# Patient Record
Sex: MALE | Race: WHITE | Employment: OTHER | URBAN - METROPOLITAN AREA
[De-identification: names, ages, dates, MRNs, and addresses within clinical notes are randomized per-mention and may not be internally consistent; named-entity substitution may affect disease eponyms.]

---

## 2017-01-02 ENCOUNTER — APPOINTMENT (OUTPATIENT)
Dept: CARDIAC REHAB | Facility: HOSPITAL | Age: 82
End: 2017-01-02
Attending: INTERNAL MEDICINE
Payer: MEDICARE

## 2017-01-02 ENCOUNTER — PRIOR ORIGINAL RECORDS (OUTPATIENT)
Dept: OTHER | Age: 82
End: 2017-01-02

## 2017-01-04 ENCOUNTER — PRIOR ORIGINAL RECORDS (OUTPATIENT)
Dept: OTHER | Age: 82
End: 2017-01-04

## 2017-01-04 ENCOUNTER — APPOINTMENT (OUTPATIENT)
Dept: CARDIAC REHAB | Facility: HOSPITAL | Age: 82
End: 2017-01-04
Attending: INTERNAL MEDICINE
Payer: MEDICARE

## 2017-01-04 LAB
HEMATOCRIT: 34.3 %
HEMOGLOBIN: 10.6 G/DL
PLATELETS: 206 K/UL
RED BLOOD COUNT: 3.49 X 10-6/U
WHITE BLOOD COUNT: 10.87 X 10-3/U

## 2017-01-06 ENCOUNTER — LAB REQUISITION (OUTPATIENT)
Dept: LAB | Facility: HOSPITAL | Age: 82
End: 2017-01-06
Payer: MEDICARE

## 2017-01-06 ENCOUNTER — PRIOR ORIGINAL RECORDS (OUTPATIENT)
Dept: OTHER | Age: 82
End: 2017-01-06

## 2017-01-06 ENCOUNTER — APPOINTMENT (OUTPATIENT)
Dept: CARDIAC REHAB | Facility: HOSPITAL | Age: 82
End: 2017-01-06
Attending: INTERNAL MEDICINE
Payer: MEDICARE

## 2017-01-06 DIAGNOSIS — I50.31 ACUTE DIASTOLIC HEART FAILURE (HCC): ICD-10-CM

## 2017-01-06 LAB
ALT SERPL-CCNC: 19 U/L (ref 17–63)
ANION GAP SERPL CALC-SCNC: 11 MMOL/L (ref 0–18)
AST SERPL-CCNC: 34 U/L (ref 15–41)
BASOPHILS # BLD: 0.1 K/UL (ref 0–0.2)
BASOPHILS NFR BLD: 1 %
BUN SERPL-MCNC: 18 MG/DL (ref 8–20)
BUN/CREAT SERPL: 16.4 (ref 10–20)
CALCIUM SERPL-MCNC: 8.3 MG/DL (ref 8.5–10.5)
CHLORIDE SERPL-SCNC: 98 MMOL/L (ref 95–110)
CO2 SERPL-SCNC: 25 MMOL/L (ref 22–32)
CREAT SERPL-MCNC: 1.1 MG/DL (ref 0.5–1.5)
EOSINOPHIL # BLD: 0.3 K/UL (ref 0–0.7)
EOSINOPHIL NFR BLD: 3 %
ERYTHROCYTE [DISTWIDTH] IN BLOOD BY AUTOMATED COUNT: 17.6 % (ref 11–15)
GLUCOSE SERPL-MCNC: 119 MG/DL (ref 70–99)
HCT VFR BLD AUTO: 29.5 % (ref 41–52)
HGB BLD-MCNC: 10 G/DL (ref 13.5–17.5)
LYMPHOCYTES # BLD: 0.7 K/UL (ref 1–4)
LYMPHOCYTES NFR BLD: 8 %
MCH RBC QN AUTO: 31.5 PG (ref 27–32)
MCHC RBC AUTO-ENTMCNC: 33.8 G/DL (ref 32–37)
MCV RBC AUTO: 93.2 FL (ref 80–100)
MONOCYTES # BLD: 1.5 K/UL (ref 0–1)
MONOCYTES NFR BLD: 16 %
NEUTROPHILS # BLD AUTO: 6.4 K/UL (ref 1.8–7.7)
NEUTROPHILS NFR BLD: 71 %
OSMOLALITY UR CALC.SUM OF ELEC: 281 MOSM/KG (ref 275–295)
PLATELET # BLD AUTO: 179 K/UL (ref 140–400)
PMV BLD AUTO: 7.8 FL (ref 7.4–10.3)
POTASSIUM SERPL-SCNC: 4 MMOL/L (ref 3.3–5.1)
RBC # BLD AUTO: 3.17 M/UL (ref 4.5–5.9)
SODIUM SERPL-SCNC: 134 MMOL/L (ref 136–144)
WBC # BLD AUTO: 9 K/UL (ref 4–11)

## 2017-01-06 PROCEDURE — 84450 TRANSFERASE (AST) (SGOT): CPT | Performed by: INTERNAL MEDICINE

## 2017-01-06 PROCEDURE — 80048 BASIC METABOLIC PNL TOTAL CA: CPT | Performed by: INTERNAL MEDICINE

## 2017-01-06 PROCEDURE — 84460 ALANINE AMINO (ALT) (SGPT): CPT | Performed by: INTERNAL MEDICINE

## 2017-01-06 PROCEDURE — 85025 COMPLETE CBC W/AUTO DIFF WBC: CPT | Performed by: INTERNAL MEDICINE

## 2017-01-09 ENCOUNTER — APPOINTMENT (OUTPATIENT)
Dept: CARDIAC REHAB | Facility: HOSPITAL | Age: 82
End: 2017-01-09
Attending: INTERNAL MEDICINE
Payer: MEDICARE

## 2017-01-10 ENCOUNTER — PRIOR ORIGINAL RECORDS (OUTPATIENT)
Dept: OTHER | Age: 82
End: 2017-01-10

## 2017-01-11 ENCOUNTER — APPOINTMENT (OUTPATIENT)
Dept: CARDIAC REHAB | Facility: HOSPITAL | Age: 82
End: 2017-01-11
Attending: INTERNAL MEDICINE
Payer: MEDICARE

## 2017-01-13 ENCOUNTER — APPOINTMENT (OUTPATIENT)
Dept: CARDIAC REHAB | Facility: HOSPITAL | Age: 82
End: 2017-01-13
Attending: INTERNAL MEDICINE
Payer: MEDICARE

## 2017-01-16 ENCOUNTER — APPOINTMENT (OUTPATIENT)
Dept: CARDIAC REHAB | Facility: HOSPITAL | Age: 82
End: 2017-01-16
Attending: INTERNAL MEDICINE
Payer: MEDICARE

## 2017-01-16 ENCOUNTER — PRIOR ORIGINAL RECORDS (OUTPATIENT)
Dept: OTHER | Age: 82
End: 2017-01-16

## 2017-01-18 ENCOUNTER — APPOINTMENT (OUTPATIENT)
Dept: CARDIAC REHAB | Facility: HOSPITAL | Age: 82
End: 2017-01-18
Attending: INTERNAL MEDICINE
Payer: MEDICARE

## 2017-01-20 ENCOUNTER — APPOINTMENT (OUTPATIENT)
Dept: CARDIAC REHAB | Facility: HOSPITAL | Age: 82
End: 2017-01-20
Attending: INTERNAL MEDICINE
Payer: MEDICARE

## 2017-01-23 ENCOUNTER — APPOINTMENT (OUTPATIENT)
Dept: CARDIAC REHAB | Facility: HOSPITAL | Age: 82
End: 2017-01-23
Attending: INTERNAL MEDICINE
Payer: MEDICARE

## 2017-01-25 ENCOUNTER — HOSPITAL ENCOUNTER (OUTPATIENT)
Dept: CARDIOLOGY CLINIC | Facility: HOSPITAL | Age: 82
Discharge: HOME | End: 2017-01-25
Attending: NURSE PRACTITIONER
Payer: MEDICARE

## 2017-01-25 ENCOUNTER — HOSPITAL ENCOUNTER (OUTPATIENT)
Dept: CV DIAGNOSTICS | Facility: HOSPITAL | Age: 82
Discharge: HOME | End: 2017-01-25
Attending: THORACIC SURGERY (CARDIOTHORACIC VASCULAR SURGERY)
Payer: MEDICARE

## 2017-01-25 ENCOUNTER — APPOINTMENT (OUTPATIENT)
Dept: CARDIAC REHAB | Facility: HOSPITAL | Age: 82
End: 2017-01-25
Attending: INTERNAL MEDICINE
Payer: MEDICARE

## 2017-01-25 VITALS
OXYGEN SATURATION: 97 % | RESPIRATION RATE: 20 BRPM | DIASTOLIC BLOOD PRESSURE: 62 MMHG | SYSTOLIC BLOOD PRESSURE: 118 MMHG | TEMPERATURE: 98 F | HEART RATE: 85 BPM

## 2017-01-25 DIAGNOSIS — I35.0 AORTIC STENOSIS: ICD-10-CM

## 2017-01-25 PROCEDURE — 93306 TTE W/DOPPLER COMPLETE: CPT | Performed by: THORACIC SURGERY (CARDIOTHORACIC VASCULAR SURGERY)

## 2017-01-25 PROCEDURE — 99214 OFFICE O/P EST MOD 30 MIN: CPT

## 2017-01-25 PROCEDURE — 93306 TTE W/DOPPLER COMPLETE: CPT

## 2017-01-25 NOTE — PROGRESS NOTES
Benny Davis Note    Subjective:   Patient presents for routine 6 Month postop TMVR visit, accompanied by his wife and daughter.   He underwent Mitraclip X 2 on 7/29/2016 with reduction in MR from 4+ to 1+.  He also had a watchman on 9/6/201 pressure: 61mm Hg (S). Compared to the previous study these findings represent improvement in mitral  regurgitation, and worsening in pulmonary hypertension.       Labs:           Lab Results  Component Value Date   PT 31.8* 09/26/2016   INR 1.3* 10/11/201 facility-administered medications on file prior to encounter.       Assessment:  · Severe mitral regurgitation , s/p anne clip x 2 on  7/29/16  - no antiplatelet therapy at present d/t severe GI bleeds   · Chronic atrial fibrillation, s/p Watchman procedur

## 2017-01-27 ENCOUNTER — CARDPULM VISIT (OUTPATIENT)
Dept: CARDIAC REHAB | Facility: HOSPITAL | Age: 82
End: 2017-01-27
Attending: INTERNAL MEDICINE
Payer: MEDICARE

## 2017-01-27 PROCEDURE — 93798 PHYS/QHP OP CAR RHAB W/ECG: CPT

## 2017-01-30 ENCOUNTER — CARDPULM VISIT (OUTPATIENT)
Dept: CARDIAC REHAB | Facility: HOSPITAL | Age: 82
End: 2017-01-30
Attending: INTERNAL MEDICINE
Payer: MEDICARE

## 2017-01-30 PROCEDURE — 93798 PHYS/QHP OP CAR RHAB W/ECG: CPT

## 2017-02-01 ENCOUNTER — CARDPULM VISIT (OUTPATIENT)
Dept: CARDIAC REHAB | Facility: HOSPITAL | Age: 82
End: 2017-02-01
Attending: INTERNAL MEDICINE
Payer: MEDICARE

## 2017-02-01 PROCEDURE — 93798 PHYS/QHP OP CAR RHAB W/ECG: CPT

## 2017-02-03 ENCOUNTER — CARDPULM VISIT (OUTPATIENT)
Dept: CARDIAC REHAB | Facility: HOSPITAL | Age: 82
End: 2017-02-03
Attending: INTERNAL MEDICINE
Payer: MEDICARE

## 2017-02-03 PROCEDURE — 93798 PHYS/QHP OP CAR RHAB W/ECG: CPT

## 2017-02-06 ENCOUNTER — CARDPULM VISIT (OUTPATIENT)
Dept: CARDIAC REHAB | Facility: HOSPITAL | Age: 82
End: 2017-02-06
Attending: INTERNAL MEDICINE
Payer: MEDICARE

## 2017-02-06 PROCEDURE — 93798 PHYS/QHP OP CAR RHAB W/ECG: CPT

## 2017-02-08 ENCOUNTER — CARDPULM VISIT (OUTPATIENT)
Dept: CARDIAC REHAB | Facility: HOSPITAL | Age: 82
End: 2017-02-08
Attending: INTERNAL MEDICINE
Payer: MEDICARE

## 2017-02-08 PROCEDURE — 93798 PHYS/QHP OP CAR RHAB W/ECG: CPT

## 2017-02-10 ENCOUNTER — CARDPULM VISIT (OUTPATIENT)
Dept: CARDIAC REHAB | Facility: HOSPITAL | Age: 82
End: 2017-02-10
Attending: INTERNAL MEDICINE
Payer: MEDICARE

## 2017-02-10 PROCEDURE — 93798 PHYS/QHP OP CAR RHAB W/ECG: CPT

## 2017-02-11 ENCOUNTER — PRIOR ORIGINAL RECORDS (OUTPATIENT)
Dept: OTHER | Age: 82
End: 2017-02-11

## 2017-02-13 ENCOUNTER — CARDPULM VISIT (OUTPATIENT)
Dept: CARDIAC REHAB | Facility: HOSPITAL | Age: 82
End: 2017-02-13
Attending: INTERNAL MEDICINE
Payer: MEDICARE

## 2017-02-13 PROCEDURE — 93798 PHYS/QHP OP CAR RHAB W/ECG: CPT

## 2017-02-15 ENCOUNTER — CARDPULM VISIT (OUTPATIENT)
Dept: CARDIAC REHAB | Facility: HOSPITAL | Age: 82
End: 2017-02-15
Attending: INTERNAL MEDICINE
Payer: MEDICARE

## 2017-02-15 PROCEDURE — 93798 PHYS/QHP OP CAR RHAB W/ECG: CPT

## 2017-02-16 LAB
BUN: 18 MG/DL
CALCIUM: 8.3 MG/DL
CHLORIDE: 98 MEQ/L
CREATININE, SERUM: 1.1 MG/DL
GLUCOSE: 119 MG/DL
HEMATOCRIT: 29.5 %
HEMATOCRIT: 33.9 %
HEMOGLOBIN: 10 G/DL
HEMOGLOBIN: 10.9 G/DL
PLATELETS: 162 K/UL
PLATELETS: 179 K/UL
POTASSIUM, SERUM: 4 MEQ/L
RED BLOOD COUNT: 3.17 X 10-6/U
RED BLOOD COUNT: 3.48 X 10-6/U
SGOT (AST): 34 IU/L
SGPT (ALT): 19 IU/L
SODIUM: 134 MEQ/L
WHITE BLOOD COUNT: 8.82 X 10-3/U
WHITE BLOOD COUNT: 9 X 10-3/U

## 2017-02-17 ENCOUNTER — PRIOR ORIGINAL RECORDS (OUTPATIENT)
Dept: OTHER | Age: 82
End: 2017-02-17

## 2017-02-20 ENCOUNTER — CARDPULM VISIT (OUTPATIENT)
Dept: CARDIAC REHAB | Facility: HOSPITAL | Age: 82
End: 2017-02-20
Attending: INTERNAL MEDICINE
Payer: MEDICARE

## 2017-02-20 PROCEDURE — 93798 PHYS/QHP OP CAR RHAB W/ECG: CPT

## 2017-02-22 ENCOUNTER — CARDPULM VISIT (OUTPATIENT)
Dept: CARDIAC REHAB | Facility: HOSPITAL | Age: 82
End: 2017-02-22
Attending: INTERNAL MEDICINE
Payer: MEDICARE

## 2017-02-22 PROCEDURE — 93798 PHYS/QHP OP CAR RHAB W/ECG: CPT

## 2017-02-24 ENCOUNTER — CARDPULM VISIT (OUTPATIENT)
Dept: CARDIAC REHAB | Facility: HOSPITAL | Age: 82
End: 2017-02-24
Attending: INTERNAL MEDICINE
Payer: MEDICARE

## 2017-02-24 PROCEDURE — 93798 PHYS/QHP OP CAR RHAB W/ECG: CPT

## 2017-02-27 ENCOUNTER — CARDPULM VISIT (OUTPATIENT)
Dept: CARDIAC REHAB | Facility: HOSPITAL | Age: 82
End: 2017-02-27
Attending: INTERNAL MEDICINE
Payer: MEDICARE

## 2017-02-27 PROCEDURE — 93798 PHYS/QHP OP CAR RHAB W/ECG: CPT

## 2017-03-01 ENCOUNTER — CARDPULM VISIT (OUTPATIENT)
Dept: CARDIAC REHAB | Facility: HOSPITAL | Age: 82
End: 2017-03-01
Attending: INTERNAL MEDICINE
Payer: MEDICARE

## 2017-03-01 DIAGNOSIS — Z98.890 S/P MITRAL VALVE REPAIR: Primary | ICD-10-CM

## 2017-03-01 PROCEDURE — 93798 PHYS/QHP OP CAR RHAB W/ECG: CPT

## 2017-03-03 ENCOUNTER — CARDPULM VISIT (OUTPATIENT)
Dept: CARDIAC REHAB | Facility: HOSPITAL | Age: 82
End: 2017-03-03
Attending: INTERNAL MEDICINE
Payer: MEDICARE

## 2017-03-03 PROCEDURE — 93798 PHYS/QHP OP CAR RHAB W/ECG: CPT

## 2017-03-06 ENCOUNTER — CARDPULM VISIT (OUTPATIENT)
Dept: CARDIAC REHAB | Facility: HOSPITAL | Age: 82
End: 2017-03-06
Attending: INTERNAL MEDICINE
Payer: MEDICARE

## 2017-03-06 PROCEDURE — 93798 PHYS/QHP OP CAR RHAB W/ECG: CPT

## 2017-03-08 ENCOUNTER — CARDPULM VISIT (OUTPATIENT)
Dept: CARDIAC REHAB | Facility: HOSPITAL | Age: 82
End: 2017-03-08
Attending: INTERNAL MEDICINE
Payer: MEDICARE

## 2017-03-08 ENCOUNTER — APPOINTMENT (OUTPATIENT)
Dept: CARDIAC REHAB | Facility: HOSPITAL | Age: 82
End: 2017-03-08
Payer: MEDICARE

## 2017-03-08 PROCEDURE — 93798 PHYS/QHP OP CAR RHAB W/ECG: CPT

## 2017-03-10 ENCOUNTER — APPOINTMENT (OUTPATIENT)
Dept: CARDIAC REHAB | Facility: HOSPITAL | Age: 82
End: 2017-03-10
Payer: MEDICARE

## 2017-03-10 ENCOUNTER — CARDPULM VISIT (OUTPATIENT)
Dept: CARDIAC REHAB | Facility: HOSPITAL | Age: 82
End: 2017-03-10
Attending: INTERNAL MEDICINE
Payer: MEDICARE

## 2017-03-10 PROCEDURE — 93798 PHYS/QHP OP CAR RHAB W/ECG: CPT

## 2017-03-13 ENCOUNTER — CARDPULM VISIT (OUTPATIENT)
Dept: CARDIAC REHAB | Facility: HOSPITAL | Age: 82
End: 2017-03-13
Attending: INTERNAL MEDICINE
Payer: MEDICARE

## 2017-03-13 ENCOUNTER — APPOINTMENT (OUTPATIENT)
Dept: CARDIAC REHAB | Facility: HOSPITAL | Age: 82
End: 2017-03-13
Payer: MEDICARE

## 2017-03-13 PROCEDURE — 93798 PHYS/QHP OP CAR RHAB W/ECG: CPT

## 2017-03-15 ENCOUNTER — CARDPULM VISIT (OUTPATIENT)
Dept: CARDIAC REHAB | Facility: HOSPITAL | Age: 82
End: 2017-03-15
Attending: INTERNAL MEDICINE
Payer: MEDICARE

## 2017-03-15 ENCOUNTER — APPOINTMENT (OUTPATIENT)
Dept: CARDIAC REHAB | Facility: HOSPITAL | Age: 82
End: 2017-03-15
Payer: MEDICARE

## 2017-03-15 PROCEDURE — 93798 PHYS/QHP OP CAR RHAB W/ECG: CPT

## 2017-03-17 ENCOUNTER — CARDPULM VISIT (OUTPATIENT)
Dept: CARDIAC REHAB | Facility: HOSPITAL | Age: 82
End: 2017-03-17
Attending: INTERNAL MEDICINE
Payer: MEDICARE

## 2017-03-17 ENCOUNTER — APPOINTMENT (OUTPATIENT)
Dept: CARDIAC REHAB | Facility: HOSPITAL | Age: 82
End: 2017-03-17
Payer: MEDICARE

## 2017-03-17 PROCEDURE — 93798 PHYS/QHP OP CAR RHAB W/ECG: CPT

## 2017-03-20 ENCOUNTER — CARDPULM VISIT (OUTPATIENT)
Dept: CARDIAC REHAB | Facility: HOSPITAL | Age: 82
End: 2017-03-20
Attending: INTERNAL MEDICINE
Payer: MEDICARE

## 2017-03-20 ENCOUNTER — APPOINTMENT (OUTPATIENT)
Dept: CARDIAC REHAB | Facility: HOSPITAL | Age: 82
End: 2017-03-20
Payer: MEDICARE

## 2017-03-20 PROCEDURE — 93798 PHYS/QHP OP CAR RHAB W/ECG: CPT

## 2017-03-22 ENCOUNTER — APPOINTMENT (OUTPATIENT)
Dept: CARDIAC REHAB | Facility: HOSPITAL | Age: 82
End: 2017-03-22
Payer: MEDICARE

## 2017-03-22 ENCOUNTER — CARDPULM VISIT (OUTPATIENT)
Dept: CARDIAC REHAB | Facility: HOSPITAL | Age: 82
End: 2017-03-22
Attending: INTERNAL MEDICINE
Payer: MEDICARE

## 2017-03-22 PROCEDURE — 93798 PHYS/QHP OP CAR RHAB W/ECG: CPT

## 2017-03-24 ENCOUNTER — APPOINTMENT (OUTPATIENT)
Dept: CARDIAC REHAB | Facility: HOSPITAL | Age: 82
End: 2017-03-24
Payer: MEDICARE

## 2017-03-27 ENCOUNTER — APPOINTMENT (OUTPATIENT)
Dept: CARDIAC REHAB | Facility: HOSPITAL | Age: 82
End: 2017-03-27
Payer: MEDICARE

## 2017-03-27 ENCOUNTER — CARDPULM VISIT (OUTPATIENT)
Dept: CARDIAC REHAB | Facility: HOSPITAL | Age: 82
End: 2017-03-27
Attending: INTERNAL MEDICINE
Payer: MEDICARE

## 2017-03-27 PROCEDURE — 93798 PHYS/QHP OP CAR RHAB W/ECG: CPT

## 2017-03-28 ENCOUNTER — DOCUMENTATION ONLY (OUTPATIENT)
Dept: INTERVENTIONAL RADIOLOGY/VASCULAR | Facility: HOSPITAL | Age: 82
End: 2017-03-28

## 2017-03-28 NOTE — PROGRESS NOTES
Patient has reached 6 months post-WATCHMAN device implant. Patient remains off all anticoagulation due to severe GI bleeding, per Dr. Kaila Hughes office note from 2/17/17.

## 2017-03-29 ENCOUNTER — APPOINTMENT (OUTPATIENT)
Dept: CARDIAC REHAB | Facility: HOSPITAL | Age: 82
End: 2017-03-29
Attending: INTERNAL MEDICINE
Payer: MEDICARE

## 2017-04-09 ENCOUNTER — HOSPITAL ENCOUNTER (INPATIENT)
Facility: HOSPITAL | Age: 82
LOS: 4 days | Discharge: HOME | DRG: 378 | End: 2017-04-13
Attending: EMERGENCY MEDICINE | Admitting: INTERNAL MEDICINE
Payer: MEDICARE

## 2017-04-09 DIAGNOSIS — D64.9 ANEMIA, UNSPECIFIED TYPE: Primary | ICD-10-CM

## 2017-04-09 DIAGNOSIS — I95.89 OTHER SPECIFIED HYPOTENSION: ICD-10-CM

## 2017-04-09 PROCEDURE — 86900 BLOOD TYPING SEROLOGIC ABO: CPT | Performed by: EMERGENCY MEDICINE

## 2017-04-09 PROCEDURE — C9113 INJ PANTOPRAZOLE SODIUM, VIA: HCPCS | Performed by: EMERGENCY MEDICINE

## 2017-04-09 PROCEDURE — 86850 RBC ANTIBODY SCREEN: CPT | Performed by: EMERGENCY MEDICINE

## 2017-04-09 PROCEDURE — 99285 EMERGENCY DEPT VISIT HI MDM: CPT

## 2017-04-09 PROCEDURE — 96361 HYDRATE IV INFUSION ADD-ON: CPT

## 2017-04-09 PROCEDURE — 85027 COMPLETE CBC AUTOMATED: CPT | Performed by: EMERGENCY MEDICINE

## 2017-04-09 PROCEDURE — 85025 COMPLETE CBC W/AUTO DIFF WBC: CPT | Performed by: EMERGENCY MEDICINE

## 2017-04-09 PROCEDURE — 86901 BLOOD TYPING SEROLOGIC RH(D): CPT | Performed by: EMERGENCY MEDICINE

## 2017-04-09 PROCEDURE — 30233N1 TRANSFUSION OF NONAUTOLOGOUS RED BLOOD CELLS INTO PERIPHERAL VEIN, PERCUTANEOUS APPROACH: ICD-10-PCS | Performed by: INTERNAL MEDICINE

## 2017-04-09 PROCEDURE — 36430 TRANSFUSION BLD/BLD COMPNT: CPT

## 2017-04-09 PROCEDURE — 80048 BASIC METABOLIC PNL TOTAL CA: CPT | Performed by: EMERGENCY MEDICINE

## 2017-04-09 PROCEDURE — 86920 COMPATIBILITY TEST SPIN: CPT

## 2017-04-09 PROCEDURE — 81003 URINALYSIS AUTO W/O SCOPE: CPT | Performed by: EMERGENCY MEDICINE

## 2017-04-09 PROCEDURE — 85007 BL SMEAR W/DIFF WBC COUNT: CPT | Performed by: EMERGENCY MEDICINE

## 2017-04-09 PROCEDURE — 96374 THER/PROPH/DIAG INJ IV PUSH: CPT

## 2017-04-09 PROCEDURE — 87641 MR-STAPH DNA AMP PROBE: CPT | Performed by: EMERGENCY MEDICINE

## 2017-04-09 RX ORDER — SODIUM CHLORIDE 9 MG/ML
INJECTION, SOLUTION INTRAVENOUS CONTINUOUS
Status: DISCONTINUED | OUTPATIENT
Start: 2017-04-09 | End: 2017-04-11

## 2017-04-09 NOTE — ED NOTES
Pt resting on ED cart with family at bedside. Pt states no pain or nausea, just some cramping intermittently. Skin pink warm dry. Blood transfusing to RAC 20 G with no irritation. Pt and family updated on care.

## 2017-04-09 NOTE — ED INITIAL ASSESSMENT (HPI)
Pt noticed blood in the stool today after bowel movement. sts it appeared dark but he also takes iron.   Hx of gi bleed in the past.  Denies pain but does c/o generalized weakness

## 2017-04-09 NOTE — ED PROVIDER NOTES
Patient Seen in: [facility] AND [facility] Emergency Department    History   Patient presents with:  Bleeding (hematologic)    Stated Complaint: poss gi bleed    HPI  Pt is 81 yo M who p/w generalized weakness x 1 day. +diaphoresis.  Denies nausea, vomiting or di QD   allopurinol 100 MG Oral Tab,  TAKE 1TABLETS BY MOUTH BID   torsemide 10 MG Oral Tab,  Take 20 mg by mouth daily. famoTIDine 20 MG Oral Tab,  Take 20 mg by mouth nightly.    omeprazole 20 MG Oral Capsule Delayed Release,  Take 40 mg by mouth 2 (two) equal  Resp: CTAB, no wheezes or retractions  Ab: soft, nontender, no distension.  +reducible umbilical hernia  Rectal: stool black but hemoccult negative  Extremities: FROM of all extremities, no cyanosis/clubbing/edema  Neuro: CN intact, normal speech, 5/ these tests on the individual orders.    BLOOD TYPE, ABO AND RH D   ANTIBODY SCREEN   RAINBOW DRAW BLUE   RAINBOW DRAW GOLD   RAINBOW DRAW DARK GREEN   RAINBOW DRAW LAVENDER TALL (BNP)       MDM   Pulse Ox: 98%, Normal, RA    Cardiac Monitor: Pulse Readings

## 2017-04-09 NOTE — H&P
[facility]    PATIENT'S NAME: Orville Rodney   ATTENDING PHYSICIAN: Paulo Leo MD   PATIENT ACCOUNT#:   [de-identified]    LOCATION:  [place] [address]  MEDICAL RECORD #:   [id number]       YOB: 1932  ADMISSION DATE:       0 minimally decreased. No active oropharyngeal lesions noted. NECK:  Thyroid feels normal.  HEART:  Normal S1, S2. No S3 audible. No murmurs audible. ABDOMEN:  Slightly bloated. Nontender. No guarding or rigidity. No mass.   Reducible umbilical butch

## 2017-04-10 ENCOUNTER — SURGERY (OUTPATIENT)
Age: 82
End: 2017-04-10

## 2017-04-10 PROCEDURE — 82962 GLUCOSE BLOOD TEST: CPT

## 2017-04-10 PROCEDURE — 85007 BL SMEAR W/DIFF WBC COUNT: CPT | Performed by: INTERNAL MEDICINE

## 2017-04-10 PROCEDURE — 85025 COMPLETE CBC W/AUTO DIFF WBC: CPT | Performed by: INTERNAL MEDICINE

## 2017-04-10 PROCEDURE — 94640 AIRWAY INHALATION TREATMENT: CPT

## 2017-04-10 PROCEDURE — 85018 HEMOGLOBIN: CPT | Performed by: INTERNAL MEDICINE

## 2017-04-10 PROCEDURE — 93005 ELECTROCARDIOGRAM TRACING: CPT

## 2017-04-10 PROCEDURE — 80048 BASIC METABOLIC PNL TOTAL CA: CPT | Performed by: INTERNAL MEDICINE

## 2017-04-10 PROCEDURE — 0D568ZZ DESTRUCTION OF STOMACH, VIA NATURAL OR ARTIFICIAL OPENING ENDOSCOPIC: ICD-10-PCS | Performed by: INTERNAL MEDICINE

## 2017-04-10 PROCEDURE — 85027 COMPLETE CBC AUTOMATED: CPT | Performed by: INTERNAL MEDICINE

## 2017-04-10 PROCEDURE — 93010 ELECTROCARDIOGRAM REPORT: CPT | Performed by: INTERNAL MEDICINE

## 2017-04-10 RX ORDER — MIDAZOLAM HYDROCHLORIDE 1 MG/ML
INJECTION INTRAMUSCULAR; INTRAVENOUS
Status: DISCONTINUED | OUTPATIENT
Start: 2017-04-10 | End: 2017-04-10 | Stop reason: HOSPADM

## 2017-04-10 RX ORDER — SUCRALFATE ORAL 1 G/10ML
1 SUSPENSION ORAL
Status: DISCONTINUED | OUTPATIENT
Start: 2017-04-10 | End: 2017-04-13

## 2017-04-10 RX ORDER — METOCLOPRAMIDE HYDROCHLORIDE 5 MG/ML
10 INJECTION INTRAMUSCULAR; INTRAVENOUS ONCE
Status: COMPLETED | OUTPATIENT
Start: 2017-04-10 | End: 2017-04-10

## 2017-04-10 RX ORDER — PANTOPRAZOLE SODIUM 40 MG/1
40 TABLET, DELAYED RELEASE ORAL
Status: DISCONTINUED | OUTPATIENT
Start: 2017-04-10 | End: 2017-04-13

## 2017-04-10 RX ORDER — SUCRALFATE 1 G/1
1 TABLET ORAL
COMMUNITY
End: 2017-04-18

## 2017-04-10 NOTE — PROGRESS NOTES
[place] [facility] - [facility]    Progress Note    Yvon Dejesus Patient Status:  Inpatient    3/8/1932 MRN [id number]   Location [facility] 2W/SW Attending Milton Alvarado MD   Hosp Day # 1 PCP Nancy Dudley MD       SUBJECTIVE:  No CP, SOB Daily   0.9%  NaCl infusion  Intravenous Continuous         Assessment  Patient Active Problem List:     Hypothyroid     Anemia     COPD (chronic obstructive pulmonary disease) (HCC)     Hyperlipemia     CAD (coronary artery disease)     Gout of ankle, uns

## 2017-04-10 NOTE — PLAN OF CARE
Problem: CARDIOVASCULAR - ADULT  Goal: Maintains optimal cardiac output and hemodynamic stability  INTERVENTIONS:  - Monitor vital signs, rhythm, and trends  - Monitor for bleeding, hypotension and signs of decreased cardiac output  - Evaluate effectivenes mobilization of patient  - Hold pressure on venipuncture sites to achieve adequate hemostasis  - Assess for signs and symptoms of internal bleeding  - Monitor lab trends   Outcome: Progressing  Pt is not bleeding    Problem: SAFETY ADULT - FALL  Goal: Free

## 2017-04-10 NOTE — HISTORICAL OFFICE NOTE
Ferdinand Gibson  : 1932  ACCOUNT:  [id number]  [phone]  PCP: Dr. Ruby Yeboah     TODAY'S DATE: 2017  DICTATED BY:  Mehran Castellanos M.D. ]    CHIEF COMPLAINT: [Followup of .  CAD, of native vessels.]    HPI:    [On 2017, Dominica Brunner, an SOCIAL HISTORY: SMOKING: Former tobacco use. Used to smoke but quit. CAFFEINE: none. ALCOHOL: denies drinking. EXERCISE: no regular exercise. DIET: no special diet. MARITAL STATUS:  and lives with significant other. OCCUPATION: retired.  RESIDENCE:  9. Presence Of Other Vascular Implants And Grafts, LANEY closure device, Watchman    PLAN:  1. Continue current medical therapy. 2. Followup here in four months.       Nora Carter M.D.  BEENA/rebecca    D: 02/17/2017 - T: 03/01/2017 - Job ID# [id number]  C: Dr. Zohaib Ny Additional Medications Administered: None          TEST CONDUCTED BY: Andry Zhao RN  Stress EKG Reviewed By: MB; PM     00/00 - DD: 00 - DT: 000 - Job ID: 00 -c :00

## 2017-04-10 NOTE — CONSULTS
[facility]    Cardiology Consultation    Aba Coronado Location: [facility] 2W/SW    3/8/1932 MRN [id number]   Consulting Date 4/10/2017 [facility] [id number]   Consulting Physician Marcus Godinez MD Attending Physician Hien Comfort [Warfarin]     Bleeding    Comment:Needed 12 units of blood at [facility]             10/2016  Eliquis [Apixaban]      Bleeding    Medications:    No current facility-administered medications on file prior to encounter.   Current Outpatient Prescript Pulse 77  Temp(Src) 98.7 °F (37.1 °C) (Temporal)  Resp 17  Ht 5' 3.5\" (1.613 m)  Wt 146 lb 9.6 oz (66.497 kg)  BMI 25.56 kg/m2  SpO2 95%  General: Comfortable, well-nourished, in no acute distress   HEENT: Atraumatic. No scleral icterus.   Mucous membrane

## 2017-04-10 NOTE — PLAN OF CARE
Problem: Patient Centered Care  Goal: Patient preferences are identified and integrated in the patient’s plan of care  Interventions:  - What would you like us to know as we care for you?  Pt has significan other, keeps a log of his history  - Provide timel

## 2017-04-10 NOTE — OPERATIVE REPORT
Egd:  Long segment catalan's esophagus  Hiatal hernia  Abnormal blood vessel in the mid body of the stomach.  apc cautery done  No ulcers  No active bleeding or old blood seen    Plan;  Continue ppi  Full liquid diet  Advance as tolerated if no bleeding lat

## 2017-04-10 NOTE — PROGRESS NOTES
[facility] AND [facility]  Progress Note    [facility] Patient Status:  Inpatient    3/8/1932 MRN [id number]   Location [facility] 2W/SW Attending Alana Walsh MD   Hosp Day # 1 PCP Chris Ya MD     Assessment:    1.   Upper GI bleeding, INR 1.3* 10/11/2016       Lab Results  Component Value Date    04/10/2017   K 3.7 04/10/2017    04/10/2017   CO2 22 04/10/2017   BUN 40 04/10/2017   CREATSERUM 1.22 04/10/2017    04/10/2017   CA 8.3 04/10/2017          Lab Results  Com

## 2017-04-10 NOTE — CONSULTS
Critical Care Consult     Assessment / Plan:  Acute blood loss anemia:  - trend H+H  - goal hemoglobin >7, platelets >78 and INR <1.5  - hold all anticoagulants and antiplatelet therapy  - large bore IV x2  GI bleed:  - IV pantoprazole  - per GI  Chronic h admission:  sucralfate 1 g Oral Tab Take 1 g by mouth 4 (four) times daily before meals and nightly. Disp:  Rfl:     Tiotropium Bromide Monohydrate 18 MCG Inhalation Cap Inhale 1 mcg into the lungs daily.  Disp:  Rfl:     BREO ELLIPTA 100-25 MCG/INH Inhalat 10/2016  Eliquis [Apixaban]      Bleeding     Social History   Marital Status:    Spouse Name: N/A    Years of Education: N/A  Number of Children: N/A     Occupational History  None on file     Social History Main Topics   Smoking status: Former Smok

## 2017-04-10 NOTE — CONSULTS
[facility]    Report of Consultation    Ashu Jacobson Patient Status:  Inpatient    3/8/1932 MRN [id number]   Location [facility] 2W/SW Attending Radha Palm MD   Hosp Day # 1 PCP Kwabena Bravo MD     Date of Admission: Pantoprazole Sodium (PROTONIX) 40 mg in Sodium Chloride 0.9 % 10 mL IV push 40 mg Intravenous Once   Fluticasone Furoate-Vilanterol (BREO ELLIPTA) 100-25 MCG/INH inhaler 1 puff 1 puff Inhalation Daily   levothyroxine Sodium (SYNTHROID) 50 mcg in Sodium C Blood pressure 111/35, pulse 83, temperature 98.7 °F (37.1 °C), temperature source Temporal, resp. rate 19, height 5' 3.5\" (1.613 m), weight 146 lb 9.6 oz (66.497 kg), SpO2 96 %.     Head: Normocephalic, without obvious abnormality, atraumatic  Throat: l NSAIDS  PROTONIX IV  EGD  HOLD BLOOD THINNERS    Thank you for allowing me to participate in the care of your patient.     [facility] [place] SCHAFFER, Keeley Burns MD  4/10/2017

## 2017-04-11 PROCEDURE — 85025 COMPLETE CBC W/AUTO DIFF WBC: CPT | Performed by: INTERNAL MEDICINE

## 2017-04-11 PROCEDURE — 85018 HEMOGLOBIN: CPT | Performed by: INTERNAL MEDICINE

## 2017-04-11 PROCEDURE — 85027 COMPLETE CBC AUTOMATED: CPT | Performed by: INTERNAL MEDICINE

## 2017-04-11 PROCEDURE — 85007 BL SMEAR W/DIFF WBC COUNT: CPT | Performed by: INTERNAL MEDICINE

## 2017-04-11 PROCEDURE — 80048 BASIC METABOLIC PNL TOTAL CA: CPT | Performed by: INTERNAL MEDICINE

## 2017-04-11 PROCEDURE — 36430 TRANSFUSION BLD/BLD COMPNT: CPT

## 2017-04-11 PROCEDURE — 82962 GLUCOSE BLOOD TEST: CPT

## 2017-04-11 RX ORDER — LEVOTHYROXINE SODIUM 0.15 MG/1
150 TABLET ORAL
Status: DISCONTINUED | OUTPATIENT
Start: 2017-04-12 | End: 2017-04-13

## 2017-04-11 RX ORDER — SODIUM CHLORIDE 9 MG/ML
INJECTION, SOLUTION INTRAVENOUS ONCE
Status: COMPLETED | OUTPATIENT
Start: 2017-04-11 | End: 2017-04-11

## 2017-04-11 RX ORDER — ATORVASTATIN CALCIUM 20 MG/1
20 TABLET, FILM COATED ORAL NIGHTLY
Status: DISCONTINUED | OUTPATIENT
Start: 2017-04-11 | End: 2017-04-13

## 2017-04-11 RX ORDER — SODIUM CHLORIDE 0.9 % (FLUSH) 0.9 %
10 SYRINGE (ML) INJECTION AS NEEDED
Status: DISCONTINUED | OUTPATIENT
Start: 2017-04-11 | End: 2017-04-13

## 2017-04-11 RX ORDER — ALLOPURINOL 100 MG/1
100 TABLET ORAL 2 TIMES DAILY
Status: DISCONTINUED | OUTPATIENT
Start: 2017-04-11 | End: 2017-04-13

## 2017-04-11 NOTE — PROGRESS NOTES
[facility]    Progress Note    Angie Kiser Patient Status:  Inpatient    3/8/1932 MRN [id number]   Location [facility] 2W/SW Attending Shay Mathew MD   Hosp Day # 2 PCP Domenic Quinonez MD       Subjective:   NO BLEEDING 07/29/2016   TROP 0.04* 10/11/2016   CK 27* 07/28/2016           Ekg 12-lead    4/10/2017  ECG Report  Interpretation  -------------------------- Atrial fibrillation Low voltage in limb leads.  - Nonspecific T-abnormality.  ABNORMAL When compared with ECG o

## 2017-04-11 NOTE — PROGRESS NOTES
[facility] AND [facility]  Progress Note    [facility] Patient Status:  Inpatient    3/8/1932 MRN [id number]   Location [facility] 2W/SW Attending Alana Walsh MD   Hosp Day # 2 PCP Chris Ya MD     Assessment:    1.  Upper GI bleeding, Lab Results  Component Value Date   PT 31.8* 09/26/2016   INR 1.3* 10/11/2016       Lab Results  Component Value Date    04/11/2017   K 3.6 04/11/2017    04/11/2017   CO2 22 04/11/2017   BUN 28 04/11/2017   CREATSERUM 1.05 04/11/2017   GL

## 2017-04-11 NOTE — PROGRESS NOTES
Tiffanie Navarrete Patient Status:  Inpatient    3/8/1932 MRN [id number]   Location [facility] 2W/SW Attending Alvah Collet, MD   Hosp Day # 2 PCP Hany Hernandez MD     Critical Care Progress Note    Assessment/Plan:      1.  COPD: not in acu non-distended, positive BS. Extremity: No clubbing or cyanosis no edema. Skin: No rashes or lesions.     Recent Labs   Lab  04/11/17   0455   RBC  2.43*   HGB  7.8*  7.8*   HCT  23.1*   MCV  94.9   MCH  32.0   MCHC  33.7   RDW  19.4*   WBC  9.5   PLT  1

## 2017-04-11 NOTE — PLAN OF CARE
Problem: CARDIOVASCULAR - ADULT  Goal: Maintains optimal cardiac output and hemodynamic stability  INTERVENTIONS:  - Monitor vital signs, rhythm, and trends  - Monitor for bleeding, hypotension and signs of decreased cardiac output  - Evaluate effectivenes pt frequently for physical needs  - Identify cognitive and physical deficits and behaviors that affect risk of falls.   - [place] fall precautions as indicated by assessment.  - Educate pt/family on patient safety including physical limitations  - Instruc

## 2017-04-11 NOTE — PROGRESS NOTES
[facility]    Progress Note    Radford Baumgarten Patient Status:  Inpatient    3/8/1932 MRN [id number]   Location [facility] 2W/SW Attending Ramana Shaikh MD   Hosp Day # 2 PCP Jaswinder Pryor MD       Subjective:   Kira Lopez PHOS 3.7 07/29/2016   TROP 0.04* 10/11/2016   CK 27* 07/28/2016           Ekg 12-lead    4/10/2017  ECG Report  Interpretation  -------------------------- Atrial fibrillation Low voltage in limb leads.  - Nonspecific T-abnormality.  ABNORMAL When compared

## 2017-04-11 NOTE — PROGRESS NOTES
Patient slept during the night. Hgb at 0000 resulted at 7.9. Dr. Linda Schaeffer was notified. Requested transfusion of less than 7. No active bleeding was noted, patient asymptomatic. Vitals stable. Will continue to monitor.

## 2017-04-11 NOTE — OPERATIVE REPORT
[facility]    PATIENT'S NAME: [facility]   ATTENDING PHYSICIAN: Leonard Jo MD   OPERATING PHYSICIAN: Guille Davison MD   PATIENT ACCOUNT#:   [id number]    LOCATION:  [address] [place] [address] #:   [id number]       DATE OF BIRTH: appearance of an angiodysplasia, although not bleeding. This was cauterized with argon plasma coagulation. Careful examination of the esophagus, stomach, and duodenum was done. No other possible lesions seen at present.     The patient tolerated the proc

## 2017-04-12 PROCEDURE — 85018 HEMOGLOBIN: CPT | Performed by: INTERNAL MEDICINE

## 2017-04-12 PROCEDURE — 85007 BL SMEAR W/DIFF WBC COUNT: CPT | Performed by: INTERNAL MEDICINE

## 2017-04-12 PROCEDURE — 82962 GLUCOSE BLOOD TEST: CPT

## 2017-04-12 PROCEDURE — 85027 COMPLETE CBC AUTOMATED: CPT | Performed by: INTERNAL MEDICINE

## 2017-04-12 PROCEDURE — 94640 AIRWAY INHALATION TREATMENT: CPT

## 2017-04-12 PROCEDURE — 85025 COMPLETE CBC W/AUTO DIFF WBC: CPT | Performed by: INTERNAL MEDICINE

## 2017-04-12 NOTE — PROGRESS NOTES
[facility] AND [facility]  Progress Note    Priyank Rose Patient Status:  Inpatient    3/8/1932 MRN [id number]   Location [facility] 2W/SW Attending Alva Lamar MD   Hosp Day # 3 PCP Сергей Singh MD     Assessment:      1.  Upper GI bleeding 1.3* 10/11/2016             Lab Results  Component Value Date   TROP 0.04* 10/11/2016   TROP 0.03 10/11/2016   TROP 0.01 10/02/2016        Medications:    • Atorvastatin Calcium  20 mg Oral Nightly   • Umeclidinium Bromide  1 puff Inhalation Daily   • allo

## 2017-04-12 NOTE — PROGRESS NOTES
[place] [facility] - [facility]    Progress Note    Christopher Palomares Patient Status:  Inpatient    3/8/1932 MRN [id number]   Location [facility] 2W/SW Attending Robert Packer MD   Hosp Day # 3 PCP Aaliyah Johnson MD       SUBJECTIVE:  No CP, SOB 100-25 MCG/INH inhaler 1 puff 1 puff Inhalation Daily         Assessment  Patient Active Problem List:     Hypothyroid     Anemia     COPD (chronic obstructive pulmonary disease) (HCC)     Hyperlipemia     CAD (coronary artery disease)     Gout of ankle, u

## 2017-04-12 NOTE — PROGRESS NOTES
Pulmonary Progress Note     Assessment / Plan:  1. COPD: not in acute exacerbation  - continue Breo  - Incruse in lieu of Spiriva  2.  Acute blood loss anemia: due to GIB  - trend H+H  - cardiology preference for Hgb>8  - hold all anticoagulants and antipla

## 2017-04-13 ENCOUNTER — TELEPHONE (OUTPATIENT)
Dept: MEDSURG UNIT | Facility: HOSPITAL | Age: 82
End: 2017-04-13

## 2017-04-13 ENCOUNTER — PRIOR ORIGINAL RECORDS (OUTPATIENT)
Dept: OTHER | Age: 82
End: 2017-04-13

## 2017-04-13 VITALS
BODY MASS INDEX: 26.71 KG/M2 | DIASTOLIC BLOOD PRESSURE: 60 MMHG | OXYGEN SATURATION: 95 % | HEART RATE: 93 BPM | TEMPERATURE: 98 F | HEIGHT: 63.5 IN | SYSTOLIC BLOOD PRESSURE: 123 MMHG | WEIGHT: 152.63 LBS | RESPIRATION RATE: 20 BRPM

## 2017-04-13 DIAGNOSIS — D64.9 ANEMIA, UNSPECIFIED TYPE: Primary | ICD-10-CM

## 2017-04-13 PROCEDURE — 85025 COMPLETE CBC W/AUTO DIFF WBC: CPT | Performed by: INTERNAL MEDICINE

## 2017-04-13 PROCEDURE — 85018 HEMOGLOBIN: CPT | Performed by: INTERNAL MEDICINE

## 2017-04-13 PROCEDURE — 85027 COMPLETE CBC AUTOMATED: CPT | Performed by: INTERNAL MEDICINE

## 2017-04-13 PROCEDURE — 82962 GLUCOSE BLOOD TEST: CPT

## 2017-04-13 PROCEDURE — 85007 BL SMEAR W/DIFF WBC COUNT: CPT | Performed by: INTERNAL MEDICINE

## 2017-04-13 RX ORDER — ACETAMINOPHEN 325 MG/1
650 TABLET ORAL ONCE
Status: COMPLETED | OUTPATIENT
Start: 2017-04-13 | End: 2017-04-13

## 2017-04-13 NOTE — DISCHARGE SUMMARY
[facility]    PATIENT'S NAME: Mayra Khushi   ATTENDING PHYSICIAN: Jennifer Jimenez.  Abner Velásquez MD   PATIENT ACCOUNT#:   [id number]    LOCATION:  [address] RECORD #:   [id number]       YOB: 1932  ADMISSION DATE:       04/09/

## 2017-04-13 NOTE — PROGRESS NOTES
Pulmonary Progress Note      NAME: Yvon Dejesus - ROOM: 424/228-N - MRN: [id number] - Age: 80year old - : 3/8/1932    Assessment/Plan:  1. COPD: not in acute exacerbation  - continue Breo  - Incruse in lieu of Spiriva  2.  Acute blood loss anemia: du Labs   Lab  04/13/17   0559   RBC  2.62*   HGB  8.4*  8.4*   HCT  24.8*   MCV  94.7   MCH  32.1*   MCHC  33.9   RDW  17.7*   WBC  8.3   PLT  125*     Recent Labs   Lab  04/11/17   0455   GLU  114*   BUN  28*   CREATSERUM  1.05   GFRAA  >60   GFRNAA  >60

## 2017-04-13 NOTE — PLAN OF CARE
Problem: Patient Centered Care  Goal: Patient preferences are identified and integrated in the patient’s plan of care  Interventions:  - What would you like us to know as we care for you?  Pt has significant other, keeps a log of his history  - Provide time Administer supportive blood products/factors, fluids and medications as ordered and appropriate  - Administer supportive blood products/factors as ordered and appropriate   Outcome: Progressing  Hgb 8.4 this am,no active bleeding noted.   Goal: Free from bl

## 2017-05-18 ENCOUNTER — PRIOR ORIGINAL RECORDS (OUTPATIENT)
Dept: OTHER | Age: 82
End: 2017-05-18

## 2017-06-15 LAB
HEMATOCRIT: 24.8 %
HEMOGLOBIN A1C: 8.4 %
HEMOGLOBIN: 8.4 G/DL
PLATELETS: 125 K/UL
RED BLOOD COUNT: 2.62 X 10-6/U
WHITE BLOOD COUNT: 8.3 X 10-3/U

## 2017-06-16 ENCOUNTER — PRIOR ORIGINAL RECORDS (OUTPATIENT)
Dept: OTHER | Age: 82
End: 2017-06-16

## 2017-06-28 ENCOUNTER — HOSPITAL ENCOUNTER (OUTPATIENT)
Age: 82
Discharge: HOME | End: 2017-06-28
Attending: EMERGENCY MEDICINE
Payer: MEDICARE

## 2017-06-28 ENCOUNTER — APPOINTMENT (OUTPATIENT)
Dept: GENERAL RADIOLOGY | Age: 82
End: 2017-06-28
Attending: EMERGENCY MEDICINE
Payer: MEDICARE

## 2017-06-28 VITALS
OXYGEN SATURATION: 95 % | DIASTOLIC BLOOD PRESSURE: 45 MMHG | HEIGHT: 64 IN | HEART RATE: 89 BPM | TEMPERATURE: 98 F | RESPIRATION RATE: 16 BRPM | BODY MASS INDEX: 25.61 KG/M2 | SYSTOLIC BLOOD PRESSURE: 100 MMHG | WEIGHT: 150 LBS

## 2017-06-28 DIAGNOSIS — S93.402A SPRAIN OF LEFT ANKLE, UNSPECIFIED LIGAMENT, INITIAL ENCOUNTER: Primary | ICD-10-CM

## 2017-06-28 PROCEDURE — 99213 OFFICE O/P EST LOW 20 MIN: CPT

## 2017-06-28 PROCEDURE — 99214 OFFICE O/P EST MOD 30 MIN: CPT

## 2017-06-28 PROCEDURE — 73610 X-RAY EXAM OF ANKLE: CPT | Performed by: EMERGENCY MEDICINE

## 2017-06-28 NOTE — ED PROVIDER NOTES
Patient Seen in: [address]    History   Patient presents with:   Ankle Injury    Stated Complaint: Left ankle pain    HPI  1 AM this morning patient got up out of bed to urinate and states when he put his feet on the floo total) by mouth daily.    SPIRIVA HANDIHALER 18 MCG Inhalation Cap,  INHALE THE CONTENTS OF 1 CAPSULE(18 MCG) INTO THE LUNGS DAILY   LEVOTHYROXINE SODIUM 150 MCG Oral Tab,  TAKE 1 TABLET(150 MCG) BY MOUTH BEFORE BREAKFAST   BREO ELLIPTA 100-25 MCG/INH Inhal agreed except as otherwise stated in HPI.     Physical Exam   ED Triage Vitals [06/28/17 1651]  BP: 100/45  Pulse: 89  Resp: 16  Temp: 97.7 °F (36.5 °C)  Temp src: Oral  SpO2: 95 %  O2 Device: None (Room air)    Current:/45   Pulse 89   Temp 97.7 °F ( has gone up and down the stairs on his bottom today. He was advised to continue to do this to avoid falls on the stairs. Patient received pain relief with Tylenol was instructed to continue Tylenol if needed.   Follow-up, return and home management were d

## 2017-06-28 NOTE — ED INITIAL ASSESSMENT (HPI)
1am got out of bed to use bathroom and rolled left ankle. No fall. C/o pain to left ankle. + swelling, tender to touch. Painful weight bearing.

## 2017-07-26 ENCOUNTER — HOSPITAL ENCOUNTER (OUTPATIENT)
Dept: CARDIOLOGY CLINIC | Facility: HOSPITAL | Age: 82
Discharge: HOME | End: 2017-07-26
Attending: NURSE PRACTITIONER
Payer: MEDICARE

## 2017-07-26 ENCOUNTER — HOSPITAL ENCOUNTER (OUTPATIENT)
Dept: CV DIAGNOSTICS | Facility: HOSPITAL | Age: 82
Discharge: HOME | End: 2017-07-26
Attending: THORACIC SURGERY (CARDIOTHORACIC VASCULAR SURGERY)
Payer: MEDICARE

## 2017-07-26 VITALS
SYSTOLIC BLOOD PRESSURE: 121 MMHG | DIASTOLIC BLOOD PRESSURE: 49 MMHG | RESPIRATION RATE: 18 BRPM | HEART RATE: 82 BPM | TEMPERATURE: 98 F | OXYGEN SATURATION: 92 %

## 2017-07-26 DIAGNOSIS — I35.0 AORTIC STENOSIS: ICD-10-CM

## 2017-07-26 PROCEDURE — 99214 OFFICE O/P EST MOD 30 MIN: CPT

## 2017-07-26 PROCEDURE — 93306 TTE W/DOPPLER COMPLETE: CPT | Performed by: THORACIC SURGERY (CARDIOTHORACIC VASCULAR SURGERY)

## 2017-07-26 RX ORDER — TORSEMIDE 10 MG/1
10 TABLET ORAL DAILY
COMMUNITY
End: 2018-02-14

## 2017-07-26 NOTE — PROGRESS NOTES
Benny Davis Note    Subjective:   Patient presents for routine 1 year postop Mitraclip visit, accompanied by his wife and daughter.   He underwent Mitraclip X 2 on 7/29/2016 with reduction in MR from 4+ to 1+.  He also had a watchman on 9/6 estimated at     4mm Hg at a heart rate of 80 beats per minute. There was mild to moderate     regurgitation. 3. Left atrium: The left atrium was markedly dilated. The left atrial volume     was markedly increased. 4. Right ventricle:  The cavity size was daily.   Disp:  Rfl:    docusate sodium 100 MG Oral Cap Take 200 mg by mouth 2 (two) times daily. Disp:  Rfl:    Ferrous Sulfate 325 (65 FE) MG Oral Tab Take 325 mg by mouth 2 (two) times daily.    Disp:  Rfl:      No current facility-administered medicat

## 2017-07-27 ENCOUNTER — TELEPHONE (OUTPATIENT)
Dept: CARDIOLOGY CLINIC | Facility: HOSPITAL | Age: 82
End: 2017-07-27

## 2017-07-27 NOTE — PROGRESS NOTES
I called and updated pt on his echo results from yesterday. I advised his EF was normal 55%, The mitraclips were functioning normally, there remains mild-mod regurg (same as last echo), mean grad 4-7mm. Pulm pressures 40s. He verbalized understanding.

## 2017-08-09 ENCOUNTER — HOSPITAL ENCOUNTER (OUTPATIENT)
Age: 82
Discharge: HOME | End: 2017-08-09
Attending: FAMILY MEDICINE
Payer: MEDICARE

## 2017-08-09 VITALS
HEIGHT: 64 IN | SYSTOLIC BLOOD PRESSURE: 114 MMHG | HEART RATE: 76 BPM | DIASTOLIC BLOOD PRESSURE: 55 MMHG | WEIGHT: 150 LBS | TEMPERATURE: 97 F | BODY MASS INDEX: 25.61 KG/M2 | RESPIRATION RATE: 18 BRPM | OXYGEN SATURATION: 93 %

## 2017-08-09 DIAGNOSIS — S51.812A SKIN TEAR OF FOREARM WITHOUT COMPLICATION, LEFT, INITIAL ENCOUNTER: Primary | ICD-10-CM

## 2017-08-09 PROCEDURE — 99212 OFFICE O/P EST SF 10 MIN: CPT

## 2017-08-09 NOTE — ED INITIAL ASSESSMENT (HPI)
Left forearm skin tear sustained when his dog jumped on him last monday,still bleeding despite dressing per wife, cms intact, minimal oozing

## 2017-09-02 ENCOUNTER — LAB ENCOUNTER (OUTPATIENT)
Dept: LAB | Age: 82
End: 2017-09-02
Attending: INTERNAL MEDICINE
Payer: MEDICARE

## 2017-09-02 DIAGNOSIS — E61.1 IRON DEFICIENCY: ICD-10-CM

## 2017-09-02 DIAGNOSIS — D64.9 ANEMIA: Primary | ICD-10-CM

## 2017-09-02 LAB
BASOPHILS # BLD AUTO: 0.07 X10(3) UL (ref 0–0.1)
BASOPHILS NFR BLD AUTO: 1 %
DEPRECATED HBV CORE AB SER IA-ACNC: 132.1 NG/ML (ref 22–322)
EOSINOPHIL # BLD AUTO: 0.49 X10(3) UL (ref 0–0.3)
EOSINOPHIL NFR BLD AUTO: 6.9 %
ERYTHROCYTE [DISTWIDTH] IN BLOOD BY AUTOMATED COUNT: 19.2 % (ref 11.5–16)
HCT VFR BLD AUTO: 32.4 % (ref 37–53)
HGB BLD-MCNC: 10 G/DL (ref 13–17)
IMMATURE GRANULOCYTE COUNT: 0.08 X10(3) UL (ref 0–1)
IMMATURE GRANULOCYTE RATIO %: 1.1 %
IRON SATURATION: 13 % (ref 13–45)
IRON: 40 UG/DL (ref 45–182)
LYMPHOCYTES # BLD AUTO: 1.21 X10(3) UL (ref 0.9–4)
LYMPHOCYTES NFR BLD AUTO: 16.9 %
MCH RBC QN AUTO: 31.4 PG (ref 27–33.2)
MCHC RBC AUTO-ENTMCNC: 30.9 G/DL (ref 31–37)
MCV RBC AUTO: 101.9 FL (ref 80–99)
MONOCYTES # BLD AUTO: 1.45 X10(3) UL (ref 0.1–0.6)
MONOCYTES NFR BLD AUTO: 20.3 %
MORPHOLOGY: NORMAL
NEUTROPHIL ABS PRELIM: 3.84 X10 (3) UL (ref 1.3–6.7)
NEUTROPHILS # BLD AUTO: 3.84 X10(3) UL (ref 1.3–6.7)
NEUTROPHILS NFR BLD AUTO: 53.8 %
PLATELET # BLD AUTO: 147 10(3)UL (ref 150–450)
PLATELET MORPHOLOGY: NORMAL
RBC # BLD AUTO: 3.18 X10(6)UL (ref 3.8–5.8)
RED CELL DISTRIBUTION WIDTH-SD: 71.6 FL (ref 35.1–46.3)
TOTAL IRON BINDING CAPACITY: 314 UG/DL (ref 298–536)
TRANSFERRIN: 211 MG/DL (ref 200–360)
WBC # BLD AUTO: 7.1 X10(3) UL (ref 4–13)

## 2017-09-02 PROCEDURE — 85025 COMPLETE CBC W/AUTO DIFF WBC: CPT

## 2017-09-02 PROCEDURE — 83550 IRON BINDING TEST: CPT

## 2017-09-02 PROCEDURE — 83540 ASSAY OF IRON: CPT

## 2017-09-02 PROCEDURE — 82728 ASSAY OF FERRITIN: CPT

## 2017-09-25 ENCOUNTER — TELEPHONE (OUTPATIENT)
Dept: INTERVENTIONAL RADIOLOGY/VASCULAR | Facility: HOSPITAL | Age: 82
End: 2017-09-25

## 2017-09-25 NOTE — TELEPHONE ENCOUNTER
Called patient for 1 year follow-up post-WATCHMAN implant. Patient states he is doing well, no bleeding events since December 2016, no stroke/TIA. He is not currently on any anticoagulation due to history of severe GI bleeds.   Will follow-up at 2 years po

## 2017-10-09 ENCOUNTER — PRIOR ORIGINAL RECORDS (OUTPATIENT)
Dept: OTHER | Age: 82
End: 2017-10-09

## 2017-10-12 LAB
ALBUMIN: 3.3 G/DL
ALKALINE PHOSPHATATE(ALK PHOS): 106 IU/L
BILIRUBIN TOTAL: 1 MG/DL
BUN: 19 MG/DL
CALCIUM: 8.8 MG/DL
CHLORIDE: 98 MEQ/L
CREATININE, SERUM: 1.15 MG/DL
GLOBULIN: 2.9 G/DL
GLUCOSE: 108 MG/DL
HEMATOCRIT: 34.5 %
HEMOGLOBIN: 11.2 G/DL
PLATELETS: 197 K/UL
POTASSIUM, SERUM: 3.7 MEQ/L
PROTEIN, TOTAL: 6.2 G/DL
RED BLOOD COUNT: 3.58 X 10-6/U
SGOT (AST): 23 IU/L
SGPT (ALT): 30 IU/L
SODIUM: 135 MEQ/L
WHITE BLOOD COUNT: 15.8 X 10-3/U

## 2017-10-13 ENCOUNTER — PRIOR ORIGINAL RECORDS (OUTPATIENT)
Dept: OTHER | Age: 82
End: 2017-10-13

## 2017-10-27 ENCOUNTER — LAB ENCOUNTER (OUTPATIENT)
Dept: LAB | Facility: HOSPITAL | Age: 82
End: 2017-10-27
Attending: INTERNAL MEDICINE
Payer: MEDICARE

## 2017-10-27 ENCOUNTER — PRIOR ORIGINAL RECORDS (OUTPATIENT)
Dept: OTHER | Age: 82
End: 2017-10-27

## 2017-10-27 DIAGNOSIS — I50.31 ACUTE DIASTOLIC HEART FAILURE (HCC): Primary | ICD-10-CM

## 2017-10-27 PROCEDURE — 36415 COLL VENOUS BLD VENIPUNCTURE: CPT

## 2017-10-27 PROCEDURE — 83880 ASSAY OF NATRIURETIC PEPTIDE: CPT

## 2017-10-27 PROCEDURE — 80048 BASIC METABOLIC PNL TOTAL CA: CPT

## 2017-10-30 LAB
BNP: 415 PMOL/L
BUN: 16 MG/DL
CALCIUM: 8.9 MG/DL
CHLORIDE: 99 MEQ/L
CREATININE, SERUM: 1.25 MG/DL
GLUCOSE: 108 MG/DL
POTASSIUM, SERUM: 3.8 MEQ/L
SODIUM: 135 MEQ/L

## 2017-11-03 ENCOUNTER — PRIOR ORIGINAL RECORDS (OUTPATIENT)
Dept: OTHER | Age: 82
End: 2017-11-03

## 2017-11-13 ENCOUNTER — CARDPULM VISIT (OUTPATIENT)
Dept: CARDIAC REHAB | Facility: HOSPITAL | Age: 82
End: 2017-11-13
Attending: INTERNAL MEDICINE
Payer: MEDICARE

## 2017-11-14 ENCOUNTER — CARDPULM VISIT (OUTPATIENT)
Dept: CARDIAC REHAB | Facility: HOSPITAL | Age: 82
End: 2017-11-14
Attending: INTERNAL MEDICINE
Payer: MEDICARE

## 2017-11-16 ENCOUNTER — CARDPULM VISIT (OUTPATIENT)
Dept: CARDIAC REHAB | Facility: HOSPITAL | Age: 82
End: 2017-11-16
Attending: INTERNAL MEDICINE
Payer: MEDICARE

## 2017-11-21 ENCOUNTER — CARDPULM VISIT (OUTPATIENT)
Dept: CARDIAC REHAB | Facility: HOSPITAL | Age: 82
End: 2017-11-21
Attending: INTERNAL MEDICINE
Payer: MEDICARE

## 2017-11-27 ENCOUNTER — HOSPITAL ENCOUNTER (OUTPATIENT)
Facility: HOSPITAL | Age: 82
Setting detail: OBSERVATION
Discharge: HOME | End: 2017-11-28
Attending: EMERGENCY MEDICINE | Admitting: HOSPITALIST
Payer: MEDICARE

## 2017-11-27 DIAGNOSIS — K43.9 VENTRAL HERNIA WITHOUT OBSTRUCTION OR GANGRENE: Primary | ICD-10-CM

## 2017-11-27 PROCEDURE — 80048 BASIC METABOLIC PNL TOTAL CA: CPT | Performed by: EMERGENCY MEDICINE

## 2017-11-27 PROCEDURE — 86901 BLOOD TYPING SEROLOGIC RH(D): CPT | Performed by: EMERGENCY MEDICINE

## 2017-11-27 PROCEDURE — 81003 URINALYSIS AUTO W/O SCOPE: CPT | Performed by: EMERGENCY MEDICINE

## 2017-11-27 PROCEDURE — 86850 RBC ANTIBODY SCREEN: CPT | Performed by: EMERGENCY MEDICINE

## 2017-11-27 PROCEDURE — 85007 BL SMEAR W/DIFF WBC COUNT: CPT

## 2017-11-27 PROCEDURE — 85027 COMPLETE CBC AUTOMATED: CPT

## 2017-11-27 PROCEDURE — 99285 EMERGENCY DEPT VISIT HI MDM: CPT

## 2017-11-27 PROCEDURE — 36415 COLL VENOUS BLD VENIPUNCTURE: CPT

## 2017-11-27 PROCEDURE — 85025 COMPLETE CBC W/AUTO DIFF WBC: CPT

## 2017-11-27 PROCEDURE — 80048 BASIC METABOLIC PNL TOTAL CA: CPT

## 2017-11-27 PROCEDURE — 83605 ASSAY OF LACTIC ACID: CPT | Performed by: EMERGENCY MEDICINE

## 2017-11-27 PROCEDURE — 86900 BLOOD TYPING SEROLOGIC ABO: CPT | Performed by: EMERGENCY MEDICINE

## 2017-11-27 PROCEDURE — 85025 COMPLETE CBC W/AUTO DIFF WBC: CPT | Performed by: EMERGENCY MEDICINE

## 2017-11-27 RX ORDER — HYDROCODONE BITARTRATE AND ACETAMINOPHEN 5; 325 MG/1; MG/1
1 TABLET ORAL EVERY 4 HOURS PRN
Status: DISCONTINUED | OUTPATIENT
Start: 2017-11-27 | End: 2017-11-28

## 2017-11-27 RX ORDER — MORPHINE SULFATE 4 MG/ML
4 INJECTION, SOLUTION INTRAMUSCULAR; INTRAVENOUS EVERY 2 HOUR PRN
Status: DISCONTINUED | OUTPATIENT
Start: 2017-11-27 | End: 2017-11-28

## 2017-11-27 RX ORDER — MORPHINE SULFATE 2 MG/ML
2 INJECTION, SOLUTION INTRAMUSCULAR; INTRAVENOUS ONCE
Status: DISCONTINUED | OUTPATIENT
Start: 2017-11-27 | End: 2017-11-28

## 2017-11-27 RX ORDER — ATORVASTATIN CALCIUM 20 MG/1
20 TABLET, FILM COATED ORAL NIGHTLY
Status: DISCONTINUED | OUTPATIENT
Start: 2017-11-27 | End: 2017-11-28

## 2017-11-27 RX ORDER — LEVOTHYROXINE SODIUM 0.15 MG/1
150 TABLET ORAL
Status: DISCONTINUED | OUTPATIENT
Start: 2017-11-28 | End: 2017-11-28

## 2017-11-27 RX ORDER — ALLOPURINOL 100 MG/1
100 TABLET ORAL 2 TIMES DAILY
Status: DISCONTINUED | OUTPATIENT
Start: 2017-11-27 | End: 2017-11-28

## 2017-11-27 RX ORDER — DILTIAZEM HYDROCHLORIDE 120 MG/1
120 CAPSULE, COATED, EXTENDED RELEASE ORAL DAILY
Status: DISCONTINUED | OUTPATIENT
Start: 2017-11-28 | End: 2017-11-28

## 2017-11-27 RX ORDER — MORPHINE SULFATE 2 MG/ML
2 INJECTION, SOLUTION INTRAMUSCULAR; INTRAVENOUS EVERY 2 HOUR PRN
Status: DISCONTINUED | OUTPATIENT
Start: 2017-11-27 | End: 2017-11-28

## 2017-11-27 RX ORDER — ACETAMINOPHEN 325 MG/1
650 TABLET ORAL EVERY 6 HOURS PRN
Status: DISCONTINUED | OUTPATIENT
Start: 2017-11-27 | End: 2017-11-28

## 2017-11-27 RX ORDER — PANTOPRAZOLE SODIUM 40 MG/1
40 TABLET, DELAYED RELEASE ORAL
Status: DISCONTINUED | OUTPATIENT
Start: 2017-11-28 | End: 2017-11-28

## 2017-11-27 RX ORDER — ONDANSETRON 2 MG/ML
4 INJECTION INTRAMUSCULAR; INTRAVENOUS EVERY 4 HOURS PRN
Status: DISCONTINUED | OUTPATIENT
Start: 2017-11-27 | End: 2017-11-28

## 2017-11-27 RX ORDER — HYDROCODONE BITARTRATE AND ACETAMINOPHEN 5; 325 MG/1; MG/1
2 TABLET ORAL EVERY 4 HOURS PRN
Status: DISCONTINUED | OUTPATIENT
Start: 2017-11-27 | End: 2017-11-28

## 2017-11-27 RX ORDER — ACETAMINOPHEN 325 MG/1
650 TABLET ORAL EVERY 4 HOURS PRN
Status: DISCONTINUED | OUTPATIENT
Start: 2017-11-27 | End: 2017-11-28

## 2017-11-27 RX ORDER — MONTELUKAST SODIUM 10 MG/1
10 TABLET ORAL NIGHTLY
Status: DISCONTINUED | OUTPATIENT
Start: 2017-11-27 | End: 2017-11-28

## 2017-11-27 RX ORDER — SODIUM CHLORIDE 0.9 % (FLUSH) 0.9 %
3 SYRINGE (ML) INJECTION AS NEEDED
Status: DISCONTINUED | OUTPATIENT
Start: 2017-11-27 | End: 2017-11-28

## 2017-11-27 RX ORDER — MORPHINE SULFATE 2 MG/ML
1 INJECTION, SOLUTION INTRAMUSCULAR; INTRAVENOUS EVERY 2 HOUR PRN
Status: DISCONTINUED | OUTPATIENT
Start: 2017-11-27 | End: 2017-11-28

## 2017-11-27 RX ORDER — MELATONIN
325 2 TIMES DAILY
Status: DISCONTINUED | OUTPATIENT
Start: 2017-11-27 | End: 2017-11-28

## 2017-11-27 RX ORDER — ALBUTEROL SULFATE 90 UG/1
2 AEROSOL, METERED RESPIRATORY (INHALATION) EVERY 6 HOURS PRN
Status: DISCONTINUED | OUTPATIENT
Start: 2017-11-27 | End: 2017-11-28

## 2017-11-27 RX ORDER — FAMOTIDINE 20 MG/1
20 TABLET ORAL NIGHTLY
Status: DISCONTINUED | OUTPATIENT
Start: 2017-11-27 | End: 2017-11-28

## 2017-11-27 NOTE — ED NOTES
Pt's right side of abd is distended. Pt denies n/v/d, urinary complaints or fevers. Pt states, \"Sometimes I feel constipated\". Pt resting comfortably in bed. Will continue to closely monitor.

## 2017-11-27 NOTE — ED INITIAL ASSESSMENT (HPI)
Pt sent by PMD after a CT scan revealed incarcerated hernia. Pt reports mid abdominal pain that started this morning.

## 2017-11-27 NOTE — ED PROVIDER NOTES
Patient Seen in: [facility] AND [facility] Emergency Department    History   Patient presents with:  Abnormal Result (metabolic, cardiac)    Stated Complaint:  Abdominal pain    HPI    79 yo M with PMH afib not on AC secondary to GIB and s/p Watchman procedure, CO 10 MG Oral Tab,  TAKE 1 TABLET BY MOUTH EVERY EVENING   Fluticasone Furoate (ARNUITY ELLIPTA) 200 MCG/ACT Inhalation Aerosol Powder, Breath Activated,  Inhale 1 puff into the lungs daily.    Albuterol Sulfate  (90 Base) MCG/ACT Inhalation Aero Soln, above.    PSFH elements reviewed from today and agreed except as otherwise stated in HPI.     Physical Exam   ED Triage Vitals [11/27/17 1550]  BP: 127/78  Pulse: 99  Resp: 18  Temp: 98.1 °F (36.7 °C)  Temp src: n/a  SpO2: 94 %  O2 Device: None (Room air) orders were created for panel order TYPE AND SCREEN.   Procedure                               Abnormality         Status                     ---------                               -----------         ------                     ABORH (BLOOD XVQF)[[id number] which is stable. IMPRESSION: 1. There has been resolution of a right lower lobe infiltrate/consolidation in the right lower lobe when compared to a CT performed on 9/30/2017. 2. Advanced centrilobular emphysematous change.  3. Trace bilateral pleural eff calcifications are present throughout. MESENTERY/RETROPERITONEUM: There is a supraumbilical ventral hernia containing fat. There is soft tissue stranding within the fat, suggestive of fat necrosis. The fascial gap measures 1.9 cm laterally.  There is ill-de

## 2017-11-28 ENCOUNTER — APPOINTMENT (OUTPATIENT)
Dept: CARDIAC REHAB | Facility: HOSPITAL | Age: 82
End: 2017-11-28
Attending: INTERNAL MEDICINE
Payer: MEDICARE

## 2017-11-28 ENCOUNTER — PRIOR ORIGINAL RECORDS (OUTPATIENT)
Dept: OTHER | Age: 82
End: 2017-11-28

## 2017-11-28 VITALS
HEART RATE: 86 BPM | OXYGEN SATURATION: 91 % | BODY MASS INDEX: 26.12 KG/M2 | TEMPERATURE: 98 F | RESPIRATION RATE: 20 BRPM | WEIGHT: 147.44 LBS | HEIGHT: 63 IN | DIASTOLIC BLOOD PRESSURE: 49 MMHG | SYSTOLIC BLOOD PRESSURE: 103 MMHG

## 2017-11-28 PROCEDURE — 85007 BL SMEAR W/DIFF WBC COUNT: CPT | Performed by: HOSPITALIST

## 2017-11-28 PROCEDURE — 94640 AIRWAY INHALATION TREATMENT: CPT

## 2017-11-28 PROCEDURE — 80048 BASIC METABOLIC PNL TOTAL CA: CPT | Performed by: HOSPITALIST

## 2017-11-28 PROCEDURE — 85027 COMPLETE CBC AUTOMATED: CPT | Performed by: HOSPITALIST

## 2017-11-28 PROCEDURE — 85025 COMPLETE CBC W/AUTO DIFF WBC: CPT | Performed by: HOSPITALIST

## 2017-11-28 RX ORDER — TORSEMIDE 20 MG/1
10 TABLET ORAL DAILY
Status: DISCONTINUED | OUTPATIENT
Start: 2017-11-28 | End: 2017-11-28

## 2017-11-28 RX ORDER — POTASSIUM CHLORIDE 20 MEQ/1
20 TABLET, EXTENDED RELEASE ORAL 2 TIMES DAILY
Status: DISCONTINUED | OUTPATIENT
Start: 2017-11-28 | End: 2017-11-28

## 2017-11-28 RX ORDER — POLYETHYLENE GLYCOL 3350 17 G/17G
17 POWDER, FOR SOLUTION ORAL DAILY
Qty: 7 EACH | Refills: 0 | Status: SHIPPED | OUTPATIENT
Start: 2017-11-28 | End: 2017-12-11

## 2017-11-28 NOTE — CONSULTS
[facility] HOSP - [facility]    Report of Consultation    Anthonythaddeus Muñoz Patient Status:  Emergency    3/8/1932 MRN [id number]   Location [address] Attending Bubba Benitez MD   Hosp Day # 0 PCP Milvia Arnold MD     Date Relation Age of Onset   • Other[other] [OTHER] Father      cerebral hemorrhage    • Other[other] [OTHER] Mother      gallbladder       reports that he quit smoking about 26 years ago.  He has never used smokeless tobacco. He reports that he does not drink a grossly intact. Motor strength and sensory examination is grossly normal.  No focal neurologic deficit.     Laboratory Data:    Lab Results  Component Value Date   WBC 8.7 11/27/2017   HGB 10.5 (L) 11/27/2017   HCT 32.2 (L) 11/27/2017    11/27/2017 believe risks are small for significant problems with incarcerated omentum, however, hernia defect not large and surgery would be reasonable approach depending on risks. Plan admit, observe, repeat exam and labs.   Plan pumonary consult, pt sees Eleanor

## 2017-11-28 NOTE — CONSULTS
Pulmonary H&P/Consult     NAME: Tiffanie Navarrete - ROOM: [address] [place] [address] - MRN: [id number] - Age: 80year old - :  3/8/1932    Date of Admission: 2017  4:18 PM  Admission Diagnosis: Ventral hernia without obstruction or gangrene [K43.9]    Assessment/Ayesha Congestive heart disease (HCC)    • COPD (chronic obstructive pulmonary disease) (HCC)     on home oxygen    • Coronary atherosclerosis    • Disorder of thyroid    • Gout    • Heart attack    • High blood pressure    • High cholesterol    • Hypothyroid 11/28/17 0516   Gross per 24 hour   Intake                0 ml   Output              450 ml   Net             -450 ml     /53 (BP Location: Right arm)   Pulse 84   Temp (!) 97.5 °F (36.4 °C) (Oral)   Resp 20   Ht 160 cm (5' 3\")   Wt 147 lb 7 oz (66.

## 2017-11-28 NOTE — HISTORICAL OFFICE NOTE
Donnell Rodas  : 1932  ACCOUNT:  [id number]  [phone]  PCP: Dr. Henri Segura    TODAY'S DATE: 2017  DICTATED BY:  Grey Chaney M.D.]    CHIEF COMPLAINT: [Followup of .  CAD, of native vessels.]    HPI:  [On 2017, Dagoberto Otto, an 80- 1992, cardiac catheterization 2002, dyslipidemia, hypertension, PTCA 1991, PTCA 1992, EUGENIA and Watchman device implant 2016    FAMILY HISTORY: Significant for premature CAD. Negative for AAA. SOCIAL HISTORY: SMOKING: Former tobacco use.  used to smoke but replacement  8. Mitral regurgitation  9. Presence Of Other Vascular Implants And Grafts, LANEY closure device, Watchaman    PLAN:  [   1. Continue current medical therapy. 2. Obtain records from [place] particularly the echocardiogram and lab work.   3. See

## 2017-11-28 NOTE — CONSULTS
Cardiology (consult dictated)    Assessment:  1. Anterior abdominal wall hernia. Surgical opinion is that surgery can safely be postponed for now.     2.  Multiple cardiac issues including chronic atrial fibrillation, status post Watchman device; severe m

## 2017-11-28 NOTE — DISCHARGE SUMMARY
[facility] Hospitalist Discharge Summary   Patient ID:  Diane Hawk  [id number]  80year old  3/8/1932    Admit date: 11/27/2017  Discharge date: 11/28/2017  Risk of Readmission Lace+ Score: 76  59-90 High Risk  29-58 Medium Risk  0-28   Low Risk    Primary C in case future OR needed. Pt D/C to home, see below for details     Ventral Hernia  -plans for medical management at present time   -Pre Op- per cards \" mild to moderate increased risk for cardiac complications\".  Per pulmonary- \"moderately increased ris 17 g by mouth daily. CONTINUE taking these medications    Albuterol Sulfate  (90 Base) MCG/ACT Aers  Inhale 2 puffs into the lungs every 6 (six) hours as needed for Wheezing.      allopurinol 100 MG Tabs  Commonly known as:  ZYLOPRIM  TAKE 1 T Call Pam Delatorre MD.    Specialty:  SURGERY, GENERAL  Why:  For surgical followup and re-evaluation.   Contact information:  [facility]. [address]  [address]  [phone]             Nestor Robbins MD. Schedule an appointment as s

## 2017-11-28 NOTE — PROGRESS NOTES
[facility]    Progress Note    Priyank Rose Patient Status:  Observation    3/8/1932 MRN [id number]   Location [facility] 4W/SW/SE Attending Andre Sosa MD   Hosp Day # 0 PCP Сергей Singh MD            Subjective:     Pt feel Ct Abdomen+pelvis(cpt=74176)    Result Date: 11/27/2017  IMPRESSION: 1. Supraumbilical ventral hernia containing fat. Soft tissue stranding is suggestive of fat necrosis.  2. Small bilateral pleural effusions, greater on the right than on the pr

## 2017-11-28 NOTE — ED NOTES
Gave report to receiving RN. Awaiting room to be cleaned. Pt denies complaints at this time. Will continue to monitor.

## 2017-11-28 NOTE — CONSULTS
[facility]    PATIENT'S NAME: Eldonmelissa Parkeres   ATTENDING PHYSICIAN: Reuben Andrade MD   CONSULTING PHYSICIAN: Rue Closs.  Mj Amado MD   PATIENT ACCOUNT#:   [id number]    LOCATION:  [address] [place] [address] Drive #:   [id number]       DATE OF BIRTH:  03 q.a.m., vitamin B12, allopurinol, diltiazem 120 q.a.m., Colace, ferrous sulfate, levothyroxine, montelukast, multivitamin, and simvastatin 40 daily. ALLERGIES:  He has no known drug allergies. SOCIAL HISTORY:  Former smoker. No caffeine.   No alcoh

## 2017-11-28 NOTE — H&P
[facility] Hospitalist Team  History and Physical     ASSESSMENT / PLAN:   81 yo male with hx COPD, chronic atrial fibrillation S/P Watchman device, sever MR S/P mitral Clip, CAD S/P CABGChronic hypoxic Respiratory Failure-on 2L at home, gout, Gi Bleeds who prese Patient presents with:  Abnormal Result (metabolic, cardiac)       PCP: Fito yT MD    History of Present Illness:    79 yo male with hx COPD, chronic atrial fibrillation S/P Watchman device, sever MR S/P mitral Clip, CAD S/P CABGChronic hypoxic Res atherosclerosis    • Disorder of thyroid    • Gout    • Heart attack    • High blood pressure    • High cholesterol    • Hypothyroid    • MI, old    • Shortness of breath    • Unspecified essential hypertension         PSH  Past Surgical History:  No date: Rfl: 11   umeclidinium-vilanterol (ANORO ELLIPTA) 62.5-25 MCG/INH Inhalation Aerosol Powder, Breath Activated Inhale 1 puff into the lungs daily. Disp: 1 each Rfl: 11   torsemide 10 MG Oral Tab Take 10 mg by mouth daily.  Disp:  Rfl:    Multiple Vitamin (MU input(s): TROP in the last 72 hours. Radiology: Ct Abdomen+pelvis(cpt=74176)    Result Date: 11/27/2017  IMPRESSION: 1. Supraumbilical ventral hernia containing fat. Soft tissue stranding is suggestive of fat necrosis.  2. Small bilateral pleural effus Fibrillation  -tele  -not on AC as S/P Stefano 2016  -cardizem for HR control  -as per Dr Aurea Nielsen    COPD/Chronic Hypoxic Resp Failure  -uses 2 L at night only   -continue home inhalers  -as per Dr Gary Farmer    CAD S/P PTCA/CABG, MR S/P Mitral Clip, HL, C

## 2017-11-30 ENCOUNTER — CARDPULM VISIT (OUTPATIENT)
Dept: CARDIAC REHAB | Facility: HOSPITAL | Age: 82
End: 2017-11-30
Attending: INTERNAL MEDICINE
Payer: MEDICARE

## 2017-12-05 ENCOUNTER — CARDPULM VISIT (OUTPATIENT)
Dept: CARDIAC REHAB | Facility: HOSPITAL | Age: 82
End: 2017-12-05
Attending: INTERNAL MEDICINE
Payer: MEDICARE

## 2017-12-07 ENCOUNTER — CARDPULM VISIT (OUTPATIENT)
Dept: CARDIAC REHAB | Facility: HOSPITAL | Age: 82
End: 2017-12-07
Attending: INTERNAL MEDICINE
Payer: MEDICARE

## 2017-12-11 PROCEDURE — 88305 TISSUE EXAM BY PATHOLOGIST: CPT | Performed by: INTERNAL MEDICINE

## 2017-12-12 ENCOUNTER — CARDPULM VISIT (OUTPATIENT)
Dept: CARDIAC REHAB | Facility: HOSPITAL | Age: 82
End: 2017-12-12
Attending: INTERNAL MEDICINE
Payer: MEDICARE

## 2017-12-14 ENCOUNTER — CARDPULM VISIT (OUTPATIENT)
Dept: CARDIAC REHAB | Facility: HOSPITAL | Age: 82
End: 2017-12-14
Attending: INTERNAL MEDICINE
Payer: MEDICARE

## 2017-12-19 ENCOUNTER — CARDPULM VISIT (OUTPATIENT)
Dept: CARDIAC REHAB | Facility: HOSPITAL | Age: 82
End: 2017-12-19
Attending: INTERNAL MEDICINE
Payer: MEDICARE

## 2017-12-21 ENCOUNTER — CARDPULM VISIT (OUTPATIENT)
Dept: CARDIAC REHAB | Facility: HOSPITAL | Age: 82
End: 2017-12-21
Attending: INTERNAL MEDICINE
Payer: MEDICARE

## 2017-12-26 ENCOUNTER — CARDPULM VISIT (OUTPATIENT)
Dept: CARDIAC REHAB | Facility: HOSPITAL | Age: 82
End: 2017-12-26
Attending: INTERNAL MEDICINE
Payer: MEDICARE

## 2017-12-28 ENCOUNTER — CARDPULM VISIT (OUTPATIENT)
Dept: CARDIAC REHAB | Facility: HOSPITAL | Age: 82
End: 2017-12-28
Attending: INTERNAL MEDICINE
Payer: MEDICARE

## 2018-01-02 ENCOUNTER — CARDPULM VISIT (OUTPATIENT)
Dept: CARDIAC REHAB | Facility: HOSPITAL | Age: 83
End: 2018-01-02
Attending: INTERNAL MEDICINE
Payer: MEDICARE

## 2018-01-02 LAB
BUN: 19 MG/DL
CALCIUM: 9 MG/DL
CHLORIDE: 106 MEQ/L
CREATININE, SERUM: 1.15 MG/DL
GLUCOSE: 103 MG/DL
HEMATOCRIT: 29.6 %
HEMOGLOBIN: 9.8 G/DL
PLATELETS: 155 K/UL
POTASSIUM, SERUM: 3.9 MEQ/L
RED BLOOD COUNT: 3.01 X 10-6/U
SODIUM: 138 MEQ/L
WHITE BLOOD COUNT: 7.9 X 10-3/U

## 2018-01-04 ENCOUNTER — CARDPULM VISIT (OUTPATIENT)
Dept: CARDIAC REHAB | Facility: HOSPITAL | Age: 83
End: 2018-01-04
Attending: INTERNAL MEDICINE
Payer: MEDICARE

## 2018-01-09 ENCOUNTER — CARDPULM VISIT (OUTPATIENT)
Dept: CARDIAC REHAB | Facility: HOSPITAL | Age: 83
End: 2018-01-09
Attending: INTERNAL MEDICINE
Payer: MEDICARE

## 2018-01-09 ENCOUNTER — PRIOR ORIGINAL RECORDS (OUTPATIENT)
Dept: OTHER | Age: 83
End: 2018-01-09

## 2018-01-10 PROBLEM — J96.10 CHRONIC RESPIRATORY FAILURE (HCC): Status: ACTIVE | Noted: 2018-01-10

## 2018-01-10 PROBLEM — Z87.19 HISTORY OF GASTROINTESTINAL BLEEDING: Status: ACTIVE | Noted: 2018-01-10

## 2018-01-10 PROBLEM — E03.9 HYPOTHYROIDISM, UNSPECIFIED: Status: ACTIVE | Noted: 2018-01-10

## 2018-01-11 ENCOUNTER — CARDPULM VISIT (OUTPATIENT)
Dept: CARDIAC REHAB | Facility: HOSPITAL | Age: 83
End: 2018-01-11
Attending: INTERNAL MEDICINE
Payer: MEDICARE

## 2018-01-16 ENCOUNTER — CARDPULM VISIT (OUTPATIENT)
Dept: CARDIAC REHAB | Facility: HOSPITAL | Age: 83
End: 2018-01-16
Attending: INTERNAL MEDICINE
Payer: MEDICARE

## 2018-01-18 ENCOUNTER — CARDPULM VISIT (OUTPATIENT)
Dept: CARDIAC REHAB | Facility: HOSPITAL | Age: 83
End: 2018-01-18
Attending: INTERNAL MEDICINE
Payer: MEDICARE

## 2018-01-23 ENCOUNTER — APPOINTMENT (OUTPATIENT)
Dept: CARDIAC REHAB | Facility: HOSPITAL | Age: 83
End: 2018-01-23
Attending: INTERNAL MEDICINE
Payer: MEDICARE

## 2018-01-25 ENCOUNTER — APPOINTMENT (OUTPATIENT)
Dept: CARDIAC REHAB | Facility: HOSPITAL | Age: 83
End: 2018-01-25
Attending: INTERNAL MEDICINE
Payer: MEDICARE

## 2018-02-01 ENCOUNTER — CARDPULM VISIT (OUTPATIENT)
Dept: CARDIAC REHAB | Facility: HOSPITAL | Age: 83
End: 2018-02-01
Attending: INTERNAL MEDICINE
Payer: MEDICARE

## 2018-02-06 ENCOUNTER — APPOINTMENT (OUTPATIENT)
Dept: CARDIAC REHAB | Facility: HOSPITAL | Age: 83
End: 2018-02-06
Attending: INTERNAL MEDICINE
Payer: MEDICARE

## 2018-02-08 ENCOUNTER — CARDPULM VISIT (OUTPATIENT)
Dept: CARDIAC REHAB | Facility: HOSPITAL | Age: 83
End: 2018-02-08
Attending: INTERNAL MEDICINE
Payer: MEDICARE

## 2018-02-13 ENCOUNTER — CARDPULM VISIT (OUTPATIENT)
Dept: CARDIAC REHAB | Facility: HOSPITAL | Age: 83
End: 2018-02-13
Attending: INTERNAL MEDICINE
Payer: MEDICARE

## 2018-02-14 PROBLEM — I70.0 CALCIFICATION OF ABDOMINAL AORTA (HCC): Status: ACTIVE | Noted: 2018-02-14

## 2018-02-14 PROBLEM — IMO0002 RIGHT TO LEFT CARDIAC SHUNT: Status: ACTIVE | Noted: 2018-02-14

## 2018-02-14 PROBLEM — I77.9 BILATERAL CAROTID ARTERY DISEASE (HCC): Status: ACTIVE | Noted: 2018-02-14

## 2018-02-14 PROBLEM — M47.814 THORACIC ARTHRITIS: Status: ACTIVE | Noted: 2018-02-14

## 2018-02-14 PROBLEM — S22.000A THORACIC COMPRESSION FRACTURE (HCC): Status: ACTIVE | Noted: 2018-02-14

## 2018-02-14 PROBLEM — I28.0 RIGHT TO LEFT CARDIAC SHUNT (HCC): Status: ACTIVE | Noted: 2018-02-14

## 2018-02-14 PROBLEM — Z95.1 HX OF CABG: Status: ACTIVE | Noted: 2018-02-14

## 2018-02-14 PROBLEM — I70.0 ABDOMINAL AORTIC ATHEROSCLEROSIS (HCC): Status: ACTIVE | Noted: 2018-02-14

## 2018-02-14 PROBLEM — M47.816 ARTHRITIS OF LUMBAR SPINE: Status: ACTIVE | Noted: 2018-02-14

## 2018-02-14 PROBLEM — M40.204 KYPHOSIS OF THORACIC REGION: Status: ACTIVE | Noted: 2018-02-14

## 2018-02-14 PROBLEM — I70.0 THORACIC AORTA ATHEROSCLEROSIS (HCC): Status: ACTIVE | Noted: 2018-02-14

## 2018-02-14 PROBLEM — I27.20 PULMONARY HYPERTENSION (HCC): Status: ACTIVE | Noted: 2018-02-14

## 2018-02-15 ENCOUNTER — CARDPULM VISIT (OUTPATIENT)
Dept: CARDIAC REHAB | Facility: HOSPITAL | Age: 83
End: 2018-02-15
Attending: INTERNAL MEDICINE
Payer: MEDICARE

## 2018-02-20 ENCOUNTER — CARDPULM VISIT (OUTPATIENT)
Dept: CARDIAC REHAB | Facility: HOSPITAL | Age: 83
End: 2018-02-20
Attending: INTERNAL MEDICINE
Payer: MEDICARE

## 2018-02-22 ENCOUNTER — CARDPULM VISIT (OUTPATIENT)
Dept: CARDIAC REHAB | Facility: HOSPITAL | Age: 83
End: 2018-02-22
Attending: INTERNAL MEDICINE
Payer: MEDICARE

## 2018-02-27 ENCOUNTER — CARDPULM VISIT (OUTPATIENT)
Dept: CARDIAC REHAB | Facility: HOSPITAL | Age: 83
End: 2018-02-27
Attending: INTERNAL MEDICINE
Payer: MEDICARE

## 2018-03-01 ENCOUNTER — CARDPULM VISIT (OUTPATIENT)
Dept: CARDIAC REHAB | Facility: HOSPITAL | Age: 83
End: 2018-03-01
Attending: INTERNAL MEDICINE
Payer: MEDICARE

## 2018-03-06 ENCOUNTER — APPOINTMENT (OUTPATIENT)
Dept: CARDIAC REHAB | Facility: HOSPITAL | Age: 83
End: 2018-03-06
Attending: INTERNAL MEDICINE
Payer: MEDICARE

## 2018-03-08 ENCOUNTER — CARDPULM VISIT (OUTPATIENT)
Dept: CARDIAC REHAB | Facility: HOSPITAL | Age: 83
End: 2018-03-08
Attending: INTERNAL MEDICINE
Payer: MEDICARE

## 2018-03-13 ENCOUNTER — CARDPULM VISIT (OUTPATIENT)
Dept: CARDIAC REHAB | Facility: HOSPITAL | Age: 83
End: 2018-03-13
Attending: INTERNAL MEDICINE
Payer: MEDICARE

## 2018-03-15 ENCOUNTER — CARDPULM VISIT (OUTPATIENT)
Dept: CARDIAC REHAB | Facility: HOSPITAL | Age: 83
End: 2018-03-15
Attending: INTERNAL MEDICINE
Payer: MEDICARE

## 2018-03-17 ENCOUNTER — PRIOR ORIGINAL RECORDS (OUTPATIENT)
Dept: OTHER | Age: 83
End: 2018-03-17

## 2018-03-17 ENCOUNTER — LAB ENCOUNTER (OUTPATIENT)
Dept: LAB | Facility: HOSPITAL | Age: 83
End: 2018-03-17
Attending: INTERNAL MEDICINE
Payer: MEDICARE

## 2018-03-17 DIAGNOSIS — I25.10 CAD (CORONARY ARTERY DISEASE): Primary | ICD-10-CM

## 2018-03-17 LAB
ALT SERPL-CCNC: 28 U/L (ref 17–63)
AST SERPL-CCNC: 39 U/L (ref 15–41)
CHOLEST SERPL-MCNC: 140 MG/DL (ref 110–200)
CK SERPL-CCNC: 38 U/L (ref 49–397)
HDLC SERPL-MCNC: 56 MG/DL
LDLC SERPL CALC-MCNC: 70 MG/DL (ref 0–99)
NONHDLC SERPL-MCNC: 84 MG/DL
TRIGL SERPL-MCNC: 72 MG/DL (ref 1–149)

## 2018-03-17 PROCEDURE — 36415 COLL VENOUS BLD VENIPUNCTURE: CPT

## 2018-03-17 PROCEDURE — 82550 ASSAY OF CK (CPK): CPT

## 2018-03-17 PROCEDURE — 80061 LIPID PANEL: CPT

## 2018-03-17 PROCEDURE — 84460 ALANINE AMINO (ALT) (SGPT): CPT

## 2018-03-17 PROCEDURE — 84450 TRANSFERASE (AST) (SGOT): CPT

## 2018-03-20 ENCOUNTER — CARDPULM VISIT (OUTPATIENT)
Dept: CARDIAC REHAB | Facility: HOSPITAL | Age: 83
End: 2018-03-20
Attending: INTERNAL MEDICINE
Payer: MEDICARE

## 2018-03-22 ENCOUNTER — APPOINTMENT (OUTPATIENT)
Dept: CARDIAC REHAB | Facility: HOSPITAL | Age: 83
End: 2018-03-22
Attending: INTERNAL MEDICINE
Payer: MEDICARE

## 2018-03-27 ENCOUNTER — CARDPULM VISIT (OUTPATIENT)
Dept: CARDIAC REHAB | Facility: HOSPITAL | Age: 83
End: 2018-03-27
Attending: INTERNAL MEDICINE
Payer: MEDICARE

## 2018-03-29 ENCOUNTER — CARDPULM VISIT (OUTPATIENT)
Dept: CARDIAC REHAB | Facility: HOSPITAL | Age: 83
End: 2018-03-29
Attending: INTERNAL MEDICINE
Payer: MEDICARE

## 2018-04-03 ENCOUNTER — CARDPULM VISIT (OUTPATIENT)
Dept: CARDIAC REHAB | Facility: HOSPITAL | Age: 83
End: 2018-04-03
Attending: INTERNAL MEDICINE

## 2018-04-05 ENCOUNTER — CARDPULM VISIT (OUTPATIENT)
Dept: CARDIAC REHAB | Facility: HOSPITAL | Age: 83
End: 2018-04-05
Attending: INTERNAL MEDICINE

## 2018-04-10 ENCOUNTER — CARDPULM VISIT (OUTPATIENT)
Dept: CARDIAC REHAB | Facility: HOSPITAL | Age: 83
End: 2018-04-10
Attending: INTERNAL MEDICINE

## 2018-04-17 ENCOUNTER — CARDPULM VISIT (OUTPATIENT)
Dept: CARDIAC REHAB | Facility: HOSPITAL | Age: 83
End: 2018-04-17
Attending: INTERNAL MEDICINE

## 2018-04-30 LAB
ALT (SGPT): 28 U/L
AST (SGOT): 39 U/L
CHOLESTEROL, TOTAL: 140 MG/DL
CREATININE KINASE: 38 U/L
HDL CHOLESTEROL: 56 MG/DL
LDL CHOLESTEROL: 70 MG/DL
NON-HDL CHOLESTEROL: 84 MG/DL
TRIGLYCERIDES: 72 MG/DL

## 2018-05-01 ENCOUNTER — PRIOR ORIGINAL RECORDS (OUTPATIENT)
Dept: OTHER | Age: 83
End: 2018-05-01

## 2018-05-25 ENCOUNTER — PRIOR ORIGINAL RECORDS (OUTPATIENT)
Dept: OTHER | Age: 83
End: 2018-05-25

## 2018-05-31 ENCOUNTER — HOSPITAL ENCOUNTER (OUTPATIENT)
Age: 83
Discharge: HOME | DRG: 291 | End: 2018-05-31
Attending: EMERGENCY MEDICINE
Payer: MEDICARE

## 2018-05-31 VITALS
OXYGEN SATURATION: 93 % | WEIGHT: 143 LBS | BODY MASS INDEX: 25.34 KG/M2 | SYSTOLIC BLOOD PRESSURE: 121 MMHG | DIASTOLIC BLOOD PRESSURE: 57 MMHG | HEART RATE: 99 BPM | TEMPERATURE: 98 F | HEIGHT: 63 IN | RESPIRATION RATE: 24 BRPM

## 2018-05-31 DIAGNOSIS — R04.0 EPISTAXIS: Primary | ICD-10-CM

## 2018-05-31 PROCEDURE — 30901 CONTROL OF NOSEBLEED: CPT

## 2018-05-31 PROCEDURE — 99213 OFFICE O/P EST LOW 20 MIN: CPT

## 2018-05-31 NOTE — ED PROVIDER NOTES
Patient Seen in: [facility]    History   Patient presents with:  Nose Bleed (nasopharyngeal)    Stated Complaint: EPISTAXIS    HPI    Patient complains of a nosebleed.   He states he has been a having a nosebleed from the [facility]                ENDOSCOPY  No date: OTHER SURGICAL HISTORY      Comment: watchman device  No date: REPAIR  ANAL FISTULA,RECT ADV FLAP  No date: TRANSCATH MITRAL VALVE REPAIR VIA CORONARY SIN*    Family history reviewed and is not pertinent to presenting right nostril. After observation no bleeding was noted  MDM       As a result of patient being on continuous oxygen by nasal cannula did not want to perform nasal packing. Advised patient if he has recurrent bleeding to go to emergency department.

## 2018-06-01 ENCOUNTER — APPOINTMENT (OUTPATIENT)
Dept: GENERAL RADIOLOGY | Facility: HOSPITAL | Age: 83
DRG: 291 | End: 2018-06-01
Attending: EMERGENCY MEDICINE
Payer: MEDICARE

## 2018-06-01 ENCOUNTER — HOSPITAL ENCOUNTER (INPATIENT)
Facility: HOSPITAL | Age: 83
LOS: 5 days | Discharge: NURSING, REHAB OR LONG-TERM CARE | DRG: 291 | End: 2018-06-06
Attending: EMERGENCY MEDICINE | Admitting: HOSPITALIST
Payer: MEDICARE

## 2018-06-01 DIAGNOSIS — R06.00 DYSPNEA, UNSPECIFIED TYPE: ICD-10-CM

## 2018-06-01 DIAGNOSIS — J44.1 COPD EXACERBATION (HCC): ICD-10-CM

## 2018-06-01 DIAGNOSIS — E87.1 HYPONATREMIA: ICD-10-CM

## 2018-06-01 DIAGNOSIS — I50.9 ACUTE ON CHRONIC CONGESTIVE HEART FAILURE, UNSPECIFIED HEART FAILURE TYPE (HCC): Primary | ICD-10-CM

## 2018-06-01 PROBLEM — D64.9 ANEMIA: Status: ACTIVE | Noted: 2018-06-01

## 2018-06-01 PROCEDURE — 85379 FIBRIN DEGRADATION QUANT: CPT | Performed by: EMERGENCY MEDICINE

## 2018-06-01 PROCEDURE — 84484 ASSAY OF TROPONIN QUANT: CPT | Performed by: EMERGENCY MEDICINE

## 2018-06-01 PROCEDURE — 93010 ELECTROCARDIOGRAM REPORT: CPT | Performed by: INTERNAL MEDICINE

## 2018-06-01 PROCEDURE — 83880 ASSAY OF NATRIURETIC PEPTIDE: CPT | Performed by: EMERGENCY MEDICINE

## 2018-06-01 PROCEDURE — 96374 THER/PROPH/DIAG INJ IV PUSH: CPT

## 2018-06-01 PROCEDURE — 85060 BLOOD SMEAR INTERPRETATION: CPT | Performed by: EMERGENCY MEDICINE

## 2018-06-01 PROCEDURE — 93005 ELECTROCARDIOGRAM TRACING: CPT

## 2018-06-01 PROCEDURE — 71046 X-RAY EXAM CHEST 2 VIEWS: CPT | Performed by: EMERGENCY MEDICINE

## 2018-06-01 PROCEDURE — 80048 BASIC METABOLIC PNL TOTAL CA: CPT | Performed by: EMERGENCY MEDICINE

## 2018-06-01 PROCEDURE — 85025 COMPLETE CBC W/AUTO DIFF WBC: CPT | Performed by: EMERGENCY MEDICINE

## 2018-06-01 PROCEDURE — 99285 EMERGENCY DEPT VISIT HI MDM: CPT

## 2018-06-01 RX ORDER — FAMOTIDINE 20 MG/1
20 TABLET ORAL NIGHTLY
Status: DISCONTINUED | OUTPATIENT
Start: 2018-06-01 | End: 2018-06-04

## 2018-06-01 RX ORDER — MONTELUKAST SODIUM 10 MG/1
10 TABLET ORAL NIGHTLY
Status: DISCONTINUED | OUTPATIENT
Start: 2018-06-01 | End: 2018-06-06

## 2018-06-01 RX ORDER — ALLOPURINOL 100 MG/1
100 TABLET ORAL 2 TIMES DAILY
Status: DISCONTINUED | OUTPATIENT
Start: 2018-06-01 | End: 2018-06-06

## 2018-06-01 RX ORDER — HEPARIN SODIUM 5000 [USP'U]/ML
5000 INJECTION, SOLUTION INTRAVENOUS; SUBCUTANEOUS EVERY 8 HOURS SCHEDULED
Status: DISCONTINUED | OUTPATIENT
Start: 2018-06-01 | End: 2018-06-02

## 2018-06-01 RX ORDER — ACETAMINOPHEN 325 MG/1
650 TABLET ORAL EVERY 6 HOURS PRN
Status: DISCONTINUED | OUTPATIENT
Start: 2018-06-01 | End: 2018-06-06

## 2018-06-01 RX ORDER — SODIUM CHLORIDE 0.9 % (FLUSH) 0.9 %
3 SYRINGE (ML) INJECTION AS NEEDED
Status: DISCONTINUED | OUTPATIENT
Start: 2018-06-01 | End: 2018-06-06

## 2018-06-01 RX ORDER — FUROSEMIDE 10 MG/ML
40 INJECTION INTRAMUSCULAR; INTRAVENOUS ONCE
Status: COMPLETED | OUTPATIENT
Start: 2018-06-01 | End: 2018-06-01

## 2018-06-01 RX ORDER — ATORVASTATIN CALCIUM 20 MG/1
20 TABLET, FILM COATED ORAL NIGHTLY
Status: DISCONTINUED | OUTPATIENT
Start: 2018-06-01 | End: 2018-06-06

## 2018-06-01 RX ORDER — BISACODYL 10 MG
10 SUPPOSITORY, RECTAL RECTAL
Status: DISCONTINUED | OUTPATIENT
Start: 2018-06-01 | End: 2018-06-06

## 2018-06-01 RX ORDER — DILTIAZEM HYDROCHLORIDE 120 MG/1
120 CAPSULE, COATED, EXTENDED RELEASE ORAL DAILY
Status: DISCONTINUED | OUTPATIENT
Start: 2018-06-02 | End: 2018-06-06

## 2018-06-01 RX ORDER — PANTOPRAZOLE SODIUM 40 MG/1
40 TABLET, DELAYED RELEASE ORAL
Status: DISCONTINUED | OUTPATIENT
Start: 2018-06-02 | End: 2018-06-06

## 2018-06-01 RX ORDER — ONDANSETRON 2 MG/ML
4 INJECTION INTRAMUSCULAR; INTRAVENOUS EVERY 6 HOURS PRN
Status: DISCONTINUED | OUTPATIENT
Start: 2018-06-01 | End: 2018-06-06

## 2018-06-01 RX ORDER — POLYETHYLENE GLYCOL 3350 17 G/17G
17 POWDER, FOR SOLUTION ORAL DAILY PRN
Status: DISCONTINUED | OUTPATIENT
Start: 2018-06-01 | End: 2018-06-06

## 2018-06-01 RX ORDER — LEVOTHYROXINE SODIUM 0.15 MG/1
150 TABLET ORAL
Status: DISCONTINUED | OUTPATIENT
Start: 2018-06-02 | End: 2018-06-06

## 2018-06-01 RX ORDER — METOCLOPRAMIDE HYDROCHLORIDE 5 MG/ML
10 INJECTION INTRAMUSCULAR; INTRAVENOUS EVERY 8 HOURS PRN
Status: DISCONTINUED | OUTPATIENT
Start: 2018-06-01 | End: 2018-06-06

## 2018-06-01 RX ORDER — DIPHENOXYLATE HYDROCHLORIDE AND ATROPINE SULFATE 2.5; .025 MG/1; MG/1
1 TABLET ORAL DAILY
Status: DISCONTINUED | OUTPATIENT
Start: 2018-06-02 | End: 2018-06-06

## 2018-06-01 RX ORDER — CHOLECALCIFEROL (VITAMIN D3) 125 MCG
250 CAPSULE ORAL 2 TIMES DAILY
Status: DISCONTINUED | OUTPATIENT
Start: 2018-06-01 | End: 2018-06-06

## 2018-06-01 RX ORDER — IPRATROPIUM BROMIDE AND ALBUTEROL SULFATE 2.5; .5 MG/3ML; MG/3ML
3 SOLUTION RESPIRATORY (INHALATION) EVERY 4 HOURS PRN
Status: DISCONTINUED | OUTPATIENT
Start: 2018-06-01 | End: 2018-06-03

## 2018-06-01 RX ORDER — MELATONIN
325 2 TIMES DAILY
Status: DISCONTINUED | OUTPATIENT
Start: 2018-06-01 | End: 2018-06-06

## 2018-06-01 RX ORDER — DOCUSATE SODIUM 100 MG/1
200 CAPSULE, LIQUID FILLED ORAL 2 TIMES DAILY
Status: DISCONTINUED | OUTPATIENT
Start: 2018-06-01 | End: 2018-06-06

## 2018-06-01 RX ORDER — DOCUSATE SODIUM 100 MG/1
100 CAPSULE, LIQUID FILLED ORAL 2 TIMES DAILY PRN
Status: DISCONTINUED | OUTPATIENT
Start: 2018-06-01 | End: 2018-06-06

## 2018-06-01 NOTE — ED INITIAL ASSESSMENT (HPI)
Pt using 3 L/min NC and has some SOB and KRAMER. Sent from [address] [place] [address] office for eval.  Reports having a nose bleed for several hours yesterday, and is now states that nostril is clogged.

## 2018-06-01 NOTE — ED NOTES
prelim cbc (under pathological review) lab call received:    Neutrophil 67.9  Monocyctes 22.5  Lymphocytes 5.5  Eosinophils 3.2  Basophils 0.9

## 2018-06-01 NOTE — CONSULTS
[facility] - [facility]    Report of Consultation    Giuliano Miller Patient Status:  Emergency    3/8/1932 MRN [id number]   Location [address] Attending Kraig Brown MD   Hosp Day # 0 PCP Sherrie Matthew MD     Date coughing. He has no fever or chills. He follows with Dr. Haile Calle in the office. Troponin is negative. EKG shows A. fib with PVCs and right axis consistent with his pulmonary disease.   His last echo from a year ago showed normal LV function with mitral v Reanna Burt MD;  Location: [facility]                ENDOSCOPY  No date: OTHER SURGICAL HISTORY      Comment: watchman device  No date: REPAIR  ANAL FISTULA,RECT ADV FLAP  No date: TRANSCATH MITRAL VALVE REPAIR VIA CORONARY SIN*    Family History        Family Histo Peripheral pulses are 2+. Neurologic:  Alert and oriented, normal affect. No motor or coordinational deficit. Skin:  Warm and dry.      Results:     Laboratory Data:          Lab Results  Component Value Date   WBC 9.4 06/01/2018   HGB 10.7 (L) 06/01/2018

## 2018-06-01 NOTE — HISTORICAL OFFICE NOTE
Socorro Benitez  : 1932  ACCOUNT:  [id number]  [phone]  PCP: Dr. Sobeida Gibson     TODAY'S DATE: 2018  DICTATED BY:  [Dr. Owen Castro: [Followup of .  CAD, of native vessels.]    HPI:    [On 2018, kimberlyn Lima 2002, dyslipidemia, hypertension, PTCA 1991, PTCA 1992, EUGENIA and Watchman device implant 2016    FAMILY HISTORY: Significant for premature CAD. Negative for AAA. SOCIAL HISTORY: SMOKING: Former tobacco use. used to smoke but quit. CAFFEINE: none.  ALCOHOL: regurgitation  9. Presence Of Other Vascular Implants And Grafts, LANEY closure device, Watchaman      PLAN:  [1.  Continue same cardiac medications  2.   Follow-up in 6 months]    PRESCRIPTIONS:   05/01/18 Ventolin HFA          108 (90   as needed

## 2018-06-01 NOTE — H&P
[facility] Hospitalist H&P     CC: Patient presents with:  Dyspnea WAGNER SOB (respiratory)       PCP: Nancy Ortiz MD      Assessment and Plan     Marika Hoang is a 80year old male with PMH sig for CAD s/p CABG, severe MR s/p mitral clip,  HTN, HL, afib o MD  [facility] Hospitalist    Answering Service number: [phone]    TERESSA Webber is a 80year old male with PMH sig for CAD s/p CABG, severe MR s/p mitral clip,  HTN, HL, afib on aspirin (sig leeding on coumadin/eliquis), b/l carotid artery disease • Thoracic arthritis ([facility] 2/14/2018   • Thoracic compression fracture ([facility] 2/14/2018   • Unspecified essential hypertension         PSH  Past Surgical History:  No date: CABG  No date: CATARACT  10/4/2016: EGD      Comment: Procedure: Edrie Query 1619   WBC  9.4   HGB  10.7*   MCV  97.3   PLT  229       Recent Labs   Lab  05/31/18   1246   NA  134*   K  4.40   CL  96*   CO2  22.8   BUN  21*   CREATSERUM  1.34*   GLU  100*   CA  8.9       No results for input(s): ALT, AST, ALB, AMYLASE, LIPASE, LDH bilateral reticular nodular opacities have progressed since 7/24/16 and may represent worsening fibrotic changes persist severe interstitial edema. Centrilobular emphysema. Imaging findings suggestive of COPD.       Dictated by (CST): Remy Whatley MD on 6 Chronic mild midthoracic vertebral body wedging. Stable mild thoracic dextrocurvature. UPPER ABDOMEN:Negative. IMPRESSION: Mild interval worsening diffuse groundglass, reticulonodular and interstitial opacities compatible with pulmonary edema.  NO lobar

## 2018-06-02 ENCOUNTER — APPOINTMENT (OUTPATIENT)
Dept: CV DIAGNOSTICS | Facility: HOSPITAL | Age: 83
DRG: 291 | End: 2018-06-02
Attending: INTERNAL MEDICINE
Payer: MEDICARE

## 2018-06-02 PROCEDURE — 93306 TTE W/DOPPLER COMPLETE: CPT | Performed by: INTERNAL MEDICINE

## 2018-06-02 PROCEDURE — 85007 BL SMEAR W/DIFF WBC COUNT: CPT | Performed by: HOSPITALIST

## 2018-06-02 PROCEDURE — 84145 PROCALCITONIN (PCT): CPT | Performed by: HOSPITALIST

## 2018-06-02 PROCEDURE — 85025 COMPLETE CBC W/AUTO DIFF WBC: CPT | Performed by: HOSPITALIST

## 2018-06-02 PROCEDURE — 94770 HC CARBON DIOXIDE EXPIRED GAS DETERMINATION: CPT

## 2018-06-02 PROCEDURE — 85027 COMPLETE CBC AUTOMATED: CPT | Performed by: HOSPITALIST

## 2018-06-02 PROCEDURE — 94640 AIRWAY INHALATION TREATMENT: CPT

## 2018-06-02 PROCEDURE — 80053 COMPREHEN METABOLIC PANEL: CPT | Performed by: HOSPITALIST

## 2018-06-02 RX ORDER — SODIUM CHLORIDE/ALOE VERA
GEL (GRAM) NASAL 2 TIMES DAILY
Status: DISCONTINUED | OUTPATIENT
Start: 2018-06-02 | End: 2018-06-06

## 2018-06-02 RX ORDER — FUROSEMIDE 10 MG/ML
40 INJECTION INTRAMUSCULAR; INTRAVENOUS DAILY
Status: DISCONTINUED | OUTPATIENT
Start: 2018-06-02 | End: 2018-06-04

## 2018-06-02 RX ORDER — POTASSIUM CHLORIDE 20 MEQ/1
40 TABLET, EXTENDED RELEASE ORAL EVERY 4 HOURS
Status: COMPLETED | OUTPATIENT
Start: 2018-06-02 | End: 2018-06-02

## 2018-06-02 NOTE — PROGRESS NOTES
[facility] Hospitalist Progress Note     PCP: Albaro Bell MD    CC: Follow up       Assessment/Plan:     Ashu Jacobson is a 80year old male with PMH sig for CAD s/p CABG, severe MR s/p mitral clip,  HTN, HL, afib on aspirin (sig leeding on coumadin/eliq continue to follow patient while in house     Patient and/or patient's family given opportunity to ask questions and note understanding and agreeing with therapeutic plan as outlined     Thank Boaz Young     Answering Service 20 mg Oral Nightly   • umeclidinium-vilanterol  1 puff Inhalation Daily   • Vitamin B-12  250 mcg Oral BID       Normal Saline Flush, acetaminophen, ondansetron HCl, Metoclopramide HCl, docusate sodium, PEG 3350, bisacodyl, ipratropium-albuterol    Data Re

## 2018-06-02 NOTE — ED PROVIDER NOTES
Patient Seen in: [facility] Emergency Department    History   Patient presents with:  Dyspnea WAGNER SOB (respiratory)    Stated Complaint: nose bleed    HPI    Patient complains of shortness of breath that began a couple weeks ago to where she has b • Thoracic compression fracture ([facility] [address] 2/14/2018   • Unspecified essential hypertension        Past Surgical History:  No date: CABG  No date: CATARACT  10/4/2016: EGD      Comment: Procedure: ESOPHAGOGASTRODUODENOSCOPY (EGD);                  Surgeon: Natalya Aviles MG Oral Tab,  Take 325 mg by mouth 2 (two) times daily.          Family History   Problem Relation Age of Onset   • Other [OTHER] Father      cerebral hemorrhage    • Other [OTHER] Mother      gallbladder        Smoking status: Former Smoker following:     Beta Natriuretic Peptide 760 (*)     All other components within normal limits   CBC W/ DIFFERENTIAL - Abnormal; Notable for the following:     RBC 3.34 (*)     HGB 10.7 (*)     HCT 32.5 (*)     MCH 32.1 (*)     RDW 20.0 (*)     All other co ICD-10-CM Noted POA    Acute on chronic congestive heart failure (HCC) I50.9 6/1/2018 Unknown    Anemia D64.9 6/1/2018 Yes    Hyponatremia E87.1 6/1/2018 Yes                Disposition and Plan     Clinical Impression:  Acute on chronic congestive heart fa

## 2018-06-02 NOTE — PROGRESS NOTES
[facility]    Cardiology Progress Note    Tara Corona Patient Status:  Inpatient    3/8/1932 MRN [id number]   Location [facility] 3W/SW Attending Steward Kussmaul, MD   Hosp Day # 1 PCP Indira Ahumada MD     Primary Cardiolo nondistended  Extremities: No peripheral Edema  Psych: Cooperative    Objective:    Assessment and Plan:      Shortness of breath and A/C diastolic HF  Multifactorial in origin with underlying pulmonary disease, recent nosebleed is affecting his ability to ventricle: The cavity size was moderately dilated.     Systolic function was moderately reduced. 6. Right atrium: The atrium was severely dilated. Central venous     pressure (est): 8mm Hg.   7. Atrial septum: There was a small residual post-surgical atria ROVERTO Umanzor  [facility] Cardiology  06/02/18

## 2018-06-03 PROCEDURE — 94640 AIRWAY INHALATION TREATMENT: CPT

## 2018-06-03 PROCEDURE — 97162 PT EVAL MOD COMPLEX 30 MIN: CPT

## 2018-06-03 PROCEDURE — 80048 BASIC METABOLIC PNL TOTAL CA: CPT | Performed by: HOSPITALIST

## 2018-06-03 PROCEDURE — 84132 ASSAY OF SERUM POTASSIUM: CPT | Performed by: HOSPITALIST

## 2018-06-03 PROCEDURE — 85027 COMPLETE CBC AUTOMATED: CPT | Performed by: HOSPITALIST

## 2018-06-03 PROCEDURE — 97530 THERAPEUTIC ACTIVITIES: CPT

## 2018-06-03 PROCEDURE — 85007 BL SMEAR W/DIFF WBC COUNT: CPT | Performed by: HOSPITALIST

## 2018-06-03 PROCEDURE — 94770 HC CARBON DIOXIDE EXPIRED GAS DETERMINATION: CPT

## 2018-06-03 PROCEDURE — 85025 COMPLETE CBC W/AUTO DIFF WBC: CPT | Performed by: HOSPITALIST

## 2018-06-03 PROCEDURE — 97166 OT EVAL MOD COMPLEX 45 MIN: CPT

## 2018-06-03 RX ORDER — IPRATROPIUM BROMIDE AND ALBUTEROL SULFATE 2.5; .5 MG/3ML; MG/3ML
3 SOLUTION RESPIRATORY (INHALATION)
Status: DISCONTINUED | OUTPATIENT
Start: 2018-06-03 | End: 2018-06-06

## 2018-06-03 RX ORDER — FUROSEMIDE 10 MG/ML
20 INJECTION INTRAMUSCULAR; INTRAVENOUS ONCE
Status: COMPLETED | OUTPATIENT
Start: 2018-06-03 | End: 2018-06-03

## 2018-06-03 NOTE — PROGRESS NOTES
[facility] Hospitalist Progress Note     PCP: Albaro Bell MD    CC: Follow up       Assessment/Plan:     Ashu Jacobson is a 80year old male with PMH sig for CAD s/p CABG, severe MR s/p mitral clip,  HTN, HL, afib on aspirin (sig leeding on coumadin/eliq monitor closely, no current active bleeding     Hypothyroidism  -cont home med     GERD, history of barretts  -cont PPI     Gout  -cont allopurinol     FEN  -no IVF  -lytes in AM  -Cardiac diet     PPX; SCD, hleld HSQ, per family had sig issues with bleedi Nebulization Q6H WA   • ayr saline nasal   Nasal BID   • furosemide  40 mg Intravenous Daily   • allopurinol  100 mg Oral BID   • DilTIAZem HCl ER Coated Beads  120 mg Oral Daily   • docusate sodium  200 mg Oral BID   • famoTIDine  20 mg Oral Nightly   • f

## 2018-06-03 NOTE — OCCUPATIONAL THERAPY NOTE
OCCUPATIONAL THERAPY EVALUATION - INPATIENT     Room Number: 320/320-A  Evaluation Date: 6/3/2018  Type of Evaluation: Initial  Presenting Problem:  (SOB, acute on chronic CHF)    Physician Order: IP Consult to Occupational Therapy  Reason for Therapy: ADL MEDICAL/SOCIAL HISTORY     Problem List  Principal Problem:    Acute on chronic congestive heart failure, unspecified heart failure type ([facility] [address]  Active Problems:    Hyponatremia    Anemia    Acute on chronic congestive heart failure (HCC)    COPD exacerbati ADV FLAP  No date: TRANSCATH MITRAL VALVE REPAIR VIA CORONARY SIN*    HOME SITUATION  Type of Home: House  Home Layout: Two level  Lives With: Spouse     Toilet and Equipment: Standard height toilet  Shower/Tub and Equipment: Walk-in shower;Grab bar; Shower Supervision  Sit to Stand:  (CGA)  Toilet Transfer: CGA  Shower Transfer: NT  Chair Transfer: CGA    Bedroom Mobility: CGA with use of RW, min a without device    BALANCE ASSESSMENT  Static Sitting: good  Dynamic Sitting: good  Static Standing: fair  Dynam

## 2018-06-03 NOTE — PHYSICAL THERAPY NOTE
PHYSICAL THERAPY EVALUATION - INPATIENT     Room Number: 320/320-A  Evaluation Date: 6/3/2018  Type of Evaluation: Initial PT Evaluation   Physician Order: PT Eval and Treat    Presenting Problem: CHF  Reason for Therapy: Mobility Dysfunction and Discharg congestive heart failure (HCC)    COPD exacerbation (HCC)    Dyspnea, unspecified type      Past Medical History  Past Medical History:   Diagnosis Date   • Abdominal aortic atherosclerosis ([facility] [address] 2/14/2018   • Arrhythmia     Afib   • Arthritis of lumbar sp Yes    Lives With: Spouse  Drives: Yes          Prior Level of Martin: Patient lives with wife in a 2 story house with 12 steps to the second floor. He was independent with self care, ADLs and gait without assistive device prior to admission.      SUB walker             Bed Mobility: not tested, patient up in chair    Transfers: min assist x 1    Exercise/Education Provided:  Patient and family educated on PT plan of care, goals and continued therapy after discharge from the hospital. Patient's daughter

## 2018-06-03 NOTE — CM/SW NOTE
6/3/18 CM Discharge planning   PT resides with s/o 2 level family home, reports independent with ADL's and has home O2 from [place]. Discharge planning discussed and [place]  options provided. Pt [facility] hx of [facility].   Referral made to [facility], p

## 2018-06-03 NOTE — PROGRESS NOTES
[facility]    Cardiology Progress Note    Tasha Cardozo Patient Status:  Inpatient    3/8/1932 MRN [id number]   Location [facility] 3W/SW Attending Peyman Mccoy MD   Hosp Day # 2 PCP Yovani Polk MD     Primary Cardiolo nondistended  Extremities: No peripheral Edema  Psych: Cooperative    Objective:    Assessment and Plan:      Shortness of breath and A/C diastolic HF  Multifactorial in origin with underlying pulmonary disease, recent nosebleed is affecting his ability to 10.3 (L) 06/03/2018   HCT 30.6 (L) 06/03/2018    06/03/2018   CREATSERUM 1.13 06/03/2018   BUN 19 06/03/2018    (L) 06/03/2018   K 4.4 06/03/2018   K 4.4 06/03/2018    06/03/2018   CO2 23 06/03/2018    (H) 06/03/2018   CA 8.9 06/0

## 2018-06-04 ENCOUNTER — APPOINTMENT (OUTPATIENT)
Dept: GENERAL RADIOLOGY | Facility: HOSPITAL | Age: 83
DRG: 291 | End: 2018-06-04
Attending: INTERNAL MEDICINE
Payer: MEDICARE

## 2018-06-04 ENCOUNTER — APPOINTMENT (OUTPATIENT)
Dept: GENERAL RADIOLOGY | Facility: HOSPITAL | Age: 83
DRG: 291 | End: 2018-06-04
Attending: HOSPITALIST
Payer: MEDICARE

## 2018-06-04 PROCEDURE — 94770 HC CARBON DIOXIDE EXPIRED GAS DETERMINATION: CPT

## 2018-06-04 PROCEDURE — 94640 AIRWAY INHALATION TREATMENT: CPT

## 2018-06-04 PROCEDURE — 71045 X-RAY EXAM CHEST 1 VIEW: CPT | Performed by: INTERNAL MEDICINE

## 2018-06-04 PROCEDURE — 85007 BL SMEAR W/DIFF WBC COUNT: CPT | Performed by: HOSPITALIST

## 2018-06-04 PROCEDURE — 73110 X-RAY EXAM OF WRIST: CPT | Performed by: HOSPITALIST

## 2018-06-04 PROCEDURE — 85027 COMPLETE CBC AUTOMATED: CPT | Performed by: HOSPITALIST

## 2018-06-04 PROCEDURE — 97116 GAIT TRAINING THERAPY: CPT

## 2018-06-04 PROCEDURE — 80048 BASIC METABOLIC PNL TOTAL CA: CPT | Performed by: HOSPITALIST

## 2018-06-04 PROCEDURE — 85025 COMPLETE CBC W/AUTO DIFF WBC: CPT | Performed by: HOSPITALIST

## 2018-06-04 PROCEDURE — 97110 THERAPEUTIC EXERCISES: CPT

## 2018-06-04 RX ORDER — TORSEMIDE 20 MG/1
20 TABLET ORAL DAILY
Status: DISCONTINUED | OUTPATIENT
Start: 2018-06-05 | End: 2018-06-06

## 2018-06-04 NOTE — PROGRESS NOTES
[facility]  Progress [facility]    Toño Castano Patient Status:  Inpatient    3/8/1932 MRN [id number]   Location [facility] 3W/SW Attending Álvarez MD Radhika   Hosp Day # 3 PCP Glenda Gallegos MD     Assessment:    1.   Acute on chronic re Extremities: Without clubbing, cyanosis or edema. Skin: Warm and dry. Labs:    Lab Results  Component Value Date   WBC 8.9 06/04/2018   HGB 10.0 06/04/2018   HCT 29.9 06/04/2018    06/04/2018       Lab Results  Component Value Date   PT 31.

## 2018-06-04 NOTE — PROGRESS NOTES
Pharmacy note: Duplicate Proton Pump Inhibitor with Histamine 2 blocker. Mahendra Bahena is a 80year old male who has been prescribed both Famotidine and Protonix.   Pepcid was discontinued per P&T approved protocol for duplicate therapy in adult patien

## 2018-06-04 NOTE — CONSULTS
Pulmonary Consult     Assessment / Plan:  1. Acute on chronic hypoxic respiratory failure - due to heart failure exacerbation  - wean O2 as able. At baseline uses 2L supplemental O2 with activity and sleep  - check CXR  2.  Acute on chronic systolic heart f Shortness of breath    • Thoracic aorta atherosclerosis ([address] 2/14/2018   • Thoracic arthritis ([address] 2/14/2018   • Thoracic compression fracture ([address] 2/14/2018   • Unspecified essential hypertension        Past Surgical History:  No date: CABG  No date: C Inhalation Aero Soln Inhale 2 puffs into the lungs every 6 (six) hours as needed for Wheezing. Disp: 1 Inhaler Rfl: 11 5/31/2018 at Unknown time   torsemide 10 MG Oral Tab Take 1 tablet (10 mg total) by mouth daily.  Disp: 90 tablet Rfl: 1 6/1/2018 at [facility] needed for Wheezing. Disp: 1 Inhaler Rfl: 11   torsemide 10 MG Oral Tab Take 1 tablet (10 mg total) by mouth daily. Disp: 90 tablet Rfl: 1   Multiple Vitamin (MULTI VITAMIN MENS) Oral Tab Take 1 tablet by mouth daily.  Disp:  Rfl:    famoTIDine 20 MG Oral T Chest imaging reviewed

## 2018-06-04 NOTE — PROGRESS NOTES
[facility] Hospitalist Progress Note     PCP: Lyndsey Dallas MD    CC: Follow up       Assessment/Plan:     Oziel Edmonds is a 80year old male with PMH sig for CAD s/p CABG, severe MR s/p mitral clip,  HTN, HL, afib on aspirin (sig leeding on coumadin/eliq in the low 130's  -likely 2/2 overload, monitor, improving    Anemia  -range from 9-11 in the last several months  -was 11's prior to admission but did have recent large nose bleed  -hgb 9.8->10.3->10.0,  monitor closely, no current active bleeding     Hyp heard  CV: irreg, irreg,  No murmurs, rubs, gallops  Abd: Abdomen soft, nontender, nondistended, no organomegaly, bowel sounds present  MSK:no clubbing, no cyanosis.   No Lower extremity edema  Skin: no rashes or lesions, well perfused  Psych: mood stable, (cpt=73110)    Result Date: 6/4/2018  CONCLUSION:  1. No acute fracture dislocation. 2. Right wrist and hand degenerative osteoarthritis.  3. Chondrocalcinosis (CPPD) right wrist    Dictated by (CST): Suzanne [place] MD on 6/04/2018 at 12:39     Approve

## 2018-06-04 NOTE — PHYSICAL THERAPY NOTE
PHYSICAL THERAPY TREATMENT NOTE - INPATIENT     Room Number: 320/320-A       Presenting Problem: CHF    Problem List  Principal Problem:    Acute on chronic congestive heart failure, unspecified heart failure type [facility])  Active Problems:    Hyponatremia with arms (e.g., wheelchair, bedside commode, etc.): A Little   -   Moving from lying on back to sitting on the side of the bed?: A Little   How much help from another person does the patient currently need. ..   -   Moving to and from a bed to a chair (inc

## 2018-06-04 NOTE — CM/SW NOTE
Progression of care - Currently receiving IV lasix, no AC for A.fib due to bleeding issues, cardiology following. Wears oxygen chronically, new pulm consult pending. Will discharge to home w/ [place] when medically stable, will need orders.    Akin Thompson RN, CTL

## 2018-06-05 ENCOUNTER — APPOINTMENT (OUTPATIENT)
Dept: CV DIAGNOSTICS | Facility: HOSPITAL | Age: 83
DRG: 291 | End: 2018-06-05
Attending: INTERNAL MEDICINE
Payer: MEDICARE

## 2018-06-05 ENCOUNTER — APPOINTMENT (OUTPATIENT)
Dept: NUCLEAR MEDICINE | Facility: HOSPITAL | Age: 83
DRG: 291 | End: 2018-06-05
Attending: INTERNAL MEDICINE
Payer: MEDICARE

## 2018-06-05 PROCEDURE — 78452 HT MUSCLE IMAGE SPECT MULT: CPT | Performed by: INTERNAL MEDICINE

## 2018-06-05 PROCEDURE — 80048 BASIC METABOLIC PNL TOTAL CA: CPT | Performed by: HOSPITALIST

## 2018-06-05 PROCEDURE — 97110 THERAPEUTIC EXERCISES: CPT

## 2018-06-05 PROCEDURE — 97535 SELF CARE MNGMENT TRAINING: CPT

## 2018-06-05 PROCEDURE — 94640 AIRWAY INHALATION TREATMENT: CPT

## 2018-06-05 PROCEDURE — 97116 GAIT TRAINING THERAPY: CPT

## 2018-06-05 PROCEDURE — 93017 CV STRESS TEST TRACING ONLY: CPT | Performed by: INTERNAL MEDICINE

## 2018-06-05 PROCEDURE — A4216 STERILE WATER/SALINE, 10 ML: HCPCS

## 2018-06-05 PROCEDURE — 94770 HC CARBON DIOXIDE EXPIRED GAS DETERMINATION: CPT

## 2018-06-05 PROCEDURE — 93018 CV STRESS TEST I&R ONLY: CPT | Performed by: HOSPITALIST

## 2018-06-05 PROCEDURE — 85025 COMPLETE CBC W/AUTO DIFF WBC: CPT | Performed by: HOSPITALIST

## 2018-06-05 PROCEDURE — 93016 CV STRESS TEST SUPVJ ONLY: CPT | Performed by: HOSPITALIST

## 2018-06-05 RX ORDER — 0.9 % SODIUM CHLORIDE 0.9 %
VIAL (ML) INJECTION
Status: COMPLETED
Start: 2018-06-05 | End: 2018-06-05

## 2018-06-05 NOTE — PROGRESS NOTES
[facility] Cardiology Progress Note      Waylon Dylan Patient Status:  Inpatient    3/8/1932 MRN [id number]   Location [facility] 3W/SW Attending Sandi Briggs, DO   Hosp Day # 4 PCP Barrett Tirado MD       A lb (68.9 kg)       Exam  Gen: No acute distress, alert and oriented x3,   Neck:supple,no JVD  Pulm: Lungs clear, normal respiratory effort  CV:  Heart with regular rate and irregularly irregular rhythm, Nl S1,S2 ,no S3 or murmur  Abd: Abdomen soft, nontend DDIMER 0.69 06/01/2018   ESRML 77 (H) 06/06/2016   CRP 7.77 (H) 06/06/2016   MG 2.0 10/16/2016   PHOS 3.7 07/29/2016   TROP 0.03 06/01/2018   CK 38 (L) 03/17/2018       Xr Wrist Complete (min 3 Views), Right (cpt=73110)    Result Date: 6/4/2018  CONCLUSI

## 2018-06-05 NOTE — PROGRESS NOTES
Pulmonary Progress Note      NAME: Priscilla Troy - ROOM: 320/320-A - MRN: [id number] - Age: 80year old - : 3/8/1932    Assessment/Plan:  1.  Acute on chronic hypoxic respiratory failure - due to heart failure exacerbation, improved with diuresis  - we BMI 27.13 kg/m²   Physical Exam:   General: alert, cooperative, no respiratory distress. HEENT: Normocephalic atraumatic. Lips, mucosa, and tongue normal.  No thrush noted. Lungs: Diminished bilaterally   Chest wall: No tenderness or deformity.    Heart:

## 2018-06-05 NOTE — HOME CARE LIAISON
Met with patient and his partner Sameer at the bedside. Both are agreeable to [facility], pending orders. Brochure and liaison's business card provided with contact information. All questions addressed and answered.

## 2018-06-05 NOTE — OCCUPATIONAL THERAPY NOTE
OCCUPATIONAL THERAPY TREATMENT NOTE - INPATIENT        Room Number: 320/320-A           Presenting Problem:  (SOB, acute on chronic CHF)    Problem List  Principal Problem:    Acute on chronic congestive heart failure, unspecified heart failure type ([facility] Activity Short Form  How much help from another person does the patient currently need…  -   Putting on and taking off regular lower body clothing?: A Little  -   Bathing (including washing, rinsing, drying)?: A Little  -   Toileting, which includes using

## 2018-06-05 NOTE — PHYSICAL THERAPY NOTE
PHYSICAL THERAPY TREATMENT NOTE - INPATIENT     Room Number: 320/320-A       Presenting Problem: CHF    Problem List  Principal Problem:    Acute on chronic congestive heart failure, unspecified heart failure type [facility])  Active Problems:    Hyponatremia Moving to and from a bed to a chair (including a wheelchair)?: A Little   -   Need to walk in hospital room?: A Little   -   Climbing 3-5 steps with a railing?: A Lot     AM-PAC Score:  Raw Score: 17   PT Approx Degree of Impairment Score: 50.57%   Standar

## 2018-06-05 NOTE — PLAN OF CARE
Maintains optimal cardiac output and hemodynamic stability Progressing      Absence of cardiac arrhythmias or at baseline Progressing      Patient preferences are identified and integrated in the patient's plan of care Progressing      Patient/Family Long

## 2018-06-05 NOTE — PROGRESS NOTES
[facility] Hospitalist Progress Note     PCP: Jessica Sanches MD    CC: Follow up       Assessment/Plan:   Radha Edgar is a 80year old male with PMH sig for CAD s/p CABG, severe MR s/p mitral clip,  HTN, HL, afib on aspirin (sig leeding on coumadin/eliqui to admisison in the low 130's  -thought likely 2/2 overload, monitor, improved initially, now back down to 131, will monitor    Anemia  -range from 9-11 in the last several months  -was 11's prior to admission but did have recent large nose bleed  -hgb 9.8 (66.2 kg)  05/31/18 1323 : 143 lb (64.9 kg)      Exam  Gen: No acute distress, alert and oriented x3, appears chronically ill, ND  Neck Supple, no JVD  Pulm: poor airmovement b/l but clear,trace crackle LLL no wheezing heard  CV: irreg, irreg,  No murmurs, 9.1   GLU  102*  111*  131*  135*  159*       Recent Labs   Lab  06/02/18   0615   ALT  19   AST  31   ALB  2.5*       No results for input(s): PGLU in the last 168 hours.     Recent Labs   Lab  06/01/18   1630   TROP  0.03       Imaging:Xr Wrist Complete (

## 2018-06-06 VITALS
TEMPERATURE: 98 F | OXYGEN SATURATION: 96 % | DIASTOLIC BLOOD PRESSURE: 52 MMHG | SYSTOLIC BLOOD PRESSURE: 109 MMHG | HEIGHT: 60 IN | BODY MASS INDEX: 27.23 KG/M2 | RESPIRATION RATE: 20 BRPM | WEIGHT: 138.69 LBS | HEART RATE: 101 BPM

## 2018-06-06 PROCEDURE — 85027 COMPLETE CBC AUTOMATED: CPT | Performed by: HOSPITALIST

## 2018-06-06 PROCEDURE — 80048 BASIC METABOLIC PNL TOTAL CA: CPT | Performed by: HOSPITALIST

## 2018-06-06 PROCEDURE — 85025 COMPLETE CBC W/AUTO DIFF WBC: CPT | Performed by: HOSPITALIST

## 2018-06-06 PROCEDURE — 94640 AIRWAY INHALATION TREATMENT: CPT

## 2018-06-06 PROCEDURE — 83735 ASSAY OF MAGNESIUM: CPT | Performed by: HOSPITALIST

## 2018-06-06 PROCEDURE — 85007 BL SMEAR W/DIFF WBC COUNT: CPT | Performed by: HOSPITALIST

## 2018-06-06 RX ORDER — IPRATROPIUM BROMIDE AND ALBUTEROL SULFATE 2.5; .5 MG/3ML; MG/3ML
3 SOLUTION RESPIRATORY (INHALATION)
Qty: 3 ML | Refills: 0 | Status: SHIPPED | OUTPATIENT
Start: 2018-06-06 | End: 2018-08-08

## 2018-06-06 RX ORDER — TORSEMIDE 20 MG/1
20 TABLET ORAL DAILY
Qty: 30 TABLET | Refills: 0 | Status: SHIPPED | OUTPATIENT
Start: 2018-06-07 | End: 2018-06-06

## 2018-06-06 RX ORDER — TORSEMIDE 20 MG/1
20 TABLET ORAL DAILY
Qty: 30 TABLET | Refills: 0 | Status: SHIPPED | OUTPATIENT
Start: 2018-06-07 | End: 2018-07-05

## 2018-06-06 NOTE — DISCHARGE SUMMARY
[facility] Hospitalist Discharge Summary   Patient ID:  Hector Logan  [id number]  40 year old  3/8/1932    Admit date: 6/1/2018  Discharge date: 6/6/2018  Primary Care Physician: Latoya Soriano MD   Attending Physician: Ruth Scott DO   Consults:   Nikita Fischer BMP in 2 days.      SOB, worsening pulm edema on CXR, elevated BNP- improved  -likely due to CHF which improved back to baseline, has severe TR however would not affect resp symptoms, has hx severe MR s/p mitral clip which is functioning well  -DDIMER neg, back down to 131, stable  -f/u with repeat BMP at [facility]     Anemia  -range from 9-11 in the last several months  -was 11's prior to admission but did have recent large nose bleed  -hgb 9.8->10.3->10.0->9.5->9.4,  monitor closely, no current active bleeding mg total) by mouth 2 (two) times daily.      CARTIA  MG Cp24  Generic drug:  DilTIAZem HCl ER Coated Beads  TK ONE C PO  QD     docusate sodium 100 MG Caps  Commonly known as:  COLACE     famoTIDine 20 MG Tabs  Commonly known as:  PEPCID     [facility] CARDIOLOGY  Contact information:  [address] [facility] [address]  [address]. Schedule an appointment as soon as possible for a visit in 1 week.     Why:  call after discharge from rehab

## 2018-06-06 NOTE — PROGRESS NOTES
[facility] Heart Cardiology Progress Note      [facility] Patient Status:  Inpatient    3/8/1932 MRN [id number]   Location [facility] 3W/SW Attending Fernandez Cosby, [address] [facility] Day # 5 PCP Nancy Ortiz MD       C 1450 (1) 900 (0.6)     Stool 0 (0)      Total Output 1450 900      Net -910 +40             Void Urine Occurrence 2 x      Stool Occurrence 1 x           Wt Readings from Last 6 Encounters:  06/06/18 : 138 lb 11.2 oz (62.9 kg)  06/01/18 : 146 lb (66.2 kg) 8.9  8.0  10.8   PLT  195  195  198         Lab Results  Component Value Date   AST 31 06/02/2018   ALT 19 06/02/2018   .8 (H) 07/29/2016   INR 1.3 (H) 10/11/2016   PT 31.8 (H) 09/26/2016   T4F 1.4 06/06/2016   TSH 2.110 06/06/2016   DDIMER 0.69 06/

## 2018-06-06 NOTE — TRANSITION NOTE
45-year-old male with long-standing severe COPD presented to [facility] ER on 6/1/2018. Primary symptom was dyspnea on exertion.   He had had a nosebleed limiting his ability to breathe through his nose but he also had true dyspnea and his EKG showed diffuse

## 2018-06-06 NOTE — CM/SW NOTE
6/6CM-The Patient's RN informed this Writer that the Patient is medically stable for discharge today (6/6) This Writer informed [facility] of the above, they are able to accept the Patient today (6/6) at 4:00 p.m.   This Writer informed the Patient's RN of

## 2018-06-06 NOTE — PLAN OF CARE
Problem: Patient Centered Care  Goal: Patient preferences are identified and integrated in the patient's plan of care  Interventions:  - What would you like us to know as we care for you?  Wants to return to home with family  - Provide timely, complete, and obtain 12 lead EKG if indicated  - Evaluate effectiveness of antiarrhythmic and heart rate control medications as ordered  - Initiate emergency measures for life threatening arrhythmias  - Monitor electrolytes and administer replacement therapy as ordered

## 2018-06-06 NOTE — RESTORATIVE THERAPY
RESTORATIVE CARE TREATMENT NOTE    Presenting Problem  Presenting Problem: CHF  Presenting Problem:  (SOB, acute on chronic CHF)       Precautions          Weight Bearing Restriction  Weight Bearing Restriction: None                   SUNDAY MONDAY TUESDAY

## 2018-06-06 NOTE — PROGRESS NOTES
Pulmonary Progress Note     Assessment / Plan:  1. Acute on chronic hypoxic respiratory failure - due to heart failure exacerbation, improved with diuresis  - wean O2 as able. At baseline uses 2L supplemental O2 with activity and sleep  2.  Acute on chronic

## 2018-06-07 ENCOUNTER — SNF VISIT (OUTPATIENT)
Dept: INTERNAL MEDICINE CLINIC | Facility: SKILLED NURSING FACILITY | Age: 83
End: 2018-06-07

## 2018-06-07 DIAGNOSIS — R53.1 WEAKNESS: ICD-10-CM

## 2018-06-07 DIAGNOSIS — J44.9 CHRONIC OBSTRUCTIVE PULMONARY DISEASE, UNSPECIFIED COPD TYPE (HCC): ICD-10-CM

## 2018-06-07 DIAGNOSIS — I50.9 ACUTE ON CHRONIC CONGESTIVE HEART FAILURE, UNSPECIFIED HEART FAILURE TYPE (HCC): ICD-10-CM

## 2018-06-07 PROCEDURE — 99310 SBSQ NF CARE HIGH MDM 45: CPT | Performed by: NURSE PRACTITIONER

## 2018-06-07 NOTE — PROGRESS NOTES
HPI: Toño Castano  Is an 79 yo female with PMH sig for CAD s/p CABG, severe MR s/p mitral clip,  HTN, HL, afib on aspirin (sig leeding on coumadin/eliquis), b/l carotid artery disease, GERD with catalan esophagus, asthma/COPD on chronic 3 L, hypothyroid [address] [facility]                ENDOSCOPY  10/12/2016: EGD N/A      Comment: Procedure: ESOPHAGOGASTRODUODENOSCOPY (EGD);                  Surgeon: Atul Bray MD;  Location: [facility]                ENDOSCOPY  No date: OTHER SURGICAL HISTORY      Comment: Hema Gibson improved with diuresis  -likely due to CHF  - has severe TR, hx severe MR s/p mitral clip which is functioning well  -DDIMER neg, Trop normal inpatient   - repeat echo with EF of 45-50%, mild MR with clips, mod to severe TR, evidence of right sided failure

## 2018-06-12 ENCOUNTER — SNF VISIT (OUTPATIENT)
Dept: INTERNAL MEDICINE CLINIC | Facility: SKILLED NURSING FACILITY | Age: 83
End: 2018-06-12

## 2018-06-12 DIAGNOSIS — J44.9 CHRONIC OBSTRUCTIVE PULMONARY DISEASE, UNSPECIFIED COPD TYPE (HCC): ICD-10-CM

## 2018-06-12 DIAGNOSIS — I48.20 CHRONIC ATRIAL FIBRILLATION (HCC): ICD-10-CM

## 2018-06-12 DIAGNOSIS — I50.9 ACUTE ON CHRONIC CONGESTIVE HEART FAILURE, UNSPECIFIED HEART FAILURE TYPE (HCC): ICD-10-CM

## 2018-06-12 PROCEDURE — 99308 SBSQ NF CARE LOW MDM 20: CPT | Performed by: NURSE PRACTITIONER

## 2018-06-12 NOTE — PROGRESS NOTES
HPI: Mehreen Navarrete  Is an 79 yo female with PMH sig for CAD s/p CABG, severe MR s/p mitral clip,  HTN, HL, afib on aspirin (sig leeding on coumadin/eliquis), b/l carotid artery disease, GERD with catalan esophagus, asthma/COPD on chronic 3 L, hypothyroid Deidre Marley MD;  Location: [address] [place] [address]                ENDOSCOPY  10/12/2016: EGD N/A      Comment: Procedure: ESOPHAGOGASTRODUODENOSCOPY (EGD);                  Surgeon: Deidre Marley MD;  Location: [facility]                ENDOSCOPY  No date: OTHER SURGICAL HIST repeat echo with EF of 45-50%, mild MR with clips, mod to severe TR, evidence of right sided failure .  PAP 64  -nuclear med stress without evidence of ischemia   - Follows Dr Julito Abbott cardio,   Dr Dinesh Vásquez  pulm  - cont torsemide 20mg po qd  - CHF protoco

## 2018-06-13 ENCOUNTER — OFFICE VISIT (OUTPATIENT)
Dept: CARDIOLOGY CLINIC | Facility: HOSPITAL | Age: 83
DRG: 291 | End: 2018-06-13
Attending: INTERNAL MEDICINE
Payer: MEDICARE

## 2018-06-13 VITALS
BODY MASS INDEX: 29 KG/M2 | SYSTOLIC BLOOD PRESSURE: 100 MMHG | HEART RATE: 96 BPM | WEIGHT: 147 LBS | DIASTOLIC BLOOD PRESSURE: 52 MMHG | OXYGEN SATURATION: 92 %

## 2018-06-13 PROBLEM — I50.9 ACUTE ON CHRONIC CONGESTIVE HEART FAILURE, UNSPECIFIED HEART FAILURE TYPE (HCC): Status: RESOLVED | Noted: 2018-06-01 | Resolved: 2018-06-13

## 2018-06-13 PROBLEM — I50.9 ACUTE ON CHRONIC CONGESTIVE HEART FAILURE (HCC): Status: RESOLVED | Noted: 2018-06-01 | Resolved: 2018-06-13

## 2018-06-13 PROBLEM — I50.30 (HFPEF) HEART FAILURE WITH PRESERVED EJECTION FRACTION (HCC): Status: ACTIVE | Noted: 2018-06-13

## 2018-06-13 PROCEDURE — 99211 OFF/OP EST MAY X REQ PHY/QHP: CPT | Performed by: CLINICAL NURSE SPECIALIST

## 2018-06-13 PROCEDURE — 99214 OFFICE O/P EST MOD 30 MIN: CPT | Performed by: CLINICAL NURSE SPECIALIST

## 2018-06-13 RX ORDER — TORSEMIDE 20 MG/1
TABLET ORAL
Status: COMPLETED
Start: 2018-06-13 | End: 2018-06-13

## 2018-06-13 RX ADMIN — TORSEMIDE 20 MG: 20 TABLET ORAL at 14:10:00

## 2018-06-13 NOTE — PROGRESS NOTES
[facility] [address] Patient Status:  Outpatient    3/8/1932 MRN [id number]   Location MD Dr Deanna Arriola is a 80year old male who presents to clinic for asse T4F 1.4 06/06/2016 05:20 AM   TSH 2.110 06/06/2016 05:20 AM   DDIMER 0.69 06/01/2018 04:19 PM   ESRML 77 (H) 06/06/2016 05:20 AM   CRP 7.77 (H) 06/06/2016 05:20 AM   MG 2.0 06/06/2018 06:25 AM   PHOS 3.7 07/29/2016 10:37 AM   TROP 0.03 06/01/2018 04:30 P sodium/fluid restriction, and medications with patient. Receptive.        Assessment:  Acute on chronic diastolic  - Torsemide 20 mg daily    Atrial fibrillation  - No a/c secondary to bleeding  - Watchman device    Mitral regurgitation s/p mitral clip    D Call with weight gain of 3 lbs overnight or concerning symptoms. [phone]    32-52 oz fluid restriction    Less than 2000 mg sodium/salt diet.  Common high sodium foods include frozen dinners, soups (not homemade), some cereal, vegetable juice, canned

## 2018-06-13 NOTE — PATIENT INSTRUCTIONS
Torsemide 20 mg give in clinic today for dyspnea. Thursday and Friday, please give an additional torsemide 20 mg in the afternoon, for a total daily dose of 40 mg (20 mg in the morning and 20 mg in the afternoon.  Please also give an additional potassiu

## 2018-06-14 ENCOUNTER — HOSPITAL ENCOUNTER (INPATIENT)
Facility: HOSPITAL | Age: 83
LOS: 6 days | Discharge: HOME HEALTH | DRG: 291 | End: 2018-06-20
Attending: EMERGENCY MEDICINE | Admitting: INTERNAL MEDICINE
Payer: MEDICARE

## 2018-06-14 ENCOUNTER — APPOINTMENT (OUTPATIENT)
Dept: GENERAL RADIOLOGY | Facility: HOSPITAL | Age: 83
DRG: 291 | End: 2018-06-14
Attending: EMERGENCY MEDICINE
Payer: MEDICARE

## 2018-06-14 DIAGNOSIS — J96.01 ACUTE RESPIRATORY FAILURE WITH HYPOXIA (HCC): Primary | ICD-10-CM

## 2018-06-14 PROCEDURE — 81003 URINALYSIS AUTO W/O SCOPE: CPT | Performed by: EMERGENCY MEDICINE

## 2018-06-14 PROCEDURE — 87641 MR-STAPH DNA AMP PROBE: CPT | Performed by: EMERGENCY MEDICINE

## 2018-06-14 PROCEDURE — 99285 EMERGENCY DEPT VISIT HI MDM: CPT

## 2018-06-14 PROCEDURE — 84145 PROCALCITONIN (PCT): CPT | Performed by: EMERGENCY MEDICINE

## 2018-06-14 PROCEDURE — 85025 COMPLETE CBC W/AUTO DIFF WBC: CPT | Performed by: EMERGENCY MEDICINE

## 2018-06-14 PROCEDURE — 87633 RESP VIRUS 12-25 TARGETS: CPT | Performed by: EMERGENCY MEDICINE

## 2018-06-14 PROCEDURE — 94640 AIRWAY INHALATION TREATMENT: CPT

## 2018-06-14 PROCEDURE — 93005 ELECTROCARDIOGRAM TRACING: CPT

## 2018-06-14 PROCEDURE — 87798 DETECT AGENT NOS DNA AMP: CPT | Performed by: EMERGENCY MEDICINE

## 2018-06-14 PROCEDURE — 93010 ELECTROCARDIOGRAM REPORT: CPT | Performed by: EMERGENCY MEDICINE

## 2018-06-14 PROCEDURE — 87581 M.PNEUMON DNA AMP PROBE: CPT | Performed by: EMERGENCY MEDICINE

## 2018-06-14 PROCEDURE — 83880 ASSAY OF NATRIURETIC PEPTIDE: CPT | Performed by: EMERGENCY MEDICINE

## 2018-06-14 PROCEDURE — 85007 BL SMEAR W/DIFF WBC COUNT: CPT | Performed by: EMERGENCY MEDICINE

## 2018-06-14 PROCEDURE — 87486 CHLMYD PNEUM DNA AMP PROBE: CPT | Performed by: EMERGENCY MEDICINE

## 2018-06-14 PROCEDURE — 96374 THER/PROPH/DIAG INJ IV PUSH: CPT

## 2018-06-14 PROCEDURE — 71045 X-RAY EXAM CHEST 1 VIEW: CPT | Performed by: EMERGENCY MEDICINE

## 2018-06-14 PROCEDURE — 85027 COMPLETE CBC AUTOMATED: CPT | Performed by: EMERGENCY MEDICINE

## 2018-06-14 PROCEDURE — 83605 ASSAY OF LACTIC ACID: CPT | Performed by: EMERGENCY MEDICINE

## 2018-06-14 PROCEDURE — 80048 BASIC METABOLIC PNL TOTAL CA: CPT | Performed by: EMERGENCY MEDICINE

## 2018-06-14 RX ORDER — ONDANSETRON 2 MG/ML
4 INJECTION INTRAMUSCULAR; INTRAVENOUS EVERY 4 HOURS PRN
Status: DISCONTINUED | OUTPATIENT
Start: 2018-06-14 | End: 2018-06-20

## 2018-06-14 RX ORDER — METHYLPREDNISOLONE SODIUM SUCCINATE 125 MG/2ML
125 INJECTION, POWDER, LYOPHILIZED, FOR SOLUTION INTRAMUSCULAR; INTRAVENOUS ONCE
Status: COMPLETED | OUTPATIENT
Start: 2018-06-14 | End: 2018-06-14

## 2018-06-14 RX ORDER — IPRATROPIUM BROMIDE AND ALBUTEROL SULFATE 2.5; .5 MG/3ML; MG/3ML
3 SOLUTION RESPIRATORY (INHALATION) ONCE
Status: COMPLETED | OUTPATIENT
Start: 2018-06-14 | End: 2018-06-14

## 2018-06-15 PROCEDURE — 83735 ASSAY OF MAGNESIUM: CPT | Performed by: INTERNAL MEDICINE

## 2018-06-15 PROCEDURE — A4216 STERILE WATER/SALINE, 10 ML: HCPCS

## 2018-06-15 PROCEDURE — 94640 AIRWAY INHALATION TREATMENT: CPT

## 2018-06-15 PROCEDURE — 80048 BASIC METABOLIC PNL TOTAL CA: CPT | Performed by: INTERNAL MEDICINE

## 2018-06-15 PROCEDURE — 85025 COMPLETE CBC W/AUTO DIFF WBC: CPT | Performed by: INTERNAL MEDICINE

## 2018-06-15 PROCEDURE — 97166 OT EVAL MOD COMPLEX 45 MIN: CPT

## 2018-06-15 PROCEDURE — 84132 ASSAY OF SERUM POTASSIUM: CPT | Performed by: INTERNAL MEDICINE

## 2018-06-15 PROCEDURE — 97535 SELF CARE MNGMENT TRAINING: CPT

## 2018-06-15 RX ORDER — FUROSEMIDE 10 MG/ML
40 INJECTION INTRAMUSCULAR; INTRAVENOUS
Status: DISCONTINUED | OUTPATIENT
Start: 2018-06-15 | End: 2018-06-17

## 2018-06-15 RX ORDER — ATORVASTATIN CALCIUM 20 MG/1
20 TABLET, FILM COATED ORAL NIGHTLY
Status: DISCONTINUED | OUTPATIENT
Start: 2018-06-15 | End: 2018-06-20

## 2018-06-15 RX ORDER — MONTELUKAST SODIUM 10 MG/1
10 TABLET ORAL NIGHTLY
Status: DISCONTINUED | OUTPATIENT
Start: 2018-06-15 | End: 2018-06-20

## 2018-06-15 RX ORDER — LEVOTHYROXINE SODIUM 0.15 MG/1
150 TABLET ORAL
Status: DISCONTINUED | OUTPATIENT
Start: 2018-06-15 | End: 2018-06-20

## 2018-06-15 RX ORDER — 0.9 % SODIUM CHLORIDE 0.9 %
VIAL (ML) INJECTION
Status: COMPLETED
Start: 2018-06-15 | End: 2018-06-15

## 2018-06-15 RX ORDER — FAMOTIDINE 20 MG/1
20 TABLET ORAL NIGHTLY
Status: DISCONTINUED | OUTPATIENT
Start: 2018-06-15 | End: 2018-06-20

## 2018-06-15 RX ORDER — MELATONIN
325 2 TIMES DAILY
Status: DISCONTINUED | OUTPATIENT
Start: 2018-06-15 | End: 2018-06-20

## 2018-06-15 RX ORDER — SODIUM CHLORIDE/ALOE VERA
GEL (GRAM) NASAL
Status: DISCONTINUED | OUTPATIENT
Start: 2018-06-15 | End: 2018-06-20

## 2018-06-15 RX ORDER — IPRATROPIUM BROMIDE AND ALBUTEROL SULFATE 2.5; .5 MG/3ML; MG/3ML
3 SOLUTION RESPIRATORY (INHALATION) EVERY 4 HOURS PRN
Status: DISCONTINUED | OUTPATIENT
Start: 2018-06-15 | End: 2018-06-20

## 2018-06-15 RX ORDER — CHOLECALCIFEROL (VITAMIN D3) 125 MCG
250 CAPSULE ORAL 2 TIMES DAILY
Status: DISCONTINUED | OUTPATIENT
Start: 2018-06-15 | End: 2018-06-20

## 2018-06-15 RX ORDER — DOCUSATE SODIUM 100 MG/1
200 CAPSULE, LIQUID FILLED ORAL 2 TIMES DAILY
Status: DISCONTINUED | OUTPATIENT
Start: 2018-06-15 | End: 2018-06-20

## 2018-06-15 RX ORDER — ALBUTEROL SULFATE 90 UG/1
2 AEROSOL, METERED RESPIRATORY (INHALATION) EVERY 6 HOURS PRN
Status: DISCONTINUED | OUTPATIENT
Start: 2018-06-15 | End: 2018-06-20

## 2018-06-15 RX ORDER — DILTIAZEM HYDROCHLORIDE 120 MG/1
120 CAPSULE, COATED, EXTENDED RELEASE ORAL DAILY
Status: DISCONTINUED | OUTPATIENT
Start: 2018-06-15 | End: 2018-06-20

## 2018-06-15 RX ORDER — ALLOPURINOL 100 MG/1
100 TABLET ORAL 2 TIMES DAILY
Status: DISCONTINUED | OUTPATIENT
Start: 2018-06-15 | End: 2018-06-20

## 2018-06-15 RX ORDER — PANTOPRAZOLE SODIUM 40 MG/1
40 TABLET, DELAYED RELEASE ORAL
Status: DISCONTINUED | OUTPATIENT
Start: 2018-06-15 | End: 2018-06-20

## 2018-06-15 RX ORDER — POTASSIUM CHLORIDE 20 MEQ/1
40 TABLET, EXTENDED RELEASE ORAL EVERY 4 HOURS
Status: COMPLETED | OUTPATIENT
Start: 2018-06-15 | End: 2018-06-15

## 2018-06-15 NOTE — CM/SW NOTE
06/15/18 0800   CM/SW Screening   Patient's Mental Status Alert;Oriented   Patient's [address] [facility] Name PP SNF   Patient lives with Alone   Patient Status Prior to Admission   Services in place prior to admission DME/S

## 2018-06-15 NOTE — H&P
[facility] Hospitalist H&P     CC: Patient presents with:  Fever (infectious)  Dyspnea WAGNER SOB (respiratory)       PCP: Salome Chapman MD      Assessment and Plan     Thaddeus Haji a 80year old male with PMH sig for CAD s/p CABG, severe MR s/p mitral cl records reviewed confirming patient's medical history and medications. Further recommendations pending patient's clinical course.   [facility] hospitalist to continue to follow patient while in house    Patient and/or patient's family given opportunity to ask gastrointestinal bleeding 1/10/2018   • Hx of CABG 2/14/2018   • Hypothyroid    • Hypothyroidism, unspecified 1/10/2018   • Kyphosis of thoracic region 2/14/2018   • MI, old    • Pulmonary hypertension ([facility] 2/14/2018   • Right to left cardiac shunt ([facility] simvastatin 40 MG Oral Tab Take 1 tablet (40 mg total) by mouth once daily.  Disp: 90 tablet Rfl: 1   Levothyroxine Sodium 150 MCG Oral Tab TAKE 1 TABLET(150 MCG) BY MOUTH BEFORE BREAKFAST Disp: 90 tablet Rfl: 3   allopurinol 100 MG Oral Tab Take 1 tablet rashes or lesions, well perfused  Psych: mood stable, cooperative  Neuro: No focal deficits    Diagnostic Data:    CBC/Chem  Recent Labs   Lab  06/14/18 2057  06/15/18   0627   WBC  9.2  7.2   HGB  9.1*  9.4*   MCV  96.2  97.2   PLT  160  152   BAND  3

## 2018-06-15 NOTE — OCCUPATIONAL THERAPY NOTE
OCCUPATIONAL THERAPY EVALUATION - INPATIENT     Room Number: 318/318-A  Evaluation Date: 6/15/2018  Type of Evaluation: Initial  Presenting Problem:  (SOB)    Physician Order: IP Consult to Occupational Therapy  Reason for Therapy: ADL/IADL Dysfunction and Congestive heart disease (HCC)    • COPD (chronic obstructive pulmonary disease) (HCC)     on home oxygen    • Coronary atherosclerosis    • Disorder of thyroid    • Gout    • Heart attack ([address]    • High blood pressure    • High cholesterol    • History o 2  Shortness of breath    COGNITION  Orientation Level:  oriented to place, oriented to time, oriented to situation and oriented to person    TRAMAINE OF MOTION   Upper extremity ROM is within functional limits     STRENGTH ASSESSMENT  Upper extremity strength sink with SBA  Comment:            Goals  on:   Frequency: 3x a week    [facility]/R  [address] [place] [address]  #[id number]

## 2018-06-15 NOTE — PROGRESS NOTES
[facility] AND CLINICS  Progress Note    Oziel Older Patient Status:  Inpatient    3/8/1932 MRN [id number]   Location [facility] 3W/SW Attending Chelo Luke MD   Hosp Day # 1 PCP Lyndsey Dallas MD     Assessment:    1.   Acute exacerbati wheezing. Mildly tachypneic. Abdomen: Soft, non-tender. Extremities: Without clubbing, cyanosis or edema. Skin: Warm and dry.      Labs:    Lab Results  Component Value Date   WBC 7.2 06/15/2018   HGB 9.4 06/15/2018   HCT 28.6 06/15/2018    06/

## 2018-06-15 NOTE — HISTORICAL OFFICE NOTE
Ami Breanna  : 1932  ACCOUNT:  [id number]  [phone]  PCP: Dr. Kwabena Bravo     TODAY'S DATE: 2018  DICTATED BY:  [Dr. Brea Hightower: [Followup of .  CAD, of native vessels.]    HPI:    [On 2018, kimberlyn Pantoja 2002, dyslipidemia, hypertension, PTCA 1991, PTCA 1992, EUGENIA and Watchman device implant 2016    FAMILY HISTORY: Significant for premature CAD. Negative for AAA. SOCIAL HISTORY: SMOKING: Former tobacco use. used to smoke but quit. CAFFEINE: none.  ALCOHOL: regurgitation  9. Presence Of Other Vascular Implants And Grafts, LANEY closure device, Watchaman      PLAN:  [1.  Continue same cardiac medications  2.   Follow-up in 6 months]    PRESCRIPTIONS:   05/01/18 Ventolin HFA          108 (90   as needed

## 2018-06-15 NOTE — ED PROVIDER NOTES
Patient Seen in: [facility] AND [facility] Emergency Department    History   Patient presents with:  Fever (infectious)  Dyspnea WAGNER SOB (respiratory)      HPI    Patient presents to the ED after being sent by his nursing care facility for respiratory distress a ESOPHAGOGASTRODUODENOSCOPY (EGD); Surgeon: Sachin Allred MD;  Location: [address] [place] [address]                ENDOSCOPY  10/12/2016: EGD N/A      Comment: Procedure: ESOPHAGOGASTRODUODENOSCOPY (EGD);                  Surgeon: Sachin Allred MD;  Location: Rfl:  6/14/2018 at Unknown time   famoTIDine 20 MG Oral Tab Take 20 mg by mouth nightly. Disp:  Rfl:  6/14/2018 at Unknown time   Vitamin B-12 250 MCG Oral Tab Take 250 mcg by mouth 2 (two) times daily.    Disp:  Rfl:  6/14/2018 at Unknown time   docusate Conjunctivae are normal. Right eye exhibits no discharge. Left eye exhibits no discharge. Neck: No tracheal deviation present. Cardiovascular: Normal rate and intact distal pulses. Pulmonary/Chest: No stridor. He is in respiratory distress.  He has n DIFFERENTIAL[[id number]]          Abnormal            Final result                 Please view results for these tests on the individual orders.    BASIC METABOLIC PANEL (8)   CBC WITH DIFFERENTIAL WITH PLATELET   MAGNESIUM   RAINBOW DRAW BLUE   RAINBOW DRAW (37.8 °C) 98.7 °F (37.1 °C)   TempSrc:   Oral Oral   SpO2: 91%  93%    Weight:  65.2 kg     Height:  152.4 cm (5')       *I personally reviewed and interpreted all ED vitals.     Pulse Ox: 86%, 2 L O2, hypoxic    Monitor Interpretation:   atrial fibrillatio breathing treatment. On reexamination patient's respiratory effort much decreased, normal respiratory rate and O2 saturations now 91% on his normal 2 L.   Laboratory workup without evidence for leukocytosis or left shift, no significant electrolyte abnorma

## 2018-06-15 NOTE — CONSULTS
Pulmonary H&P/Consult     NAME: Roro Muñoz - [facility]OM[facility]- - MRN: [id number] - Age: 80year old - :  3/8/1932    Date of Admission: 2018  8:36 PM  Admission Diagnosis: Acute respiratory failure with hypoxia ([facility] [J96.01]    Assessment/Plan: Disorder of thyroid    • Gout    • Heart attack ([facility]    • High blood pressure    • High cholesterol    • History of gastrointestinal bleeding 1/10/2018   • Hx of CABG 2/14/2018   • Hypothyroid    • Hypothyroidism, unspecified 1/10/2018   • Kyphosis of tho (Diuretic)       ALLERGIES:   Coumadin [Warfarin]     BLEEDING    Comment:Needed 12 units of blood at [facility]             10/2016  Eliquis [Apixaban]      BLEEDING    REVIEW OF SYSTEMS: 10 systems reviewed, negative except as stated in the HPI

## 2018-06-15 NOTE — CONSULTS
[facility]    Cardiology Consultation  [facility]    Tony Stout Patient Status:  Emergency    3/8/1932 MRN [id number]   Location [address] Attending MD Edilma Edmondson respiratory failure ([email or web address] 1/10/2018   • Congestive heart disease (HCC)    • COPD (chronic obstructive pulmonary disease) (HCC)     on home oxygen    • Coronary atherosclerosis    • Disorder of thyroid    • Gout    • Heart attack ([email or web address]    • High blood press BLEEDING    Review of Systems:   Review of Systems   Constitutional: Positive for malaise/fatigue. HENT: Negative. Respiratory: Positive for shortness of breath. Cardiovascular: Positive for leg swelling. Negative for chest pain.    Gastrointestinal 06/06/2018   PHOS 3.7 07/29/2016   TROP 0.03 06/01/2018   CK 38 (L) 03/17/2018         Imaging:  Xr Chest Ap Portable  (cpt=71045)    Result Date: 6/14/2018  CONCLUSION:  1. Cardiomegaly. Postop cardiac surgery changes.  2. Diffuse increased interstitial ma

## 2018-06-15 NOTE — ED NOTES
Patient sent by NH by EMS for fever and increased SOB. Upon arrival, patient denies any complaints, states \"I'm always SOB\". Denies N/V/D. Denies CP.

## 2018-06-16 PROCEDURE — 97162 PT EVAL MOD COMPLEX 30 MIN: CPT

## 2018-06-16 PROCEDURE — 97116 GAIT TRAINING THERAPY: CPT

## 2018-06-16 PROCEDURE — 97110 THERAPEUTIC EXERCISES: CPT

## 2018-06-16 PROCEDURE — 80048 BASIC METABOLIC PNL TOTAL CA: CPT | Performed by: INTERNAL MEDICINE

## 2018-06-16 NOTE — PHYSICAL THERAPY NOTE
PHYSICAL THERAPY EVALUATION - INPATIENT     Room Number: 318/318-A  Evaluation Date: 6/16/2018  Type of Evaluation: Initial   Physician Order: PT Eval and Treat    Presenting Problem: Acute on chronic hypoxia  Reason for Therapy: Mobility Dysfunction and Treatment Plan: Bed mobility; Body mechanics; Endurance; Energy conservation;Patient education; Family education;Gait training;Strengthening;Stair training;Transfer training;Balance training  Rehab Potential : Good  Frequency (Obs): 5x/week       PHYSICAL THER CATARACT  10/4/2016: EGD      Comment: Procedure: ESOPHAGOGASTRODUODENOSCOPY (EGD);                  Surgeon: Damir Pham MD;  Location: [facility]                ENDOSCOPY  10/12/2016: EGD N/A      Comment: Procedure: ESOPHAGOGASTRODUODENOSCOPY (EGD); wheelchair, bedside commode, etc.): A Little   -   Moving from lying on back to sitting on the side of the bed?: None   How much help from another person does the patient currently need. ..   -   Moving to and from a bed to a chair (including a wheelchair)?

## 2018-06-16 NOTE — PROGRESS NOTES
[facility] Cardiology Progress Note    Patient seen and examined.  Chart reviewed.  Discussed with family at bedside. Still on O2 supplementation. Went for walk in hallways and had to stop midway. Lost approximately 7 lbs.     /56 (BP Location: Right ar

## 2018-06-16 NOTE — PROGRESS NOTES
[facility] Parley Bookman Patient Status:  Inpatient    3/8/1932 MRN [id number]   Location [facility] 3W/SW Attending Kary Alcala MD   Hosp Day # 2 PCP Jessica Sanches MD     SUBJECTIVE: no new events.  Feels a little better     OBJECTIVE bowel sounds normal; no masses,  no organomegaly  Extremities: extremities normal, atraumatic, no cyanosis or edema          Lab Results  Component Value Date    06/16/2018   K 4.5 06/16/2018    06/16/2018   CO2 23 06/16/2018   BUN 35 06/16/201

## 2018-06-16 NOTE — PROGRESS NOTES
[facility] Hospitalist Progress Note     CC: Hospital Follow up    PCP: Jaspreet Bailey MD       Assessment/Plan:     Principal Problem:    Acute respiratory failure with hypoxia ([address]    De Kinsey a 80year old male with PMH sig for CAD s/p CABG, sever -has been in low 130's for the last few months   -stable, monitor     Anemia  -was 9.4 at discharge, stable today   -monitor     Hypothyroidism  -cont home med     GERD, history of barretts  -cont PPI     Gout  -cont allopurinol     FEN  -no IVF  -lytes WBC  9.2  7.2   PLT  160  152         Recent Labs   Lab  06/14/18   2057  06/15/18   0627  06/15/18   0953  06/16/18   0637   GLU  178*  208*   --   118*   BUN  25*  24*   --   35*   CREATSERUM  1.07  1.16   --   1.18   GFRAA  >60  >60   --   >60   GFRNA

## 2018-06-17 ENCOUNTER — APPOINTMENT (OUTPATIENT)
Dept: CV DIAGNOSTICS | Facility: HOSPITAL | Age: 83
DRG: 291 | End: 2018-06-17
Attending: INTERNAL MEDICINE
Payer: MEDICARE

## 2018-06-17 PROCEDURE — 83735 ASSAY OF MAGNESIUM: CPT | Performed by: HOSPITALIST

## 2018-06-17 PROCEDURE — 84132 ASSAY OF SERUM POTASSIUM: CPT | Performed by: HOSPITALIST

## 2018-06-17 PROCEDURE — 80048 BASIC METABOLIC PNL TOTAL CA: CPT | Performed by: INTERNAL MEDICINE

## 2018-06-17 RX ORDER — POTASSIUM CHLORIDE 20 MEQ/1
40 TABLET, EXTENDED RELEASE ORAL EVERY 4 HOURS
Status: COMPLETED | OUTPATIENT
Start: 2018-06-17 | End: 2018-06-17

## 2018-06-17 RX ORDER — TORSEMIDE 20 MG/1
20 TABLET ORAL DAILY
Status: DISCONTINUED | OUTPATIENT
Start: 2018-06-17 | End: 2018-06-20

## 2018-06-17 RX ORDER — FUROSEMIDE 10 MG/ML
40 INJECTION INTRAMUSCULAR; INTRAVENOUS DAILY
Status: DISCONTINUED | OUTPATIENT
Start: 2018-06-17 | End: 2018-06-17

## 2018-06-17 RX ORDER — MAGNESIUM OXIDE 400 MG (241.3 MG MAGNESIUM) TABLET
400 TABLET ONCE
Status: COMPLETED | OUTPATIENT
Start: 2018-06-17 | End: 2018-06-17

## 2018-06-17 NOTE — PROGRESS NOTES
[facility] Cardiology Progress Note    Patient seen and examined.  Chart reviewed.  Discussed with RN. Still on 3 L O2 per NC. Sleeping but wakes up easily.   No new events ON    /57 (BP Location: Left arm)   Pulse 91   Temp 98.5 °F (36.9 °C) (Oral)   Re

## 2018-06-17 NOTE — PLAN OF CARE
Problem: Patient/Family Goals  Goal: Patient/Family Long Term Goal  Patient's Long Term Goal: return to baseline    Interventions:  - take all meds as prescribed  - f/u with all md appts  - monitor I/o  - monitor salt intake  - See additional Care Plan [facility] Progressing    Goal: Absence of cardiac arrhythmias or at baseline  INTERVENTIONS:  - Continuous cardiac monitoring, monitor vital signs, obtain 12 lead EKG if indicated  - Evaluate effectiveness of antiarrhythmic and heart rate control medications as orde

## 2018-06-17 NOTE — PROGRESS NOTES
[facility] Hospitalist Progress Note     CC: Hospital Follow up    PCP: Kasey Giron MD       Assessment/Plan:     Principal Problem:    Acute respiratory failure with hypoxia ([address]    Sai Gomes a 80year old male with PMH sig for CAD s/p CABG, sever pulm     Mild hyponatremia,   -has been in low 130's for the last few months   -stable, monitor     Anemia  -was 9.4 at discharge, stable on 6/15  -monitor prn     Hypothyroidism  -cont home med     GERD, history of barretts  -cont PPI     Gout  -cont allo 9.4*   HCT  27.0*  28.6*   MCV  96.2  97.2   MCH  32.2*  32.0   MCHC  33.5  32.9   RDW  20.4*  20.3*   WBC  9.2  7.2   PLT  160  152         Recent Labs   Lab  06/15/18   0627  06/15/18   0953  06/16/18   0637  06/17/18   0718   GLU  208*   --   118*  108* Daily   • Vitamin B-12  250 mcg Oral BID   • ayr saline nasal   Nasal TID & HS       ipratropium-albuterol, Albuterol Sulfate HFA, ondansetron HCl

## 2018-06-18 ENCOUNTER — APPOINTMENT (OUTPATIENT)
Dept: INTERVENTIONAL RADIOLOGY/VASCULAR | Facility: HOSPITAL | Age: 83
DRG: 291 | End: 2018-06-18
Attending: INTERNAL MEDICINE
Payer: MEDICARE

## 2018-06-18 ENCOUNTER — APPOINTMENT (OUTPATIENT)
Dept: CV DIAGNOSTICS | Facility: HOSPITAL | Age: 83
DRG: 291 | End: 2018-06-18
Attending: INTERNAL MEDICINE
Payer: MEDICARE

## 2018-06-18 PROCEDURE — 93325 DOPPLER ECHO COLOR FLOW MAPG: CPT | Performed by: INTERNAL MEDICINE

## 2018-06-18 PROCEDURE — 85027 COMPLETE CBC AUTOMATED: CPT | Performed by: HOSPITALIST

## 2018-06-18 PROCEDURE — 97530 THERAPEUTIC ACTIVITIES: CPT

## 2018-06-18 PROCEDURE — 99152 MOD SED SAME PHYS/QHP 5/>YRS: CPT

## 2018-06-18 PROCEDURE — 85007 BL SMEAR W/DIFF WBC COUNT: CPT | Performed by: HOSPITALIST

## 2018-06-18 PROCEDURE — 83735 ASSAY OF MAGNESIUM: CPT | Performed by: HOSPITALIST

## 2018-06-18 PROCEDURE — B24BZZ4 ULTRASONOGRAPHY OF HEART WITH AORTA, TRANSESOPHAGEAL: ICD-10-PCS | Performed by: INTERNAL MEDICINE

## 2018-06-18 PROCEDURE — 80048 BASIC METABOLIC PNL TOTAL CA: CPT | Performed by: INTERNAL MEDICINE

## 2018-06-18 PROCEDURE — 93320 DOPPLER ECHO COMPLETE: CPT | Performed by: INTERNAL MEDICINE

## 2018-06-18 PROCEDURE — 85025 COMPLETE CBC W/AUTO DIFF WBC: CPT | Performed by: HOSPITALIST

## 2018-06-18 PROCEDURE — 93312 ECHO TRANSESOPHAGEAL: CPT

## 2018-06-18 PROCEDURE — 97110 THERAPEUTIC EXERCISES: CPT

## 2018-06-18 RX ORDER — SODIUM CHLORIDE 9 MG/ML
INJECTION, SOLUTION INTRAVENOUS
Status: DISPENSED
Start: 2018-06-18 | End: 2018-06-19

## 2018-06-18 RX ORDER — MIDAZOLAM HYDROCHLORIDE 1 MG/ML
INJECTION INTRAMUSCULAR; INTRAVENOUS
Status: COMPLETED
Start: 2018-06-18 | End: 2018-06-18

## 2018-06-18 NOTE — OCCUPATIONAL THERAPY NOTE
OCCUPATIONAL THERAPY TREATMENT NOTE - INPATIENT        Room Number: 684/143-H      Presenting Problem:  (SOB)    Problem List  Principal Problem:    Acute respiratory failure with hypoxia [facility])      ASSESSMENT   Notified RN prior to OT treatment.  Patient r currently need…  -   Putting on and taking off regular lower body clothing?: A Little  -   Bathing (including washing, rinsing, drying)?: A Little  -   Toileting, which includes using toilet, bedpan or urinal? : A Little  -   Putting on and taking off regu

## 2018-06-18 NOTE — PRE-SEDATION ASSESSMENT
Pre-Procedure Sedation Assessment    Chief Complaint/Indication for procedure: MR, s/p Mitraclip, CHF    History of snoring or sleep or apnea?    No    History of previous problems with anesthesia or sedation  No    Physical Findings:  Neck: nl ROM  CV: 3/6

## 2018-06-18 NOTE — PROCEDURES
Preop: CHF, MR, status post mitral clip  Postop: Same  Procedure: EUGENIA    Findings: Moderate to severe mitral regurgitation. Normal LV size and contractility. Postprocedural ASD.     Sedation: Versed and fentanyl, monitored for 20 minutes  EBL: 0 cc  Speci

## 2018-06-18 NOTE — PROGRESS NOTES
Pulmonary Progress Note     Assessment / Plan:  1.  Acute on chronic hypoxemic resp failure - from volume overload superimposed on underlying lung disease characterized by emphysema and pulmonary fibrosis  - wean O2, baseline is 2lpm  - volume management pe

## 2018-06-18 NOTE — PHYSICAL THERAPY NOTE
PHYSICAL THERAPY TREATMENT NOTE - INPATIENT     Room Number: 569/109-M       Presenting Problem: Acute on chronic hypoxia    Problem List  Principal Problem:    Acute respiratory failure with hypoxia (HCC)      ASSESSMENT   Pt seen daily. Family present;fam walker  Pattern: Shuffle          Additional information:     THERAPEUTIC EXERCISES  Lower Extremity AP,QS,GS,abd/add   Position   supine     Patient End of Session: In bed;Call light within reach;RN aware of session/findings; All patient questions and conc

## 2018-06-18 NOTE — PROGRESS NOTES
[facility]  Progress Note    Mack Cambria Patient Status:  Inpatient    3/8/1932 MRN [id number]   Location [facility] 3W/SW Attending Smith Walker MD   Hosp Day # 4 PCP Alexus Amaya MD     Assessment:    1.   Acute exacerbation of  and oriented x 3,  No apparent distress. HEENT: No focal deficits. Neck: No JVD, carotids 2+ no bruits. Cardiac: Irregular rhythm, controlled heart rate, S1, S2 normal, 3/6 HSM at apex  Lungs: Clear without wheezes, rales, rhonchi.   Normal excursions an

## 2018-06-18 NOTE — PLAN OF CARE
Problem: Patient/Family Goals  Goal: Patient/Family Long Term Goal  Patient's Long Term Goal: return to baseline    Interventions:  - take all meds as prescribed  - f/u with all md appts  - monitor I/o  - monitor salt intake  - See additional Care Plan [facility] Progressing    Goal: Absence of cardiac arrhythmias or at baseline  INTERVENTIONS:  - Continuous cardiac monitoring, monitor vital signs, obtain 12 lead EKG if indicated  - Evaluate effectiveness of antiarrhythmic and heart rate control medications as orde

## 2018-06-18 NOTE — PROGRESS NOTES
Jasbir Colón  [id number]  6/18/2018    Post procedure/ recovery hand-off report given to [facility]. Patient's vital signs stable, access site dry and intact, no signs and symptoms of bleeding/ hematoma.     Aleah Kirkland RN

## 2018-06-18 NOTE — PROGRESS NOTES
[facility] Hospitalist Progress Note     CC: Hospital Follow up    PCP: Alaina Orlando MD       Assessment/Plan:     Principal Problem:    Acute respiratory failure with hypoxia ([address]    Anahi Mendoza a 80year old male with PMH sig for CAD s/p CABG, sever neg  -cont home meds  -per pulm     Mild hyponatremia,   -has been in low 130's for the last few months   -stable, monitor     Anemia  -was 9.4 at discharge, stable on 6/15  -monitor prn     Hypothyroidism  -cont home med     GERD, history of barretts  -co Labs   Lab  06/14/18   2057  06/15/18   0627  06/18/18   0551   RBC  2.81*  2.95*  3.04*   HGB  9.1*  9.4*  9.9*   HCT  27.0*  28.6*  29.5*   MCV  96.2  97.2  97.2   MCH  32.2*  32.0  32.5*   MCHC  33.5  32.9  33.4   RDW  20.4*  20.3*  20.4*   WBC  9.2  7.

## 2018-06-19 PROCEDURE — 83735 ASSAY OF MAGNESIUM: CPT | Performed by: HOSPITALIST

## 2018-06-19 PROCEDURE — 80048 BASIC METABOLIC PNL TOTAL CA: CPT | Performed by: HOSPITALIST

## 2018-06-19 PROCEDURE — 97110 THERAPEUTIC EXERCISES: CPT

## 2018-06-19 PROCEDURE — 97530 THERAPEUTIC ACTIVITIES: CPT

## 2018-06-19 PROCEDURE — 97116 GAIT TRAINING THERAPY: CPT

## 2018-06-19 NOTE — PHYSICAL THERAPY NOTE
PHYSICAL THERAPY TREATMENT NOTE - INPATIENT     Room Number: 300/103-Y       Presenting Problem: Acute on chronic hypoxia    Problem List  Principal Problem:    Acute respiratory failure with hypoxia (HCC)      ASSESSMENT   Pt seen daily. Family present;fam Assistance: Supervision  Distance (ft): 2 x 200  Assistive Device: Rolling walker  Pattern: Shuffle          Additional information:     THERAPEUTIC EXERCISES  Lower Extremity AP,QS,GS,abd/add   Position   supine     Patient End of Session: In bed;Call lig

## 2018-06-19 NOTE — PROGRESS NOTES
[facility] Hospitalist Progress Note     CC: Hospital Follow up    PCP: Sherrie Matthew MD       Assessment/Plan:     Principal Problem:    Acute respiratory failure with hypoxia ([address]    Chepe Sensing a 80year old male with PMH sig for CAD s/p CABG, sever history of barretts  -cont PPI     Gout  -cont allopurinol     FEN  -no IVF  -lytes in AM  -cardiac diet     PPX; SCD, [facility]    Questions/concerns were discussed with patient and/or family by bedside.  Discussed with daughter over the phone MCHC  33.5  32.9  33.4   RDW  20.4*  20.3*  20.4*   WBC  9.2  7.2  9.8   PLT  160  152  174         Recent Labs   Lab  06/17/18   0718  06/17/18   2043  06/18/18   0551  06/19/18   0543   GLU  108*   --   122*  113*   BUN  34*   --   27*  26*   AARON

## 2018-06-19 NOTE — PROGRESS NOTES
Pulmonary Progress Note      NAME: Mahendra Bahena - ROOM: 329/437-S - MRN: [id number] - Age: 80year old - : 3/8/1932    Assessment/Plan:  1.  Acute on chronic hypoxemic resp failure - from volume overload superimposed on underlying lung disease charact throughout   Chest wall: No tenderness or deformity. Heart: Regular rate and rhythm, normal S1S2, IV/VI murmur. Abdomen: soft, non-tender, non-distended, positive BS.    Extremity: no edema    Recent Labs   Lab  06/18/18   0551   RBC  3.04*   HGB  9.9*

## 2018-06-19 NOTE — PLAN OF CARE
Problem: Patient/Family Goals  Goal: Patient/Family Long Term Goal  Patient's Long Term Goal: return to baseline    Interventions:  - take all meds as prescribed  - f/u with all md appts  - monitor I/o  - monitor salt intake  - See additional Care Plan [facility] Progressing    Goal: Absence of cardiac arrhythmias or at baseline  INTERVENTIONS:  - Continuous cardiac monitoring, monitor vital signs, obtain 12 lead EKG if indicated  - Evaluate effectiveness of antiarrhythmic and heart rate control medications as orde

## 2018-06-20 VITALS
WEIGHT: 137.88 LBS | HEIGHT: 60 IN | OXYGEN SATURATION: 93 % | RESPIRATION RATE: 18 BRPM | TEMPERATURE: 98 F | DIASTOLIC BLOOD PRESSURE: 50 MMHG | HEART RATE: 87 BPM | BODY MASS INDEX: 27.07 KG/M2 | SYSTOLIC BLOOD PRESSURE: 97 MMHG

## 2018-06-20 PROCEDURE — 83735 ASSAY OF MAGNESIUM: CPT | Performed by: HOSPITALIST

## 2018-06-20 PROCEDURE — 80048 BASIC METABOLIC PNL TOTAL CA: CPT | Performed by: HOSPITALIST

## 2018-06-20 NOTE — DISCHARGE SUMMARY
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 Specialty:  PULMONARY DISEASES  Contact information:  [address] [facility] [address]             Hayder Thomas MD. Schedule an appointment as soon as possible for a visit in 1 week. BREAKFAST     Montelukast Sodium 10 MG Tabs  Commonly known as:  SINGULAIR  Take 1 tablet (10 mg total) by mouth nightly.  TAKE 1 TABLET BY MOUTH EVERY EVENING     MULTI VITAMIN MENS Tabs     Omeprazole 40 MG Cpdr     simvastatin 40 MG Tabs  Commonly known

## 2018-06-20 NOTE — TRANSITION NOTE
80-year-old man came to the [place] ER on 6/14/2018 with shortness of breath and a 12 pound weight gain. He had acute exacerbation of heart failure.   He has normal LV systolic function but has valvular disease and is status post mitral clipping 2 years a

## 2018-06-20 NOTE — PROGRESS NOTES
[facility] CLINICS  Progress Note    Mahendra Bahena Patient Status:  Inpatient    3/8/1932 MRN [id number]   Location [facility] 3W/SW Attending Merrick Beckham MD   Hosp Day # 6 PCP Nikki Willett MD     Assessment:    1.  Acute exacerbation of  distress. HEENT: No focal deficits. Neck: No JVD, carotids 2+ no bruits. Cardiac: Regular rate and rhythm, S1, S2 normal, no murmur  Lungs: Minimal basilar crackles. No wheezing. Normal excursions and effort. Abdomen: Soft, non-tender.    Extremities:

## 2018-06-20 NOTE — CM/SW NOTE
6/20/18 CM Discharge planning / MDO for home health   Aung 46 orders on chart anticipate d/c home later today.    Dee Rota  [phone]

## 2018-06-20 NOTE — HOME CARE LIAISON
Met with patient's partner Derick Aguilera at the bedside, (patient sleeping). Derick Aguilera is agreeable to [facility]. Dercik Aguilera has Residential contact information. All questions addressed and answered.

## 2018-06-21 ENCOUNTER — TELEPHONE (OUTPATIENT)
Dept: CARDIOLOGY UNIT | Facility: HOSPITAL | Age: 83
End: 2018-06-21

## 2018-06-25 ENCOUNTER — OFFICE VISIT (OUTPATIENT)
Dept: CARDIOLOGY CLINIC | Facility: HOSPITAL | Age: 83
End: 2018-06-25
Attending: INTERNAL MEDICINE
Payer: MEDICARE

## 2018-06-25 VITALS
HEART RATE: 101 BPM | SYSTOLIC BLOOD PRESSURE: 97 MMHG | WEIGHT: 138 LBS | BODY MASS INDEX: 27 KG/M2 | OXYGEN SATURATION: 91 % | DIASTOLIC BLOOD PRESSURE: 79 MMHG

## 2018-06-25 DIAGNOSIS — I50.30 (HFPEF) HEART FAILURE WITH PRESERVED EJECTION FRACTION (HCC): ICD-10-CM

## 2018-06-25 DIAGNOSIS — I50.9 HEART FAILURE, UNSPECIFIED (HCC): Primary | ICD-10-CM

## 2018-06-25 PROCEDURE — 36415 COLL VENOUS BLD VENIPUNCTURE: CPT | Performed by: CLINICAL NURSE SPECIALIST

## 2018-06-25 PROCEDURE — 93010 ELECTROCARDIOGRAM REPORT: CPT | Performed by: CLINICAL NURSE SPECIALIST

## 2018-06-25 PROCEDURE — 93005 ELECTROCARDIOGRAM TRACING: CPT

## 2018-06-25 PROCEDURE — 99211 OFF/OP EST MAY X REQ PHY/QHP: CPT | Performed by: CLINICAL NURSE SPECIALIST

## 2018-06-25 PROCEDURE — 80048 BASIC METABOLIC PNL TOTAL CA: CPT | Performed by: CLINICAL NURSE SPECIALIST

## 2018-06-25 PROCEDURE — 83880 ASSAY OF NATRIURETIC PEPTIDE: CPT | Performed by: CLINICAL NURSE SPECIALIST

## 2018-06-25 PROCEDURE — 99214 OFFICE O/P EST MOD 30 MIN: CPT | Performed by: CLINICAL NURSE SPECIALIST

## 2018-06-25 RX ORDER — POTASSIUM CHLORIDE 20 MEQ/1
TABLET, EXTENDED RELEASE ORAL
Status: COMPLETED
Start: 2018-06-25 | End: 2018-06-25

## 2018-06-25 RX ORDER — POTASSIUM CHLORIDE 20 MEQ/1
20 TABLET, EXTENDED RELEASE ORAL 2 TIMES DAILY
Refills: 0 | COMMUNITY
Start: 2018-06-25 | End: 2018-07-05

## 2018-06-25 RX ADMIN — POTASSIUM CHLORIDE 40 MEQ: 20 TABLET, EXTENDED RELEASE ORAL at 11:55:00

## 2018-06-25 NOTE — PROGRESS NOTES
[address] Patient Status:  Outpatient    3/8/1932 MRN [id number]   Location MD Dr Tyler Marquez is a 80year old male who presents to clinic for asse 10:37 AM   INR 1.3 (H) 10/11/2016 01:47 PM   PTP 16.1 (H) 10/11/2016 01:47 PM   T4F 1.4 06/06/2016 05:20 AM   TSH 2.110 06/06/2016 05:20 AM   DDIMER 0.69 06/01/2018 04:19 PM   ESRML 77 (H) 06/06/2016 05:20 AM   CRP 7.77 (H) 06/06/2016 05:20 AM   MG 1.9 06/ Underlying rhythm atrial fibrillation. 3. Ejection fraction calculated 51%. Education:  Reviewed disease process, sodium/fluid restriction, and medications with patient. Receptive.        Assessment:  Acute on chronic diastolic  - Torsemide 20 mg lisandro to heart failure.       ROVERTO Chris

## 2018-06-25 NOTE — PATIENT INSTRUCTIONS
Please take an additional 40 meq potassium tonight. Tomorrow, start potassium 40 meq     Home health repeat labs on Wednesday - order faxed      Continue tracking daily weights. Call with weight gain of 3 lbs overnight or concerning symptoms.    [phone]

## 2018-06-27 ENCOUNTER — LAB REQUISITION (OUTPATIENT)
Dept: LAB | Facility: HOSPITAL | Age: 83
End: 2018-06-27
Payer: MEDICARE

## 2018-06-27 ENCOUNTER — TELEPHONE (OUTPATIENT)
Dept: INTERVENTIONAL RADIOLOGY/VASCULAR | Facility: HOSPITAL | Age: 83
End: 2018-06-27

## 2018-06-27 ENCOUNTER — PRIOR ORIGINAL RECORDS (OUTPATIENT)
Dept: OTHER | Age: 83
End: 2018-06-27

## 2018-06-27 DIAGNOSIS — I50.9 HEART FAILURE (HCC): ICD-10-CM

## 2018-06-27 PROBLEM — J96.10 CHRONIC RESPIRATORY FAILURE, UNSPECIFIED WHETHER WITH HYPOXIA OR HYPERCAPNIA (HCC): Status: ACTIVE | Noted: 2018-01-10

## 2018-06-27 PROBLEM — J96.00 ACUTE RESPIRATORY FAILURE, UNSPECIFIED WHETHER WITH HYPOXIA OR HYPERCAPNIA (HCC): Status: ACTIVE | Noted: 2018-06-14

## 2018-06-27 LAB
ANION GAP SERPL CALC-SCNC: 8 MMOL/L (ref 0–18)
BUN SERPL-MCNC: 23 MG/DL (ref 8–20)
BUN/CREAT SERPL: 16.5 (ref 10–20)
BUN: 23 MG/DL
CALCIUM SERPL-MCNC: 9.1 MG/DL (ref 8.5–10.5)
CALCIUM: 9.1 MG/DL
CHLORIDE SERPL-SCNC: 104 MMOL/L (ref 95–110)
CHLORIDE: 104 MEQ/L
CO2 SERPL-SCNC: 25 MMOL/L (ref 22–32)
CREAT SERPL-MCNC: 1.39 MG/DL (ref 0.5–1.5)
CREATININE, SERUM: 1.39 MG/DL
GLUCOSE SERPL-MCNC: 140 MG/DL (ref 70–99)
GLUCOSE: 140 MG/DL
OSMOLALITY UR CALC.SUM OF ELEC: 290 MOSM/KG (ref 275–295)
POTASSIUM SERPL-SCNC: 4.4 MMOL/L (ref 3.3–5.1)
POTASSIUM, SERUM: 4.4 MEQ/L
SODIUM SERPL-SCNC: 137 MMOL/L (ref 136–144)
SODIUM: 137 MEQ/L

## 2018-06-27 PROCEDURE — 80048 BASIC METABOLIC PNL TOTAL CA: CPT | Performed by: INTERNAL MEDICINE

## 2018-06-28 ENCOUNTER — TELEPHONE (OUTPATIENT)
Dept: CARDIOLOGY CLINIC | Facility: HOSPITAL | Age: 83
End: 2018-06-28

## 2018-06-28 ENCOUNTER — MYAURORA ACCOUNT LINK (OUTPATIENT)
Dept: OTHER | Age: 83
End: 2018-06-28

## 2018-06-28 ENCOUNTER — PRIOR ORIGINAL RECORDS (OUTPATIENT)
Dept: OTHER | Age: 83
End: 2018-06-28

## 2018-06-29 ENCOUNTER — TELEPHONE (OUTPATIENT)
Dept: CARDIOLOGY CLINIC | Facility: CLINIC | Age: 83
End: 2018-06-29

## 2018-06-29 DIAGNOSIS — E87.6 HYPOKALEMIA: Primary | ICD-10-CM

## 2018-06-29 NOTE — TELEPHONE ENCOUNTER
Clarified with pt who was taking potassium 20meq total of 80meq daily. According to last CHF note is was to be 40meq daily.  Daughter was informed and will make change to 40meq and will recheck BMP next week

## 2018-07-02 ENCOUNTER — PRIOR ORIGINAL RECORDS (OUTPATIENT)
Dept: OTHER | Age: 83
End: 2018-07-02

## 2018-07-02 NOTE — HISTORICAL OFFICE NOTE
Kylie Villalba  : 1932  ACCOUNT:  [id number]  [phone]  PCP: Dr. Nancy Dudley     TODAY'S DATE: 2018  DICTATED BY:  ROVERTO Espinal]      CHIEF COMPLAINT: [Followup of Heart failure, diastolic, acute.]    HPI:    [On 2018, Latesha Ko used to smoke but quit. CAFFEINE: none. ALCOHOL: denies drinking. EXERCISE: no regular exercise. DIET: no special diet. MARITAL STATUS:  and lives with significant other. OCCUPATION: retired. RESIDENCE: homeowner.      ALLERGIES: No Known Allergies replacement  8. Mitral regurgitation  9.  Presence Of Other Vascular Implants And Grafts, LANEY closure device, Watchaman      PLAN:  [0 continue same medications follow-up next week and heart failure clinic we will call to see if we can get any sooner appoin

## 2018-07-02 NOTE — H&P
Nisha Elaine  : 1932  ACCOUNT:  [id number]  [phone]  PCP: Dr. Domenic Quinonez     TODAY'S DATE: 2018  DICTATED BY:  ROVERTO Guzman]      CHIEF COMPLAINT: [Followup of Heart failure, diastolic, acute.]    HPI:    [On 2018, Ann Benavides used to smoke but quit. CAFFEINE: none. ALCOHOL: denies drinking. EXERCISE: no regular exercise. DIET: no special diet. MARITAL STATUS:  and lives with significant other. OCCUPATION: retired. RESIDENCE: homeowner.      ALLERGIES: No Known Allergies replacement  8. Mitral regurgitation  9.  Presence Of Other Vascular Implants And Grafts, LANEY closure device, Watchaman      PLAN:  [0 continue same medications follow-up next week and heart failure clinic we will call to see if we can get any sooner appoin

## 2018-07-02 NOTE — HISTORICAL OFFICE NOTE
Charlene Vilchis  : 1932  ACCOUNT:  [id number]  [phone]  PCP: Dr. Сергей Singh     TODAY'S DATE: 2018  DICTATED BY:  [Dr. Alberto Pitt: [Followup of .  CAD, of native vessels.]    HPI:    [On 2018, Rueben Dakin, a 8 2002, dyslipidemia, hypertension, PTCA 1991, PTCA 1992, EUGENIA and Watchman device implant 2016    FAMILY HISTORY: Significant for premature CAD. Negative for AAA. SOCIAL HISTORY: SMOKING: Former tobacco use. used to smoke but quit. CAFFEINE: none.  ALCOHOL: regurgitation  9. Presence Of Other Vascular Implants And Grafts, LANEY closure device, Watchaman      PLAN:  [1.  Continue same cardiac medications  2.   Follow-up in 6 months]    PRESCRIPTIONS:   05/01/18 Ventolin HFA          108 (90   as needed

## 2018-07-02 NOTE — HOME CARE LIAISON
Shawn Teague  : 1932  ACCOUNT:  [id number]  [phone]  PCP: Dr. David Knight     TODAY'S DATE: 2018  DICTATED BY:  [Dr. Alexander Leisure: [Followup of .  CAD, of native vessels.]    HPI:    [On 2018, kimberlyn Gil 2002, dyslipidemia, hypertension, PTCA 1991, PTCA 1992, EUGENIA and Watchman device implant 2016    FAMILY HISTORY: Significant for premature CAD. Negative for AAA. SOCIAL HISTORY: SMOKING: Former tobacco use. used to smoke but quit. CAFFEINE: none.  ALCOHOL: regurgitation  9. Presence Of Other Vascular Implants And Grafts, LANEY closure device, Watchaman      PLAN:  [1.  Continue same cardiac medications  2.   Follow-up in 6 months]    PRESCRIPTIONS:   05/01/18 Ventolin HFA          108 (90   as needed

## 2018-07-05 ENCOUNTER — OFFICE VISIT (OUTPATIENT)
Dept: CARDIOLOGY CLINIC | Facility: HOSPITAL | Age: 83
End: 2018-07-05
Attending: INTERNAL MEDICINE
Payer: MEDICARE

## 2018-07-05 VITALS
DIASTOLIC BLOOD PRESSURE: 52 MMHG | SYSTOLIC BLOOD PRESSURE: 94 MMHG | HEART RATE: 91 BPM | WEIGHT: 137 LBS | BODY MASS INDEX: 27 KG/M2 | OXYGEN SATURATION: 94 %

## 2018-07-05 DIAGNOSIS — I50.9 HEART FAILURE, UNSPECIFIED (HCC): Primary | ICD-10-CM

## 2018-07-05 DIAGNOSIS — I50.30 (HFPEF) HEART FAILURE WITH PRESERVED EJECTION FRACTION (HCC): ICD-10-CM

## 2018-07-05 PROBLEM — J44.1 COPD EXACERBATION (HCC): Status: RESOLVED | Noted: 2018-06-01 | Resolved: 2018-07-05

## 2018-07-05 LAB
ANION GAP SERPL CALC-SCNC: 8 MMOL/L (ref 0–18)
BUN SERPL-MCNC: 23 MG/DL (ref 8–20)
BUN/CREAT SERPL: 17.4 (ref 10–20)
CALCIUM SERPL-MCNC: 8.8 MG/DL (ref 8.5–10.5)
CHLORIDE SERPL-SCNC: 104 MMOL/L (ref 95–110)
CO2 SERPL-SCNC: 25 MMOL/L (ref 22–32)
CREAT SERPL-MCNC: 1.32 MG/DL (ref 0.5–1.5)
GLUCOSE SERPL-MCNC: 176 MG/DL (ref 70–99)
OSMOLALITY UR CALC.SUM OF ELEC: 292 MOSM/KG (ref 275–295)
POTASSIUM SERPL-SCNC: 3.8 MMOL/L (ref 3.3–5.1)
SODIUM SERPL-SCNC: 137 MMOL/L (ref 136–144)

## 2018-07-05 PROCEDURE — 36415 COLL VENOUS BLD VENIPUNCTURE: CPT | Performed by: CLINICAL NURSE SPECIALIST

## 2018-07-05 PROCEDURE — 80048 BASIC METABOLIC PNL TOTAL CA: CPT | Performed by: CLINICAL NURSE SPECIALIST

## 2018-07-05 PROCEDURE — 99214 OFFICE O/P EST MOD 30 MIN: CPT | Performed by: CLINICAL NURSE SPECIALIST

## 2018-07-05 PROCEDURE — 99211 OFF/OP EST MAY X REQ PHY/QHP: CPT | Performed by: CLINICAL NURSE SPECIALIST

## 2018-07-05 RX ORDER — POTASSIUM CHLORIDE 20 MEQ/1
20 TABLET, EXTENDED RELEASE ORAL DAILY
Refills: 0 | Status: ON HOLD | COMMUNITY
Start: 2018-07-05 | End: 2019-01-01

## 2018-07-05 RX ORDER — TORSEMIDE 20 MG/1
20 TABLET ORAL EVERY OTHER DAY
Qty: 30 TABLET | Refills: 0 | COMMUNITY
Start: 2018-07-05 | End: 2019-01-01

## 2018-07-05 NOTE — PROGRESS NOTES
[address] Patient Status:  Outpatient    3/8/1932 MRN [id number]   Location MD Dr Simran Mendoza is a 80year old male who presents to clinic for asse INR 1.3 (H) 10/11/2016 01:47 PM   PTP 16.1 (H) 10/11/2016 01:47 PM   T4F 1.4 06/06/2016 05:20 AM   TSH 2.110 06/06/2016 05:20 AM   DDIMER 0.69 06/01/2018 04:19 PM   ESRML 77 (H) 06/06/2016 05:20 AM   CRP 7.77 (H) 06/06/2016 05:20 AM   MG 1.9 06/20/2018 0 Underlying rhythm atrial fibrillation. 3. Ejection fraction calculated 51%. Education:  Reviewed disease process, sodium/fluid restriction, and medications with patient. Receptive.        Assessment:  Acute on chronic diastolic  - Torsemide 20 mg lisandro than 30 minutes with this patient providing counseling, coordination of care and education related specifically to heart failure.       ROVERTO Jack

## 2018-07-05 NOTE — PATIENT INSTRUCTIONS
Decrease potassium to 20 meq daily    Decrease torsemide to 10 mg on Sun, Tues, Thurs, and Sat. Continue torsemide 10 mg twice daily on Mon, Wed, Fri    Continue tracking daily weights. Call with weight gain of 3 lbs overnight or concerning symptoms.    [address] [facility] [id number]

## 2018-07-09 ENCOUNTER — HOSPITAL ENCOUNTER (OUTPATIENT)
Dept: INTERVENTIONAL RADIOLOGY/VASCULAR | Facility: HOSPITAL | Age: 83
Discharge: HOME | End: 2018-07-09
Attending: INTERNAL MEDICINE | Admitting: INTERNAL MEDICINE
Payer: MEDICARE

## 2018-07-09 ENCOUNTER — HOSPITAL ENCOUNTER (OUTPATIENT)
Dept: CV DIAGNOSTICS | Facility: HOSPITAL | Age: 83
Discharge: HOME | End: 2018-07-09
Attending: INTERNAL MEDICINE
Payer: MEDICARE

## 2018-07-09 VITALS
SYSTOLIC BLOOD PRESSURE: 94 MMHG | HEART RATE: 73 BPM | WEIGHT: 135 LBS | BODY MASS INDEX: 26.5 KG/M2 | HEIGHT: 60 IN | DIASTOLIC BLOOD PRESSURE: 53 MMHG | OXYGEN SATURATION: 92 % | TEMPERATURE: 98 F | RESPIRATION RATE: 14 BRPM

## 2018-07-09 DIAGNOSIS — I48.91 A-FIB (HCC): ICD-10-CM

## 2018-07-09 PROCEDURE — 99152 MOD SED SAME PHYS/QHP 5/>YRS: CPT

## 2018-07-09 PROCEDURE — 93325 DOPPLER ECHO COLOR FLOW MAPG: CPT | Performed by: INTERNAL MEDICINE

## 2018-07-09 PROCEDURE — 99153 MOD SED SAME PHYS/QHP EA: CPT

## 2018-07-09 PROCEDURE — B24BZZ4 ULTRASONOGRAPHY OF HEART WITH AORTA, TRANSESOPHAGEAL: ICD-10-PCS | Performed by: INTERNAL MEDICINE

## 2018-07-09 PROCEDURE — 93312 ECHO TRANSESOPHAGEAL: CPT

## 2018-07-09 PROCEDURE — 93320 DOPPLER ECHO COMPLETE: CPT | Performed by: INTERNAL MEDICINE

## 2018-07-09 RX ORDER — SODIUM CHLORIDE 9 MG/ML
INJECTION, SOLUTION INTRAVENOUS CONTINUOUS
Status: DISCONTINUED | OUTPATIENT
Start: 2018-07-09 | End: 2018-07-09

## 2018-07-09 RX ORDER — MIDAZOLAM HYDROCHLORIDE 1 MG/ML
1 INJECTION INTRAMUSCULAR; INTRAVENOUS EVERY 5 MIN PRN
Status: DISCONTINUED | OUTPATIENT
Start: 2018-07-09 | End: 2018-07-09 | Stop reason: HOSPADM

## 2018-07-09 RX ORDER — MIDAZOLAM HYDROCHLORIDE 1 MG/ML
INJECTION INTRAMUSCULAR; INTRAVENOUS
Status: COMPLETED
Start: 2018-07-09 | End: 2018-07-09

## 2018-07-09 RX ADMIN — SODIUM CHLORIDE 500 ML: 9 INJECTION, SOLUTION INTRAVENOUS at 08:24:00

## 2018-07-09 RX ADMIN — MIDAZOLAM HYDROCHLORIDE 3 MG: 1 INJECTION INTRAMUSCULAR; INTRAVENOUS at 10:32:00

## 2018-07-09 NOTE — PROCEDURES
Cardiology 3D Transesophageal Echo Note    PRE-PROCEDURE DIAGNOSIS:  Mitral regurgitation, status post [id number] mitral clip procedure    PROCEDURE: Transesophageal Echocardiogram.    SEDATION:   Topical spray x 1  Versed: 3 mg  Fentanyl: 75 mcg  I pers consistent with prior transseptal punctures  · The aorta had scattered atherosclerotic plaque but no evidence for mobile atheromata or thrombus. · No pericardial effusion.      · WATCHMAN device noted in left atrial appendage (LANEY).  Device appears well se

## 2018-07-09 NOTE — PROGRESS NOTES
S/p EUGENIA, tolerated procedure well, NPO complete, tolerating cool PO liquids. Deneis pain 0/10 opn pain scale. Trae Randolph spoke with pt's family. Discharge instructions given/explained to patient/family, all questions answered, verbalized understanding.  Tele d'

## 2018-07-09 NOTE — H&P
Charlene Vilchis  : 1932  ACCOUNT:  [id number]  [phone]  PCP: Dr. Сергей Singh     TODAY'S DATE: 2018  DICTATED BY:  Owen Lesch, APN]        CHIEF COMPLAINT: [Followup of Heart failure, diastolic, acute.]     HPI:    [On 2018, Tovar Prima tobacco use. used to smoke but quit. CAFFEINE: none. ALCOHOL: denies drinking. EXERCISE: no regular exercise. DIET: no special diet. MARITAL STATUS:  and lives with significant other. OCCUPATION: retired.  RESIDENCE: homeowner.      ALLERGIES: No Kno Hypothyroidism, on replacement  8. Mitral regurgitation  9.  Presence Of Other Vascular Implants And Grafts, LANEY closure device, Watchaman        PLAN:  [0 continue same medications follow-up next week and heart failure clinic we will call to see if we can

## 2018-07-15 ENCOUNTER — APPOINTMENT (OUTPATIENT)
Dept: CT IMAGING | Facility: HOSPITAL | Age: 83
DRG: 291 | End: 2018-07-15
Attending: INTERNAL MEDICINE
Payer: MEDICARE

## 2018-07-15 ENCOUNTER — PRIOR ORIGINAL RECORDS (OUTPATIENT)
Dept: OTHER | Age: 83
End: 2018-07-15

## 2018-07-15 ENCOUNTER — APPOINTMENT (OUTPATIENT)
Dept: CT IMAGING | Facility: HOSPITAL | Age: 83
DRG: 291 | End: 2018-07-15
Attending: HOSPITALIST
Payer: MEDICARE

## 2018-07-15 ENCOUNTER — HOSPITAL ENCOUNTER (INPATIENT)
Facility: HOSPITAL | Age: 83
LOS: 4 days | Discharge: HOME HEALTH | DRG: 291 | End: 2018-07-19
Attending: EMERGENCY MEDICINE | Admitting: HOSPITALIST
Payer: MEDICARE

## 2018-07-15 ENCOUNTER — APPOINTMENT (OUTPATIENT)
Dept: GENERAL RADIOLOGY | Facility: HOSPITAL | Age: 83
DRG: 291 | End: 2018-07-15
Attending: EMERGENCY MEDICINE
Payer: MEDICARE

## 2018-07-15 DIAGNOSIS — I50.9 ACUTE ON CHRONIC HEART FAILURE, UNSPECIFIED HEART FAILURE TYPE (HCC): Primary | ICD-10-CM

## 2018-07-15 LAB
ANION GAP SERPL CALC-SCNC: 9 MMOL/L (ref 0–18)
BASOPHILS # BLD: 0.1 K/UL (ref 0–0.2)
BASOPHILS NFR BLD: 1 %
BNP SERPL-MCNC: 1090 PG/ML (ref 0–100)
BUN SERPL-MCNC: 22 MG/DL (ref 8–20)
BUN/CREAT SERPL: 18.2 (ref 10–20)
CALCIUM SERPL-MCNC: 9.3 MG/DL (ref 8.5–10.5)
CHLORIDE SERPL-SCNC: 101 MMOL/L (ref 95–110)
CO2 SERPL-SCNC: 25 MMOL/L (ref 22–32)
CREAT SERPL-MCNC: 1.21 MG/DL (ref 0.5–1.5)
EOSINOPHIL # BLD: 0.1 K/UL (ref 0–0.7)
EOSINOPHIL NFR BLD: 1 %
ERYTHROCYTE [DISTWIDTH] IN BLOOD BY AUTOMATED COUNT: 21.9 % (ref 11–15)
GLUCOSE SERPL-MCNC: 140 MG/DL (ref 70–99)
HCT VFR BLD AUTO: 33.7 % (ref 41–52)
HGB BLD-MCNC: 11.1 G/DL (ref 13.5–17.5)
LYMPHOCYTES # BLD: 0.7 K/UL (ref 1–4)
LYMPHOCYTES NFR BLD: 8 %
MCH RBC QN AUTO: 33 PG (ref 27–32)
MCHC RBC AUTO-ENTMCNC: 33 G/DL (ref 32–37)
MCV RBC AUTO: 100.1 FL (ref 80–100)
MONOCYTES # BLD: 2.4 K/UL (ref 0–1)
MONOCYTES NFR BLD: 27 %
NEUTROPHILS # BLD AUTO: 5.7 K/UL (ref 1.8–7.7)
NEUTROPHILS NFR BLD: 64 %
OSMOLALITY UR CALC.SUM OF ELEC: 286 MOSM/KG (ref 275–295)
PLATELET # BLD AUTO: 142 K/UL (ref 140–400)
PMV BLD AUTO: 8.3 FL (ref 7.4–10.3)
POTASSIUM SERPL-SCNC: 3.9 MMOL/L (ref 3.3–5.1)
PROCALCITONIN SERPL-MCNC: 0.09 NG/ML (ref ?–0.11)
RBC # BLD AUTO: 3.37 M/UL (ref 4.5–5.9)
S PYO AG THROAT QL: NEGATIVE
SODIUM SERPL-SCNC: 135 MMOL/L (ref 136–144)
TROPONIN I SERPL-MCNC: 0.03 NG/ML (ref ?–0.03)
WBC # BLD AUTO: 9 K/UL (ref 4–11)

## 2018-07-15 PROCEDURE — 87205 SMEAR GRAM STAIN: CPT | Performed by: HOSPITALIST

## 2018-07-15 PROCEDURE — 83880 ASSAY OF NATRIURETIC PEPTIDE: CPT | Performed by: EMERGENCY MEDICINE

## 2018-07-15 PROCEDURE — 96361 HYDRATE IV INFUSION ADD-ON: CPT

## 2018-07-15 PROCEDURE — 80048 BASIC METABOLIC PNL TOTAL CA: CPT | Performed by: EMERGENCY MEDICINE

## 2018-07-15 PROCEDURE — A4216 STERILE WATER/SALINE, 10 ML: HCPCS

## 2018-07-15 PROCEDURE — 70450 CT HEAD/BRAIN W/O DYE: CPT | Performed by: INTERNAL MEDICINE

## 2018-07-15 PROCEDURE — 96375 TX/PRO/DX INJ NEW DRUG ADDON: CPT

## 2018-07-15 PROCEDURE — 85025 COMPLETE CBC W/AUTO DIFF WBC: CPT | Performed by: EMERGENCY MEDICINE

## 2018-07-15 PROCEDURE — 71250 CT THORAX DX C-: CPT | Performed by: INTERNAL MEDICINE

## 2018-07-15 PROCEDURE — 70490 CT SOFT TISSUE NECK W/O DYE: CPT | Performed by: HOSPITALIST

## 2018-07-15 PROCEDURE — 84145 PROCALCITONIN (PCT): CPT | Performed by: HOSPITALIST

## 2018-07-15 PROCEDURE — 99285 EMERGENCY DEPT VISIT HI MDM: CPT

## 2018-07-15 PROCEDURE — 96365 THER/PROPH/DIAG IV INF INIT: CPT

## 2018-07-15 PROCEDURE — 87430 STREP A AG IA: CPT

## 2018-07-15 PROCEDURE — 71045 X-RAY EXAM CHEST 1 VIEW: CPT | Performed by: EMERGENCY MEDICINE

## 2018-07-15 PROCEDURE — 84484 ASSAY OF TROPONIN QUANT: CPT | Performed by: EMERGENCY MEDICINE

## 2018-07-15 RX ORDER — ALBUTEROL SULFATE 2.5 MG/3ML
2.5 SOLUTION RESPIRATORY (INHALATION) EVERY 6 HOURS PRN
Status: DISCONTINUED | OUTPATIENT
Start: 2018-07-15 | End: 2018-07-19

## 2018-07-15 RX ORDER — DOCUSATE SODIUM 100 MG/1
200 CAPSULE, LIQUID FILLED ORAL 2 TIMES DAILY
Status: DISCONTINUED | OUTPATIENT
Start: 2018-07-15 | End: 2018-07-19

## 2018-07-15 RX ORDER — ACETAMINOPHEN 325 MG/1
650 TABLET ORAL EVERY 6 HOURS PRN
Status: DISCONTINUED | OUTPATIENT
Start: 2018-07-15 | End: 2018-07-19

## 2018-07-15 RX ORDER — ENOXAPARIN SODIUM 100 MG/ML
40 INJECTION SUBCUTANEOUS DAILY
Status: DISCONTINUED | OUTPATIENT
Start: 2018-07-15 | End: 2018-07-19

## 2018-07-15 RX ORDER — PANTOPRAZOLE SODIUM 40 MG/1
40 TABLET, DELAYED RELEASE ORAL
Status: DISCONTINUED | OUTPATIENT
Start: 2018-07-16 | End: 2018-07-19

## 2018-07-15 RX ORDER — 0.9 % SODIUM CHLORIDE 0.9 %
VIAL (ML) INJECTION
Status: COMPLETED
Start: 2018-07-15 | End: 2018-07-15

## 2018-07-15 RX ORDER — DILTIAZEM HYDROCHLORIDE 120 MG/1
120 CAPSULE, COATED, EXTENDED RELEASE ORAL DAILY
Status: DISCONTINUED | OUTPATIENT
Start: 2018-07-15 | End: 2018-07-17

## 2018-07-15 RX ORDER — ACETAMINOPHEN 325 MG/1
650 TABLET ORAL EVERY 6 HOURS PRN
COMMUNITY
End: 2018-10-17

## 2018-07-15 RX ORDER — SODIUM CHLORIDE 9 MG/ML
80 INJECTION, SOLUTION INTRAVENOUS CONTINUOUS
Status: DISCONTINUED | OUTPATIENT
Start: 2018-07-15 | End: 2018-07-15

## 2018-07-15 RX ORDER — IPRATROPIUM BROMIDE AND ALBUTEROL SULFATE 2.5; .5 MG/3ML; MG/3ML
3 SOLUTION RESPIRATORY (INHALATION)
Status: DISCONTINUED | OUTPATIENT
Start: 2018-07-15 | End: 2018-07-19

## 2018-07-15 RX ORDER — NITROGLYCERIN 0.4 MG/1
0.4 TABLET SUBLINGUAL EVERY 5 MIN PRN
Status: DISCONTINUED | OUTPATIENT
Start: 2018-07-15 | End: 2018-07-19

## 2018-07-15 RX ORDER — FUROSEMIDE 10 MG/ML
40 INJECTION INTRAMUSCULAR; INTRAVENOUS ONCE
Status: COMPLETED | OUTPATIENT
Start: 2018-07-15 | End: 2018-07-15

## 2018-07-15 RX ORDER — DOXEPIN HYDROCHLORIDE 50 MG/1
1 CAPSULE ORAL DAILY
Status: DISCONTINUED | OUTPATIENT
Start: 2018-07-15 | End: 2018-07-19

## 2018-07-15 RX ORDER — LEVOTHYROXINE SODIUM 0.15 MG/1
150 TABLET ORAL
Status: DISCONTINUED | OUTPATIENT
Start: 2018-07-15 | End: 2018-07-19

## 2018-07-15 RX ORDER — FUROSEMIDE 10 MG/ML
40 INJECTION INTRAMUSCULAR; INTRAVENOUS
Status: DISCONTINUED | OUTPATIENT
Start: 2018-07-15 | End: 2018-07-17

## 2018-07-15 RX ORDER — MONTELUKAST SODIUM 10 MG/1
10 TABLET ORAL NIGHTLY
Status: DISCONTINUED | OUTPATIENT
Start: 2018-07-15 | End: 2018-07-19

## 2018-07-15 RX ORDER — SODIUM CHLORIDE 9 MG/ML
INJECTION, SOLUTION INTRAVENOUS
Status: COMPLETED
Start: 2018-07-15 | End: 2018-07-15

## 2018-07-15 RX ORDER — MELATONIN
650 2 TIMES DAILY
Status: DISCONTINUED | OUTPATIENT
Start: 2018-07-15 | End: 2018-07-19

## 2018-07-15 RX ORDER — FAMOTIDINE 20 MG/1
20 TABLET ORAL NIGHTLY
Status: DISCONTINUED | OUTPATIENT
Start: 2018-07-15 | End: 2018-07-19

## 2018-07-15 RX ORDER — ALLOPURINOL 100 MG/1
100 TABLET ORAL 2 TIMES DAILY
Status: DISCONTINUED | OUTPATIENT
Start: 2018-07-15 | End: 2018-07-19

## 2018-07-15 RX ORDER — ATORVASTATIN CALCIUM 20 MG/1
20 TABLET, FILM COATED ORAL NIGHTLY
Status: DISCONTINUED | OUTPATIENT
Start: 2018-07-15 | End: 2018-07-19

## 2018-07-15 NOTE — ED PROVIDER NOTES
Patient Seen in: [facility] 3w/sw    History   Patient presents with:  Sore Throat    Stated Complaint: EUGENIA on monday at [facility] - can not swallow, eat    HPI    80-year-old male presents for evaluation of sore throat.   Patient reports he had EUGENIA ENDOSCOPY  10/12/2016: EGD N/A      Comment: Procedure: ESOPHAGOGASTRODUODENOSCOPY (EGD);                  Surgeon: Maria Hdez MD;  Location: [facility]                ENDOSCOPY  No date: OTHER SURGICAL HISTORY      Comment: watchman device  No date: REPA Musculoskeletal: Normal range of motion. Neurological: He is alert and oriented to person, place, and time. No focal deficits   Skin: Skin is warm and dry. No rash noted. Psychiatric: He has a normal mood and affect.    Nursing note and vitals revie Course     Labs Reviewed   BASIC METABOLIC PANEL (8) - Abnormal; Notable for the following:        Result Value    Glucose 140 (*)     Sodium 135 (*)     BUN 22 (*)     GFR, Non- 54 (*)     All other components within normal limits   BNP (B am    Follow-up:  No follow-up provider specified. We recommend that you schedule follow up care with a primary care provider within the next three months to obtain basic health screening including reassessment of your blood pressure.     Medications Presc

## 2018-07-15 NOTE — PROGRESS NOTES
[facility] dosing service    Initial Pharmacokinetic Consult for Vancomycin Dosing     Oziel Edmonds is a 80year old male admitted on 7/15 who is being treated for pneumonia.   Pharmacy has been asked to dose Vancomycin by Dr. Marla Arnold    He is allergic to changes, toxicity and efficacy. Pharmacy will continue to follow him. We appreciate the opportunity to assist in his care.     Kirstin Early, Jane  7/15/2018  1:41 PM  [address]: [phone]

## 2018-07-15 NOTE — CONSULTS
[facility] AND [facility]  [facility] Cardiology Consult Note    Joudran Hobson Patient Status:  Emergency    3/8/1932 MRN [id number]   Location [address] Attending Esther Cabrales MD   Hosp Day # 0 PCP Paul Charles MD     21 Cardiology    --------------------------------------------------------------------------------------------------------------------------------  ROS 10 systems reviewed, pertinent findings above.   ROS    History:  Past Medical History:   Diagnosis Date   • Father      cerebral hemorrhage    • Other Wilhemena Fuel Mother      gallbladder       reports that he quit smoking about 27 years ago. He has never used smokeless tobacco. He reports that he does not drink alcohol or use drugs.     Objective:   Temp: 98 °F (36.7

## 2018-07-15 NOTE — PLAN OF CARE
PT. ADMITTED FROM ER TO CV TODAY WITH CHF, THROAT/NECK PAIN AND DIFFICULTY SWALLOWING AFTER EUGENIA LAST Monday 7/9, WEAKNESS, SOB, CARDIOLOGY CONSULT, IVP NAIDA C.T. CHEST, IV ANTIBIOTICS FOR PNEUMONIA,

## 2018-07-16 LAB
ADENOVIRUS PCR:: NEGATIVE
B PERT DNA SPEC QL NAA+PROBE: NEGATIVE
C PNEUM DNA SPEC QL NAA+PROBE: NEGATIVE
CORONAVIRUS 229E PCR:: NEGATIVE
CORONAVIRUS HKU1 PCR:: NEGATIVE
CORONAVIRUS NL63 PCR:: NEGATIVE
CORONAVIRUS OC43 PCR:: NEGATIVE
FLUAV RNA SPEC QL NAA+PROBE: NEGATIVE
FLUBV RNA SPEC QL NAA+PROBE: NEGATIVE
METAPNEUMOVIRUS PCR:: NEGATIVE
MYCOPLASMA PNEUMONIA PCR:: NEGATIVE
PARAINFLUENZA 1 PCR:: NEGATIVE
PARAINFLUENZA 2 PCR:: NEGATIVE
PARAINFLUENZA 3 PCR:: NEGATIVE
PARAINFLUENZA 4 PCR:: NEGATIVE
RHINOVIRUS/ENTERO PCR:: NEGATIVE
RSV RNA SPEC QL NAA+PROBE: NEGATIVE

## 2018-07-16 PROCEDURE — 87581 M.PNEUMON DNA AMP PROBE: CPT | Performed by: INTERNAL MEDICINE

## 2018-07-16 PROCEDURE — 87486 CHLMYD PNEUM DNA AMP PROBE: CPT | Performed by: INTERNAL MEDICINE

## 2018-07-16 PROCEDURE — 92610 EVALUATE SWALLOWING FUNCTION: CPT

## 2018-07-16 PROCEDURE — 94640 AIRWAY INHALATION TREATMENT: CPT

## 2018-07-16 PROCEDURE — A4216 STERILE WATER/SALINE, 10 ML: HCPCS

## 2018-07-16 PROCEDURE — 87633 RESP VIRUS 12-25 TARGETS: CPT | Performed by: INTERNAL MEDICINE

## 2018-07-16 PROCEDURE — 87798 DETECT AGENT NOS DNA AMP: CPT | Performed by: INTERNAL MEDICINE

## 2018-07-16 RX ORDER — 0.9 % SODIUM CHLORIDE 0.9 %
VIAL (ML) INJECTION
Status: COMPLETED
Start: 2018-07-16 | End: 2018-07-16

## 2018-07-16 NOTE — SLP NOTE
ADULT SWALLOWING EVALUATION    ASSESSMENT    ASSESSMENT/OVERALL IMPRESSION:  Pt seen sitting upright in bed for all PO trials. No anterior loss of bolus on any consistency given.  Bolus formation and a-p propulsion appeared functional and timely across all unspecified heart failure type [facility])  Active Problems:    Acute on chronic heart failure [facility])      Past Medical History  Past Medical History:   Diagnosis Date   • Abdominal aortic atherosclerosis ([facility] [address] 2/14/2018   • Arrhythmia     Afib   • Arthritis of l Trialed: Thin liquids;Puree; Soft solid  Method of Presentation: Self presentation;Spoon;Staff/Clinician assistance;Cup;Straw  Patient Positioning: Upright    Oral Phase of Swallow: Impaired  Bolus Retrieval: Intact  Bilabial Seal: Intact  Bolus Formation:

## 2018-07-16 NOTE — CM/SW NOTE
7/16/18 CM Discharge planning   Pt resides with s/o Sparkle Boswell, family home, and is current with [facility]. Pt has recent hx of rehab at [facility]. PT/OT evals pending. CM will continue to follow and assist with discharge needs pending medical course.

## 2018-07-16 NOTE — PROGRESS NOTES
[facility] AND [facility]  [facility] Cardiology Progress Note    Tiffanie Navarrete Patient Status:  Inpatient    3/8/1932 MRN [id number]   Location [facility] 3W/SW Attending Camron Ibarra MD   Hosp Day # 1 PCP Alaina Orlando MD     80year old male, consulte History:   Diagnosis Date   • Abdominal aortic atherosclerosis ([facility] [address] 2/14/2018   • Arrhythmia     Afib   • Arthritis of lumbar spine (HCC) 2/14/2018   • Asthma    • Atrial fibrillation (HCC)    • Morris esophagus    • Bilateral carotid artery disease ([facility] [address] drugs.    Objective:   Temp: 98.5 °F (36.9 °C)  Pulse: 100  Resp: 22  BP: 112/70    Intake/Output:     Intake/Output Summary (Last 24 hours) at 07/16/18 0859  Last data filed at 07/16/18 0600   Gross per 24 hour   Intake              680 ml   Output

## 2018-07-16 NOTE — PLAN OF CARE
Problem: Patient Centered Care  Goal: Patient preferences are identified and integrated in the patient's plan of care  Interventions:  - What would you like us to know as we care for you?  Hard of hearing    - Provide timely, complete, and accurate informat AND ELECTROLYTES - ADULT  Goal: Electrolytes maintained within normal limits  INTERVENTIONS:  - Monitor labs and rhythm and assess patient for signs and symptoms of electrolyte imbalances  - Administer electrolyte replacement as ordered  - Monitor response Progressing      Problem: Impaired Activities of Daily Living  Goal: Achieve highest/safest level of independence in self care  Interventions:  - Assess ability and encourage patient to participate in ADLs to maximize function  - Promote sitting position w physical deficits and behaviors that affect risk of falls.   - [place] fall precautions as indicated by assessment.  - Educate pt/family on patient safety including physical limitations  - Instruct pt to call for assistance with activity based on assessme

## 2018-07-16 NOTE — PROGRESS NOTES
[facility] Hospitalist Progress Note     CC: Hospital Follow up    PCP: Brandi Alba MD       Assessment/Plan:     Principal Problem:    Acute on chronic heart failure, unspecified heart failure type [facility])  Active Problems:    Acute on chronic heart failure ( cough  -does not appear to be active exacerbation  -RVP neg  -cont home meds  - pulm FU     Mild hyponatremia,   -has been in low 130's for the last few months   -stable, monitor     Anemia  -hgb 11.1, continue to monitor     Hypothyroidism  -cont home med 07/15/18   1000   RBC  3.37*   HGB  11.1*   HCT  33.7*   MCV  100.1*   MCH  33.0*   MCHC  33.0   RDW  21.9*   WBC  9.0   PLT  142         Recent Labs   Lab  07/15/18   1000   GLU  140*   BUN  22*   CREATSERUM  1.21   GFRAA  >60   GFRNAA  54*   CA  9.3   NA Xr Chest Ap Portable  (cpt=71045)    Result Date: 7/15/2018  CONCLUSION:  Imaging findings suggestive of emphysema with COPD and superimposed fibrotic changes. Right upper lobe airspace opacity suggestive of pneumonia appears new since 6/14/18.  Sh

## 2018-07-16 NOTE — PLAN OF CARE
Problem: Patient Centered Care  Goal: Patient preferences are identified and integrated in the patient's plan of care  Interventions:  - What would you like us to know as we care for you? From home with family.  Home oxygen  - Provide timely, complete, and Progressing      Problem: METABOLIC/FLUID AND ELECTROLYTES - ADULT  Goal: Electrolytes maintained within normal limits  INTERVENTIONS:  - Monitor labs and rhythm and assess patient for signs and symptoms of electrolyte imbalances  - Administer electrolyte with patient/family   Outcome: Progressing      Problem: Impaired Activities of Daily Living  Goal: Achieve highest/safest level of independence in self care  Interventions:  - Assess ability and encourage patient to participate in ADLs to maximize functio [place] fall precautions as indicated by assessment.  - Educate pt/family on patient safety including physical limitations  - Instruct pt to call for assistance with activity based on assessment  - Modify environment to reduce risk of injury  - Provide a

## 2018-07-16 NOTE — CONSULTS
Pulmonary Consult     Assessment / Plan:  1. Acute on chronic hypoxic respiratory failure - due to PNA and heart failure  - wean O2 as able  2. PNA   - check RVP  - repeat PCT tomorrow  - stop vanc  - continue Zosyn for now  - follow-up cultures  3.  COPD Unspecified essential hypertension        Past Surgical History:  No date: CABG  No date: CATARACT  10/4/2016: EGD      Comment: Procedure: ESOPHAGOGASTRODUODENOSCOPY (EGD);                  Surgeon: Alejandra Ray MD;  Location: [address] [place] [address] tablet Rfl: 3 7/14/2018 at 2100   umeclidinium-vilanterol (ANORO ELLIPTA) 62.5-25 MCG/INH Inhalation Aerosol Powder, Breath Activated Inhale 1 puff into the lungs daily.  Disp: 1 each Rfl: 11 7/14/2018 at 0900   Multiple Vitamin (MULTI VITAMIN MENS) Oral Ta differently: Take 120 mg by mouth daily.  ) Disp: 90 capsule Rfl: 1   simvastatin 40 MG Oral Tab Take 1 tablet (40 mg total) by mouth once daily.  Disp: 90 tablet Rfl: 1   Levothyroxine Sodium 150 MCG Oral Tab TAKE 1 TABLET(150 MCG) BY MOUTH BEFORE BREAKFAS BP:   90/49 110/57   BP Location:   Left arm Right arm   Pulse:   102 103   Resp:   18    Temp:   99.4 °F (37.4 °C)    TempSrc:   Oral    SpO2:  94% 93%    Weight: 129 lb 6.4 oz (58.7 kg)      Height:         General: NAD  HEENT: OP clear  Respiratory: c

## 2018-07-17 ENCOUNTER — APPOINTMENT (OUTPATIENT)
Dept: GENERAL RADIOLOGY | Facility: HOSPITAL | Age: 83
DRG: 291 | End: 2018-07-17
Attending: HOSPITALIST
Payer: MEDICARE

## 2018-07-17 LAB
ANION GAP SERPL CALC-SCNC: 9 MMOL/L (ref 0–18)
BASOPHILS # BLD: 0.1 K/UL (ref 0–0.2)
BASOPHILS NFR BLD: 1 %
BUN SERPL-MCNC: 19 MG/DL (ref 8–20)
BUN/CREAT SERPL: 15 (ref 10–20)
CALCIUM SERPL-MCNC: 8.7 MG/DL (ref 8.5–10.5)
CHLORIDE SERPL-SCNC: 97 MMOL/L (ref 95–110)
CO2 SERPL-SCNC: 28 MMOL/L (ref 22–32)
CREAT SERPL-MCNC: 1.27 MG/DL (ref 0.5–1.5)
EOSINOPHIL # BLD: 0.4 K/UL (ref 0–0.7)
EOSINOPHIL NFR BLD: 5 %
ERYTHROCYTE [DISTWIDTH] IN BLOOD BY AUTOMATED COUNT: 21.6 % (ref 11–15)
GLUCOSE SERPL-MCNC: 118 MG/DL (ref 70–99)
HCT VFR BLD AUTO: 29.5 % (ref 41–52)
HGB BLD-MCNC: 9.9 G/DL (ref 13.5–17.5)
LYMPHOCYTES # BLD: 0.9 K/UL (ref 1–4)
LYMPHOCYTES NFR BLD: 12 %
MAGNESIUM SERPL-MCNC: 1.8 MG/DL (ref 1.8–2.5)
MCH RBC QN AUTO: 33.1 PG (ref 27–32)
MCHC RBC AUTO-ENTMCNC: 33.4 G/DL (ref 32–37)
MCV RBC AUTO: 99.1 FL (ref 80–100)
MONOCYTES # BLD: 1.7 K/UL (ref 0–1)
MONOCYTES NFR BLD: 23 %
NEUTROPHILS # BLD AUTO: 4.5 K/UL (ref 1.8–7.7)
NEUTROPHILS NFR BLD: 60 %
OSMOLALITY UR CALC.SUM OF ELEC: 281 MOSM/KG (ref 275–295)
PLATELET # BLD AUTO: 116 K/UL (ref 140–400)
PMV BLD AUTO: 8.4 FL (ref 7.4–10.3)
POTASSIUM SERPL-SCNC: 2.7 MMOL/L (ref 3.3–5.1)
POTASSIUM SERPL-SCNC: 3.8 MMOL/L (ref 3.3–5.1)
PROCALCITONIN SERPL-MCNC: 0.12 NG/ML (ref ?–0.11)
RBC # BLD AUTO: 2.98 M/UL (ref 4.5–5.9)
SODIUM SERPL-SCNC: 134 MMOL/L (ref 136–144)
WBC # BLD AUTO: 7.5 K/UL (ref 4–11)

## 2018-07-17 PROCEDURE — 84145 PROCALCITONIN (PCT): CPT | Performed by: INTERNAL MEDICINE

## 2018-07-17 PROCEDURE — 74230 X-RAY XM SWLNG FUNCJ C+: CPT | Performed by: HOSPITALIST

## 2018-07-17 PROCEDURE — 97161 PT EVAL LOW COMPLEX 20 MIN: CPT

## 2018-07-17 PROCEDURE — 92611 MOTION FLUOROSCOPY/SWALLOW: CPT

## 2018-07-17 PROCEDURE — 80048 BASIC METABOLIC PNL TOTAL CA: CPT | Performed by: HOSPITALIST

## 2018-07-17 PROCEDURE — 83735 ASSAY OF MAGNESIUM: CPT | Performed by: HOSPITALIST

## 2018-07-17 PROCEDURE — A4216 STERILE WATER/SALINE, 10 ML: HCPCS

## 2018-07-17 PROCEDURE — 84132 ASSAY OF SERUM POTASSIUM: CPT | Performed by: HOSPITALIST

## 2018-07-17 PROCEDURE — 85025 COMPLETE CBC W/AUTO DIFF WBC: CPT | Performed by: HOSPITALIST

## 2018-07-17 PROCEDURE — 97116 GAIT TRAINING THERAPY: CPT

## 2018-07-17 PROCEDURE — 94640 AIRWAY INHALATION TREATMENT: CPT

## 2018-07-17 RX ORDER — POTASSIUM CHLORIDE 20 MEQ/1
40 TABLET, EXTENDED RELEASE ORAL ONCE
Status: COMPLETED | OUTPATIENT
Start: 2018-07-17 | End: 2018-07-17

## 2018-07-17 RX ORDER — MAGNESIUM OXIDE 400 MG (241.3 MG MAGNESIUM) TABLET
400 TABLET ONCE
Status: DISCONTINUED | OUTPATIENT
Start: 2018-07-17 | End: 2018-07-17

## 2018-07-17 RX ORDER — TORSEMIDE 20 MG/1
20 TABLET ORAL DAILY
Status: DISCONTINUED | OUTPATIENT
Start: 2018-07-18 | End: 2018-07-19

## 2018-07-17 RX ORDER — 0.9 % SODIUM CHLORIDE 0.9 %
VIAL (ML) INJECTION
Status: COMPLETED
Start: 2018-07-17 | End: 2018-07-17

## 2018-07-17 RX ORDER — MAGNESIUM SULFATE HEPTAHYDRATE 40 MG/ML
2 INJECTION, SOLUTION INTRAVENOUS ONCE
Status: DISCONTINUED | OUTPATIENT
Start: 2018-07-17 | End: 2018-07-17

## 2018-07-17 RX ORDER — AMOXICILLIN AND CLAVULANATE POTASSIUM 875; 125 MG/1; MG/1
1 TABLET, FILM COATED ORAL EVERY 12 HOURS SCHEDULED
Status: DISCONTINUED | OUTPATIENT
Start: 2018-07-17 | End: 2018-07-19

## 2018-07-17 RX ORDER — DILTIAZEM HYDROCHLORIDE 120 MG/1
120 CAPSULE, COATED, EXTENDED RELEASE ORAL
Status: DISCONTINUED | OUTPATIENT
Start: 2018-07-17 | End: 2018-07-18

## 2018-07-17 NOTE — PLAN OF CARE
Problem: Patient Centered Care  Goal: Patient preferences are identified and integrated in the patient's plan of care  Interventions:  - What would you like us to know as we care for you? From home with family.  Home oxygen  - Provide timely, complete, and Progressing      Problem: METABOLIC/FLUID AND ELECTROLYTES - ADULT  Goal: Electrolytes maintained within normal limits  INTERVENTIONS:  - Monitor labs and rhythm and assess patient for signs and symptoms of electrolyte imbalances  - Administer electrolyte with patient/family   Outcome: Progressing      Problem: Impaired Activities of Daily Living  Goal: Achieve highest/safest level of independence in self care  Interventions:  - Assess ability and encourage patient to participate in ADLs to maximize functio [place] fall precautions as indicated by assessment.  - Educate pt/family on patient safety including physical limitations  - Instruct pt to call for assistance with activity based on assessment  - Modify environment to reduce risk of injury  - Provide a

## 2018-07-17 NOTE — PROGRESS NOTES
[facility] Hospitalist Progress Note     CC: Hospital Follow up    PCP: Kasey Giron MD       Assessment/Plan:     Principal Problem:    Acute on chronic heart failure, unspecified heart failure type [facility])  Active Problems:    Acute on chronic heart failure (  cont statin      COPD, chronic 2-3L  -no wheezing, no cough  -does not appear to be active exacerbation  -RVP neg  -cont home meds  - pulm FU     Mild hyponatremia,   -has been in low 130's for the last few months   -stable, monitor     Anemia  -hgb 11.1, appropriate mood and affect      Data Review:       Labs:     Recent Labs   Lab  07/15/18   1000  07/17/18   0605   RBC  3.37*  2.98*   HGB  11.1*  9.9*   HCT  33.7*  29.5*   MCV  100.1*  99.1   MCH  33.0*  33.1*   MCHC  33.0  33.4   RDW  21.9*  21.6*   WB the extra-axial region of the right parietal lobe represents vascularity and not a subdural hematoma.     Dictated by (CST): Kecia Pope MD on 7/15/2018 at 20:50     Approved by (CST): Kecia Pope MD on 7/15/2018 at 20:54          Xr Chest Ap Portable  (c

## 2018-07-17 NOTE — PHYSICAL THERAPY NOTE
PHYSICAL THERAPY EVALUATION - INPATIENT     Room Number: 641/960-Q  Evaluation Date: 7/17/2018  Type of Evaluation: Initial   Physician Order: PT Eval and Treat    Presenting Problem: sore throat, SOB, CHF, acute on chronic hypoxic respiratory failure  Re failure type [facility])  Active Problems:    Acute on chronic heart failure [facility])      Past Medical History  Past Medical History:   Diagnosis Date   • [facility] 2/14/2018   • Arrhythmia     Afib   • Arthritis of lumbar spine ([facility] Patient Owned Equipment: None       Prior Level of [place]: 200' per attempt on 2.5L home 02, no device. IND with ADLs. SUBJECTIVE  \"I am waiting for my breakfast\".  Agreeable to get up to chair for breakfast.     PHYSICAL THERAPY EXAMINATION guidance for 02 line and IV line )  Distance (ft): 40  Assistive Device: None  Pattern: Shuffle  Stoop/Curb Assistance: Not tested  Comment : 3L 02    Bed Mobility: SBA for lines    Transfers: SBA for lines.      Exercise/Education Provided:  Ambulation, pa

## 2018-07-17 NOTE — CARDIAC REHAB
CARDIAC REHAB HEART FAILURE EDUCATION    Handouts provided and reviewed: CHF Booklet. Activity: Chair for all meals: Reviewed       Ambulation: Reviewed       Tolerated Activity:          Disease Process: Disease process reviewed.     Reviewed the mamie

## 2018-07-17 NOTE — PLAN OF CARE
Problem: Patient Centered Care  Goal: Patient preferences are identified and integrated in the patient's plan of care  Interventions:  - What would you like us to know as we care for you? From home with family.  Home oxygen  - Provide timely, complete, and anxiety  - Monitor for signs/symptoms of CO2 retention   Outcome: Progressing  Patient sating well on 3L nasal cannula. Self suctions for comfort. Plan for video swallow tomorrow.     Problem: METABOLIC/FLUID AND ELECTROLYTES - ADULT  Goal: Electrolytes m and ambulation using safe patient handling equipment as needed  - Ensure adequate protection for wounds/incisions during mobilization  - Obtain PT/OT consults as needed  - Advance activity as appropriate  - Communicate ordered activity level and limitation Anticipate increased pain with activity and pre-medicate as appropriate   Outcome: Progressing  No complaints of pain at this time.     Problem: SAFETY ADULT - FALL  Goal: Free from fall injury  INTERVENTIONS:  - Assess pt frequently for physical needs  - I

## 2018-07-17 NOTE — PROGRESS NOTES
Pulmonary Progress Note      NAME: Cindy Solis - ROOM: [facility]-I - MRN: [id number] - Age: 80year old - : 3/8/1932    Assessment/Plan:  1. Acute on chronic hypoxic respiratory failure - due to PNA and heart failure  - wean O2 as able  2.  PNA - RUL i 9.6 oz (58.3 kg)   SpO2 95%   BMI 25.12 kg/m²   Physical Exam:   General: alert, cooperative, no respiratory distress. HEENT: Normocephalic atraumatic. Lips, mucosa, and tongue normal.  No thrush noted.    Lungs: diminished at the bases   Chest wall: No te

## 2018-07-17 NOTE — SLP NOTE
ADULT VIDEOFLUOROSCOPIC SWALLOWING STUDY    Admission Date: 7/15/2018  Evaluation Date: 07/17/18  Radiologist: Dr. Bassem Kraft:    Speech to f/u x1 session/meal for dysphagia treatment.  Focus will be to ensure safe tolerance of diet and to reinforce a cardiac shunt ([facility] 2/14/2018   • Shortness of breath    • Thoracic aorta atherosclerosis ([address] 2/14/2018   • Thoracic arthritis ([address] 2/14/2018   • Thoracic compression fracture ([facility] 2/14/2018   • Unspecified essential hypertension        Current Diet Co Aspiration: None  Strategy(ies) Implemented (Thin Liquids): Multiple swallows  Effectiveness: Yes           SOFT SOLID  Oral Phase of Swallow (VFSS - Soft Solid):  Within Functional Limits  Triggered at: Base of tongue  Laryngeal Penetration: None  Tracheal

## 2018-07-18 ENCOUNTER — PRIOR ORIGINAL RECORDS (OUTPATIENT)
Dept: OTHER | Age: 83
End: 2018-07-18

## 2018-07-18 LAB
ANION GAP SERPL CALC-SCNC: 7 MMOL/L (ref 0–18)
BASOPHILS # BLD: 0.1 K/UL (ref 0–0.2)
BASOPHILS NFR BLD: 1 %
BUN SERPL-MCNC: 20 MG/DL (ref 8–20)
BUN/CREAT SERPL: 17.4 (ref 10–20)
CALCIUM SERPL-MCNC: 8.9 MG/DL (ref 8.5–10.5)
CHLORIDE SERPL-SCNC: 101 MMOL/L (ref 95–110)
CO2 SERPL-SCNC: 24 MMOL/L (ref 22–32)
CREAT SERPL-MCNC: 1.15 MG/DL (ref 0.5–1.5)
EOSINOPHIL # BLD: 0.3 K/UL (ref 0–0.7)
EOSINOPHIL NFR BLD: 4 %
ERYTHROCYTE [DISTWIDTH] IN BLOOD BY AUTOMATED COUNT: 21.6 % (ref 11–15)
GLUCOSE SERPL-MCNC: 116 MG/DL (ref 70–99)
HCT VFR BLD AUTO: 28.9 % (ref 41–52)
HGB BLD-MCNC: 9.5 G/DL (ref 13.5–17.5)
LYMPHOCYTES # BLD: 0.7 K/UL (ref 1–4)
LYMPHOCYTES NFR BLD: 9 %
MAGNESIUM SERPL-MCNC: 2 MG/DL (ref 1.8–2.5)
MCH RBC QN AUTO: 33 PG (ref 27–32)
MCHC RBC AUTO-ENTMCNC: 33 G/DL (ref 32–37)
MCV RBC AUTO: 100 FL (ref 80–100)
MONOCYTES # BLD: 2 K/UL (ref 0–1)
MONOCYTES NFR BLD: 24 %
NEUTROPHILS # BLD AUTO: 5.2 K/UL (ref 1.8–7.7)
NEUTROPHILS NFR BLD: 63 %
OSMOLALITY UR CALC.SUM OF ELEC: 278 MOSM/KG (ref 275–295)
PLATELET # BLD AUTO: 126 K/UL (ref 140–400)
PMV BLD AUTO: 8.1 FL (ref 7.4–10.3)
POTASSIUM SERPL-SCNC: 4.2 MMOL/L (ref 3.3–5.1)
POTASSIUM SERPL-SCNC: 4.2 MMOL/L (ref 3.3–5.1)
RBC # BLD AUTO: 2.89 M/UL (ref 4.5–5.9)
SODIUM SERPL-SCNC: 132 MMOL/L (ref 136–144)
WBC # BLD AUTO: 8.3 K/UL (ref 4–11)

## 2018-07-18 PROCEDURE — 83735 ASSAY OF MAGNESIUM: CPT | Performed by: HOSPITALIST

## 2018-07-18 PROCEDURE — 85025 COMPLETE CBC W/AUTO DIFF WBC: CPT | Performed by: INTERNAL MEDICINE

## 2018-07-18 PROCEDURE — 84132 ASSAY OF SERUM POTASSIUM: CPT | Performed by: HOSPITALIST

## 2018-07-18 PROCEDURE — 94640 AIRWAY INHALATION TREATMENT: CPT

## 2018-07-18 PROCEDURE — 80048 BASIC METABOLIC PNL TOTAL CA: CPT | Performed by: INTERNAL MEDICINE

## 2018-07-18 PROCEDURE — 97116 GAIT TRAINING THERAPY: CPT

## 2018-07-18 PROCEDURE — 92526 ORAL FUNCTION THERAPY: CPT

## 2018-07-18 RX ORDER — DILTIAZEM HYDROCHLORIDE 120 MG/1
120 CAPSULE, COATED, EXTENDED RELEASE ORAL DAILY
Status: DISCONTINUED | OUTPATIENT
Start: 2018-07-19 | End: 2018-07-18

## 2018-07-18 RX ORDER — DILTIAZEM HYDROCHLORIDE 180 MG/1
180 CAPSULE, COATED, EXTENDED RELEASE ORAL DAILY
Status: DISCONTINUED | OUTPATIENT
Start: 2018-07-19 | End: 2018-07-18

## 2018-07-18 RX ORDER — DILTIAZEM HYDROCHLORIDE 120 MG/1
120 CAPSULE, COATED, EXTENDED RELEASE ORAL DAILY
Status: DISCONTINUED | OUTPATIENT
Start: 2018-07-19 | End: 2018-07-19

## 2018-07-18 RX ORDER — DIGOXIN 125 MCG
125 TABLET ORAL DAILY
Status: DISCONTINUED | OUTPATIENT
Start: 2018-07-18 | End: 2018-07-19

## 2018-07-18 RX ORDER — AMOXICILLIN AND CLAVULANATE POTASSIUM 875; 125 MG/1; MG/1
1 TABLET, FILM COATED ORAL EVERY 12 HOURS SCHEDULED
Qty: 10 TABLET | Refills: 0 | Status: SHIPPED | OUTPATIENT
Start: 2018-07-19 | End: 2018-07-24 | Stop reason: ALTCHOICE

## 2018-07-18 NOTE — PROGRESS NOTES
[facility]    Progress Note    [place] Patient Status:  Inpatient    3/8/1932 MRN [id number]   Location [facility] 3W/SW Attending Brenda Mckeon MD   Hosp Day # 3 PCP Kayden Helms MD       CARDIOLOGY ATTENDING   Note an Severe MVR     Being considered for another mitral clip procedure- plan f/u with dr Steven Frederick        CAD     CABG   Statin    No asa due to hx of bleeding issues     Acute on chronic respiratory failure   COPD   Home o2 chronic 2-3L    PNA- RUL   PO abx

## 2018-07-18 NOTE — CM/SW NOTE
7/18/18 CM Discharge planning / MDO for home health   [facility]   Derick Stapleton X [phone]

## 2018-07-18 NOTE — PLAN OF CARE
CARDIOVASCULAR - ADULT    • Maintains optimal cardiac output and hemodynamic stability Progressing        DISCHARGE PLANNING    • Discharge to home or other facility with appropriate resources Progressing        Impaired Activities of Daily Living    • Ach

## 2018-07-18 NOTE — PLAN OF CARE
Problem: CARDIOVASCULAR - ADULT  Goal: Maintains optimal cardiac output and hemodynamic stability  INTERVENTIONS:  - Monitor vital signs, rhythm, and trends  - Monitor for bleeding, hypotension and signs of decreased cardiac output  - Evaluate effectivenes for signs and symptoms of volume excess or deficit  - Monitor intake, output and patient weight  - Monitor urine specific gravity, serum osmolarity and serum sodium as indicated or ordered  - Monitor response to interventions for patient's volume status, i (90 degrees preferred)  - Offer food and liquids at a slow rate  - No straws  - Encourage small bites of food and small sips of liquid  - Offer pills one at a time, crush or deliver with applesauce as needed  - Discontinue feeding and notify MD (or speech discharge planning  - Arrange for needed discharge resources and transportation as appropriate  - Identify discharge learning needs (meds, wound care, etc)  - Arrange for interpreters to assist at discharge as needed  - Consider post-discharge preferences

## 2018-07-18 NOTE — HOME CARE LIAISON
Patient is current with [address] [place] [address]  Met with patient and his life partner at the bedside. Both are agreeable to resume home health services.

## 2018-07-18 NOTE — CDS QUERY
Pneumonia Specificity  CLINICAL DOCUMENTATION CLARIFICATION FORM    Yonas Moreno information (provided below) indicates pneumonia.  For accurate ICD-10-CM code assignment to reflect severity of illness and risk of mortality,     Please specify

## 2018-07-18 NOTE — CARDIAC REHAB
CARDIAC REHAB HEART FAILURE EDUCATION    Handouts provided and reviewed: CHF Booklet. Activity: Chair for all meals: Yes       Ambulation: Yes       Tolerated Activity: Well         Disease Process: Disease process reviewed.     Reviewed the following:

## 2018-07-18 NOTE — PROGRESS NOTES
Pulmonary Progress Note     Assessment / Plan:  1. Acute on chronic hypoxic respiratory failure - due to PNA and heart failure  - wean O2 as able  2.  PNA - RUL infiltrate  - negative RVP  - repeat PCT not elevated, but uptrending  - now on Augmentin  - fol

## 2018-07-18 NOTE — PHYSICAL THERAPY NOTE
PHYSICAL THERAPY TREATMENT NOTE - INPATIENT     Room Number: 765/661-A       Presenting Problem: sore throat, SOB, CHF, acute on chronic hypoxic respiratory failure    Problem List  Principal Problem:    Acute on chronic heart failure, unspecified heart fa much help from another person does the patient currently need. ..   -   Moving to and from a bed to a chair (including a wheelchair)?: A Little   -   Need to walk in hospital room?: A Little   -   Climbing 3-5 steps with a railing?: A Little     AM-PAC Scor

## 2018-07-18 NOTE — DISCHARGE SUMMARY
General Medicine Discharge Summary     Patient ID:  Patrick Webber  80year old  3/8/1932    Admit date: 7/15/2018    Discharge date and time: 7/18/18    Attending Physician: Uriah Andrade MD     Consults: IP CONSULT TO HOSPITALIST  IP CONSULT TO CARDIOLOGY  HTN, HL, afib on nothing (sig leeding on coumadin/eliquis/aspirin), b/l carotid artery disease, GERD with catalan esophagus, asthma/COPD on chronic 2-3 L, hypothyroidism, gout (recent flare 6/4/18 tx with steroids) and recent admission for acute on chroni exacerbation  -RVP neg  -cont home meds  - pulm FU     Mild hyponatremia,   -has been in low 130's for the last few months   -stable, monitor     Anemia  -hgb 11.1, continue to monitor     Hypothyroidism  -cont home med     GERD, history of barretts  -cont SYNTHROID  TAKE 1 TABLET(150 MCG) BY MOUTH BEFORE BREAKFAST  What changed:  · how much to take  · how to take this  · when to take this  · additional instructions        CONTINUE taking these medications    acetaminophen 325 MG Tabs  Commonly known as:  TY soon as possible for a visit in 1 week. Specialty:  CARDIOLOGY  Contact information:  [address]                   DC instructions:       Other Discharge Instructions:         Please follow up with cardi VITAMIN MENS Tabs     Omeprazole 40 MG Cpdr     Potassium Chloride ER 20 MEQ Tbcr  Commonly known as:  K-DUR M20     simvastatin 40 MG Tabs  Commonly known as:  ZOCOR  Take 1 tablet (40 mg total) by mouth once daily.      torsemide 20 MG Tabs  Commonly know

## 2018-07-18 NOTE — PROGRESS NOTES
[facility] Hospitalist Progress Note     CC: Hospital Follow up    PCP: Rita Bravo MD       Assessment/Plan:     Principal Problem:    Acute on chronic heart failure, unspecified heart failure type [facility])  Active Problems:    Acute on chronic heart failure ( 6/5/18  -see acute management above  -continued cartia 120 XL, resumed torsemide,  cont statin      COPD, chronic 2-3L  -no wheezing, no cough  -does not appear to be active exacerbation  -RVP neg  -cont home meds  - pulm FU     Mild hyponatremia,   -has b extremities, no clubbing, no cyanosis  Skin: no rashes or lesions  Neuro: AO*3, motor intact, no sensory deficits  Psyc: appropriate mood and affect      Data Review:       Labs:     Recent Labs   Lab  07/15/18   1000  07/17/18   0605  07/18/18   0554    Daily   • enoxaparin  40 mg Subcutaneous Daily   • allopurinol  100 mg Oral BID   • docusate sodium  200 mg Oral BID   • famoTIDine  20 mg Oral Nightly   • ferrous sulfate  650 mg Oral BID   • Levothyroxine Sodium  150 mcg Oral Before breakfast   • ipratro

## 2018-07-18 NOTE — SLP NOTE
SPEECH DAILY NOTE - INPATIENT    ASSESSMENT & PLAN   ASSESSMENT  Pt seen for meal assessment and further training of swallowing precautions. Pt and family present and participated in dysphagia session.  SLP reviewed the results and recommendations of the VF VFSS including Slow rate, Small bites, Small sips, Multiple swallows with no assistance 90 % of the time across one session. GOAL MET. Pt independently uses a slow rate, small bites, small sips, double swallow, and upright 90 degrees.  Reviewed with patien

## 2018-07-19 VITALS
WEIGHT: 140.63 LBS | OXYGEN SATURATION: 95 % | HEART RATE: 99 BPM | TEMPERATURE: 99 F | RESPIRATION RATE: 18 BRPM | SYSTOLIC BLOOD PRESSURE: 95 MMHG | HEIGHT: 60 IN | BODY MASS INDEX: 27.61 KG/M2 | DIASTOLIC BLOOD PRESSURE: 60 MMHG

## 2018-07-19 PROCEDURE — 94640 AIRWAY INHALATION TREATMENT: CPT

## 2018-07-19 RX ORDER — GUAIFENESIN 600 MG
600 TABLET, EXTENDED RELEASE 12 HR ORAL 2 TIMES DAILY
Status: DISCONTINUED | OUTPATIENT
Start: 2018-07-19 | End: 2018-07-19

## 2018-07-19 RX ORDER — DIGOXIN 125 MCG
125 TABLET ORAL DAILY
Qty: 30 TABLET | Refills: 0 | Status: ON HOLD | OUTPATIENT
Start: 2018-07-20 | End: 2018-10-29

## 2018-07-19 RX ORDER — GUAIFENESIN 600 MG
600 TABLET, EXTENDED RELEASE 12 HR ORAL 2 TIMES DAILY
Qty: 30 TABLET | Refills: 0 | Status: SHIPPED | OUTPATIENT
Start: 2018-07-19 | End: 2018-07-30

## 2018-07-19 NOTE — CARDIAC REHAB
CARDIAC REHAB HEART FAILURE EDUCATION    Handouts provided and reviewed: CHF Booklet. Activity: Chair for all meals: Yes       Ambulation: Yes       Tolerated Activity: Well per patient and his wife         Disease Process: Disease process reviewed.

## 2018-07-19 NOTE — CM/SW NOTE
Case Management Note/ Progression of Note    Home with Spouse, return to [facility], no transportation needs.

## 2018-07-19 NOTE — CM/SW NOTE
7/19- MD orders received in regards to discharge planning [facility]-Case Management previously noted that a referral was sent to [facility].  This Writer informed Cristal Burris ([id number]) [facility] that the orders are in.     -[facility] [id number]

## 2018-07-19 NOTE — CM/SW NOTE
Care  Manager Progression of Care. RN /PT/ OT to   evaluate and treat for Home Health,   Gisele Monroy of [facility] Notified of patient pending discharge today.

## 2018-07-19 NOTE — TRANSITION NOTE
80year-old patient admitted to [facility] on 7/15/2018 with respiratory insufficiency. He has underlying COPD but this appeared to be an exacerbation of heart failure.   He improved with the administration of IV Lasix which produced a good diuresis

## 2018-07-19 NOTE — PROGRESS NOTES
[facility] AND CLINICS  Progress Note    [facility] Patient Status:  Inpatient    3/8/1932 MRN [id number]   Location [facility] 3W/SW Attending Blair Maher MD   Hosp Day # 4 PCP Nancy Ortiz MD     Assessment:    1.   Respiratory insuffi Medications:    • GuaiFENesin ER  600 mg Oral BID   • DilTIAZem HCl ER Coated Beads  120 mg Oral Daily   • digoxin  125 mcg Oral Daily   • Amoxicillin-Pot Clavulanate  1 tablet Oral 2 times per day   • torsemide  20 mg Oral Daily   • enoxaparin  40 mg Whitman

## 2018-07-19 NOTE — PROGRESS NOTES
Pulmonary Progress Note      NAME: Ana Dunham - ROOM: 568/231-D - MRN: [id number] - Age: 80year old - : 3/8/1932    Assessment/Plan:  1.  Acute on chronic hypoxic respiratory failure - due to PNA and heart failure  - wean O2 as able, down to baseli kg/m²   Physical Exam:   General: alert, cooperative, no respiratory distress. HEENT: Normocephalic atraumatic. Lips, mucosa, and tongue normal.  No thrush noted. Lungs: diminished   Chest wall: No tenderness or deformity.    Heart: Regular rate and rhyt

## 2018-07-19 NOTE — DISCHARGE SUMMARY
General Medicine Discharge Summary     Patient ID:  Aba Coronado  80year old  3/8/1932    Admit date: 7/15/2018    Discharge date and time: 7/19/18    Attending Physician: Elmyra Nageotte, MD     Consults: IP CONSULT TO HOSPITALIST  IP CONSULT TO CARDIOLOGY has much improved from first arrival to ED.        Hospital Course:   Renetta Mays a 80year old male with PMH sig for CAD s/p CABG, severe MR s/p mitral clip,  HTN, HL, afib on nothing (sig leeding on coumadin/eliquis/aspirin), b/l carotid artery dis following  - was taken off aspirin too due to continued issues with bleeding  -normal stress 6/5/18  -see acute management above  -continued cartia 120 XL, resumed torsemide,  cont statin   -dig 0.125 added and tolerated on 7/18/18 per Dr Haile Calle      COPD, changed:  · when to take this  · reasons to take this     Levothyroxine Sodium 150 MCG Tabs  Commonly known as:  SYNTHROID  TAKE 1 TABLET(150 MCG) BY MOUTH BEFORE BREAKFAST  What changed:  · how much to take  · how to take this  · when to take this  · alex Specialty:  PULMONARY DISEASES  Contact information:  [address] [facility] [address] [employer]             Priya Leger MD. Schedule an appointment as soon as possible for a visit in 2 weeks.     Specialty:  Internal Medicine

## 2018-07-20 ENCOUNTER — TELEPHONE (OUTPATIENT)
Dept: CARDIOLOGY UNIT | Facility: HOSPITAL | Age: 83
End: 2018-07-20

## 2018-07-20 LAB
BUN: 19 MG/DL
BUN: 20 MG/DL
BUN: 22 MG/DL
CALCIUM: 8.7 MG/DL
CALCIUM: 8.9 MG/DL
CALCIUM: 9.3 MG/DL
CHLORIDE: 101 MEQ/L
CHLORIDE: 101 MEQ/L
CHLORIDE: 97 MEQ/L
CREATININE, SERUM: 1.15 MG/DL
CREATININE, SERUM: 1.21 MG/DL
CREATININE, SERUM: 1.27 MG/DL
GLUCOSE: 116 MG/DL
GLUCOSE: 118 MG/DL
GLUCOSE: 140 MG/DL
MAGNESIUM: 1.8 MG/DL
MAGNESIUM: 2 MG/DL
POTASSIUM, SERUM: 2.7 MEQ/L
POTASSIUM, SERUM: 3.9 MEQ/L
POTASSIUM, SERUM: 4.2 MEQ/L
SODIUM: 132 MEQ/L
SODIUM: 134 MEQ/L
SODIUM: 135 MEQ/L

## 2018-07-20 NOTE — PROGRESS NOTES
[address] Patient Status:  No patient class for patient encounter    3/8/1932 MRN [id number]   Location [facility] MD Dr. Felipe Mahoney is a 80year old male wh hemoptysis and wheezing  Cardiovascular: negative for chest pain, exertional chest pressure/discomfort, near-syncope, orthopnea and palpitations  Gastrointestinal: negative for abdominal pain, diarrhea, melena, nausea and vomiting  Hematologic/lymphatic: n with few scattered crackles to left base  Chest wall: no tenderness  Heart: S1, S2 normal, no S3 or S4, regular rate and rhythm,ACRMELA  Abdomen: soft, non-tender; bowel sounds normal; no masses,  no organomegaly  Extremities: extremities normal, atraumatic, n microvascular ischemic disease without acute intracranial abnormality. Hyperdensity seen within the extra-axial region of the right parietal lobe represents vascularity and not a subdural hematoma.           Education:  Patient and family instructed at [employer] days    Please call the heart failure clinic if you notice a weight gain of 3 pounds overnight or 5 pounds or more in 5 days.      Monitor yourself for signs and symptoms of fluid overload, increased shortness of breath, difficulty breathing when laying do week. Start by walking at a slow to moderate pace for 5-10 minutes 2-3 times a day. Pace your activity to prevent shortness of breath or fatigue.  Stop exercise if you develop chest pain, lightheadedness, or significant shortness of breath      I spent jose a

## 2018-07-24 ENCOUNTER — OFFICE VISIT (OUTPATIENT)
Dept: CARDIOLOGY CLINIC | Facility: HOSPITAL | Age: 83
End: 2018-07-24
Attending: NURSE PRACTITIONER
Payer: MEDICARE

## 2018-07-24 VITALS
DIASTOLIC BLOOD PRESSURE: 38 MMHG | SYSTOLIC BLOOD PRESSURE: 100 MMHG | WEIGHT: 138 LBS | HEART RATE: 86 BPM | OXYGEN SATURATION: 98 % | BODY MASS INDEX: 27 KG/M2

## 2018-07-24 DIAGNOSIS — I50.30 (HFPEF) HEART FAILURE WITH PRESERVED EJECTION FRACTION (HCC): Primary | ICD-10-CM

## 2018-07-24 DIAGNOSIS — I50.9 HEART FAILURE, UNSPECIFIED (HCC): ICD-10-CM

## 2018-07-24 DIAGNOSIS — I48.20 CHRONIC ATRIAL FIBRILLATION (HCC): ICD-10-CM

## 2018-07-24 LAB
ANION GAP SERPL CALC-SCNC: 10 MMOL/L (ref 0–18)
BASOPHILS # BLD: 0.1 K/UL (ref 0–0.2)
BASOPHILS NFR BLD: 1 %
BNP SERPL-MCNC: 694 PG/ML (ref 0–100)
BUN SERPL-MCNC: 28 MG/DL (ref 8–20)
BUN/CREAT SERPL: 21.9 (ref 10–20)
CALCIUM SERPL-MCNC: 8.9 MG/DL (ref 8.5–10.5)
CHLORIDE SERPL-SCNC: 101 MMOL/L (ref 95–110)
CO2 SERPL-SCNC: 21 MMOL/L (ref 22–32)
CREAT SERPL-MCNC: 1.28 MG/DL (ref 0.5–1.5)
EOSINOPHIL # BLD: 0.5 K/UL (ref 0–0.7)
EOSINOPHIL NFR BLD: 7 %
ERYTHROCYTE [DISTWIDTH] IN BLOOD BY AUTOMATED COUNT: 20.4 % (ref 11–15)
GLUCOSE SERPL-MCNC: 153 MG/DL (ref 70–99)
HCT VFR BLD AUTO: 30.5 % (ref 41–52)
HGB BLD-MCNC: 9.9 G/DL (ref 13.5–17.5)
LYMPHOCYTES # BLD: 1 K/UL (ref 1–4)
LYMPHOCYTES NFR BLD: 13 %
MCH RBC QN AUTO: 32.8 PG (ref 27–32)
MCHC RBC AUTO-ENTMCNC: 32.4 G/DL (ref 32–37)
MCV RBC AUTO: 101.2 FL (ref 80–100)
METAMYELOCYTES # BLD MANUAL: 0.07 K/UL
METAMYELOCYTES # BLD MANUAL: 0.07 K/UL
METAMYELOCYTES NFR BLD: 1 %
MONOCYTES # BLD: 1.3 K/UL (ref 0–1)
MONOCYTES NFR BLD: 17 %
MYELOCYTES NFR BLD: 1 %
NEUTROPHILS # BLD AUTO: 4.5 K/UL (ref 1.8–7.7)
NEUTROPHILS NFR BLD: 60 %
OSMOLALITY UR CALC.SUM OF ELEC: 283 MOSM/KG (ref 275–295)
PLATELET # BLD AUTO: 232 K/UL (ref 140–400)
PMV BLD AUTO: 7.6 FL (ref 7.4–10.3)
POTASSIUM SERPL-SCNC: 3.7 MMOL/L (ref 3.3–5.1)
RBC # BLD AUTO: 3.01 M/UL (ref 4.5–5.9)
SODIUM SERPL-SCNC: 132 MMOL/L (ref 136–144)
WBC # BLD AUTO: 7.5 K/UL (ref 4–11)

## 2018-07-24 PROCEDURE — 85007 BL SMEAR W/DIFF WBC COUNT: CPT | Performed by: NURSE PRACTITIONER

## 2018-07-24 PROCEDURE — 99211 OFF/OP EST MAY X REQ PHY/QHP: CPT | Performed by: NURSE PRACTITIONER

## 2018-07-24 PROCEDURE — 85027 COMPLETE CBC AUTOMATED: CPT | Performed by: NURSE PRACTITIONER

## 2018-07-24 PROCEDURE — 36415 COLL VENOUS BLD VENIPUNCTURE: CPT | Performed by: NURSE PRACTITIONER

## 2018-07-24 PROCEDURE — 85025 COMPLETE CBC W/AUTO DIFF WBC: CPT | Performed by: NURSE PRACTITIONER

## 2018-07-24 PROCEDURE — 83880 ASSAY OF NATRIURETIC PEPTIDE: CPT | Performed by: NURSE PRACTITIONER

## 2018-07-24 PROCEDURE — 99213 OFFICE O/P EST LOW 20 MIN: CPT | Performed by: NURSE PRACTITIONER

## 2018-07-24 PROCEDURE — 80048 BASIC METABOLIC PNL TOTAL CA: CPT | Performed by: NURSE PRACTITIONER

## 2018-07-24 NOTE — PATIENT INSTRUCTIONS
Continue current medications    If weight goes up an additional 1/2 lb to 1 lb by tomorrow, please add torsemide 10 mg in evening for the next 2 days    Please call the heart failure clinic if you notice a weight gain of 3 pounds overnight or 5 pounds or m

## 2018-07-27 ENCOUNTER — PRIOR ORIGINAL RECORDS (OUTPATIENT)
Dept: OTHER | Age: 83
End: 2018-07-27

## 2018-07-30 ENCOUNTER — PRIOR ORIGINAL RECORDS (OUTPATIENT)
Dept: OTHER | Age: 83
End: 2018-07-30

## 2018-08-03 ENCOUNTER — OFFICE VISIT (OUTPATIENT)
Dept: CARDIOLOGY CLINIC | Facility: HOSPITAL | Age: 83
End: 2018-08-03
Attending: INTERNAL MEDICINE
Payer: MEDICARE

## 2018-08-03 VITALS
DIASTOLIC BLOOD PRESSURE: 49 MMHG | BODY MASS INDEX: 27 KG/M2 | SYSTOLIC BLOOD PRESSURE: 102 MMHG | HEART RATE: 78 BPM | WEIGHT: 136 LBS | OXYGEN SATURATION: 93 %

## 2018-08-03 PROBLEM — I50.9 ACUTE ON CHRONIC HEART FAILURE, UNSPECIFIED HEART FAILURE TYPE (HCC): Status: RESOLVED | Noted: 2018-07-15 | Resolved: 2018-08-03

## 2018-08-03 PROBLEM — I50.9 ACUTE ON CHRONIC HEART FAILURE (HCC): Status: RESOLVED | Noted: 2018-07-15 | Resolved: 2018-08-03

## 2018-08-03 PROCEDURE — 99214 OFFICE O/P EST MOD 30 MIN: CPT | Performed by: CLINICAL NURSE SPECIALIST

## 2018-08-03 PROCEDURE — 99211 OFF/OP EST MAY X REQ PHY/QHP: CPT | Performed by: CLINICAL NURSE SPECIALIST

## 2018-08-03 NOTE — PATIENT INSTRUCTIONS
Today, please increase your fluids by an additional 4-6 oz. If your blood pressure is still low tomorrow, please again increase by only 4-6 oz, never to exceed 64 oz (2L) fluid/day.      Continue current medications    Please call the heart failure clinic i

## 2018-08-03 NOTE — PROGRESS NOTES
[address] Patient Status:  No patient class for patient encounter    3/8/1932 MRN [id number]   Location MD Dr. Aly Ellis is a 80year old male wh exertional chest pressure/discomfort, near-syncope, orthopnea and palpitations  Gastrointestinal: negative for abdominal pain, diarrhea, melena, nausea and vomiting  Hematologic/lymphatic: negative  Musculoskeletal: negative for muscle weakness and myalgia tenderness  Heart: S1, S2 normal, no S3 or S4, regular rate and rhythm,CARMELA  Abdomen: soft, non-tender; bowel sounds normal; no masses,  no organomegaly  Extremities: extremities normal, atraumatic, no cyanosis or edema  Pulses: 2+ and symmetric  Neurologic Hyperdensity seen within the extra-axial region of the right parietal lobe represents vascularity and not a subdural hematoma.         Education:  Patient and family instructed at length regarding clinic procedures, hours, purpose of clinic visits, sodium    Monitor yourself for signs and symptoms of fluid overload, increased shortness of breath, difficulty breathing when laying down etc. Call the clinic if any of these signs develop at ph: [phone]    Call if having any dizziness, lightheadedness, he

## 2018-08-07 ENCOUNTER — PRIOR ORIGINAL RECORDS (OUTPATIENT)
Dept: OTHER | Age: 83
End: 2018-08-07

## 2018-08-08 ENCOUNTER — HOSPITAL ENCOUNTER (OUTPATIENT)
Dept: INTERVENTIONAL RADIOLOGY/VASCULAR | Facility: HOSPITAL | Age: 83
Discharge: HOME | End: 2018-08-09
Attending: INTERNAL MEDICINE | Admitting: INTERNAL MEDICINE
Payer: MEDICARE

## 2018-08-08 DIAGNOSIS — I25.10 CAD (CORONARY ARTERY DISEASE): ICD-10-CM

## 2018-08-08 DIAGNOSIS — I34.0 MITRAL REGURGITATION: ICD-10-CM

## 2018-08-08 PROCEDURE — 93010 ELECTROCARDIOGRAM REPORT: CPT | Performed by: INTERNAL MEDICINE

## 2018-08-08 PROCEDURE — 93005 ELECTROCARDIOGRAM TRACING: CPT

## 2018-08-08 PROCEDURE — 93461 R&L HRT ART/VENTRICLE ANGIO: CPT

## 2018-08-08 RX ORDER — ACETAMINOPHEN 325 MG/1
650 TABLET ORAL EVERY 6 HOURS PRN
Status: DISCONTINUED | OUTPATIENT
Start: 2018-08-08 | End: 2018-08-09

## 2018-08-08 RX ORDER — HEPARIN SODIUM 5000 [USP'U]/ML
INJECTION, SOLUTION INTRAVENOUS; SUBCUTANEOUS
Status: COMPLETED
Start: 2018-08-08 | End: 2018-08-08

## 2018-08-08 RX ORDER — CLOPIDOGREL BISULFATE 75 MG/1
TABLET ORAL
Status: COMPLETED
Start: 2018-08-08 | End: 2018-08-08

## 2018-08-08 RX ORDER — MIDAZOLAM HYDROCHLORIDE 1 MG/ML
INJECTION INTRAMUSCULAR; INTRAVENOUS
Status: COMPLETED
Start: 2018-08-08 | End: 2018-08-08

## 2018-08-08 RX ORDER — ASPIRIN 81 MG/1
324 TABLET, CHEWABLE ORAL ONCE
Status: DISCONTINUED | OUTPATIENT
Start: 2018-08-08 | End: 2018-08-08 | Stop reason: HOSPADM

## 2018-08-08 RX ORDER — SODIUM CHLORIDE 9 MG/ML
INJECTION, SOLUTION INTRAVENOUS CONTINUOUS
Status: DISCONTINUED | OUTPATIENT
Start: 2018-08-08 | End: 2018-08-09

## 2018-08-08 RX ORDER — LIDOCAINE HYDROCHLORIDE 10 MG/ML
INJECTION, SOLUTION EPIDURAL; INFILTRATION; INTRACAUDAL; PERINEURAL
Status: COMPLETED
Start: 2018-08-08 | End: 2018-08-08

## 2018-08-08 RX ADMIN — SODIUM CHLORIDE: 9 INJECTION, SOLUTION INTRAVENOUS at 09:30:00

## 2018-08-08 NOTE — H&P
Linda Thacker  : 1932 17:16:16  ACCOUNT:  [id number]  HOME PHONE:  [phone]  WORK PHONE:       Per Dr. Indiana Gutierrez, pt scheduled for [facility] on 18 at [place] with Dr. Indiana Gutierrez. Dx coronary artery disease and mitral regurgitation.  Per Lam Jo heart rates are in the low 80s consistently. []        RISK FACTORS:  CAD - Dyslipidemia Family    REVIEW OF SYSTEMS:    CONS: doing well and recent weight loss. ENMT: denies difficulties with hearing, otherwise negative. CV: see HPI; denies claudication. oriented to time, place and person and normal affect. CV: PALP: PMI not displaced, no lifts and thrills or rub. AUSC:  irregularly irregular rhythm, normal S1, S2 and without S3; no pathologic murmurs.  CAROTIDS: carotid pulses normal and without bruit Ferrous Sulfate       325 (65   two times daily                          10/13/17 Levothyroxine Sodium  150MCG    one daily                                10/13/17 Montelukast Sodium    10MG      hours of sleep                           10/13/17 Multivitam Coumadin - Intravenous, GI bleed, Eliquis - Oral and GI bleed    MEDICATIONS: Selected prescriptions see below    VITAL SIGNS: [B/P - 90/50 , Pulse - 70, Weight -  135, Height -   63 , BMI - 23.9 ]    CONS: well developed, well nourished.  WEIGHT: BMI marleny [id number]     1 tablet daily                           11/03/17 Vitamin B12           100MCG    1 tablet daily                           10/13/17 Allopurinol           100MG     two times daily                          10/13/17 DilTIAZem CD          120MG

## 2018-08-08 NOTE — PROGRESS NOTES
Patient is s/p PCI; a/o x4; hemodynamically stable/neurologically intact; ekg obtained; right groin with arterial/venous sheaths in place; pulled per protocol by Mitch Lambert; manual pressure held x 25 minutes and site was soft with no bleeding/hematoma note

## 2018-08-08 NOTE — PROGRESS NOTES
81 y/o with hx CABG, mitral clip, Watchman, duodenal ulcer, recurrent CHF and worsening MR     LV severe MR  PA 6i6/17   PCWP 12  RV 59/ 5  RA 8    LAD mild disease  RCA 80%  Cx 80%    RCA predil and 3.5 x 28 Cathy  Cx predil and 3.0 x 23 Cathy    If SOB

## 2018-08-08 NOTE — H&P
History & Physical Examination    Patient Name: Marika Hoang  MRN: [id number]  CSN: [id number]  YOB: 1932    Diagnosis: CHF, Hx of mitral clip    History of Present Illness:  This is an 80year old male who saw Dr. Rebeca Leiva in the office yeste daily. Disp: 180 tablet Rfl: 3 8/8/2018 at am   umeclidinium-vilanterol (ANORO ELLIPTA) 62.5-25 MCG/INH Inhalation Aerosol Powder, Breath Activated Inhale 1 puff into the lungs daily.  Disp: 1 each Rfl: 11 8/8/2018 at am   Multiple Vitamin ([address] 74 VITAMIN ME Hypothyroid    • Hypothyroidism, unspecified 1/10/2018   • Kyphosis of thoracic region 2/14/2018   • MI, old    • Pulmonary hypertension ([address] 2/14/2018   • Right to left cardiac shunt ([address] 2/14/2018   • Shortness of breath    • Thoracic aorta atheroscler

## 2018-08-09 VITALS
BODY MASS INDEX: 25.72 KG/M2 | RESPIRATION RATE: 20 BRPM | HEART RATE: 82 BPM | OXYGEN SATURATION: 90 % | SYSTOLIC BLOOD PRESSURE: 117 MMHG | HEIGHT: 60 IN | DIASTOLIC BLOOD PRESSURE: 46 MMHG | WEIGHT: 131 LBS | TEMPERATURE: 98 F

## 2018-08-09 LAB
BASOPHILS # BLD AUTO: 0.04 X10(3) UL (ref 0–0.1)
BASOPHILS NFR BLD AUTO: 0.5 %
EOSINOPHIL # BLD AUTO: 0.48 X10(3) UL (ref 0–0.3)
EOSINOPHIL NFR BLD AUTO: 5.5 %
ERYTHROCYTE [DISTWIDTH] IN BLOOD BY AUTOMATED COUNT: 18.5 % (ref 11.5–16)
HCT VFR BLD AUTO: 29.7 % (ref 37–53)
HGB BLD-MCNC: 9.7 G/DL (ref 13–17)
IMMATURE GRANULOCYTE COUNT: 0.08 X10(3) UL (ref 0–1)
IMMATURE GRANULOCYTE RATIO %: 0.9 %
LYMPHOCYTES # BLD AUTO: 0.81 X10(3) UL (ref 0.9–4)
LYMPHOCYTES NFR BLD AUTO: 9.3 %
MCH RBC QN AUTO: 33.6 PG (ref 27–33.2)
MCHC RBC AUTO-ENTMCNC: 32.7 G/DL (ref 31–37)
MCV RBC AUTO: 102.8 FL (ref 80–99)
MONOCYTES # BLD AUTO: 1.99 X10(3) UL (ref 0.1–1)
MONOCYTES NFR BLD AUTO: 22.8 %
NEUTROPHIL ABS PRELIM: 5.31 X10 (3) UL (ref 1.3–6.7)
NEUTROPHILS # BLD AUTO: 5.31 X10(3) UL (ref 1.3–6.7)
NEUTROPHILS NFR BLD AUTO: 61 %
PLATELET # BLD AUTO: 103 10(3)UL (ref 150–450)
PLATELET MORPHOLOGY: NORMAL
POTASSIUM SERPL-SCNC: 4.1 MMOL/L (ref 3.6–5.1)
RBC # BLD AUTO: 2.89 X10(6)UL (ref 3.8–5.8)
RED CELL DISTRIBUTION WIDTH-SD: 69.4 FL (ref 35.1–46.3)
WBC # BLD AUTO: 8.7 X10(3) UL (ref 4–13)

## 2018-08-09 PROCEDURE — 85025 COMPLETE CBC W/AUTO DIFF WBC: CPT | Performed by: NURSE PRACTITIONER

## 2018-08-09 PROCEDURE — 82272 OCCULT BLD FECES 1-3 TESTS: CPT | Performed by: NURSE PRACTITIONER

## 2018-08-09 PROCEDURE — 94640 AIRWAY INHALATION TREATMENT: CPT

## 2018-08-09 PROCEDURE — 99211 OFF/OP EST MAY X REQ PHY/QHP: CPT

## 2018-08-09 PROCEDURE — 84132 ASSAY OF SERUM POTASSIUM: CPT | Performed by: NURSE PRACTITIONER

## 2018-08-09 RX ORDER — DILTIAZEM HYDROCHLORIDE 120 MG/1
120 CAPSULE, EXTENDED RELEASE ORAL DAILY
Status: DISCONTINUED | OUTPATIENT
Start: 2018-08-09 | End: 2018-08-09

## 2018-08-09 RX ORDER — DIGOXIN 125 MCG
125 TABLET ORAL DAILY
Status: DISCONTINUED | OUTPATIENT
Start: 2018-08-09 | End: 2018-08-09

## 2018-08-09 RX ORDER — POTASSIUM CHLORIDE 20 MEQ/1
20 TABLET, EXTENDED RELEASE ORAL DAILY
Status: DISCONTINUED | OUTPATIENT
Start: 2018-08-09 | End: 2018-08-09

## 2018-08-09 RX ORDER — CLOPIDOGREL BISULFATE 75 MG/1
75 TABLET ORAL DAILY
Status: DISCONTINUED | OUTPATIENT
Start: 2018-08-09 | End: 2018-08-09

## 2018-08-09 RX ORDER — ASPIRIN 81 MG/1
81 TABLET ORAL DAILY
Status: DISCONTINUED | OUTPATIENT
Start: 2018-08-09 | End: 2018-08-09

## 2018-08-09 RX ORDER — ASPIRIN 81 MG/1
81 TABLET ORAL DAILY
Qty: 30 TABLET | Refills: 11 | Status: ON HOLD | OUTPATIENT
Start: 2018-08-09 | End: 2019-01-01 | Stop reason: SINTOL

## 2018-08-09 RX ORDER — CLOPIDOGREL BISULFATE 75 MG/1
75 TABLET ORAL DAILY
Qty: 30 TABLET | Refills: 11 | Status: ON HOLD | OUTPATIENT
Start: 2018-08-09 | End: 2020-01-01

## 2018-08-09 RX ORDER — ATORVASTATIN CALCIUM 20 MG/1
20 TABLET, FILM COATED ORAL
Status: DISCONTINUED | OUTPATIENT
Start: 2018-08-09 | End: 2018-08-09

## 2018-08-09 RX ORDER — MONTELUKAST SODIUM 10 MG/1
10 TABLET ORAL NIGHTLY
Status: DISCONTINUED | OUTPATIENT
Start: 2018-08-09 | End: 2018-08-09

## 2018-08-09 RX ORDER — FAMOTIDINE 20 MG/1
20 TABLET ORAL NIGHTLY
Status: DISCONTINUED | OUTPATIENT
Start: 2018-08-09 | End: 2018-08-09

## 2018-08-09 RX ORDER — ALLOPURINOL 100 MG/1
100 TABLET ORAL 2 TIMES DAILY
Status: DISCONTINUED | OUTPATIENT
Start: 2018-08-09 | End: 2018-08-09

## 2018-08-09 RX ORDER — MELATONIN
650 2 TIMES DAILY
Status: DISCONTINUED | OUTPATIENT
Start: 2018-08-09 | End: 2018-08-09

## 2018-08-09 RX ORDER — LEVOTHYROXINE SODIUM 0.15 MG/1
150 TABLET ORAL
Status: DISCONTINUED | OUTPATIENT
Start: 2018-08-09 | End: 2018-08-09

## 2018-08-09 RX ORDER — ALBUTEROL SULFATE 90 UG/1
2 AEROSOL, METERED RESPIRATORY (INHALATION) EVERY 6 HOURS PRN
Status: DISCONTINUED | OUTPATIENT
Start: 2018-08-09 | End: 2018-08-09

## 2018-08-09 RX ORDER — TORSEMIDE 20 MG/1
20 TABLET ORAL DAILY
Status: DISCONTINUED | OUTPATIENT
Start: 2018-08-09 | End: 2018-08-09

## 2018-08-09 RX ADMIN — DIGOXIN 125 MCG: 125 MCG TABLET ORAL at 14:55:00

## 2018-08-09 RX ADMIN — ASPIRIN 81 MG: 81 TABLET ORAL at 16:26:00

## 2018-08-09 RX ADMIN — ATORVASTATIN CALCIUM 20 MG: 20 TABLET, FILM COATED ORAL at 15:01:00

## 2018-08-09 RX ADMIN — TORSEMIDE 20 MG: 20 TABLET ORAL at 14:57:00

## 2018-08-09 RX ADMIN — POTASSIUM CHLORIDE 20 MEQ: 20 TABLET, EXTENDED RELEASE ORAL at 15:01:00

## 2018-08-09 RX ADMIN — CLOPIDOGREL BISULFATE 75 MG: 75 TABLET ORAL at 16:26:00

## 2018-08-09 RX ADMIN — ALLOPURINOL 100 MG: 100 TABLET ORAL at 14:55:00

## 2018-08-09 RX ADMIN — DILTIAZEM HYDROCHLORIDE 120 MG: 120 CAPSULE, EXTENDED RELEASE ORAL at 14:57:00

## 2018-08-09 RX ADMIN — MELATONIN 650 MG: at 14:55:00

## 2018-08-09 NOTE — PROGRESS NOTES
Pt walked in the hallway with 3l o2 sao2 92-90 % when sitting at bedside 89% pt states he tolerated well     Stool neg ob     Pt has tylenol here to be used for pain    Pt rt groin bruise dark but soft dressing intact      c PT and RN order to resume patel

## 2018-08-09 NOTE — CARDIAC REHAB
Cardiac rehab education completed with family. Stent card given to family. Patient sleeping. Contact information for [facility] given to family.

## 2018-08-09 NOTE — PLAN OF CARE
High fall risk precautions maintained    Comments: Pt is A&OX4, VSS on 2L/NC at rest w/ and maintaining chronic a.fib on telemetry. Right groin site is stable, C/D/I, ecchymosis present, but is soft/tender.   Awaiting d/c home after pt ambulates in crow hypotension and signs of decreased cardiac output  - Evaluate effectiveness of vasoactive medications to optimize hemodynamic stability  - Monitor arterial and/or venous puncture sites for bleeding and/or hematoma  - Assess quality of pulses, skin color an

## 2018-08-09 NOTE — PROGRESS NOTES
Clarified w/ROVERTO John re: future MitraClip timing as daughter was under the impression that the procedure would be done either next Wednesday or Friday; however, d/c note reads to just f/u in office next week w/Dr. Daily Stands.   Plan is for pt to f/u

## 2018-08-09 NOTE — PROGRESS NOTES
[facility]  Cardiology Progress Note    Subjective:  No chest pain, mild SOB    Objective:  /44 (BP Location: Left arm)   Pulse 77   Temp 97.8 °F (36.6 °C) (Oral)   Resp 18   Ht 5' (1.524 m)   Wt 131 lb (59.4 kg)   SpO2 100%   BMI 25.58 kg/m²

## 2018-08-09 NOTE — PLAN OF CARE
Received patient from cath lab at 1700pm. Patient received 3 stents, had cath due to SOB, and fatigue. Patient is alert and oriented. On room air, chronic afib on the tele. Denies any pain or SOB. Femstop on the right groin.  POC updated with patient and fa

## 2018-08-09 NOTE — HOME CARE LIAISON
[facility] with [facility] for RN and PT. Please obtain resumption orders prior to discharge. Thank you.

## 2018-08-10 LAB
ATRIAL RATE: 66 BPM
Q-T INTERVAL: 438 MS
QRS DURATION: 100 MS
QTC CALCULATION (BEZET): 451 MS
R AXIS: 92 DEGREES
T AXIS: -46 DEGREES
VENTRICULAR RATE: 64 BPM

## 2018-08-14 ENCOUNTER — TELEPHONE (OUTPATIENT)
Dept: INTERVENTIONAL RADIOLOGY/VASCULAR | Facility: HOSPITAL | Age: 83
End: 2018-08-14

## 2018-08-14 ENCOUNTER — OFFICE VISIT (OUTPATIENT)
Dept: CARDIOLOGY CLINIC | Facility: HOSPITAL | Age: 83
End: 2018-08-14
Attending: INTERNAL MEDICINE
Payer: MEDICARE

## 2018-08-14 VITALS
WEIGHT: 135 LBS | DIASTOLIC BLOOD PRESSURE: 44 MMHG | BODY MASS INDEX: 26 KG/M2 | OXYGEN SATURATION: 98 % | SYSTOLIC BLOOD PRESSURE: 115 MMHG | HEART RATE: 73 BPM

## 2018-08-14 DIAGNOSIS — I50.30 (HFPEF) HEART FAILURE WITH PRESERVED EJECTION FRACTION (HCC): ICD-10-CM

## 2018-08-14 DIAGNOSIS — I50.9 HEART FAILURE, UNSPECIFIED (HCC): Primary | ICD-10-CM

## 2018-08-14 LAB
ANION GAP SERPL CALC-SCNC: 8 MMOL/L (ref 0–18)
BASOPHILS # BLD: 0.1 K/UL (ref 0–0.2)
BASOPHILS NFR BLD: 1 %
BNP SERPL-MCNC: 547 PG/ML (ref 0–100)
BUN SERPL-MCNC: 30 MG/DL (ref 8–20)
BUN/CREAT SERPL: 24 (ref 10–20)
CALCIUM SERPL-MCNC: 8.9 MG/DL (ref 8.5–10.5)
CHLORIDE SERPL-SCNC: 103 MMOL/L (ref 95–110)
CO2 SERPL-SCNC: 24 MMOL/L (ref 22–32)
CREAT SERPL-MCNC: 1.25 MG/DL (ref 0.5–1.5)
EOSINOPHIL # BLD: 0.5 K/UL (ref 0–0.7)
EOSINOPHIL NFR BLD: 7 %
ERYTHROCYTE [DISTWIDTH] IN BLOOD BY AUTOMATED COUNT: 20.3 % (ref 11–15)
GLUCOSE SERPL-MCNC: 129 MG/DL (ref 70–99)
HCT VFR BLD AUTO: 30.7 % (ref 41–52)
HGB BLD-MCNC: 10.1 G/DL (ref 13.5–17.5)
LYMPHOCYTES # BLD: 0.8 K/UL (ref 1–4)
LYMPHOCYTES NFR BLD: 11 %
MCH RBC QN AUTO: 34.2 PG (ref 27–32)
MCHC RBC AUTO-ENTMCNC: 32.8 G/DL (ref 32–37)
MCV RBC AUTO: 104.2 FL (ref 80–100)
MONOCYTES # BLD: 1.7 K/UL (ref 0–1)
MONOCYTES NFR BLD: 23 %
NEUTROPHILS # BLD AUTO: 4.4 K/UL (ref 1.8–7.7)
NEUTROPHILS NFR BLD: 58 %
OSMOLALITY UR CALC.SUM OF ELEC: 288 MOSM/KG (ref 275–295)
PLATELET # BLD AUTO: 134 K/UL (ref 140–400)
PMV BLD AUTO: 7.7 FL (ref 7.4–10.3)
POTASSIUM SERPL-SCNC: 4 MMOL/L (ref 3.3–5.1)
RBC # BLD AUTO: 2.95 M/UL (ref 4.5–5.9)
SODIUM SERPL-SCNC: 135 MMOL/L (ref 136–144)
WBC # BLD AUTO: 7.5 K/UL (ref 4–11)

## 2018-08-14 PROCEDURE — 99211 OFF/OP EST MAY X REQ PHY/QHP: CPT | Performed by: CLINICAL NURSE SPECIALIST

## 2018-08-14 PROCEDURE — 85025 COMPLETE CBC W/AUTO DIFF WBC: CPT | Performed by: CLINICAL NURSE SPECIALIST

## 2018-08-14 PROCEDURE — 80048 BASIC METABOLIC PNL TOTAL CA: CPT | Performed by: CLINICAL NURSE SPECIALIST

## 2018-08-14 PROCEDURE — 99214 OFFICE O/P EST MOD 30 MIN: CPT | Performed by: CLINICAL NURSE SPECIALIST

## 2018-08-14 PROCEDURE — 36415 COLL VENOUS BLD VENIPUNCTURE: CPT | Performed by: CLINICAL NURSE SPECIALIST

## 2018-08-14 PROCEDURE — 83880 ASSAY OF NATRIURETIC PEPTIDE: CPT | Performed by: CLINICAL NURSE SPECIALIST

## 2018-08-14 NOTE — PROGRESS NOTES
[address] Patient Status:  No patient class for patient encounter    3/8/1932 MRN [id number]   Location MD Dr. Shaka Schafer is a 80year old male wh chest pain, exertional chest pressure/discomfort, near-syncope, orthopnea and palpitations  Gastrointestinal: negative for abdominal pain, diarrhea, melena, nausea and vomiting  Hematologic/lymphatic: negative  Musculoskeletal: negative for muscle weakness base  Chest wall: no tenderness  Heart: S1, S2 normal, no S3 or S4, regular rate and rhythm,CARMELA  Abdomen: soft, non-tender; bowel sounds normal; no masses,  no organomegaly  Extremities: extremities normal, atraumatic, no cyanosis or edema  Pulses: 2+ and intracranial abnormality. Hyperdensity seen within the extra-axial region of the right parietal lobe represents vascularity and not a subdural hematoma.         Education:  Patient and family instructed at length regarding clinic procedures, hours, purpos symptoms of fluid overload, increased shortness of breath, difficulty breathing when laying down etc. Call the clinic if any of these signs develop at ph: [phone]    Call if having any dizziness, lightheadedness, heart racing, palpitations, chest pain

## 2018-08-14 NOTE — TELEPHONE ENCOUNTER
Pt's wife, Fran Pugh called 8/13. I returned her call today, 8/14. Pt had a PCI on 8/8 and they were wondering about follow up w/ Dr Karri Yi. Have not been seen in office since PCI. Fran Pugh stated that Nishi Hernandez is still icing his groin.  She stated that it is very b

## 2018-08-17 ENCOUNTER — PRIOR ORIGINAL RECORDS (OUTPATIENT)
Dept: OTHER | Age: 83
End: 2018-08-17

## 2018-08-17 ENCOUNTER — MYAURORA ACCOUNT LINK (OUTPATIENT)
Dept: OTHER | Age: 83
End: 2018-08-17

## 2018-08-20 ENCOUNTER — PRIOR ORIGINAL RECORDS (OUTPATIENT)
Dept: OTHER | Age: 83
End: 2018-08-20

## 2018-08-20 NOTE — H&P
[facility]  [facility]    Ashu Jacobson Patient Status:  No patient class for patient encounter    3/8/1932 MRN [id number]   Location [facility] Attending No att. providers found   [facility] Day # 0 PCP CATHIE unspecified 1/10/2018   • Kyphosis of thoracic region 2/14/2018   • MI, old    • Pulmonary hypertension ([address] 2/14/2018   • Right to left cardiac shunt ([address] 2/14/2018   • Shortness of breath    • Thoracic aorta atherosclerosis ([address] 2/14/2018   • Thoracic recorded. No intake/output data recorded. Wt Readings from Last 1 Encounters:  08/14/18 : 135 lb (61.2 kg)      General: Alert and oriented in no apparent distress. Frail appearing. HEENT: No focal deficits.   Neck: No JVD  Cardiac: Regular rate and rhy shoulder-to-shoulder at the ostium of the LANEY was approximately 2.33 - 2.58 cm. · Color flow interrogation revealed small kahlil-device flow with maximum vena contracta of 2-3 mm.   · There was no evidence for intracardiac mass or thrombus  over the [place]

## 2018-08-21 LAB
BUN: 29 MG/DL
CALCIUM: 8.9 MG/DL
CHLORIDE: 98 MEQ/L
CREATININE, SERUM: 1.23 MG/DL
GLUCOSE: 95 MG/DL
HEMATOCRIT: 34.2 %
HEMOGLOBIN: 10.6 G/DL
PLATELETS: 279 K/UL
POTASSIUM, SERUM: 4.5 MEQ/L
RED BLOOD COUNT: 3.3 X 10-6/U
SODIUM: 135 MEQ/L
WHITE BLOOD COUNT: 8.45 X 10-3/U

## 2018-08-21 NOTE — H&P
Kylie Villalba  : 1932  ACCOUNT:  [id number]  [phone]  PCP: Dr. Nancy Dudley     TODAY'S DATE: 2018  DICTATED BY:  [Dr. Bridget Brown: [Followup of angioplasty.]    HPI:    [On 2018, Nakita Ireland, a 80year old m bleed    MEDICATIONS: Selected prescriptions see below    VITAL SIGNS: [B/P - 122/50 , Pulse - 68, Weight -  134, Height -   63 , BMI - 23.7 ]  General wearing oxygen  HEENT normal  Lungs rhonchi  Cardiovascular A8-T1 systolic murmur  Abdomen soft  Extremi 325 (65   two times daily                          10/13/17 Levothyroxine Sodium  150MCG    one daily                                10/13/17 Montelukast Sodium    10MG      hours of sleep                           10/13/17 Multivitamin Adult

## 2018-08-23 ENCOUNTER — APPOINTMENT (OUTPATIENT)
Dept: GENERAL RADIOLOGY | Facility: HOSPITAL | Age: 83
End: 2018-08-23
Attending: INTERNAL MEDICINE
Payer: MEDICARE

## 2018-08-23 ENCOUNTER — HOSPITAL ENCOUNTER (OUTPATIENT)
Dept: INTERVENTIONAL RADIOLOGY/VASCULAR | Facility: HOSPITAL | Age: 83
Discharge: HOME | End: 2018-08-23
Attending: INTERNAL MEDICINE | Admitting: INTERNAL MEDICINE
Payer: MEDICARE

## 2018-08-23 ENCOUNTER — PRIOR ORIGINAL RECORDS (OUTPATIENT)
Dept: OTHER | Age: 83
End: 2018-08-23

## 2018-08-23 LAB
ANION GAP SERPL CALC-SCNC: 9 MMOL/L (ref 0–18)
ANTIBODY SCREEN: NEGATIVE
ATRIAL RATE: 133 BPM
BUN BLD-MCNC: 40 MG/DL (ref 8–20)
BUN/CREAT SERPL: 31.3 (ref 10–20)
CALCIUM BLD-MCNC: 9.9 MG/DL (ref 8.3–10.3)
CHLORIDE SERPL-SCNC: 103 MMOL/L (ref 101–111)
CO2 SERPL-SCNC: 27 MMOL/L (ref 22–32)
CREAT BLD-MCNC: 1.28 MG/DL (ref 0.7–1.3)
ERYTHROCYTE [DISTWIDTH] IN BLOOD BY AUTOMATED COUNT: 18.1 % (ref 11.5–16)
GLUCOSE BLD-MCNC: 123 MG/DL (ref 70–99)
HCT VFR BLD AUTO: 33.8 % (ref 37–53)
HGB BLD-MCNC: 11 G/DL (ref 13–17)
MCH RBC QN AUTO: 33.8 PG (ref 27–33.2)
MCHC RBC AUTO-ENTMCNC: 32.5 G/DL (ref 31–37)
MCV RBC AUTO: 104 FL (ref 80–99)
OSMOLALITY SERPL CALC.SUM OF ELEC: 299 MOSM/KG (ref 275–295)
PLATELET # BLD AUTO: 196 10(3)UL (ref 150–450)
POTASSIUM SERPL-SCNC: 4.3 MMOL/L (ref 3.6–5.1)
PRO-BETA NATRIURETIC PEPTIDE: 6085 PG/ML (ref ?–450)
Q-T INTERVAL: 370 MS
QRS DURATION: 88 MS
QTC CALCULATION (BEZET): 421 MS
R AXIS: 85 DEGREES
RBC # BLD AUTO: 3.25 X10(6)UL (ref 3.8–5.8)
RED CELL DISTRIBUTION WIDTH-SD: 68.3 FL (ref 35.1–46.3)
RH BLOOD TYPE: POSITIVE
SODIUM SERPL-SCNC: 139 MMOL/L (ref 136–144)
T AXIS: 25 DEGREES
VENTRICULAR RATE: 78 BPM
WBC # BLD AUTO: 9.6 X10(3) UL (ref 4–13)

## 2018-08-23 PROCEDURE — 86850 RBC ANTIBODY SCREEN: CPT | Performed by: INTERNAL MEDICINE

## 2018-08-23 PROCEDURE — 86900 BLOOD TYPING SEROLOGIC ABO: CPT | Performed by: INTERNAL MEDICINE

## 2018-08-23 PROCEDURE — 93005 ELECTROCARDIOGRAM TRACING: CPT

## 2018-08-23 PROCEDURE — 93010 ELECTROCARDIOGRAM REPORT: CPT | Performed by: INTERNAL MEDICINE

## 2018-08-23 PROCEDURE — 99215 OFFICE O/P EST HI 40 MIN: CPT

## 2018-08-23 PROCEDURE — 86901 BLOOD TYPING SEROLOGIC RH(D): CPT | Performed by: INTERNAL MEDICINE

## 2018-08-23 PROCEDURE — 83880 ASSAY OF NATRIURETIC PEPTIDE: CPT | Performed by: INTERNAL MEDICINE

## 2018-08-23 PROCEDURE — 80048 BASIC METABOLIC PNL TOTAL CA: CPT | Performed by: INTERNAL MEDICINE

## 2018-08-23 PROCEDURE — 94618 PULMONARY STRESS TESTING: CPT

## 2018-08-23 PROCEDURE — 85027 COMPLETE CBC AUTOMATED: CPT | Performed by: INTERNAL MEDICINE

## 2018-08-23 PROCEDURE — 71045 X-RAY EXAM CHEST 1 VIEW: CPT | Performed by: INTERNAL MEDICINE

## 2018-08-23 NOTE — PROGRESS NOTES
Pt here for [place] clip PAT. 6 min walk and KCCQ done. Pt walked a total of 600 feet in the 6 min.   Had some general fatigue at the end

## 2018-08-23 NOTE — PROGRESS NOTES
Here for PATs for mitraclip,  EKG, CXR, blood drawn, all questions answered. Gave copy of AVS with med instructions.

## 2018-08-23 NOTE — CONSULTS
Cardiothoracic Surgery Consult   Amari Manuel Consult  8/23/18  Dr. Ashly Parks  Referring: Dr Marcell Davis  Diagnosis: severe mitral regurgitation  80year old with worsening KRAMER s/p mitraclip 2016 and watchman  PMH: COPD on home O2  PSH: CABG 1 clips across middle and lateral portion of the A2-P2   leaflets functioning appropriately.  The major regurgitation jet is   between the iyfj-qj-yxpe mitral clips and another small regurgitation jet   lateral to [id number]-uo-[id number] mitral clips.   Mean gradient ac COPD on home O2 and age         The indications, procedure and possible complications as well as the post-operative course was   discussed with the patient and family and questions answered.   Plan: mitraclip next week

## 2018-08-27 NOTE — H&P
History & Physical Examination    Patient Name: Jourdan Hobson  MRN: [id number]  CSN: [id number]  YOB: 1932    Diagnosis:   Severe, symptomatic Mitral regurgitation    Present Illness:   Jourdan Hobson is a pleasant 80year old male who prese pressure    • High cholesterol    • History of gastrointestinal bleeding 1/10/2018   • Hx of CABG 2/14/2018   • Hypothyroid    • Hypothyroidism, unspecified 1/10/2018   • Kyphosis of thoracic region 2/14/2018   • MI, old    • Pulmonary hypertension ([facility] 2 AM  [id number]    Patient seen and examined. Note reviewed and labs reviewed. Agree.

## 2018-08-29 ENCOUNTER — TELEPHONE (OUTPATIENT)
Dept: INTERVENTIONAL RADIOLOGY/VASCULAR | Facility: HOSPITAL | Age: 83
End: 2018-08-29

## 2018-08-30 ENCOUNTER — ANESTHESIA EVENT (OUTPATIENT)
Dept: CARDIAC SURGERY | Facility: HOSPITAL | Age: 83
DRG: 229 | End: 2018-08-30
Payer: MEDICARE

## 2018-08-30 NOTE — ANESTHESIA PREPROCEDURE EVALUATION
PRE-OP EVALUATION    Patient Name: Aba Coronado    Pre-op Diagnosis: mitral regurgitation    Procedure(s):  Mitraclip    Surgeon(s) and Role:     Cyndi Varner MD - Primary     * Christie Olivera MD    Pre-op vitals reviewed.   Temp: 97.6 °F (36.4 °C 1 puff into the lungs daily. Disp: 1 each Rfl: 11   Multiple Vitamin (MULTI VITAMIN MENS) Oral Tab Take 1 tablet by mouth daily. Disp:  Rfl:    famoTIDine 20 MG Oral Tab Take 20 mg by mouth nightly.  Disp:  Rfl:    Vitamin B-12 250 MCG Oral Tab Take 250 mcg tobacco: Never Used    Alcohol use No    Comment: etoh abuse quit 1988       Drug use: No     Available pre-op labs reviewed.     Lab Results  Component Value Date   WBC 9.6 08/23/2018   WBC 8.45 07/30/2018   RBC 3.25 (L) 08/23/2018   RBC 3.30 (L) 07/30/201 use of blood products. We discussed postoperative usage of sedatives, analgesics and anxiolytics. All questions re: anesthetic management were answered.   Plan/risks discussed with: patient, spouse and child/children  Use of blood product(s) discussed with

## 2018-08-31 ENCOUNTER — APPOINTMENT (OUTPATIENT)
Dept: GENERAL RADIOLOGY | Facility: HOSPITAL | Age: 83
DRG: 229 | End: 2018-08-31
Attending: NURSE PRACTITIONER
Payer: MEDICARE

## 2018-08-31 ENCOUNTER — ANESTHESIA (OUTPATIENT)
Dept: CARDIAC SURGERY | Facility: HOSPITAL | Age: 83
DRG: 229 | End: 2018-08-31
Payer: MEDICARE

## 2018-08-31 ENCOUNTER — APPOINTMENT (OUTPATIENT)
Dept: CV DIAGNOSTICS | Facility: HOSPITAL | Age: 83
DRG: 229 | End: 2018-08-31
Attending: NURSE PRACTITIONER
Payer: MEDICARE

## 2018-08-31 ENCOUNTER — HOSPITAL ENCOUNTER (INPATIENT)
Facility: HOSPITAL | Age: 83
LOS: 5 days | Discharge: NURSING, REHAB OR LONG-TERM CARE | DRG: 229 | End: 2018-09-05
Attending: INTERNAL MEDICINE | Admitting: INTERNAL MEDICINE
Payer: MEDICARE

## 2018-08-31 PROBLEM — Z98.890 S/P MITRAL VALVE CLIP IMPLANTATION: Status: ACTIVE | Noted: 2018-08-31

## 2018-08-31 PROBLEM — Z95.818 S/P MITRAL VALVE CLIP IMPLANTATION: Status: ACTIVE | Noted: 2018-08-31

## 2018-08-31 LAB
ANION GAP SERPL CALC-SCNC: 9 MMOL/L (ref 0–18)
APTT PPP: 91 SECONDS (ref 26.1–34.6)
ATRIAL RATE: 77 BPM
BASOPHILS # BLD AUTO: 0.08 X10(3) UL (ref 0–0.1)
BASOPHILS NFR BLD AUTO: 0.9 %
BUN BLD-MCNC: 37 MG/DL (ref 8–20)
BUN/CREAT SERPL: 28.7 (ref 10–20)
CALCIUM BLD-MCNC: 8.8 MG/DL (ref 8.3–10.3)
CHLORIDE SERPL-SCNC: 105 MMOL/L (ref 101–111)
CO2 SERPL-SCNC: 25 MMOL/L (ref 22–32)
CREAT BLD-MCNC: 1.29 MG/DL (ref 0.7–1.3)
EOSINOPHIL # BLD AUTO: 0.57 X10(3) UL (ref 0–0.3)
EOSINOPHIL NFR BLD AUTO: 6.7 %
ERYTHROCYTE [DISTWIDTH] IN BLOOD BY AUTOMATED COUNT: 17.3 % (ref 11.5–16)
FIBRINOGEN: 421 MG/DL (ref 200–446)
GLUCOSE BLD-MCNC: 115 MG/DL (ref 70–99)
HAV IGM SER QL: 2.2 MG/DL (ref 1.8–2.5)
HCT VFR BLD AUTO: 33.1 % (ref 37–53)
HGB BLD-MCNC: 10.9 G/DL (ref 13–17)
IMMATURE GRANULOCYTE COUNT: 0.13 X10(3) UL (ref 0–1)
IMMATURE GRANULOCYTE RATIO %: 1.5 %
INR BLD: 1.21 (ref 0.9–1.1)
INR BLD: 1.28 (ref 0.9–1.1)
ISTAT ACTIVATED CLOTTING TIME: 257 SECONDS (ref 74–137)
ISTAT ACTIVATED CLOTTING TIME: 274 SECONDS (ref 74–137)
LYMPHOCYTES # BLD AUTO: 1.09 X10(3) UL (ref 0.9–4)
LYMPHOCYTES NFR BLD AUTO: 12.8 %
MCH RBC QN AUTO: 34.5 PG (ref 27–33.2)
MCHC RBC AUTO-ENTMCNC: 32.9 G/DL (ref 31–37)
MCV RBC AUTO: 104.7 FL (ref 80–99)
MONOCYTES # BLD AUTO: 2.04 X10(3) UL (ref 0.1–1)
MONOCYTES NFR BLD AUTO: 24 %
NEUTROPHIL ABS PRELIM: 4.59 X10 (3) UL (ref 1.3–6.7)
NEUTROPHILS # BLD AUTO: 4.59 X10(3) UL (ref 1.3–6.7)
NEUTROPHILS NFR BLD AUTO: 54.1 %
OSMOLALITY SERPL CALC.SUM OF ELEC: 298 MOSM/KG (ref 275–295)
PLATELET # BLD AUTO: 143 10(3)UL (ref 150–450)
PLATELET MORPHOLOGY: NORMAL
POTASSIUM SERPL-SCNC: 3.4 MMOL/L (ref 3.6–5.1)
PSA SERPL DL<=0.01 NG/ML-MCNC: 15.8 SECONDS (ref 12.4–14.7)
PSA SERPL DL<=0.01 NG/ML-MCNC: 16.5 SECONDS (ref 12.4–14.7)
Q-T INTERVAL: 456 MS
QRS DURATION: 108 MS
QTC CALCULATION (BEZET): 466 MS
R AXIS: 86 DEGREES
RBC # BLD AUTO: 3.16 X10(6)UL (ref 3.8–5.8)
RED CELL DISTRIBUTION WIDTH-SD: 67 FL (ref 35.1–46.3)
SODIUM SERPL-SCNC: 139 MMOL/L (ref 136–144)
T AXIS: -21 DEGREES
VENTRICULAR RATE: 63 BPM
WBC # BLD AUTO: 8.5 X10(3) UL (ref 4–13)

## 2018-08-31 PROCEDURE — 93005 ELECTROCARDIOGRAM TRACING: CPT

## 2018-08-31 PROCEDURE — 85347 COAGULATION TIME ACTIVATED: CPT

## 2018-08-31 PROCEDURE — 85610 PROTHROMBIN TIME: CPT | Performed by: NURSE PRACTITIONER

## 2018-08-31 PROCEDURE — 86920 COMPATIBILITY TEST SPIN: CPT

## 2018-08-31 PROCEDURE — 87081 CULTURE SCREEN ONLY: CPT | Performed by: INTERNAL MEDICINE

## 2018-08-31 PROCEDURE — 02UG3JZ SUPPLEMENT MITRAL VALVE WITH SYNTHETIC SUBSTITUTE, PERCUTANEOUS APPROACH: ICD-10-PCS | Performed by: INTERNAL MEDICINE

## 2018-08-31 PROCEDURE — 85384 FIBRINOGEN ACTIVITY: CPT | Performed by: NURSE PRACTITIONER

## 2018-08-31 PROCEDURE — 85730 THROMBOPLASTIN TIME PARTIAL: CPT | Performed by: NURSE PRACTITIONER

## 2018-08-31 PROCEDURE — B245ZZ4 ULTRASONOGRAPHY OF LEFT HEART, TRANSESOPHAGEAL: ICD-10-PCS | Performed by: INTERNAL MEDICINE

## 2018-08-31 PROCEDURE — 83735 ASSAY OF MAGNESIUM: CPT | Performed by: NURSE PRACTITIONER

## 2018-08-31 PROCEDURE — 93010 ELECTROCARDIOGRAM REPORT: CPT | Performed by: INTERNAL MEDICINE

## 2018-08-31 PROCEDURE — 80048 BASIC METABOLIC PNL TOTAL CA: CPT | Performed by: NURSE PRACTITIONER

## 2018-08-31 PROCEDURE — 85025 COMPLETE CBC W/AUTO DIFF WBC: CPT | Performed by: INTERNAL MEDICINE

## 2018-08-31 PROCEDURE — B310ZZZ FLUOROSCOPY OF THORACIC AORTA: ICD-10-PCS | Performed by: INTERNAL MEDICINE

## 2018-08-31 PROCEDURE — 71045 X-RAY EXAM CHEST 1 VIEW: CPT | Performed by: NURSE PRACTITIONER

## 2018-08-31 PROCEDURE — 93355 ECHO TRANSESOPHAGEAL (TEE): CPT | Performed by: NURSE PRACTITIONER

## 2018-08-31 RX ORDER — CLOPIDOGREL BISULFATE 75 MG/1
75 TABLET ORAL DAILY
Status: DISCONTINUED | OUTPATIENT
Start: 2018-09-01 | End: 2018-09-05

## 2018-08-31 RX ORDER — ALLOPURINOL 100 MG/1
100 TABLET ORAL 2 TIMES DAILY
Status: DISCONTINUED | OUTPATIENT
Start: 2018-08-31 | End: 2018-09-05

## 2018-08-31 RX ORDER — ASPIRIN 81 MG/1
81 TABLET ORAL DAILY
Status: DISCONTINUED | OUTPATIENT
Start: 2018-09-01 | End: 2018-09-05

## 2018-08-31 RX ORDER — ATORVASTATIN CALCIUM 20 MG/1
20 TABLET, FILM COATED ORAL NIGHTLY
Status: DISCONTINUED | OUTPATIENT
Start: 2018-08-31 | End: 2018-09-05

## 2018-08-31 RX ORDER — TORSEMIDE 20 MG/1
20 TABLET ORAL DAILY
Status: DISCONTINUED | OUTPATIENT
Start: 2018-09-01 | End: 2018-09-05

## 2018-08-31 RX ORDER — ONDANSETRON 2 MG/ML
4 INJECTION INTRAMUSCULAR; INTRAVENOUS EVERY 6 HOURS PRN
Status: ACTIVE | OUTPATIENT
Start: 2018-08-31 | End: 2018-09-02

## 2018-08-31 RX ORDER — MORPHINE SULFATE 4 MG/ML
2 INJECTION, SOLUTION INTRAMUSCULAR; INTRAVENOUS EVERY 5 MIN PRN
Status: ACTIVE | OUTPATIENT
Start: 2018-08-31 | End: 2018-08-31

## 2018-08-31 RX ORDER — ACETAMINOPHEN 325 MG/1
650 TABLET ORAL EVERY 4 HOURS PRN
Status: DISCONTINUED | OUTPATIENT
Start: 2018-08-31 | End: 2018-09-05

## 2018-08-31 RX ORDER — PROTAMINE SULFATE 10 MG/ML
INJECTION, SOLUTION INTRAVENOUS
Status: COMPLETED
Start: 2018-08-31 | End: 2018-08-31

## 2018-08-31 RX ORDER — FAMOTIDINE 10 MG/ML
20 INJECTION, SOLUTION INTRAVENOUS DAILY
Status: DISCONTINUED | OUTPATIENT
Start: 2018-08-31 | End: 2018-09-04

## 2018-08-31 RX ORDER — CEFAZOLIN SODIUM 1 G/3ML
INJECTION, POWDER, FOR SOLUTION INTRAMUSCULAR; INTRAVENOUS
Status: DISCONTINUED | OUTPATIENT
Start: 2018-08-31 | End: 2018-08-31 | Stop reason: HOSPADM

## 2018-08-31 RX ORDER — DIGOXIN 125 MCG
125 TABLET ORAL DAILY
Status: DISCONTINUED | OUTPATIENT
Start: 2018-08-31 | End: 2018-09-05

## 2018-08-31 RX ORDER — NALOXONE HYDROCHLORIDE 0.4 MG/ML
80 INJECTION, SOLUTION INTRAMUSCULAR; INTRAVENOUS; SUBCUTANEOUS AS NEEDED
Status: ACTIVE | OUTPATIENT
Start: 2018-08-31 | End: 2018-08-31

## 2018-08-31 RX ORDER — ONDANSETRON 2 MG/ML
4 INJECTION INTRAMUSCULAR; INTRAVENOUS AS NEEDED
Status: ACTIVE | OUTPATIENT
Start: 2018-08-31 | End: 2018-08-31

## 2018-08-31 RX ORDER — ALBUTEROL SULFATE 90 UG/1
2 AEROSOL, METERED RESPIRATORY (INHALATION) EVERY 6 HOURS PRN
Status: DISCONTINUED | OUTPATIENT
Start: 2018-08-31 | End: 2018-09-05

## 2018-08-31 RX ORDER — MONTELUKAST SODIUM 10 MG/1
10 TABLET ORAL NIGHTLY
Status: DISCONTINUED | OUTPATIENT
Start: 2018-08-31 | End: 2018-09-05

## 2018-08-31 RX ORDER — CHOLECALCIFEROL (VITAMIN D3) 125 MCG
250 CAPSULE ORAL NIGHTLY
Status: DISCONTINUED | OUTPATIENT
Start: 2018-08-31 | End: 2018-09-05

## 2018-08-31 RX ORDER — CEFAZOLIN SODIUM/WATER 2 G/20 ML
2 SYRINGE (ML) INTRAVENOUS EVERY 8 HOURS
Status: COMPLETED | OUTPATIENT
Start: 2018-08-31 | End: 2018-08-31

## 2018-08-31 RX ORDER — LEVOTHYROXINE SODIUM 0.15 MG/1
150 TABLET ORAL
Status: DISCONTINUED | OUTPATIENT
Start: 2018-09-01 | End: 2018-09-05

## 2018-08-31 RX ORDER — MELATONIN
650 2 TIMES DAILY
Status: DISCONTINUED | OUTPATIENT
Start: 2018-08-31 | End: 2018-09-05

## 2018-08-31 RX ORDER — FAMOTIDINE 20 MG/1
20 TABLET ORAL DAILY
Status: DISCONTINUED | OUTPATIENT
Start: 2018-08-31 | End: 2018-09-05

## 2018-08-31 RX ORDER — SODIUM CHLORIDE 9 MG/ML
INJECTION, SOLUTION INTRAVENOUS CONTINUOUS
Status: DISCONTINUED | OUTPATIENT
Start: 2018-08-31 | End: 2018-08-31

## 2018-08-31 RX ORDER — HEPARIN SODIUM 5000 [USP'U]/ML
INJECTION, SOLUTION INTRAVENOUS; SUBCUTANEOUS
Status: COMPLETED
Start: 2018-08-31 | End: 2018-08-31

## 2018-08-31 RX ORDER — DILTIAZEM HYDROCHLORIDE 120 MG/1
120 CAPSULE, EXTENDED RELEASE ORAL DAILY
Status: DISCONTINUED | OUTPATIENT
Start: 2018-08-31 | End: 2018-09-05

## 2018-08-31 RX ORDER — LIDOCAINE HYDROCHLORIDE 10 MG/ML
INJECTION, SOLUTION EPIDURAL; INFILTRATION; INTRACAUDAL; PERINEURAL
Status: COMPLETED
Start: 2018-08-31 | End: 2018-08-31

## 2018-08-31 RX ORDER — SODIUM CHLORIDE 9 MG/ML
INJECTION, SOLUTION INTRAVENOUS CONTINUOUS
Status: DISCONTINUED | OUTPATIENT
Start: 2018-08-31 | End: 2018-09-01

## 2018-08-31 RX ORDER — DOCUSATE SODIUM 100 MG/1
100 CAPSULE, LIQUID FILLED ORAL 2 TIMES DAILY
Status: DISCONTINUED | OUTPATIENT
Start: 2018-08-31 | End: 2018-09-05

## 2018-08-31 RX ORDER — SODIUM CHLORIDE, SODIUM LACTATE, POTASSIUM CHLORIDE, CALCIUM CHLORIDE 600; 310; 30; 20 MG/100ML; MG/100ML; MG/100ML; MG/100ML
INJECTION, SOLUTION INTRAVENOUS CONTINUOUS
Status: DISCONTINUED | OUTPATIENT
Start: 2018-08-31 | End: 2018-08-31

## 2018-08-31 NOTE — PROCEDURES
Cardiology Transesophageal Echo Note    PRE and POST PROCEDURE DIAGNOSIS:   1.  Symptomatic mitral regurgitation    PROCEDURE: Intra-operative transesophageal echocardiogram (EUGENIA) for [id number]-uo-[id number] mitral clip procedure    The patient was already intubated a Mitral clip deployment:  - Reduction of mitral insufficiency from moderate to mild  - Mean gradient across mitral valve was 3-7 mmHg  - Blunted but no systolic flow reversal noted on interrogation of the pulmonary veins  - Trivial pericardial effusio

## 2018-08-31 NOTE — ANESTHESIA POSTPROCEDURE EVALUATION
[facility]    Ana Dunham Patient Status:  Inpatient   Age/Gender 80year old male MRN [id number]   [facility] 6NE-A Attending Ruddy Go MD   Hosp Day # 0 PCP Evonne Smallwood MD       Anesthesia Post-op Note    Procedure(s):  Mi

## 2018-08-31 NOTE — OPERATIVE REPORT
[address]    PATIENT'S NAME: Kelechi Rice   ATTENDING PHYSICIAN: Cindi Garcia M.D. OPERATING PHYSICIAN: Sara Fiore M.D.    PATIENT ACCOUNT#:   [de-identified]    LOCATION:  [address] [place] [address]  MEDICAL RECORD #:   [id number]       DATE OF BIRTH: was able to cross one of the original defects (the more posterior defect) with a Gensini catheter and a Magic Torque wire. Heparin was administered for systemic anticoagulation.     A formal dictation of the MitraClip procedure will be performed by Dr. Blair Burris

## 2018-08-31 NOTE — PROGRESS NOTES
7168- Received pt from [facility] 23 s/p Mitracl. Right groin perclose x 2. initially site was soft, dry no hematoma. At 1115 groin began bleeding- dressing removed and manual hold for 10 minutes. Dressing reapplied. Site now soft, dry, no hematoma.  Pt declining

## 2018-08-31 NOTE — OPERATIVE REPORT
[facility]    PATIENT'S NAME: Bela Regan   ATTENDING PHYSICIAN: Artemio Grayson M.D. OPERATING PHYSICIAN: Artemio Grayson M.D.    PATIENT ACCOUNT#:   [de-identified]    LOCATION:  [address] [place] [address]  MEDICAL RECORD #:   [id number]       DATE OF BIRTH:

## 2018-08-31 NOTE — PROGRESS NOTES
08/31/18 2133   Clinical Encounter Type   Visited With Patient and family together  (pt.'s wife at bedside)   Routine Visit (Responded to request for consult)   Patient's Supportive Strategies/Resources  offered non anxious presence and emotiona

## 2018-08-31 NOTE — SPIRITUAL CARE NOTE
Pt. Had just returned from surgery so was sleeping.  initiated relationship with wife Paty Callejas and two children. Prayers were said for successful recovery. Family requested  return later when patient is awake and stronger.    availabl

## 2018-08-31 NOTE — PROGRESS NOTES
79 y/o male with hx CABG, CHF, prior mitral clip, PCI Cx and RCA with now 4+ MR and CHF    Goodwn Alvino    RFV and crossed to left atrium Mitral clilp delivered to lateral jet. MR improved from 4+ to 1+. Mean gradient went 3-5. Discussed with family.  Will

## 2018-08-31 NOTE — CM/SW NOTE
08/31/18 1300   CM/SW Referral Data   Referral Source Physician   Reason for Referral Discharge planning   Informant Children;Friend   Patient Info   Patient's Mental Status Alert;Oriented   Patient's [address]   Number of Levels in Home 2

## 2018-08-31 NOTE — CONSULTS
PM&R Consult Noted. Patient to be seen this weekend for comprehensive evaluation after he has had the opportunity to work with therapy teams.     Thank you for this consultation,  Onelia Grimes, [address] [facility]

## 2018-09-01 ENCOUNTER — APPOINTMENT (OUTPATIENT)
Dept: CV DIAGNOSTICS | Facility: HOSPITAL | Age: 83
DRG: 229 | End: 2018-09-01
Attending: NURSE PRACTITIONER
Payer: MEDICARE

## 2018-09-01 LAB
ANION GAP SERPL CALC-SCNC: 12 MMOL/L (ref 0–18)
ATRIAL RATE: 214 BPM
BUN BLD-MCNC: 27 MG/DL (ref 8–20)
BUN/CREAT SERPL: 25.2 (ref 10–20)
CALCIUM BLD-MCNC: 8.6 MG/DL (ref 8.3–10.3)
CHLORIDE SERPL-SCNC: 109 MMOL/L (ref 101–111)
CO2 SERPL-SCNC: 23 MMOL/L (ref 22–32)
CREAT BLD-MCNC: 1.07 MG/DL (ref 0.7–1.3)
GLUCOSE BLD-MCNC: 117 MG/DL (ref 70–99)
INR BLD: 1.28 (ref 0.9–1.1)
OSMOLALITY SERPL CALC.SUM OF ELEC: 304 MOSM/KG (ref 275–295)
POTASSIUM SERPL-SCNC: 3.9 MMOL/L (ref 3.6–5.1)
POTASSIUM SERPL-SCNC: 3.9 MMOL/L (ref 3.6–5.1)
PSA SERPL DL<=0.01 NG/ML-MCNC: 16.5 SECONDS (ref 12.4–14.7)
Q-T INTERVAL: 380 MS
QRS DURATION: 94 MS
QTC CALCULATION (BEZET): 401 MS
R AXIS: 92 DEGREES
SODIUM SERPL-SCNC: 144 MMOL/L (ref 136–144)
T AXIS: 33 DEGREES
VENTRICULAR RATE: 67 BPM

## 2018-09-01 PROCEDURE — 85610 PROTHROMBIN TIME: CPT | Performed by: NURSE PRACTITIONER

## 2018-09-01 PROCEDURE — 84132 ASSAY OF SERUM POTASSIUM: CPT | Performed by: INTERNAL MEDICINE

## 2018-09-01 PROCEDURE — 97166 OT EVAL MOD COMPLEX 45 MIN: CPT

## 2018-09-01 PROCEDURE — 93005 ELECTROCARDIOGRAM TRACING: CPT

## 2018-09-01 PROCEDURE — 97110 THERAPEUTIC EXERCISES: CPT

## 2018-09-01 PROCEDURE — 97535 SELF CARE MNGMENT TRAINING: CPT

## 2018-09-01 PROCEDURE — 93306 TTE W/DOPPLER COMPLETE: CPT | Performed by: NURSE PRACTITIONER

## 2018-09-01 PROCEDURE — 80048 BASIC METABOLIC PNL TOTAL CA: CPT | Performed by: NURSE PRACTITIONER

## 2018-09-01 PROCEDURE — 97162 PT EVAL MOD COMPLEX 30 MIN: CPT

## 2018-09-01 PROCEDURE — 97530 THERAPEUTIC ACTIVITIES: CPT

## 2018-09-01 PROCEDURE — 93010 ELECTROCARDIOGRAM REPORT: CPT | Performed by: INTERNAL MEDICINE

## 2018-09-01 RX ORDER — FUROSEMIDE 10 MG/ML
20 INJECTION INTRAMUSCULAR; INTRAVENOUS ONCE
Status: COMPLETED | OUTPATIENT
Start: 2018-09-01 | End: 2018-09-01

## 2018-09-01 NOTE — PLAN OF CARE
Assumed care of patient this a.m. @ 3121. Patient is A/O x3. VSS. Right groin CDI, old bruising from previous procedure, no oozing noted. + doppler pulses to right pedal and PT. 2D Echo completed @ bedside.  Patient up to chair with minimal assist. PT/OT @

## 2018-09-01 NOTE — PROGRESS NOTES
Seen and examined earlier today. Reviewed overnight course with nursing staff at bedside. Doing okay yet appears weak. Mild baseline dyspnea.     T max 100.8  -- now afebrile   123/50  72    Lungs hyperinflated -- clear anteriorly  Ht irregular - soft M

## 2018-09-01 NOTE — PROGRESS NOTES
Pt. Received as a transfer from [address] to [address] [place] [address]. VSS. Pt. Saturating well on 2L, baseline home O2. A fib on tele, rate around 80's. RAC and LAC IVs flushing well, saline locked.  Pt. C/o pain to the R groin at 7/10, but denies the need for pain medicine, p

## 2018-09-01 NOTE — OCCUPATIONAL THERAPY NOTE
OCCUPATIONAL THERAPY EVALUATION - INPATIENT     Room Number: 9456/1983-X  Evaluation Date: 9/1/2018  Type of Evaluation: Initial  Presenting Problem: TMVR 8/31    Physician Order: IP Consult to Occupational Therapy  Reason for Therapy: ADL/IADL Dysfunction Surgeon: Jamie Higgins MD;  Location: [facility]                ENDOSCOPY  No date: OTHER SURGICAL HISTORY      Comment: watchman device  No date: REPAIR  ANAL FISTULA,RECT ADV FLAP  No date: TRANSCATH MITRAL VALVE REPAIR VIA CORONARY SIN*    OCCUPATIONAL PROF %  Liters of O2:  2 lpm  Shortness of breath  Heart Rate: at rest 85 and with activity up to 120, however returns to baseline with standing rest break    ACTIVITIES OF DAILY LIVING ASSESSMENT  AM-PAC ‘6-Clicks’ Inpatient Daily Activity Short Form  Ho noted above. Functional outcome measures completed include AM-PAC.  Pt's raw score for the AM-PAC ADL assessment is 42.03 indicating that pt is a good home with home health candidate, however recommend DUSTIN for endurance and strengthening based on 38.82 perc Good  Frequency (Obs): 5x/week  Number of Visits to Meet Established Goals: 5    ADL Goals   Patient will perform grooming: with stand by assist  Patient will perform lower body bathing:  with stand by assist  Patient will perform lower body dressing:  wit

## 2018-09-01 NOTE — PHYSICAL THERAPY NOTE
PHYSICAL THERAPY EVALUATION - INPATIENT     Room Number: 2252/7757-P  Evaluation Date: 9/1/2018  Type of Evaluation: Initial  Physician Order: PT Eval and Treat    Presenting Problem: significant SOB s/p TMVR  Reason for Therapy: Mobility Dysfunction CABG  No date: CATARACT  10/4/2016: EGD      Comment: Procedure: ESOPHAGOGASTRODUODENOSCOPY (EGD);                  Surgeon: Atul Bray MD;  Location: [facility]                ENDOSCOPY  10/12/2016: EGD N/A      Comment: Procedure: ESOPHAGOGASTRODUODENOSCOP Fair -    ADDITIONAL TESTS                                    NEUROLOGICAL FINDINGS                      ACTIVITY TOLERANCE  O2 Saturation at rest 92% and with activity 96%  O2 Device: Nasal cannula  Liters of O2:  2  Shortness of breath  Heart Rate: at re exercises in bed or in chair. Pt and family verbalized understanding and agreement. Discussed with pt and family goals of care, roles of PT and D/C setting. Explained to daughter on phone that acute rehab has their own set of criteria to accept a patient. RECOMMENDATIONS  PT Discharge Recommendations: Sub-acute rehabilitation (ELOS 10-12 days)    PLAN  PT Treatment Plan: Endurance; Energy conservation;Patient education; Family education;Gait training;Strengthening;Stair training;Transfer training;Balance aleida

## 2018-09-02 ENCOUNTER — APPOINTMENT (OUTPATIENT)
Dept: GENERAL RADIOLOGY | Facility: HOSPITAL | Age: 83
DRG: 229 | End: 2018-09-02
Attending: INTERNAL MEDICINE
Payer: MEDICARE

## 2018-09-02 LAB
ANION GAP SERPL CALC-SCNC: 9 MMOL/L (ref 0–18)
BNP SERPL-MCNC: 409 PG/ML (ref 2–99)
BUN BLD-MCNC: 29 MG/DL (ref 8–20)
BUN/CREAT SERPL: 25.9 (ref 10–20)
CALCIUM BLD-MCNC: 8.8 MG/DL (ref 8.3–10.3)
CHLORIDE SERPL-SCNC: 105 MMOL/L (ref 101–111)
CO2 SERPL-SCNC: 26 MMOL/L (ref 22–32)
CREAT BLD-MCNC: 1.12 MG/DL (ref 0.7–1.3)
GLUCOSE BLD-MCNC: 119 MG/DL (ref 70–99)
OSMOLALITY SERPL CALC.SUM OF ELEC: 297 MOSM/KG (ref 275–295)
POTASSIUM SERPL-SCNC: 3.9 MMOL/L (ref 3.6–5.1)
SODIUM SERPL-SCNC: 140 MMOL/L (ref 136–144)
TSI SER-ACNC: 0.04 MIU/ML (ref 0.35–5.5)

## 2018-09-02 PROCEDURE — 84443 ASSAY THYROID STIM HORMONE: CPT | Performed by: INTERNAL MEDICINE

## 2018-09-02 PROCEDURE — 94664 DEMO&/EVAL PT USE INHALER: CPT

## 2018-09-02 PROCEDURE — 71045 X-RAY EXAM CHEST 1 VIEW: CPT | Performed by: INTERNAL MEDICINE

## 2018-09-02 PROCEDURE — 83880 ASSAY OF NATRIURETIC PEPTIDE: CPT | Performed by: INTERNAL MEDICINE

## 2018-09-02 PROCEDURE — 80048 BASIC METABOLIC PNL TOTAL CA: CPT | Performed by: PHYSICAL MEDICINE & REHABILITATION

## 2018-09-02 NOTE — CONSULTS
[facility]    Aba Coronado Patient Status:  Inpatient    3/8/1932 MRN [id number]   [facility] 8NE-A Attending Pepito Bhandari MD   Hosp Day # 2 PCP Salome Chapman MD     Patient Identification  Aba Coronado is a 80year old mal Date   • Abdominal aortic atherosclerosis ([facility] 2/14/2018   • Arrhythmia     Afib   • Arthritis of lumbar spine ([facility] 2/14/2018   • Asthma    • Atrial fibrillation (HCC)    • Morris esophagus    • Bilateral carotid artery disease ([facility] 2/14/2018   • Calci PRN   docusate sodium (COLACE) cap 100 mg 100 mg Oral BID   Or      docusate sodium (COLACE) liquid 100 mg 100 mg Per NG Tube BID   [] ondansetron HCl (ZOFRAN) injection 4 mg 4 mg Intravenous Q6H PRN   famoTIDine (PEPCID) tab 20 mg 20 mg Oral Daily cerebral hemorrhage    • Other [OTHER] Mother      gallbladder        Allergies:    Coumadin [Warfarin]     BLEEDING    Comment:Needed 12 units of blood at [facility]             10/2016  Eliquis [Apixaban]      BLEEDING  Pradaxa [Dabigatran*    BLE Extremity:  Strength  is 5. ROM WNL. Left Lower Extremity: Strength  is 5. ROM WNL. Neuro: CNII-XII are grossly intact.  Sensation to dull touch intact in all extremities and reflexes are 2+, bilateral and symmetric for biceps, brachioradiali

## 2018-09-02 NOTE — PROGRESS NOTES
[facility]  Progress Note    Ashu Jacobson Patient Status:  Inpatient    3/8/1932 MRN [id number]   [facility] 8NE-A Attending Yinka Phillip MD   Hosp Day # 2 PCP Albaro Bell MD       Assessment and Plan:  Patient Active Problem Pulse 74   Temp 97.6 °F (36.4 °C) (Oral)   Resp 24   Ht 5' 2\" (1.575 m)   Wt 132 lb (59.9 kg)   SpO2 93%   BMI 24.14 kg/m²     Temp (24hrs), Av.2 °F (36.8 °C), Min:97.6 °F (36.4 °C), Max:99 °F (37.2 °C)      Intake/Output:    Intake/Output Summary (La tab 100 mg 100 mg Oral BID   diltiazem (CARDIZEM CD) 24 hr cap 120 mg 120 mg Oral Daily   Clopidogrel Bisulfate (PLAVIX) tab 75 mg 75 mg Oral Daily   aspirin EC tab 81 mg 81 mg Oral Daily   digoxin (LANOXIN) tab 125 mcg 125 mcg Oral Daily   ferrous sulfate

## 2018-09-03 LAB — DIGOXIN SERPL-MCNC: 1.2 NG/ML (ref 0.8–2)

## 2018-09-03 PROCEDURE — 80162 ASSAY OF DIGOXIN TOTAL: CPT | Performed by: INTERNAL MEDICINE

## 2018-09-03 NOTE — PLAN OF CARE
Pt a/ox4. Pt reports feeling tired. 2L NC O2 is the patient's baseline at home. A-fib. Productive cough this morning with thick yellow sputum. Rate controlled. EF = 50-55%. BM today. Bruising to right groin. Dressing is c/d/i and site is soft but tender.  P

## 2018-09-03 NOTE — PROGRESS NOTES
[facility]  Progress Note    Radha Edgar Patient Status:  Inpatient    3/8/1932 MRN [id number]   [facility] 8NE-A Attending Augusto Mchugh MD   Hosp Day # 3 PCP Jessica Sanches MD       Assessment and Plan:    1.  CV - S/P Vicki Rowley input(s): TROP, CK in the last 168 hours.     Medications:    Infusions:     Scheduled:     • docusate sodium  100 mg Oral BID    Or   • docusate sodium  100 mg Per NG Tube BID   • famoTIDine  20 mg Oral Daily    Or   • famoTIDine  20 mg Intravenous Daily

## 2018-09-03 NOTE — PLAN OF CARE
CARDIOVASCULAR - ADULT    • Maintains optimal cardiac output and hemodynamic stability Progressing    • Absence of cardiac arrhythmias or at baseline Progressing    Controlled a fib on telemetry. VSS. No c/o cardiac symptoms.   Bruising noted around right

## 2018-09-04 ENCOUNTER — APPOINTMENT (OUTPATIENT)
Dept: ULTRASOUND IMAGING | Facility: HOSPITAL | Age: 83
DRG: 229 | End: 2018-09-04
Attending: NURSE PRACTITIONER
Payer: MEDICARE

## 2018-09-04 LAB
ANION GAP SERPL CALC-SCNC: 7 MMOL/L (ref 0–18)
BLOOD TYPE BARCODE: 6200
BUN BLD-MCNC: 36 MG/DL (ref 8–20)
BUN/CREAT SERPL: 36 (ref 10–20)
CALCIUM BLD-MCNC: 8.8 MG/DL (ref 8.3–10.3)
CHLORIDE SERPL-SCNC: 100 MMOL/L (ref 101–111)
CO2 SERPL-SCNC: 30 MMOL/L (ref 22–32)
CREAT BLD-MCNC: 1 MG/DL (ref 0.7–1.3)
ERYTHROCYTE [DISTWIDTH] IN BLOOD BY AUTOMATED COUNT: 17.3 % (ref 11.5–16)
GLUCOSE BLD-MCNC: 123 MG/DL (ref 70–99)
HAV IGM SER QL: 2.1 MG/DL (ref 1.8–2.5)
HCT VFR BLD AUTO: 28.2 % (ref 37–53)
HGB BLD-MCNC: 9 G/DL (ref 13–17)
MCH RBC QN AUTO: 34.6 PG (ref 27–33.2)
MCHC RBC AUTO-ENTMCNC: 31.9 G/DL (ref 31–37)
MCV RBC AUTO: 108.5 FL (ref 80–99)
OSMOLALITY SERPL CALC.SUM OF ELEC: 294 MOSM/KG (ref 275–295)
PLATELET # BLD AUTO: 108 10(3)UL (ref 150–450)
POTASSIUM SERPL-SCNC: 4 MMOL/L (ref 3.6–5.1)
RBC # BLD AUTO: 2.6 X10(6)UL (ref 3.8–5.8)
RED CELL DISTRIBUTION WIDTH-SD: 66.9 FL (ref 35.1–46.3)
SODIUM SERPL-SCNC: 137 MMOL/L (ref 136–144)
WBC # BLD AUTO: 7.5 X10(3) UL (ref 4–13)

## 2018-09-04 PROCEDURE — 80048 BASIC METABOLIC PNL TOTAL CA: CPT | Performed by: NURSE PRACTITIONER

## 2018-09-04 PROCEDURE — 83735 ASSAY OF MAGNESIUM: CPT | Performed by: NURSE PRACTITIONER

## 2018-09-04 PROCEDURE — 93926 LOWER EXTREMITY STUDY: CPT | Performed by: NURSE PRACTITIONER

## 2018-09-04 PROCEDURE — 85027 COMPLETE CBC AUTOMATED: CPT | Performed by: NURSE PRACTITIONER

## 2018-09-04 NOTE — CM/SW NOTE
Met with patient, spouse and daughter--they would like a referral sent to [facility] as if needed any medical care, patient's cardiologist and team would be able to handle--[facility] referral sent; await response

## 2018-09-04 NOTE — PROGRESS NOTES
[facility] Progress [facility]    Subjective: In bed on exam without complaints this am except mild groin tenderness. He currently denies CP, SOB, dizziness. Afebrile overnight.      Objective:   BP 92/52 (BP Location: Left arm)   Pulse 79   Temp increased. The right     ventricle volume was moderately to markedly increased. 5. Right atrium: The atrium was moderately to markedly dilated. 6. Atrial septum: There was a residual post-surgical atrial defect.   7. Pulmonary arteries: Systolic pressure simvastatin 40 MG Oral Tab Take 1 tablet (40 mg total) by mouth once daily.  Disp: 90 tablet Rfl: 1   Levothyroxine Sodium 150 MCG Oral Tab TAKE 1 TABLET(150 MCG) BY MOUTH BEFORE BREAKFAST (Patient taking differently: Take 150 mcg by mouth before breakfas Plavix and Asa   - continue gentle diuresis with torsemide   - humidify O2   - conitnue PT/OT, increase strength and endurance   - encourage IS   -f/u as directed with cardiology and Valve clinic    ROVERTO Esposito  9/4/2018  0830    Seen and examin

## 2018-09-04 NOTE — CM/SW NOTE
Message left for shefali amador liaison Maebelle Claude inquiring about sina bed at [facility]; await call back

## 2018-09-04 NOTE — CM/SW NOTE
[facility] rehab can accept patient when medically cleared for discharge--updated patient and family

## 2018-09-04 NOTE — CM/SW NOTE
Call placed to [facility] [phone]--await call back regarding ability to accept patient

## 2018-09-04 NOTE — CM/SW NOTE
Updates sent to [facility] rehab via [place]

## 2018-09-04 NOTE — CM/SW NOTE
Call received from 2020 Misty Ba in admissions @ [facility] rehab--able to accept patient today--will have private room--carlos varner updated--to inform patient and family

## 2018-09-04 NOTE — CM/SW NOTE
Spoke with liaomari grove--patient not appropriate for sina at [facility].   Message left with admissions at [facility] rehab [phone]; await call back

## 2018-09-05 ENCOUNTER — TELEPHONE (OUTPATIENT)
Dept: INTERVENTIONAL RADIOLOGY/VASCULAR | Facility: HOSPITAL | Age: 83
End: 2018-09-05

## 2018-09-05 ENCOUNTER — SNF VISIT (OUTPATIENT)
Dept: INTERNAL MEDICINE CLINIC | Facility: SKILLED NURSING FACILITY | Age: 83
End: 2018-09-05

## 2018-09-05 VITALS
HEART RATE: 88 BPM | DIASTOLIC BLOOD PRESSURE: 53 MMHG | BODY MASS INDEX: 24.26 KG/M2 | OXYGEN SATURATION: 93 % | RESPIRATION RATE: 18 BRPM | HEIGHT: 62 IN | SYSTOLIC BLOOD PRESSURE: 127 MMHG | WEIGHT: 131.81 LBS | TEMPERATURE: 98 F

## 2018-09-05 DIAGNOSIS — I50.30 (HFPEF) HEART FAILURE WITH PRESERVED EJECTION FRACTION (HCC): ICD-10-CM

## 2018-09-05 DIAGNOSIS — Z95.818 S/P MITRAL VALVE CLIP IMPLANTATION: ICD-10-CM

## 2018-09-05 DIAGNOSIS — I48.20 CHRONIC ATRIAL FIBRILLATION (HCC): ICD-10-CM

## 2018-09-05 DIAGNOSIS — Z98.890 S/P MITRAL VALVE CLIP IMPLANTATION: ICD-10-CM

## 2018-09-05 LAB
ANION GAP SERPL CALC-SCNC: 6 MMOL/L (ref 0–18)
BUN BLD-MCNC: 31 MG/DL (ref 8–20)
BUN/CREAT SERPL: 31.3 (ref 10–20)
CALCIUM BLD-MCNC: 8.8 MG/DL (ref 8.3–10.3)
CHLORIDE SERPL-SCNC: 102 MMOL/L (ref 101–111)
CO2 SERPL-SCNC: 30 MMOL/L (ref 22–32)
CREAT BLD-MCNC: 0.99 MG/DL (ref 0.7–1.3)
GLUCOSE BLD-MCNC: 106 MG/DL (ref 70–99)
OSMOLALITY SERPL CALC.SUM OF ELEC: 293 MOSM/KG (ref 275–295)
POTASSIUM SERPL-SCNC: 4 MMOL/L (ref 3.6–5.1)
SODIUM SERPL-SCNC: 138 MMOL/L (ref 136–144)

## 2018-09-05 PROCEDURE — 80048 BASIC METABOLIC PNL TOTAL CA: CPT | Performed by: NURSE PRACTITIONER

## 2018-09-05 PROCEDURE — 99310 SBSQ NF CARE HIGH MDM 45: CPT | Performed by: NURSE PRACTITIONER

## 2018-09-05 NOTE — PROGRESS NOTES
[facility] Progress Note    Subjective: In bed on exam without complaints. He currently denies CP, SOB, dizziness, palpitations.     Objective:   /38 (BP Location: Right arm)   Pulse 65   Temp 98 °F (36.7 °C) (Oral) markedly increased. 5. Right atrium: The atrium was moderately to markedly dilated. 6. Atrial septum: There was a residual post-surgical atrial defect. 7. Pulmonary arteries: Systolic pressure was markedly increased, estimated     to be 66mm Hg.  Estimat Capsule SR 24 Hr TK ONE C PO  QD (Patient taking differently: Take 120 mg by mouth daily.  ) Disp: 90 capsule Rfl: 1   simvastatin 40 MG Oral Tab Take 1 tablet (40 mg total) by mouth once daily.  Disp: 90 tablet Rfl: 1   Levothyroxine Sodium 150 MCG Oral Ta Continue to increase activity, PT/OT   - Encouraged IS   - continue gentle diuresis   - If bed available at [facility] - transfer to rehab today    ROVERTO Hitchcock  9/5/2018  0730    Patient seen and examined. Agree with Above    Plan:   1.  Stable

## 2018-09-05 NOTE — CM/SW NOTE
Per carlos varner, patient cleared for transfer to [facility]--spoke with lewis at [facility]--await call back with room assignment

## 2018-09-05 NOTE — CM/SW NOTE
Spoke with patient and partner [facility] regarding transportation to [facility]--amanuel will drive patient to the facility ([facility] updated).   Again, patient assigned to room 310-2--nurse to call report to [phone] and send d/c paperwork

## 2018-09-05 NOTE — PROGRESS NOTES
HPI: Tasha Cardozo  Is an 81 yo male with pmh of CHF, afib s/p watchman, CAD s/pCABG 1991, PCI RCA and Cx 8/18, copd on oxygen, catalan's esophagus, severe GIB 10/16 off anticoagulation who was admitted to [facility] with severe symptomatic mitral r Location: [facility]                ENDOSCOPY  No date: OTHER SURGICAL HISTORY      Comment: watchman device  No date: REPAIR  ANAL FISTULA,RECT ADV FLAP  No date: TRANSCATH MITRAL VALVE REPAIR VIA CORONARY SIN*  Family History   Problem Relation Age of Onset   • 50-55%, NYHA III  CHF protocol, daily weights notify with 2 or more lb gain in 24 hours and/or 3-4lb gain in 3-5 days  Torsemide 20mg daily     afib s/p Watchman 2016  No AC due to severe GIB history   Continue cartia    CAD s/p CABG 1991, pci RCA and Cx 8

## 2018-09-05 NOTE — PROGRESS NOTES
09/05/18 1100   Clinical Encounter Type   Visited With Patient and family together  (friend)   Routine Visit Follow-up   Continue Visiting No    followed up on patient needs. Prayer provided as requested.  available @ [phone].

## 2018-09-05 NOTE — PHYSICAL THERAPY NOTE
Attempted to see to see pt for PT treatment. Pt currently eating lunch and then preparing for d/c within the hour. Unable to see pt at this time. RN aware.

## 2018-09-06 ENCOUNTER — PRIOR ORIGINAL RECORDS (OUTPATIENT)
Dept: OTHER | Age: 83
End: 2018-09-06

## 2018-09-06 LAB
BUN: 30 MG/DL
BUN: 40 MG/DL
CALCIUM: 8.5 MG/DL
CALCIUM: 9.9 MG/DL
CHLORIDE: 103 MEQ/L
CHLORIDE: 99 MEQ/L
CREATININE, SERUM: 1.22 MG/DL
CREATININE, SERUM: 1.28 MG/DL
GLUCOSE: 105 MG/DL
GLUCOSE: 123 MG/DL
HEMATOCRIT: 29.1 %
HEMATOCRIT: 33.8 %
HEMOGLOBIN: 11 G/DL
HEMOGLOBIN: 9.7 G/DL
PLATELETS: 121 K/UL
PLATELETS: 196 K/UL
POTASSIUM, SERUM: 3.7 MEQ/L
POTASSIUM, SERUM: 4.3 MEQ/L
PROBNP: 6085 PG/ML
RED BLOOD COUNT: 2.8 X 10-6/U
RED BLOOD COUNT: 3.25 X 10-6/U
SODIUM: 134 MEQ/L
SODIUM: 139 MEQ/L
WHITE BLOOD COUNT: 7.9 X 10-3/U
WHITE BLOOD COUNT: 9.6 X 10-3/U

## 2018-09-06 NOTE — DISCHARGE SUMMARY
[facility]  Discharge Summary    Tasha Bowling Patient Status:  Inpatient    3/8/1932 MRN [id number]   [facility] 8NE-A Attending No att. providers found   Hosp Day # 5 PCP Rita Bravo MD         Admit date: 2018    Dischar increased from 3 to 5. He tolerated the procedure well, and required no prolonged inotropic or ventilatory support. The postoperative TTE revealed: EF 50 %,  Mild MR, residual ASD, and no pericardial effusion.   He suffered no postoperative complication R-3    umeclidinium-vilanterol (ANORO ELLIPTA) 62.5-25 MCG/INH Inhalation Aerosol Powder, Breath Activated  Inhale 1 puff into the lungs daily. , Normal, Disp-1 each, R-11    Multiple Vitamin (MULTI VITAMIN MENS) Oral Tab  Take 1 tablet by mouth daily. , His

## 2018-09-07 NOTE — CDS QUERY
Abby Cox         18                     DOS 18 – 18                   [id number]     Clarification – Procedure Documentation   CLINICAL DOCUMENTATION CLARIFICATION FORM  Dear Doctor Ej Mckinney or Anusha Pereira APN,   Clinical information

## 2018-09-07 NOTE — CDS QUERY
Dominica Brunner   1932   DOS 18 to 18   [id number]   Clarification – Procedure Documentation   CLINICAL DOCUMENTATION CLARIFICATION FORM  Dear Doctor Cecilio Hemphill or Mirtha LAYNE  Clinical information (provided below) includes documentation

## 2018-09-10 ENCOUNTER — PRIOR ORIGINAL RECORDS (OUTPATIENT)
Dept: OTHER | Age: 83
End: 2018-09-10

## 2018-09-10 ENCOUNTER — SNF VISIT (OUTPATIENT)
Dept: INTERNAL MEDICINE CLINIC | Facility: SKILLED NURSING FACILITY | Age: 83
End: 2018-09-10

## 2018-09-10 DIAGNOSIS — I05.9 MITRAL VALVE DISORDER: ICD-10-CM

## 2018-09-10 DIAGNOSIS — I50.9 CONGESTIVE HEART FAILURE, UNSPECIFIED HF CHRONICITY, UNSPECIFIED HEART FAILURE TYPE (HCC): ICD-10-CM

## 2018-09-10 DIAGNOSIS — R53.1 WEAKNESS: ICD-10-CM

## 2018-09-10 PROCEDURE — 99308 SBSQ NF CARE LOW MDM 20: CPT | Performed by: NURSE PRACTITIONER

## 2018-09-10 NOTE — PROGRESS NOTES
HPI: Jasbir Colón  Is an 81 yo male with pmh of CHF, afib s/p watchman, CAD s/pCABG 1991, PCI RCA and Cx 8/18, copd on oxygen, catalan's esophagus, severe GIB 10/16 off anticoagulation who was admitted to [facility] with severe symptomatic mitral r Surgeon: Deidre Marley MD;  Location: [facility]                ENDOSCOPY  No date: OTHER SURGICAL HISTORY      Comment: watchman device  No date: REPAIR  ANAL FISTULA,RECT ADV FLAP  No date: TRANSCATH MITRAL VALVE REPAIR VIA CORONARY SIN*       ALLERGIES:    daily      COPD-home O2 2L  F/u Dr. Jeronimo Rodriguez 10/24  Cont inhalers     HL  Cont statin      Morris's esophagus  Continue ppi, f/u GI outpatient     Hypothyroidism  Cont synthroid     Gout  Cont allopurinol     Severe GIB 10/16 following Watchman while on

## 2018-09-12 NOTE — PROCEDURES
[address]    PATIENT'S NAME: Allie Sun   ATTENDING PHYSICIAN: Dov Torres M.D. OPERATING PHYSICIAN: Dov Torres M.D.    PATIENT ACCOUNT#:   [de-identified]    LOCATION:  [address] [place] [address]  MEDICAL RECORD #:   [id number]       DATE OF BIRTH:

## 2018-09-14 ENCOUNTER — SNF VISIT (OUTPATIENT)
Dept: INTERNAL MEDICINE CLINIC | Facility: SKILLED NURSING FACILITY | Age: 83
End: 2018-09-14

## 2018-09-14 DIAGNOSIS — Z95.818 S/P MITRAL VALVE CLIP IMPLANTATION: ICD-10-CM

## 2018-09-14 DIAGNOSIS — Z98.890 S/P MITRAL VALVE CLIP IMPLANTATION: ICD-10-CM

## 2018-09-14 DIAGNOSIS — I50.30 (HFPEF) HEART FAILURE WITH PRESERVED EJECTION FRACTION (HCC): ICD-10-CM

## 2018-09-14 DIAGNOSIS — I48.20 CHRONIC ATRIAL FIBRILLATION (HCC): ICD-10-CM

## 2018-09-14 PROCEDURE — 99308 SBSQ NF CARE LOW MDM 20: CPT | Performed by: NURSE PRACTITIONER

## 2018-09-14 NOTE — PROGRESS NOTES
Mark Escamilla an 79 yo male with pmh of CHF, afib s/p watchman, CAD s/pCABG 1991, PCI RCA and Cx 8/18, copd on oxygen, catalan's esophagus, severe GIB 10/16 off anticoagulation who was admitted to [facility] with severe symptomatic mitral r                 Surgeon: Alejandra Ray MD;  Location: [facility]                ENDOSCOPY  No date: OTHER SURGICAL HISTORY      Comment: watchman device  No date: REPAIR  ANAL FISTULA,RECT ADV FLAP  No date: TRANSCATH MITRAL VALVE REPAIR VIA CORONARY SIN*    severe GIB history   Continue cartia 120mg qd, digoxin 125mcg qd     CAD s/p CABG 1991, pci RCA and Cx 8/18.  Plavix/asa  Asa 81/plavix 75, digoxin, statin, torsemide 20mg daily      COPD-home O2 2L  F/u Dr. Clay Deleon 10/24  Cont inhalers     HL  Cont stat

## 2018-09-24 ENCOUNTER — OFFICE VISIT (OUTPATIENT)
Dept: CARDIOLOGY CLINIC | Facility: HOSPITAL | Age: 83
End: 2018-09-24
Attending: NURSE PRACTITIONER
Payer: MEDICARE

## 2018-09-24 VITALS
DIASTOLIC BLOOD PRESSURE: 52 MMHG | WEIGHT: 135.69 LBS | OXYGEN SATURATION: 94 % | HEART RATE: 50 BPM | BODY MASS INDEX: 25 KG/M2 | SYSTOLIC BLOOD PRESSURE: 130 MMHG

## 2018-09-24 DIAGNOSIS — I50.9 HEART FAILURE, UNSPECIFIED (HCC): Primary | ICD-10-CM

## 2018-09-24 DIAGNOSIS — I50.30 (HFPEF) HEART FAILURE WITH PRESERVED EJECTION FRACTION (HCC): ICD-10-CM

## 2018-09-24 LAB
ANION GAP SERPL CALC-SCNC: 10 MMOL/L (ref 0–18)
BNP SERPL-MCNC: 609 PG/ML (ref 0–100)
BUN SERPL-MCNC: 37 MG/DL (ref 8–20)
BUN/CREAT SERPL: 24.5 (ref 10–20)
CALCIUM SERPL-MCNC: 8.8 MG/DL (ref 8.5–10.5)
CHLORIDE SERPL-SCNC: 102 MMOL/L (ref 95–110)
CO2 SERPL-SCNC: 21 MMOL/L (ref 22–32)
CREAT SERPL-MCNC: 1.51 MG/DL (ref 0.5–1.5)
GLUCOSE SERPL-MCNC: 168 MG/DL (ref 70–99)
OSMOLALITY UR CALC.SUM OF ELEC: 289 MOSM/KG (ref 275–295)
POTASSIUM SERPL-SCNC: 3.8 MMOL/L (ref 3.3–5.1)
SODIUM SERPL-SCNC: 133 MMOL/L (ref 136–144)

## 2018-09-24 PROCEDURE — 99212 OFFICE O/P EST SF 10 MIN: CPT | Performed by: CLINICAL NURSE SPECIALIST

## 2018-09-24 PROCEDURE — 36415 COLL VENOUS BLD VENIPUNCTURE: CPT | Performed by: CLINICAL NURSE SPECIALIST

## 2018-09-24 PROCEDURE — 80048 BASIC METABOLIC PNL TOTAL CA: CPT | Performed by: CLINICAL NURSE SPECIALIST

## 2018-09-24 PROCEDURE — 83880 ASSAY OF NATRIURETIC PEPTIDE: CPT | Performed by: CLINICAL NURSE SPECIALIST

## 2018-09-24 PROCEDURE — 99214 OFFICE O/P EST MOD 30 MIN: CPT | Performed by: CLINICAL NURSE SPECIALIST

## 2018-09-24 NOTE — PATIENT INSTRUCTIONS
Today please take an additional torsemide 20 mg tablet. Tomorrow continue torsemide 20 mg once daily.     If your weight increases by 1-2 lbs overnight, please call the CHF clinic    Please call the heart failure clinic if you notice a weight gain of 3 poun

## 2018-09-24 NOTE — PROGRESS NOTES
[address] Patient Status:  No patient class for patient encounter    3/8/1932 MRN [id number]   Location MD Dr. Antolin Willard is a 80year old male wh Systems:  Constitutional: negative for fatigue, chills or fever  Respiratory: negative for cough,  negative hemoptysis and wheezing  Cardiovascular: negative for chest pain, exertional chest pressure/discomfort, near-syncope, orthopnea and palpitations  Ga no carotid bruit, no JVD, supple, symmetrical, trachea midline and thyroid not enlarged, symmetric, no tenderness/mass/nodules  Lungs: clear to auscultation with few scattered crackles to left base  Chest wall: no tenderness  Heart: S1, S2 normal, no S3 or of pneumonia appears new since 6/14/18. Short-term followup suggested to ensure resolution. Brain CT 7/15/18  CONCLUSION:   Moderate chronic microvascular ischemic disease without acute intracranial abnormality.    Hyperdensity seen within the extra-axia additional torsemide 20 mg tablet. Tomorrow continue torsemide 20 mg once daily.     If your weight increases by 1-2 lbs overnight, please call the CHF clinic    Please call the heart failure clinic if you notice a weight gain of 3 pounds overnight or 5 serenity

## 2018-10-02 ENCOUNTER — HOSPITAL ENCOUNTER (OUTPATIENT)
Dept: CARDIOLOGY CLINIC | Facility: HOSPITAL | Age: 83
Discharge: HOME | End: 2018-10-02
Attending: NURSE PRACTITIONER
Payer: MEDICARE

## 2018-10-02 ENCOUNTER — MYAURORA ACCOUNT LINK (OUTPATIENT)
Dept: OTHER | Age: 83
End: 2018-10-02

## 2018-10-02 ENCOUNTER — HOSPITAL ENCOUNTER (OUTPATIENT)
Dept: CV DIAGNOSTICS | Facility: HOSPITAL | Age: 83
Discharge: HOME | End: 2018-10-02
Attending: INTERNAL MEDICINE

## 2018-10-02 VITALS
DIASTOLIC BLOOD PRESSURE: 77 MMHG | SYSTOLIC BLOOD PRESSURE: 120 MMHG | OXYGEN SATURATION: 98 % | RESPIRATION RATE: 15 BRPM | TEMPERATURE: 97 F | HEART RATE: 65 BPM

## 2018-10-02 DIAGNOSIS — I34.0 MITRAL VALVE INSUFFICIENCY, UNSPECIFIED ETIOLOGY: ICD-10-CM

## 2018-10-02 DIAGNOSIS — I50.42 HEART FAILURE, SYSTOLIC AND DIASTOLIC, CHRONIC (HCC): Primary | ICD-10-CM

## 2018-10-02 PROCEDURE — 99212 OFFICE O/P EST SF 10 MIN: CPT | Performed by: NURSE PRACTITIONER

## 2018-10-02 PROCEDURE — 94618 PULMONARY STRESS TESTING: CPT | Performed by: NURSE PRACTITIONER

## 2018-10-02 PROCEDURE — 99213 OFFICE O/P EST LOW 20 MIN: CPT | Performed by: NURSE PRACTITIONER

## 2018-10-02 NOTE — PROGRESS NOTES
Benny Davis Note    Subjective:   Patient presents for 1 month postop TMVR visit, accompanied by his wife/daughter. He underwent 3rd Mitraclip placement on  9/1/18 with Dr. Lexi Arnold for severe, symptomatic mitral regurgitation.   His init healed        Laboratory/Data:  Echo: 9/1/18:  1. Left ventricle:  The cavity size was normal. Wall thickness was normal.     Systolic function was normal. The estimated ejection fraction was 50-55%.     There was no diagnostic evidence for regional wall mo Tab CR Take 1 tablet (20 mEq total) by mouth daily. Disp:  Rfl: 0   Omeprazole 40 MG Oral Capsule Delayed Release 80 mg by mouth twice a day before meals Disp:  Rfl: 0   Montelukast Sodium 10 MG Oral Tab Take 1 tablet (10 mg total) by mouth nightly.  TAKE 1 CABG 1991, PCI RCA and Cx 8/18.  Plavix/asa  · COPD-home O2  · H/o Pneumonia  · HL  · Morris's esophagus  · Hypothyroidism  · Gout  · Severe GIB 10/16 following Watchman while on 2 anticoagulants-required intervention to duodenum and 8u PRBC         Plan:

## 2018-10-02 NOTE — PROGRESS NOTES
Pt here for 1mo TMVR follow-up. KCCQ & 6min walk test complete.  Pt walked 500ft w/ use of 2L O2 per NC.

## 2018-10-04 ENCOUNTER — TELEPHONE (OUTPATIENT)
Dept: CARDIOLOGY CLINIC | Facility: HOSPITAL | Age: 83
End: 2018-10-04

## 2018-10-04 NOTE — PROGRESS NOTES
I called patient with results of echo from 10/02/2018. Patient's EF remains at 55-60%, clip present with mild regurgitation. Overall, there has been no significant interval changes compared to previous echo on 09/01/2018.   Patient has an appointment in the

## 2018-10-07 ENCOUNTER — APPOINTMENT (OUTPATIENT)
Dept: CT IMAGING | Facility: HOSPITAL | Age: 83
End: 2018-10-07
Attending: EMERGENCY MEDICINE
Payer: MEDICARE

## 2018-10-07 ENCOUNTER — HOSPITAL ENCOUNTER (EMERGENCY)
Facility: HOSPITAL | Age: 83
Discharge: HOME | End: 2018-10-07
Attending: EMERGENCY MEDICINE
Payer: MEDICARE

## 2018-10-07 ENCOUNTER — APPOINTMENT (OUTPATIENT)
Dept: GENERAL RADIOLOGY | Facility: HOSPITAL | Age: 83
End: 2018-10-07
Attending: EMERGENCY MEDICINE
Payer: MEDICARE

## 2018-10-07 ENCOUNTER — PRIOR ORIGINAL RECORDS (OUTPATIENT)
Dept: OTHER | Age: 83
End: 2018-10-07

## 2018-10-07 VITALS
BODY MASS INDEX: 24.74 KG/M2 | TEMPERATURE: 98 F | OXYGEN SATURATION: 93 % | RESPIRATION RATE: 23 BRPM | HEART RATE: 68 BPM | DIASTOLIC BLOOD PRESSURE: 55 MMHG | WEIGHT: 126 LBS | HEIGHT: 60 IN | SYSTOLIC BLOOD PRESSURE: 127 MMHG

## 2018-10-07 DIAGNOSIS — R53.1 WEAKNESS: Primary | ICD-10-CM

## 2018-10-07 PROCEDURE — 80061 LIPID PANEL: CPT | Performed by: EMERGENCY MEDICINE

## 2018-10-07 PROCEDURE — 93010 ELECTROCARDIOGRAM REPORT: CPT | Performed by: EMERGENCY MEDICINE

## 2018-10-07 PROCEDURE — 83690 ASSAY OF LIPASE: CPT | Performed by: EMERGENCY MEDICINE

## 2018-10-07 PROCEDURE — 85025 COMPLETE CBC W/AUTO DIFF WBC: CPT | Performed by: EMERGENCY MEDICINE

## 2018-10-07 PROCEDURE — 85007 BL SMEAR W/DIFF WBC COUNT: CPT | Performed by: EMERGENCY MEDICINE

## 2018-10-07 PROCEDURE — 80076 HEPATIC FUNCTION PANEL: CPT | Performed by: EMERGENCY MEDICINE

## 2018-10-07 PROCEDURE — 85610 PROTHROMBIN TIME: CPT | Performed by: EMERGENCY MEDICINE

## 2018-10-07 PROCEDURE — 80048 BASIC METABOLIC PNL TOTAL CA: CPT | Performed by: EMERGENCY MEDICINE

## 2018-10-07 PROCEDURE — 71045 X-RAY EXAM CHEST 1 VIEW: CPT | Performed by: EMERGENCY MEDICINE

## 2018-10-07 PROCEDURE — 85027 COMPLETE CBC AUTOMATED: CPT | Performed by: EMERGENCY MEDICINE

## 2018-10-07 PROCEDURE — 99285 EMERGENCY DEPT VISIT HI MDM: CPT

## 2018-10-07 PROCEDURE — 84484 ASSAY OF TROPONIN QUANT: CPT | Performed by: EMERGENCY MEDICINE

## 2018-10-07 PROCEDURE — 93005 ELECTROCARDIOGRAM TRACING: CPT

## 2018-10-07 PROCEDURE — 36415 COLL VENOUS BLD VENIPUNCTURE: CPT

## 2018-10-07 PROCEDURE — 70450 CT HEAD/BRAIN W/O DYE: CPT | Performed by: EMERGENCY MEDICINE

## 2018-10-07 PROCEDURE — 81003 URINALYSIS AUTO W/O SCOPE: CPT | Performed by: EMERGENCY MEDICINE

## 2018-10-07 NOTE — ED PROVIDER NOTES
Patient Seen in: [facility] AND [facility] Emergency Department    History   Patient presents with:  Malaise    Stated Complaint: possible AMS    HPI    Patient is an 31-year-old male with a history of coronary artery disease mitral valve disease chronic CHF chr Unspecified essential hypertension        Past Surgical History:  No date: CABG  No date: CATARACT  10/4/2016: EGD      Comment:  Procedure: ESOPHAGOGASTRODUODENOSCOPY (EGD);   Surgeon:                Paolo Regan MD;  Location: [address] [place] [address] ENDOSCOPY  10/12/201 person, place, and time. Appears weak and frail on nasal cannula oxygen. Terri Hoffmann HEENT:   Head: Normocephalic and atraumatic.    Right Ear: External ear normal.   Left Ear: External ear normal.   Nose: Nose normal.   Mouth/Throat: Oropharynx is clear and moist. Abnormal; Notable for the following components:    Troponin 0.04 (*)     All other components within normal limits   PROTHROMBIN TIME (PT) - Abnormal; Notable for the following components:    PT 15.3 (*)     All other components within normal limits   LIPI infarct in the left cerebellum hemisphere inferiorly. Moderate diffuse cortical atrophy. No hydrocephalus.      Dictated by (CST): Grace Crump MD on 10/07/2018 at 19:52     Approved by (CST): Grace Crump MD on 10/07/2018 at 19:57          Xr Chest Ap

## 2018-10-09 ENCOUNTER — PRIOR ORIGINAL RECORDS (OUTPATIENT)
Dept: OTHER | Age: 83
End: 2018-10-09

## 2018-10-09 LAB
ALBUMIN: 3.1 G/DL
ALKALINE PHOSPHATATE(ALK PHOS): 138 IU/L
ALT (SGPT): 25 U/L
AST (SGOT): 36 U/L
BILIRUBIN TOTAL: 0.7 MG/DL
BUN: 25 MG/DL
CALCIUM: 8.8 MG/DL
CHLORIDE: 102 MEQ/L
CHOLESTEROL, TOTAL: 147 MG/DL
CREATININE, SERUM: 1.16 MG/DL
GLUCOSE: 153 MG/DL
GLUCOSE: 153 MG/DL
HDL CHOLESTEROL: 41 MG/DL
HEMATOCRIT: 33.3 %
HEMOGLOBIN: 11.1 G/DL
LDL CHOLESTEROL: 74 MG/DL
MCH: 34.6 PG
MCHC: 33.2 G/DL
MCV: 104.3 FL
NON-HDL CHOLESTEROL: 106 MG/DL
PLATELETS: 151 K/UL
POTASSIUM, SERUM: 4 MEQ/L
PROTEIN, TOTAL: 6.6 G/DL
RED BLOOD COUNT: 3.2 X 10-6/U
SGOT (AST): 36 IU/L
SGPT (ALT): 25 IU/L
SODIUM: 134 MEQ/L
TRIGLYCERIDES: 162 MG/DL
WHITE BLOOD COUNT: 8.9 X 10-3/U

## 2018-10-16 ENCOUNTER — OFFICE VISIT (OUTPATIENT)
Dept: CARDIOLOGY CLINIC | Facility: HOSPITAL | Age: 83
End: 2018-10-16
Attending: INTERNAL MEDICINE
Payer: MEDICARE

## 2018-10-16 VITALS
SYSTOLIC BLOOD PRESSURE: 113 MMHG | WEIGHT: 133.38 LBS | OXYGEN SATURATION: 98 % | RESPIRATION RATE: 20 BRPM | DIASTOLIC BLOOD PRESSURE: 59 MMHG | HEART RATE: 80 BPM | BODY MASS INDEX: 26 KG/M2

## 2018-10-16 DIAGNOSIS — I50.9 HEART FAILURE, UNSPECIFIED (HCC): Primary | ICD-10-CM

## 2018-10-16 DIAGNOSIS — I50.30 (HFPEF) HEART FAILURE WITH PRESERVED EJECTION FRACTION (HCC): ICD-10-CM

## 2018-10-16 PROCEDURE — 99212 OFFICE O/P EST SF 10 MIN: CPT | Performed by: CLINICAL NURSE SPECIALIST

## 2018-10-16 PROCEDURE — 83880 ASSAY OF NATRIURETIC PEPTIDE: CPT | Performed by: CLINICAL NURSE SPECIALIST

## 2018-10-16 PROCEDURE — 99214 OFFICE O/P EST MOD 30 MIN: CPT | Performed by: CLINICAL NURSE SPECIALIST

## 2018-10-16 PROCEDURE — 36415 COLL VENOUS BLD VENIPUNCTURE: CPT | Performed by: CLINICAL NURSE SPECIALIST

## 2018-10-16 PROCEDURE — 80048 BASIC METABOLIC PNL TOTAL CA: CPT | Performed by: CLINICAL NURSE SPECIALIST

## 2018-10-16 NOTE — PROGRESS NOTES
[address] Patient Status:  No patient class for patient encounter    3/8/1932 MRN [id number]   Location MD Dr. Simran Mendoza is a 80year old male wh negative hemoptysis and wheezing  Cardiovascular: negative for chest pain, exertional chest pressure/discomfort, near-syncope, orthopnea and palpitations  Gastrointestinal: negative for abdominal pain, diarrhea, melena, nausea and vomiting  Hematologic/lym midline and thyroid not enlarged, symmetric, no tenderness/mass/nodules  Lungs: clear to auscultation with few scattered crackles to left base  Chest wall: no tenderness  Heart: S1, S2 normal, no S3 or S4, regular rate and rhythm,CARMELA  Abdomen: soft, non-te suggested to ensure resolution. Brain CT 7/15/18  CONCLUSION:   Moderate chronic microvascular ischemic disease without acute intracranial abnormality.    Hyperdensity seen within the extra-axial region of the right parietal lobe represents vascularity a of breath, difficulty breathing when laying down etc. Call the clinic if any of these signs develop at ph: [phone]    Call if having any dizziness, lightheadedness, heart racing, palpitations, chest pain, shortness of breath, coughing, swelling, weigh

## 2018-10-26 ENCOUNTER — HOSPITAL ENCOUNTER (INPATIENT)
Facility: HOSPITAL | Age: 83
LOS: 3 days | Discharge: HOME | DRG: 378 | End: 2018-10-29
Attending: EMERGENCY MEDICINE | Admitting: HOSPITALIST
Payer: MEDICARE

## 2018-10-26 ENCOUNTER — APPOINTMENT (OUTPATIENT)
Dept: GENERAL RADIOLOGY | Facility: HOSPITAL | Age: 83
DRG: 378 | End: 2018-10-26
Attending: EMERGENCY MEDICINE
Payer: MEDICARE

## 2018-10-26 DIAGNOSIS — R07.9 ACUTE CHEST PAIN: Primary | ICD-10-CM

## 2018-10-26 DIAGNOSIS — R53.1 WEAKNESS GENERALIZED: ICD-10-CM

## 2018-10-26 DIAGNOSIS — R55 SYNCOPE, NEAR: ICD-10-CM

## 2018-10-26 PROCEDURE — 93005 ELECTROCARDIOGRAM TRACING: CPT

## 2018-10-26 PROCEDURE — 80162 ASSAY OF DIGOXIN TOTAL: CPT | Performed by: EMERGENCY MEDICINE

## 2018-10-26 PROCEDURE — 84484 ASSAY OF TROPONIN QUANT: CPT | Performed by: EMERGENCY MEDICINE

## 2018-10-26 PROCEDURE — 80162 ASSAY OF DIGOXIN TOTAL: CPT | Performed by: INTERNAL MEDICINE

## 2018-10-26 PROCEDURE — 81003 URINALYSIS AUTO W/O SCOPE: CPT | Performed by: EMERGENCY MEDICINE

## 2018-10-26 PROCEDURE — 99285 EMERGENCY DEPT VISIT HI MDM: CPT

## 2018-10-26 PROCEDURE — A4216 STERILE WATER/SALINE, 10 ML: HCPCS | Performed by: HOSPITALIST

## 2018-10-26 PROCEDURE — 85025 COMPLETE CBC W/AUTO DIFF WBC: CPT

## 2018-10-26 PROCEDURE — 85027 COMPLETE CBC AUTOMATED: CPT

## 2018-10-26 PROCEDURE — 80048 BASIC METABOLIC PNL TOTAL CA: CPT | Performed by: EMERGENCY MEDICINE

## 2018-10-26 PROCEDURE — 71045 X-RAY EXAM CHEST 1 VIEW: CPT | Performed by: EMERGENCY MEDICINE

## 2018-10-26 PROCEDURE — 80048 BASIC METABOLIC PNL TOTAL CA: CPT

## 2018-10-26 PROCEDURE — 83735 ASSAY OF MAGNESIUM: CPT | Performed by: EMERGENCY MEDICINE

## 2018-10-26 PROCEDURE — 80061 LIPID PANEL: CPT | Performed by: EMERGENCY MEDICINE

## 2018-10-26 PROCEDURE — 93010 ELECTROCARDIOGRAM REPORT: CPT | Performed by: EMERGENCY MEDICINE

## 2018-10-26 PROCEDURE — 85007 BL SMEAR W/DIFF WBC COUNT: CPT

## 2018-10-26 PROCEDURE — 83880 ASSAY OF NATRIURETIC PEPTIDE: CPT | Performed by: HOSPITALIST

## 2018-10-26 PROCEDURE — 85025 COMPLETE CBC W/AUTO DIFF WBC: CPT | Performed by: EMERGENCY MEDICINE

## 2018-10-26 PROCEDURE — 36415 COLL VENOUS BLD VENIPUNCTURE: CPT

## 2018-10-26 RX ORDER — ALLOPURINOL 100 MG/1
100 TABLET ORAL 2 TIMES DAILY
Status: DISCONTINUED | OUTPATIENT
Start: 2018-10-26 | End: 2018-10-29

## 2018-10-26 RX ORDER — ACETAMINOPHEN 325 MG/1
650 TABLET ORAL EVERY 6 HOURS PRN
Status: DISCONTINUED | OUTPATIENT
Start: 2018-10-26 | End: 2018-10-29

## 2018-10-26 RX ORDER — ASPIRIN 81 MG/1
81 TABLET ORAL DAILY
Status: DISCONTINUED | OUTPATIENT
Start: 2018-10-27 | End: 2018-10-29

## 2018-10-26 RX ORDER — CLOPIDOGREL BISULFATE 75 MG/1
75 TABLET ORAL DAILY
Status: DISCONTINUED | OUTPATIENT
Start: 2018-10-27 | End: 2018-10-29

## 2018-10-26 RX ORDER — MONTELUKAST SODIUM 10 MG/1
10 TABLET ORAL NIGHTLY
Status: DISCONTINUED | OUTPATIENT
Start: 2018-10-26 | End: 2018-10-29

## 2018-10-26 RX ORDER — PANTOPRAZOLE SODIUM 40 MG/1
40 TABLET, DELAYED RELEASE ORAL
Status: DISCONTINUED | OUTPATIENT
Start: 2018-10-27 | End: 2018-10-27

## 2018-10-26 RX ORDER — ATORVASTATIN CALCIUM 10 MG/1
10 TABLET, FILM COATED ORAL NIGHTLY
Status: DISCONTINUED | OUTPATIENT
Start: 2018-10-26 | End: 2018-10-29

## 2018-10-26 RX ORDER — SODIUM CHLORIDE 0.9 % (FLUSH) 0.9 %
3 SYRINGE (ML) INJECTION AS NEEDED
Status: DISCONTINUED | OUTPATIENT
Start: 2018-10-26 | End: 2018-10-29

## 2018-10-26 RX ORDER — HEPARIN SODIUM 5000 [USP'U]/ML
5000 INJECTION, SOLUTION INTRAVENOUS; SUBCUTANEOUS EVERY 12 HOURS SCHEDULED
Status: DISCONTINUED | OUTPATIENT
Start: 2018-10-26 | End: 2018-10-27

## 2018-10-26 RX ORDER — SODIUM CHLORIDE 9 MG/ML
INJECTION, SOLUTION INTRAVENOUS CONTINUOUS
Status: DISCONTINUED | OUTPATIENT
Start: 2018-10-26 | End: 2018-10-27

## 2018-10-26 RX ORDER — DILTIAZEM HYDROCHLORIDE 120 MG/1
120 CAPSULE, COATED, EXTENDED RELEASE ORAL DAILY
Status: DISCONTINUED | OUTPATIENT
Start: 2018-10-27 | End: 2018-10-29

## 2018-10-26 RX ORDER — POLYETHYLENE GLYCOL 3350 17 G/17G
17 POWDER, FOR SOLUTION ORAL DAILY PRN
Status: DISCONTINUED | OUTPATIENT
Start: 2018-10-26 | End: 2018-10-29

## 2018-10-26 RX ORDER — FAMOTIDINE 20 MG/1
20 TABLET ORAL NIGHTLY
Status: DISCONTINUED | OUTPATIENT
Start: 2018-10-26 | End: 2018-10-29

## 2018-10-26 RX ORDER — BISACODYL 10 MG
10 SUPPOSITORY, RECTAL RECTAL
Status: DISCONTINUED | OUTPATIENT
Start: 2018-10-26 | End: 2018-10-29

## 2018-10-26 RX ORDER — LEVOTHYROXINE SODIUM 0.15 MG/1
150 TABLET ORAL
Status: DISCONTINUED | OUTPATIENT
Start: 2018-10-27 | End: 2018-10-29

## 2018-10-26 RX ORDER — ONDANSETRON 2 MG/ML
4 INJECTION INTRAMUSCULAR; INTRAVENOUS EVERY 6 HOURS PRN
Status: DISCONTINUED | OUTPATIENT
Start: 2018-10-26 | End: 2018-10-29

## 2018-10-26 RX ORDER — DOCUSATE SODIUM 100 MG/1
200 CAPSULE, LIQUID FILLED ORAL 2 TIMES DAILY
Status: DISCONTINUED | OUTPATIENT
Start: 2018-10-26 | End: 2018-10-29

## 2018-10-26 NOTE — H&P
[facility] Hospitalist H&P       CC: Patient presents with:  Weakness       PCP: Manjinder Maldonado MD    History of Present Illness: Junior Mullins a 80year old male with PMH sig for CAD s/p CABG with recent angio with AAMIR to circ and RCA, severe MR s/p paula atherosclerosis ([facility] 2/14/2018   • Thoracic arthritis 2/14/2018   • Thoracic compression fracture ([facility] 2/14/2018   • Unspecified essential hypertension         PSH  Past Surgical History:   Procedure Laterality Date   • CABG     • CATARACT     • EGD  10/ what's stated above.      OBJECTIVE:  /52   Pulse 76   Temp 98 °F (36.7 °C)   Resp 16   Ht 5' (1.524 m)   Wt 126 lb (57.2 kg)   SpO2 93%   BMI 24.61 kg/m²   General:  Alert, no distress, weakness deconditioned    Head:  Normocephalic, without obvious CAD s/p CABG with recent angio with AAMIR to circ and RCA, severe MR s/p mitral clip *3,  HTN, HL, afib on nothing, s/p watchman (sig bleeding on coumadin/eliquis/aspirin), b/l carotid artery disease, GERD with catalan esophagus, asthma/COPD on chronic 2-3 L Service number: [phone]

## 2018-10-26 NOTE — CONSULTS
[facility] NOTE  Cardiology Consultation    Mack Pattison Patient Status:  Emergency    3/8/1932 MRN [id number]   Location [address] Attending Bob Pedroza [facility] Day # 0 PCP Steffanie Corbett ventricular response no acute changes. Patient denies any chest pain or shortness of breath and his main complaint is that he is thirsty. The patient is noted to be a full code.   Patient's wife is concerned that if he gets admitted to the hospital it may volume depletion. For now I would recommend getting a 2D echo with Doppler. Hydrating the patient with normal saline at 42 cc/h and holding diuretics. His elevated hemoglobin increased BUN and low blood pressure all reflect volume depletion.   Unless h ENDOSCOPY   • EGD N/A 10/12/2016    Procedure: ESOPHAGOGASTRODUODENOSCOPY (EGD);   Surgeon: Yue Fregoso MD;  Location: [facility] ENDOSCOPY   • ESOPHAGOGASTRODUODENOSCOPY (EGD) N/A 4/10/2017    Performed by Yue Fregoso MD at [facility] ENDOSCOPY   • [address] [place] [address] Oral Capsule Delayed Release, 80 mg by mouth twice a day before meals, Disp: , Rfl: 0  •  Montelukast Sodium 10 MG Oral Tab, Take 1 tablet (10 mg total) by mouth nightly.  TAKE 1 TABLET BY MOUTH EVERY EVENING, Disp: 90 tablet, Rfl: 3  •  CARTIA  MG Or 126 lb (57.2 kg)  10/16/18 1241 : 133 lb 6.4 oz (60.5 kg)  10/07/18 1702 : 126 lb (57.2 kg)            Physical Exam:  Physical Exam:  The patient appears alert and comfortable in no acute distress however not well-nourished and quite thin  Head; atraumati

## 2018-10-26 NOTE — ED INITIAL ASSESSMENT (HPI)
C/o weakness, dark stools since yesterday, states he fell off toilet today, denies hitting head, states he has \"a little chest pain\" post fall, patient on plavix

## 2018-10-26 NOTE — HISTORICAL OFFICE NOTE
[place]  : 1932  ACCOUNT:  [id number]  [phone]  PCP: Dr. Gale Salvador     TODAY'S DATE: 10/09/2018  DICTATED BY:  [Dr. Holman Reap: [Followup of .  CAD, of native vessels.]    HPI:    [On 10/09/2018, kimberlyn Rasmussen PAST HISTORY: GI Bleed 2016, pneumonia 2017 and rectal fishers    PAST CV HISTORY: 1 vessel CABG 1992, CAD, cardiac catheterization 1992, [address], [address], cardiac catheterization 2002, EUGENIA, 10/2016, EUGENIA, 7/2018,, dyslipidemia, hypertension, Evangelina clip DECISION MAKING: The patient has now achieved a very good symptomatic improvement after the last mitral clip placement with significant improvement in his mitral valve regurgitation. He is tolerating mild mitral stenosis well. LV function is intact.   He 10/13/17 DilTIAZem CD          120MG     one daily                                10/13/17 Docusate Sodium       100MG     two times daily                          10/13/17 Ferrous Sulfate       325 (65   two times daily                          10/13/17 L PAST CV HISTORY: 1 vessel CABG 1992, 10/2016, 7/2018, CAD, cardiac catheterization 1992, cardiac catheterization 2002, dyslipidemia, hypertension, PTCA 1991, PTCA 1992, PTCA/Stent August 2018, EUGENIA and Watchman device implant 2016    FAMILY HISTORY: Signifi 07/27/18 Torsemide             20MG      1 tablet daily                           05/01/18 Ventolin HFA          108 (90   as needed                                11/03/17 Famotidine            20MG      1 tablet at pm                           11/03/17 O

## 2018-10-26 NOTE — ED NOTES
Dr. Ashli Gardner at bedside and stated [facility] can drink as much as he wants. \" Patient's family to bring in patient's favorite drink-peach iced tea.

## 2018-10-26 NOTE — ED PROVIDER NOTES
Patient Seen in: [facility] AND [facility] Emergency Department    History   Patient presents with:  Weakness    Stated Complaint: weak, fell of toilet. HPI    77-year-old male presents for complaint of weakness, chest pain, near syncope.   Patient states he w (EGD); Surgeon: Arminda Chang MD;  Location: [facility] ENDOSCOPY   • EGD N/A 10/12/2016    Procedure: ESOPHAGOGASTRODUODENOSCOPY (EGD);   Surgeon: Arminda Chang MD;  Location: [facility] ENDOSCOPY   • ESOPHAGOGASTRODUODENOSCOPY (EGD) N/A 4/10/2017    Performed by BMI 24.61 kg/m²         Physical Exam   Constitutional: He is oriented to person, place, and time. He appears well-developed and well-nourished. HENT:   Head: Normocephalic and atraumatic. Neck: Normal range of motion. Neck supple.    Cardiovascular: No PLATELET.   Procedure                               Abnormality         Status                     ---------                               -----------         ------                     CBC W/ DIFFERENTIAL[[id number]]          Abnormal            Final resul Cardiomegaly. 3. Postop changes in the chest and abdomen. 4. Atherosclerosis. 5. Scarring/atelectasis. 6. Demineralization. 7. Scoliosis. 8. Osteoarthritis. Dictated by (CST): Leandra Shahid MD on 10/26/2018 at 15:12     Approved by (CST):  Becki Sutton

## 2018-10-26 NOTE — CM/SW NOTE
Met with patient and family members about observation status. Patient was current with [place] until last week per life partner at bedside. Pt. States, \"I want to go home. \" Pt. Was to start cardiac rehab on Monday and goes to the CHF clinic. Pt.  Was at [facility]

## 2018-10-27 ENCOUNTER — APPOINTMENT (OUTPATIENT)
Dept: CV DIAGNOSTICS | Facility: HOSPITAL | Age: 83
DRG: 378 | End: 2018-10-27
Attending: INTERNAL MEDICINE
Payer: MEDICARE

## 2018-10-27 PROCEDURE — 84443 ASSAY THYROID STIM HORMONE: CPT | Performed by: INTERNAL MEDICINE

## 2018-10-27 PROCEDURE — 83735 ASSAY OF MAGNESIUM: CPT | Performed by: INTERNAL MEDICINE

## 2018-10-27 PROCEDURE — 87493 C DIFF AMPLIFIED PROBE: CPT | Performed by: INTERNAL MEDICINE

## 2018-10-27 PROCEDURE — 85025 COMPLETE CBC W/AUTO DIFF WBC: CPT | Performed by: INTERNAL MEDICINE

## 2018-10-27 PROCEDURE — 82272 OCCULT BLD FECES 1-3 TESTS: CPT | Performed by: HOSPITALIST

## 2018-10-27 PROCEDURE — 93306 TTE W/DOPPLER COMPLETE: CPT | Performed by: INTERNAL MEDICINE

## 2018-10-27 PROCEDURE — A4216 STERILE WATER/SALINE, 10 ML: HCPCS | Performed by: HOSPITALIST

## 2018-10-27 PROCEDURE — C9113 INJ PANTOPRAZOLE SODIUM, VIA: HCPCS | Performed by: HOSPITALIST

## 2018-10-27 PROCEDURE — 80053 COMPREHEN METABOLIC PANEL: CPT | Performed by: INTERNAL MEDICINE

## 2018-10-27 PROCEDURE — 94640 AIRWAY INHALATION TREATMENT: CPT

## 2018-10-27 PROCEDURE — 85018 HEMOGLOBIN: CPT | Performed by: INTERNAL MEDICINE

## 2018-10-27 RX ORDER — IPRATROPIUM BROMIDE AND ALBUTEROL SULFATE 2.5; .5 MG/3ML; MG/3ML
3 SOLUTION RESPIRATORY (INHALATION) EVERY 6 HOURS PRN
Status: DISCONTINUED | OUTPATIENT
Start: 2018-10-27 | End: 2018-10-29

## 2018-10-27 RX ORDER — POTASSIUM CHLORIDE 20 MEQ/1
40 TABLET, EXTENDED RELEASE ORAL EVERY 4 HOURS
Status: COMPLETED | OUTPATIENT
Start: 2018-10-27 | End: 2018-10-27

## 2018-10-27 NOTE — PLAN OF CARE
Problem: Patient Centered Care  Goal: Patient preferences are identified and integrated in the patient's plan of care  Interventions:  - What would you like us to know as we care for you?  Pt likes to drink tea from home  - Provide timely, complete, and acc emergency measures for life threatening arrhythmias  - Monitor electrolytes and administer replacement therapy as ordered  Outcome: Progressing

## 2018-10-27 NOTE — CONSULTS
[facility] HOSP - [facility]    Report of Consultation    Waylon Richardson Patient Status:  Inpatient    3/8/1932 MRN [id number]   Location [facility] Attending Laureano Olson MD   Hosp Day # 1 PCP Barrett Tirado MD     Date of Admission:  10/26/201 1/10/2018   • Hx of CABG 2/14/2018   • Hypothyroid    • Hypothyroidism, unspecified 1/10/2018   • Kyphosis of thoracic region 2/14/2018   • MI, old    • Pulmonary hypertension ([facility] [address] [address] 2/14/2018   • Right to left cardiac shunt ([facility] 2/14/2018   • Shortness of mg Intravenous BID   ipratropium-albuterol (DUONEB) nebulizer solution 3 mL 3 mL Nebulization Q6H PRN   allopurinol (ZYLOPRIM) tab 100 mg 100 mg Oral BID   aspirin EC tab 81 mg 81 mg Oral Daily   DilTIAZem HCl ER Coated Beads (CARDIZEM CD) 24 hr cap 120 mg taking differently: Take 120 mg by mouth daily.  )   simvastatin 40 MG Oral Tab Take 1 tablet (40 mg total) by mouth once daily.    Levothyroxine Sodium 150 MCG Oral Tab TAKE 1 TABLET(150 MCG) BY MOUTH BEFORE BREAKFAST (Patient taking differently: Take 150 clear to auscultation  CARDIO: RRR without murmur  GI: normal active BS, no tenderness on palpation, no masses or ascites, normal liver span/no organomegaly  EXTREMITIES: no calf tenderness  MUSCULOSKELETAL: no calf tenderness  NUT: +cachexia      Results: Brain Or Head (58356)    Result Date: 10/7/2018  PROCEDURE: CT BRAIN OR HEAD (CPT=70450)  COMPARISON: [facility], CT BRAIN OR HEAD (CPT=70450), 7/15/2018, 20:36. INDICATIONS: Altered mental status.   TECHNIQUE: CT images were obtained witho heart is mildly enlarged. Postop changes are seen about the heart. Unremarkable pulmonary vasculature. MEDIAST/RACHEL: The aorta is elongated and tortuous with atherosclerotic plaques. No visible mass or adenopathy.  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

## 2018-10-27 NOTE — PROGRESS NOTES
[facility] HOSP - [facility]    Cardiology Progress Note    Penelope Lamb Patient Status:  Observation    3/8/1932 MRN [id number]   Location [address] [facility] Attending Erin Hahn MD   Hosp Day # 0 PCP Delaney Luu MD       Subjective:   Caesar Webb clip 8/31/2018 at [facility])  -ECHO 10/2/2018 with LVEF 55-60% and stable MR  -Repeat ECHO pending    COPD on continuous home oxygen 2-3L baseline   -stable  -management per primary team    Generalized weakness with poor PO intake and mechanical fall  -diuret Nonspecific T-abnormality.  ABNORMAL When compared with ECG of 10/07/2018 17:25:11 No significant changes have occurred Electronically signed on 10/26/2018 at 17:05 by Wilfredo Ridley MD            [facility] Cardiology  10/27/18

## 2018-10-27 NOTE — PROGRESS NOTES
[facility] Hospitalist Progress Note     CC: Hospital Follow up    PCP: Negro Marshall MD       Assessment/Plan:     Principal Problem:    Acute chest pain  Active Problems:    Syncope, near    Weakness generalized    Alyssalin Dia a 80year old male with ok  -cont home meds  - pulm FU     Anemia  -baseline around ~9  -see above  -holding diuretics     Hypothyroidism  -cont home med     GERD, history of barretts  -cont PPI     Gout  -cont allopurinol     FN:  - IVF: stopped  - Diet: reg diet,       DVT Prop Recent Labs   Lab  10/26/18   1339  10/27/18   0538   GLU  160*  146*  146*   BUN  39*  40*  40*   CREATSERUM  1.07  1.05  1.05   GFRAA  >60  >60  >60   GFRNAA  >60  >60  >60   CA  9.3  9.0  9.0   NA  137  137  137   K  3.6  3.4  3.4   CL  102  104

## 2018-10-28 PROCEDURE — 83735 ASSAY OF MAGNESIUM: CPT | Performed by: INTERNAL MEDICINE

## 2018-10-28 PROCEDURE — 97165 OT EVAL LOW COMPLEX 30 MIN: CPT

## 2018-10-28 PROCEDURE — A4216 STERILE WATER/SALINE, 10 ML: HCPCS | Performed by: HOSPITALIST

## 2018-10-28 PROCEDURE — 85025 COMPLETE CBC W/AUTO DIFF WBC: CPT | Performed by: INTERNAL MEDICINE

## 2018-10-28 PROCEDURE — 97162 PT EVAL MOD COMPLEX 30 MIN: CPT

## 2018-10-28 PROCEDURE — 85027 COMPLETE CBC AUTOMATED: CPT | Performed by: INTERNAL MEDICINE

## 2018-10-28 PROCEDURE — 97116 GAIT TRAINING THERAPY: CPT

## 2018-10-28 PROCEDURE — C9113 INJ PANTOPRAZOLE SODIUM, VIA: HCPCS | Performed by: HOSPITALIST

## 2018-10-28 PROCEDURE — 80048 BASIC METABOLIC PNL TOTAL CA: CPT | Performed by: INTERNAL MEDICINE

## 2018-10-28 PROCEDURE — 85007 BL SMEAR W/DIFF WBC COUNT: CPT | Performed by: INTERNAL MEDICINE

## 2018-10-28 NOTE — PROGRESS NOTES
[facility] HOSP - [facility]    Cardiology Progress Note    Yvon Dejesus Patient Status:  Inpatient    3/8/1932 MRN [id number]   Location [facility] Attending Nichelle Hassan MD   Hosp Day # 2 PCP Shiv Reyes MD     Primary Cardiologist: Dr. Kim Robbins continue to hold diuretics and monitor volume status closely  -repeat ECHO pending    Severe Mitral regurgitation s/p anne clip x 3 (last clip [facility]/31/2018 at [facility])  -ECHO 10/2/2018 with LVEF 55-60% and stable MR  -Repeat ECHO pending     COPD on continuou 10/26/2018  ECG Report  Interpretation  -------------------------- Atrial fibrillation -Nonspecific ST depression + Nonspecific T-abnormality.  ABNORMAL When compared with ECG of 10/07/2018 17:25:11 No significant changes have occurred Electronically signed

## 2018-10-28 NOTE — PROGRESS NOTES
[facility]  Gastroenterology Progress Note    Nat Reid Patient Status:  Inpatient    3/8/1932 MRN [id number]   Location [facility] Attending Latha Valerio MD   Hosp Day # 2 PCP Mary Tellez MD     Subjective:  Nat Reid is Calcification of abdominal aorta (HCC)     Abdominal aortic atherosclerosis (HCC)     Thoracic aorta atherosclerosis (HCC)     Thoracic compression fracture (HCC)     Kyphosis of thoracic region     Thoracic arthritis     Arthritis of lumbar spine     Bila

## 2018-10-28 NOTE — PHYSICAL THERAPY NOTE
PHYSICAL THERAPY QUICK EVALUATION - INPATIENT    Room Number: 686/152-X  Evaluation Date: 10/28/2018  Presenting Problem: fall, pain on the L side of the chest, weakness  Physician Order: PT Eval and Treat    Problem List  Principal Problem:    Acute regina N/A 10/12/2016    Performed by Bryan Bonilla MD at [facility] ENDOSCOPY   • ESOPHAGOGASTRODUODENOSCOPY (EGD)  10/4/2016    Performed by Bryan Bonilla MD at [facility]   • [facility] N/A 8/31/2018    Performed by Alyson Santos MD at [place] [address] sitting on the side of the bed?: None   How much help from another person does the patient currently need. ..   -   Moving to and from a bed to a chair (including a wheelchair)?: None   -   Need to walk in hospital room?: None   -   Climbing 3-5 steps with skilled Physical Therapy needs at this time. Patient discharged from Physical Therapy services. Please re-order if a new functional limitation presents during this admission. GOALS  Patient was able to achieve the following goals . ..     Patient was ab

## 2018-10-28 NOTE — OCCUPATIONAL THERAPY NOTE
OCCUPATIONAL THERAPY EVALUATION - INPATIENT     Room Number: 776/348-L  Evaluation Date: 10/28/2018  Type of Evaluation: Initial       Physician Order: IP Consult to Occupational Therapy  Reason for Therapy: ADL/IADL Dysfunction and Discharge Planning    O • [facility] 2/14/2018   • Calcification of abdominal aorta (HCC) 2/14/2018   • Chronic respiratory failure [facility] 1/10/2018   • Congestive heart disease (HCC)    • COPD (chronic obstructive pulmonary disease) ([facility]     on home House  Home Layout: Two level  Lives With: Spouse                      Drives: Yes       Stairs in Home: 13 with rail  Use of Assistive Device(s): He owns a walker and a cane but not currently using.      Prior Level of [place]: SBA, Modified I    SUBJ purpose and POC       OT Goals  Patients self stated goal is: go home.       Patient will complete functional transfer with MOD I  Comment:     Patient will complete toileting with MOD I  Comment:     Patient will tolerate standing for 10 minutes in prep fo

## 2018-10-29 ENCOUNTER — APPOINTMENT (OUTPATIENT)
Dept: CARDIAC REHAB | Facility: HOSPITAL | Age: 83
End: 2018-10-29
Attending: INTERNAL MEDICINE
Payer: MEDICARE

## 2018-10-29 VITALS
BODY MASS INDEX: 25.26 KG/M2 | DIASTOLIC BLOOD PRESSURE: 48 MMHG | WEIGHT: 128.63 LBS | HEART RATE: 65 BPM | OXYGEN SATURATION: 99 % | TEMPERATURE: 97 F | RESPIRATION RATE: 20 BRPM | SYSTOLIC BLOOD PRESSURE: 121 MMHG | HEIGHT: 60 IN

## 2018-10-29 PROCEDURE — 85014 HEMATOCRIT: CPT | Performed by: HOSPITALIST

## 2018-10-29 PROCEDURE — 85018 HEMOGLOBIN: CPT | Performed by: HOSPITALIST

## 2018-10-29 PROCEDURE — A4216 STERILE WATER/SALINE, 10 ML: HCPCS | Performed by: HOSPITALIST

## 2018-10-29 PROCEDURE — 85027 COMPLETE CBC AUTOMATED: CPT | Performed by: INTERNAL MEDICINE

## 2018-10-29 PROCEDURE — 80048 BASIC METABOLIC PNL TOTAL CA: CPT | Performed by: INTERNAL MEDICINE

## 2018-10-29 PROCEDURE — 85007 BL SMEAR W/DIFF WBC COUNT: CPT | Performed by: INTERNAL MEDICINE

## 2018-10-29 PROCEDURE — C9113 INJ PANTOPRAZOLE SODIUM, VIA: HCPCS | Performed by: HOSPITALIST

## 2018-10-29 PROCEDURE — 83735 ASSAY OF MAGNESIUM: CPT | Performed by: INTERNAL MEDICINE

## 2018-10-29 RX ORDER — FERROUS SULFATE 325(65) MG
325 TABLET ORAL 2 TIMES DAILY
Qty: 60 TABLET | Refills: 0 | Status: ON HOLD | OUTPATIENT
Start: 2018-10-29 | End: 2018-11-26

## 2018-10-29 RX ORDER — ACETAMINOPHEN 325 MG/1
650 TABLET ORAL EVERY 6 HOURS PRN
Qty: 30 TABLET | Refills: 0 | Status: SHIPPED | OUTPATIENT
Start: 2018-10-29 | End: 2019-01-01

## 2018-10-29 RX ORDER — TORSEMIDE 20 MG/1
20 TABLET ORAL DAILY
Status: DISCONTINUED | OUTPATIENT
Start: 2018-10-29 | End: 2018-10-29

## 2018-10-29 RX ORDER — DOCUSATE SODIUM 100 MG/1
100 CAPSULE, LIQUID FILLED ORAL 2 TIMES DAILY
Qty: 60 CAPSULE | Refills: 0 | Status: ON HOLD | OUTPATIENT
Start: 2018-10-29 | End: 2018-11-26

## 2018-10-29 NOTE — DIETARY NOTE
ADULT NUTRITION INITIAL ASSESSMENT    Pt is at moderate nutrition risk. Pt does not meet malnutrition criteria.       RECOMMENDATIONS TO MD:  See Nutrition Intervention     NUTRITION DIAGNOSIS/PROBLEM:  Inadequate oral intake related to decreased ability t Calcification of abdominal aorta (HCC) 2/14/2018   • Chronic respiratory failure ([address] 1/10/2018   • Congestive heart disease (HCC)    • COPD (chronic obstructive pulmonary disease) (HCC)     on home oxygen    • Coronary atherosclerosis    • Disorder of th • pantoprazole (PROTONIX) IV push  40 mg Intravenous BID   • allopurinol  100 mg Oral BID   • aspirin  81 mg Oral Daily   • DilTIAZem HCl ER Coated Beads  120 mg Oral Daily   • Clopidogrel Bisulfate  75 mg Oral Daily   • docusate sodium  200 mg Oral BID

## 2018-10-29 NOTE — PROGRESS NOTES
[facility]  Gastroenterology Progress Note    Christopher Palomares Patient Status:  Inpatient    3/8/1932 MRN [id number]   Location [facility] 3W/SW Attending Matt King MD   Hosp Day # 3 PCP Migel Yañez MD     Subjective:  Christopher Palomares fraction ([facility]     Acute respiratory failure, unspecified whether with hypoxia or hypercapnia ([facility])     S/P mitral valve clip implantation     Heart failure, systolic and diastolic, chronic ([facility])     Acute chest pain     Syncope, near     Weakness generali

## 2018-10-29 NOTE — PROGRESS NOTES
[facility] Hospitalist Progress Note     CC: Hospital Follow up    PCP: Migel Yañez MD       Assessment/Plan:     Principal Problem:    Acute chest pain  Active Problems:    Syncope, near    Weakness generalized    Janith Forts a 80year old male with be active exacerbation  - CXR ok  -cont home meds  - pulm FU     Anemia  -baseline around ~9  -see above     Hypothyroidism  -cont home med     GERD, history of barretts  -cont PPI     Gout  -cont allopurinol     FN:  - IVF: stopped  - Diet: reg diet,      103.3*  104.1*   MCH  34.3*   --   34.3*  34.8*   MCHC  33.2   --   33.2  33.5   RDW  18.6*   --   18.1*  18.2*   WBC  10.3   --   10.0  10.5   PLT  203   --   188  167         Recent Labs   Lab  10/27/18   0538  10/28/18   0440  10/29/18   0744   GLU  1

## 2018-10-29 NOTE — CM/SW NOTE
SW received an MDO regarding home needs and social support. Sw met with pt, life partner Sparkle Boswell, and pt's son at bedside. Pt lives in house with 2 bandar to enter and full flight to access 2nd floor bedrooms.  Pt lives with his life partner Sparkle Boswell, who along wi

## 2018-10-29 NOTE — TRANSITION NOTE
58-year-old man with multiple cardiac problems who was admitted to [facility] on 10/26/2018. Primarily he was dehydrated and weak and constipated. All of this was corrected. History of GI bleeding with stable hemoglobin this admission.   History o

## 2018-10-29 NOTE — PROGRESS NOTES
[facility]  Progress Note    Mack Arena Patient Status:  Inpatient    3/8/1932 MRN [id number]   Location [facility] 3W/SW Attending Smith Walker MD   Hosp Day # 3 PCP Alexus Amaya MD     Assessment:    General medical status is impr Date    TROP 0.04 () 10/26/2018    TROP 0.04 () 10/26/2018    TROP 0.04 () 10/07/2018        Medications:    • torsemide  20 mg Oral Daily   • pantoprazole (PROTONIX) IV push  40 mg Intravenous BID   • allopurinol  100 mg Oral BID   • aspirin  81 mg

## 2018-10-29 NOTE — DISCHARGE SUMMARY
General Medicine Discharge Summary     Patient ID:  Yvon Dejesus  80year old  3/8/1932    Admit date: 10/26/2018    Discharge date and time: 10/29/18    Attending Physician: Nichelle Hassan MD     Consults: IP CONSULT TO HOSPITALIST  IP CONSULT TO SOCIAL WO coumadin/eliquis/aspirin), b/l carotid artery disease, GERD with catalan esophagus, asthma/COPD on chronic 2-3 L, hypothyroidism, gout (recent flare) and recent admission stent placement and repeat mitral clip placement who now presents with weakness, poor plavix with recent stent  -continued cartia 120 XL, cont statin   -dig stopped  -tele   - trop 0.04 *2      COPD, chronic 2-3L  -no wheezing, no cough  -does not appear to be active exacerbation  - CXR ok  -cont home meds  - pulm FU     Anemia  -baseline a TABLET(150 MCG) BY MOUTH BEFORE BREAKFAST  What changed:    · how much to take  · how to take this  · when to take this  · additional instructions        CONTINUE taking these medications    allopurinol 100 MG Tabs  Commonly known as:  ZYLOPRIM  Take 1 tab instructions: Other Discharge Instructions:         Please monitor bowl movements closely. Iron tabs decreased, and stool softner decreased.   But may need to add laxitive patient patient goes with out Bowel movement for 24 hours, can use miralax (ove PLAVIX  Take 1 tablet (75 mg total) by mouth daily. famoTIDine 20 MG Tabs  Commonly known as:  PEPCID     Montelukast Sodium 10 MG Tabs  Commonly known as:  SINGULAIR  Take 1 tablet (10 mg total) by mouth nightly.  TAKE 1 TABLET BY MOUTH EVERY EVENING

## 2018-10-30 NOTE — PROGRESS NOTES
Please tell pt heart function is ok but mitral valve is tight which we knew about. Please forward to pt's cardiologist Dr Sylvester Cade.  Pt should schedule f/u w/ Dr Sylvester Cade

## 2018-11-01 ENCOUNTER — OFFICE VISIT (OUTPATIENT)
Dept: CARDIOLOGY CLINIC | Facility: HOSPITAL | Age: 83
End: 2018-11-01
Attending: CLINICAL NURSE SPECIALIST
Payer: MEDICARE

## 2018-11-01 VITALS
WEIGHT: 131.5 LBS | BODY MASS INDEX: 26 KG/M2 | OXYGEN SATURATION: 92 % | SYSTOLIC BLOOD PRESSURE: 130 MMHG | HEART RATE: 85 BPM | DIASTOLIC BLOOD PRESSURE: 53 MMHG

## 2018-11-01 DIAGNOSIS — I50.30 (HFPEF) HEART FAILURE WITH PRESERVED EJECTION FRACTION (HCC): ICD-10-CM

## 2018-11-01 DIAGNOSIS — I50.9 HEART FAILURE, UNSPECIFIED (HCC): Primary | ICD-10-CM

## 2018-11-01 PROCEDURE — 85027 COMPLETE CBC AUTOMATED: CPT | Performed by: CLINICAL NURSE SPECIALIST

## 2018-11-01 PROCEDURE — 80048 BASIC METABOLIC PNL TOTAL CA: CPT | Performed by: CLINICAL NURSE SPECIALIST

## 2018-11-01 PROCEDURE — 85007 BL SMEAR W/DIFF WBC COUNT: CPT | Performed by: CLINICAL NURSE SPECIALIST

## 2018-11-01 PROCEDURE — 36415 COLL VENOUS BLD VENIPUNCTURE: CPT | Performed by: CLINICAL NURSE SPECIALIST

## 2018-11-01 PROCEDURE — 85025 COMPLETE CBC W/AUTO DIFF WBC: CPT | Performed by: CLINICAL NURSE SPECIALIST

## 2018-11-01 PROCEDURE — 99214 OFFICE O/P EST MOD 30 MIN: CPT | Performed by: CLINICAL NURSE SPECIALIST

## 2018-11-01 PROCEDURE — 99212 OFFICE O/P EST SF 10 MIN: CPT | Performed by: CLINICAL NURSE SPECIALIST

## 2018-11-01 NOTE — PROGRESS NOTES
[address] Patient Status:  No patient class for patient encounter    3/8/1932 MRN [id number]   Location MD Dr. Merissa Gil is a 80year old male wh hemoptysis and wheezing  Cardiovascular: negative for chest pain, exertional chest pressure/discomfort, near-syncope, orthopnea and palpitations  Gastrointestinal: negative for abdominal pain, diarrhea, melena, nausea and vomiting  Hematologic/lymphatic: n thyroid not enlarged, symmetric, no tenderness/mass/nodules  Lungs: clear to auscultation with few scattered crackles to left base  Chest wall: no tenderness  Heart: S1, S2 normal, no S3 or S4, regular rate and rhythm,CARMELA  Abdomen: soft, non-tender; bowel resolution. Brain CT 7/15/18  CONCLUSION:   Moderate chronic microvascular ischemic disease without acute intracranial abnormality.    Hyperdensity seen within the extra-axial region of the right parietal lobe represents vascularity and not a subdural he at ph: [phone]    Call if having any dizziness, lightheadedness, heart racing, palpitations, chest pain, shortness of breath, coughing, swelling, weight gain or worsening symptoms.      32–50 ounces of fluid daily    Less than 2000 mg salt/sodium daily

## 2018-11-01 NOTE — PATIENT INSTRUCTIONS
Recommend decreasing torsemide to 10 mg (half tab) for three days (Friday, Saturday and Sunday) and then resuming torsemide 20 mg daily    Please call the heart failure clinic if you notice a weight gain of 3 pounds overnight or 5 pounds or more in 5 days.

## 2018-11-08 ENCOUNTER — APPOINTMENT (OUTPATIENT)
Dept: CARDIAC REHAB | Facility: HOSPITAL | Age: 83
End: 2018-11-08
Attending: INTERNAL MEDICINE
Payer: MEDICARE

## 2018-11-15 ENCOUNTER — CARDPULM VISIT (OUTPATIENT)
Dept: CARDIAC REHAB | Facility: HOSPITAL | Age: 83
End: 2018-11-15
Attending: INTERNAL MEDICINE
Payer: MEDICARE

## 2018-11-15 ENCOUNTER — OFFICE VISIT (OUTPATIENT)
Dept: CARDIOLOGY CLINIC | Facility: HOSPITAL | Age: 83
End: 2018-11-15
Attending: CLINICAL NURSE SPECIALIST
Payer: MEDICARE

## 2018-11-15 VITALS
BODY MASS INDEX: 25 KG/M2 | OXYGEN SATURATION: 97 % | HEART RATE: 86 BPM | DIASTOLIC BLOOD PRESSURE: 76 MMHG | SYSTOLIC BLOOD PRESSURE: 113 MMHG | WEIGHT: 126 LBS

## 2018-11-15 DIAGNOSIS — I50.30 (HFPEF) HEART FAILURE WITH PRESERVED EJECTION FRACTION (HCC): ICD-10-CM

## 2018-11-15 DIAGNOSIS — I50.9 HEART FAILURE, UNSPECIFIED (HCC): Primary | ICD-10-CM

## 2018-11-15 PROCEDURE — 99214 OFFICE O/P EST MOD 30 MIN: CPT | Performed by: CLINICAL NURSE SPECIALIST

## 2018-11-15 PROCEDURE — 99212 OFFICE O/P EST SF 10 MIN: CPT | Performed by: CLINICAL NURSE SPECIALIST

## 2018-11-15 NOTE — PATIENT INSTRUCTIONS
Recommend decreasing torsemide to 10 mg (half tab) for three days (Thursday, Friday, and Saturday) and then resuming torsemide 20 mg daily if your diarrhea has resolved    May increase fluids by 4 oz/day if you're still experiencing diarrhea     Please andre

## 2018-11-15 NOTE — PROGRESS NOTES
[address] Patient Status:  No patient class for patient encounter    3/8/1932 MRN [id number]   Location MD Dr. Kacy Albarado is a 80year old male wh hemoptysis and wheezing  Cardiovascular: negative for chest pain, exertional chest pressure/discomfort, near-syncope, orthopnea and palpitations  Gastrointestinal: negative for abdominal pain, diarrhea, melena, nausea and vomiting  Hematologic/lymphatic: n symmetric, no tenderness/mass/nodules  Lungs: clear to auscultation with few scattered crackles to left base  Chest wall: no tenderness  Heart: S1, S2 normal, no S3 or S4, regular rate and rhythm,CARMELA  Abdomen: soft, non-tender; bowel sounds normal; no mass CT 7/15/18  CONCLUSION:   Moderate chronic microvascular ischemic disease without acute intracranial abnormality. Hyperdensity seen within the extra-axial region of the right parietal lobe represents vascularity and not a subdural hematoma.         Wilhemena Jose then resuming torsemide 20 mg daily if your diarrhea has resolved    May increase fluids by 4 oz/day if you're still experiencing diarrhea     Please call the heart failure clinic if you notice a weight gain of 3 pounds overnight or 5 pounds or more in 5 d

## 2018-11-18 ENCOUNTER — APPOINTMENT (OUTPATIENT)
Dept: CT IMAGING | Facility: HOSPITAL | Age: 83
DRG: 388 | End: 2018-11-18
Attending: EMERGENCY MEDICINE
Payer: MEDICARE

## 2018-11-18 ENCOUNTER — HOSPITAL ENCOUNTER (INPATIENT)
Facility: HOSPITAL | Age: 83
LOS: 9 days | Discharge: HOME | DRG: 388 | End: 2018-11-27
Attending: EMERGENCY MEDICINE | Admitting: HOSPITALIST
Payer: MEDICARE

## 2018-11-18 DIAGNOSIS — R33.9 URINARY RETENTION: ICD-10-CM

## 2018-11-18 DIAGNOSIS — K56.41 FECAL IMPACTION IN RECTUM (HCC): Primary | ICD-10-CM

## 2018-11-18 DIAGNOSIS — K92.2 GASTROINTESTINAL HEMORRHAGE, UNSPECIFIED GASTROINTESTINAL HEMORRHAGE TYPE: ICD-10-CM

## 2018-11-18 DIAGNOSIS — I27.20 PULMONARY HYPERTENSION (HCC): ICD-10-CM

## 2018-11-18 DIAGNOSIS — E87.1 HYPONATREMIA: ICD-10-CM

## 2018-11-18 DIAGNOSIS — K56.41 FECAL IMPACTION (HCC): ICD-10-CM

## 2018-11-18 PROCEDURE — 85060 BLOOD SMEAR INTERPRETATION: CPT | Performed by: EMERGENCY MEDICINE

## 2018-11-18 PROCEDURE — 81003 URINALYSIS AUTO W/O SCOPE: CPT | Performed by: EMERGENCY MEDICINE

## 2018-11-18 PROCEDURE — 51702 INSERT TEMP BLADDER CATH: CPT

## 2018-11-18 PROCEDURE — 74176 CT ABD & PELVIS W/O CONTRAST: CPT | Performed by: EMERGENCY MEDICINE

## 2018-11-18 PROCEDURE — 85027 COMPLETE CBC AUTOMATED: CPT | Performed by: EMERGENCY MEDICINE

## 2018-11-18 PROCEDURE — 85007 BL SMEAR W/DIFF WBC COUNT: CPT | Performed by: EMERGENCY MEDICINE

## 2018-11-18 PROCEDURE — 99285 EMERGENCY DEPT VISIT HI MDM: CPT

## 2018-11-18 PROCEDURE — 96360 HYDRATION IV INFUSION INIT: CPT

## 2018-11-18 PROCEDURE — 85025 COMPLETE CBC W/AUTO DIFF WBC: CPT | Performed by: EMERGENCY MEDICINE

## 2018-11-18 PROCEDURE — 80048 BASIC METABOLIC PNL TOTAL CA: CPT | Performed by: EMERGENCY MEDICINE

## 2018-11-18 PROCEDURE — C9113 INJ PANTOPRAZOLE SODIUM, VIA: HCPCS | Performed by: HOSPITALIST

## 2018-11-18 PROCEDURE — A4216 STERILE WATER/SALINE, 10 ML: HCPCS | Performed by: HOSPITALIST

## 2018-11-18 PROCEDURE — 96361 HYDRATE IV INFUSION ADD-ON: CPT

## 2018-11-18 RX ORDER — MONTELUKAST SODIUM 10 MG/1
10 TABLET ORAL NIGHTLY
Status: DISCONTINUED | OUTPATIENT
Start: 2018-11-18 | End: 2018-11-27

## 2018-11-18 RX ORDER — SODIUM CHLORIDE 9 MG/ML
83 INJECTION, SOLUTION INTRAVENOUS CONTINUOUS
Status: DISCONTINUED | OUTPATIENT
Start: 2018-11-18 | End: 2018-11-19

## 2018-11-18 RX ORDER — TRAMADOL HYDROCHLORIDE 50 MG/1
50 TABLET ORAL EVERY 6 HOURS PRN
Status: DISCONTINUED | OUTPATIENT
Start: 2018-11-18 | End: 2018-11-27

## 2018-11-18 RX ORDER — DILTIAZEM HYDROCHLORIDE 120 MG/1
120 CAPSULE, COATED, EXTENDED RELEASE ORAL DAILY
Status: DISCONTINUED | OUTPATIENT
Start: 2018-11-19 | End: 2018-11-22

## 2018-11-18 RX ORDER — LEVOTHYROXINE SODIUM 0.15 MG/1
150 TABLET ORAL
Status: DISCONTINUED | OUTPATIENT
Start: 2018-11-19 | End: 2018-11-27

## 2018-11-18 RX ORDER — SODIUM CHLORIDE 0.9 % (FLUSH) 0.9 %
3 SYRINGE (ML) INJECTION AS NEEDED
Status: DISCONTINUED | OUTPATIENT
Start: 2018-11-18 | End: 2018-11-27

## 2018-11-18 RX ORDER — ALLOPURINOL 100 MG/1
100 TABLET ORAL 2 TIMES DAILY
Status: DISCONTINUED | OUTPATIENT
Start: 2018-11-18 | End: 2018-11-27

## 2018-11-18 RX ORDER — METOCLOPRAMIDE HYDROCHLORIDE 5 MG/ML
10 INJECTION INTRAMUSCULAR; INTRAVENOUS EVERY 8 HOURS PRN
Status: DISCONTINUED | OUTPATIENT
Start: 2018-11-18 | End: 2018-11-20

## 2018-11-18 RX ORDER — ONDANSETRON 2 MG/ML
4 INJECTION INTRAMUSCULAR; INTRAVENOUS EVERY 6 HOURS PRN
Status: DISCONTINUED | OUTPATIENT
Start: 2018-11-18 | End: 2018-11-27

## 2018-11-18 RX ORDER — MAGNESIUM CARB/ALUMINUM HYDROX 105-160MG
296 TABLET,CHEWABLE ORAL ONCE
Status: COMPLETED | OUTPATIENT
Start: 2018-11-19 | End: 2018-11-19

## 2018-11-18 RX ORDER — POLYETHYLENE GLYCOL 3350 17 G/17G
17 POWDER, FOR SOLUTION ORAL DAILY
Status: ON HOLD | COMMUNITY
End: 2020-01-01

## 2018-11-18 RX ORDER — ATORVASTATIN CALCIUM 20 MG/1
20 TABLET, FILM COATED ORAL NIGHTLY
Status: DISCONTINUED | OUTPATIENT
Start: 2018-11-18 | End: 2018-11-27

## 2018-11-18 RX ORDER — MAGNESIUM CARB/ALUMINUM HYDROX 105-160MG
296 TABLET,CHEWABLE ORAL ONCE
Status: COMPLETED | OUTPATIENT
Start: 2018-11-18 | End: 2018-11-18

## 2018-11-18 RX ORDER — ACETAMINOPHEN 325 MG/1
650 TABLET ORAL EVERY 6 HOURS PRN
Status: DISCONTINUED | OUTPATIENT
Start: 2018-11-18 | End: 2018-11-19

## 2018-11-18 NOTE — ED INITIAL ASSESSMENT (HPI)
Arrived with EMS from home, accompanied by partner // significant other. Recent hx of fecal impaction - admitted to [address] [place] [address] for dehydration x3 days 10/26-10/29. C/o loose // soft stools since then. Pt with c/o \"ass\" and rectal pain.  No n/v or documented feve

## 2018-11-18 NOTE — ED PROVIDER NOTES
[facility] Emergency Department    History   Patient presents with:  Urinary Symptoms (urologic)    Stated Complaint: Diarrhea     HPI     The patient is an 59-year-old male who presents with rectal pain and frequent small soft st 2/14/2018   • Unspecified essential hypertension        Past Surgical History:   Procedure Laterality Date   • CABG     • CATARACT     • CORONARY STENT PLACEMENT     • ESOPHAGOGASTRODUODENOSCOPY (EGD) N/A 4/10/2017    Performed by Agatha Betancur MD at  Normal range of motion. Neck supple. Cardiovascular: Normal rate, regular rhythm, normal heart sounds and intact distal pulses. No murmur heard. Pulmonary/Chest: Effort normal and breath sounds normal.   Abdominal: Soft.  Bowel sounds are normal. He ex orders were created for panel order CBC WITH DIFFERENTIAL WITH PLATELET.   Procedure                               Abnormality         Status                     ---------                               -----------         ------                     CBC W/ D hiatal hernia. Findings that can be seen with gastroesophageal reflux disease. 5. Bilateral nonobstructing renal calculi measuring up to 4 mm. Right lower pole renal cyst. 6. Cholelithiasis. No CT evidence of acute cholecystitis.  7. Moderate fat containing Prescribed:  Current Discharge Medication List        Present on Admission  Date Reviewed: 11/19/2018          ICD-10-CM Noted POA    * (Principal) Fecal impaction in rectum [facility]) K56.41 11/18/2018 Unknown    Gastrointestinal hemorrhage, unspecified gastro

## 2018-11-19 PROCEDURE — 85018 HEMOGLOBIN: CPT | Performed by: HOSPITALIST

## 2018-11-19 PROCEDURE — 3E0F7GC INTRODUCTION OF OTHER THERAPEUTIC SUBSTANCE INTO RESPIRATORY TRACT, VIA NATURAL OR ARTIFICIAL OPENING: ICD-10-PCS | Performed by: HOSPITALIST

## 2018-11-19 PROCEDURE — C9113 INJ PANTOPRAZOLE SODIUM, VIA: HCPCS | Performed by: HOSPITALIST

## 2018-11-19 PROCEDURE — 85014 HEMATOCRIT: CPT | Performed by: HOSPITALIST

## 2018-11-19 PROCEDURE — A4216 STERILE WATER/SALINE, 10 ML: HCPCS | Performed by: HOSPITALIST

## 2018-11-19 PROCEDURE — 94640 AIRWAY INHALATION TREATMENT: CPT

## 2018-11-19 RX ORDER — ASPIRIN 81 MG/1
81 TABLET ORAL DAILY
Status: DISCONTINUED | OUTPATIENT
Start: 2018-11-19 | End: 2018-11-27

## 2018-11-19 RX ORDER — ACETAMINOPHEN 500 MG
500 TABLET ORAL EVERY 6 HOURS PRN
Status: DISCONTINUED | OUTPATIENT
Start: 2018-11-19 | End: 2018-11-27

## 2018-11-19 RX ORDER — CLOPIDOGREL BISULFATE 75 MG/1
75 TABLET ORAL DAILY
Status: DISCONTINUED | OUTPATIENT
Start: 2018-11-19 | End: 2018-11-27

## 2018-11-19 RX ORDER — ALFUZOSIN HYDROCHLORIDE 10 MG/1
10 TABLET, EXTENDED RELEASE ORAL
Status: DISCONTINUED | OUTPATIENT
Start: 2018-11-19 | End: 2018-11-27

## 2018-11-19 NOTE — PLAN OF CARE
Patient is alert and oriented but very irritable when received on unit. Patient unable to answer some admission questions. Information obtained from [facility] partner who was at the bedside.

## 2018-11-19 NOTE — ED NOTES
Care assumed removed from bedpan with small amount pasty black stool + abd cramping that decreases with positioning to lateral lying position Mx family at bedside Aware of disposition to inpatient bed Remains hemodynamically stable

## 2018-11-19 NOTE — ED NOTES
Orders for admission, patient is aware of plan and ready to go upstairs.  Any questions, please call ED RN Gogo  at extension [phone]

## 2018-11-19 NOTE — CONSULTS
Cardiology (consult dictated)    Assessment:  1. Presentation with fecal impaction. Resolved. Being considered for colonoscopy. 2.  No active cardiac symptoms.   Has multiple prior cardiac problems including atrial fibrillation, mitral regurgitation s

## 2018-11-19 NOTE — H&P
[facility] Hospitalist Team  History and Physical     ASSESSMENT / PLAN:   Roc Boyd a 80year old male with PMH sig for CAD s/p CABG with recent angio with AAMIR to circ and RCA, severe MR s/p mitral clip *3,  HTN, HL, afib s/p watchman (sig bleeding on c med     GERD, history of barretts  -cont PPI     Gout  -cont allopurinol    GOC  -pt is full code  -does not have POA  -per wife has refused to address these issues in past     FEN  -lytes in am  -IVF-stop  -diet-CLD    Prophy  -SCD  -no AC with bleeding h 110/45 (BP Location: Right arm)   Pulse 109   Temp 97.9 °F (36.6 °C) (Oral)   Resp 16   Ht 152.4 cm (5')   Wt 125 lb 4.8 oz (56.8 kg)   SpO2 96%   BMI 24.47 kg/m²     GENERAL: no apparent distress  NEUROLOGIC: A/A; Ox3: strength normal; sensations intact (EGD) N/A 4/10/2017    Performed by Agatha Betancur MD at [address] [place] [address] ENDOSCOPY   • ESOPHAGOGASTRODUODENOSCOPY (EGD) N/A 10/12/2016    Performed by Agatha Betancur MD at [address] [place] [address] ENDOSCOPY   • ESOPHAGOGASTRODUODENOSCOPY (EGD)  10/4/2016    Performed by Semaj Cortez Montelukast Sodium 10 MG Oral Tab Take 1 tablet (10 mg total) by mouth nightly. TAKE 1 TABLET BY MOUTH EVERY EVENING Disp: 90 tablet Rfl: 3   CARTIA  MG Oral Capsule SR 24 Hr TK ONE C PO  QD (Patient taking differently: Take 120 mg by mouth daily. Recent Labs   Lab  11/12/18   1353  11/18/18   1644   NA  136  134*   K  4.00  4.1   CL  100*  104   CO2  24.7  22   BUN  50*  43*   CREATSERUM  1.23  1.09   GLU  106*  134*   CA  9.4  8.9       No results for input(s): ALT, AST, ALB, AMYLASE, LIPASE who presents with a c/co of rectal pain, constipation, melena. Pt has had rectal pain intermittent, severe, comes and goes which is now improving s/p disimpaction. Was having only few bm PTA, now frequent bloody stool since enema/meds/disimpaction in ed.  Carl Khan mitral clip *3- last done 8/30/18,  HTN, HL, afib S/P Watchman (sig bleeding on coumadin/eliquis/aspirin), b/l carotid artery disease  -plavix/ASA for stent, will defer to cards/GI  -no AC for afib with hx GI bleed  -continued cartia 120 XL  -cont statin

## 2018-11-19 NOTE — PLAN OF CARE
Problem: Patient Centered Care  Goal: Patient preferences are identified and integrated in the patient's plan of care  Interventions:  - What would you like us to know as we care for you?  \"I don't care\"  - Provide timely, complete, and accurate informati injury  INTERVENTIONS:  - Assess pt frequently for physical needs  - Identify cognitive and physical deficits and behaviors that affect risk of falls.   - [place] fall precautions as indicated by assessment.  - Educate pt/family on patient safety includin Progressing      Comments: Patient received from ED. Oriented patient to room. Call light within reach. Updated patient and family on plan of care. Patient NPO for possible endoscopic evaluation. Vital signs stable. Bed alarm in place.

## 2018-11-19 NOTE — HISTORICAL OFFICE NOTE
Shawn Teague  : 1932  ACCOUNT:  [id number]  [phone]  PCP: Dr. Steph Hunter     TODAY'S DATE: 10/09/2018  DICTATED BY:  [Dr. Alexander Leisure: [Followup of .  CAD, of native vessels.]    HPI:    [On 10/09/2018, kimberlyn Gil and rectal fishers    PAST CV HISTORY: 1 vessel CABG 1992, CAD, cardiac catheterization 1992, [address], [address], cardiac catheterization 2002, EUGENIA, 10/2016, EUGENIA, 7/2018,, dyslipidemia, hypertension, Evangelina clip 8/18, PTCA/Stent August 2018 and Watchman d tolerating mild mitral stenosis well. LV function is intact. He has had recent two-vessel PCI. No active ischemia at this time. We discussed today the management of his weights and diuretics and written instructions were given.   The patient has an offi 10/13/17 Levothyroxine Sodium  150MCG    one daily                                10/13/17 Montelukast Sodium    10MG      hours of sleep                           10/13/17 Multivitamin Adult              one daily

## 2018-11-19 NOTE — ED NOTES
Rectal pain remains resolved after insertion of dawson catheter. Prior to administration of soap suds enema, dark tarry stool noted. Stool is heme + on guaiac - Dr Gina Gutierrez made aware. Tolerated soap suds enema well but no stool produced. Dara-area cleansed.

## 2018-11-19 NOTE — CONSULTS
REFERRING PHYSICIAN: Dr. Velarde ref. provider found    HPI:         Thank you very much for requesting me to see the patient. As you know, Marj Alonzo is a 80year old male who presents today with c/o lower abd/recta pain and multiple small dark stools. embolization. Patient had a repeat episode of black stools this morning with drop in hemoglobin needing transfusion. At present the patient is off any antiplatelet agents or any anticoagulants. EGD REPORT:  1.        Vocal cords normal.   2.       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 Normally follows with Dr Daisy Pretty  -Patient with melenic appearing stools, is on iron therapy. Occult blood positive. Hgb however is stable and above baseline.  Initia Resp 22   Ht 152.4 cm (5')   Wt 123 lb (55.8 kg)   SpO2 97%   BMI 24.02 kg/m²  -Body mass index is 24.02 kg/m². GENERAL: elderly male in NAD;   SKIN: no rashes, no suspicious lesions  HEENT: anicteric; no JVD.   NECK: supple, no adenopathy, no bruits  LUNG

## 2018-11-19 NOTE — CM/SW NOTE
SW attempted to meet w/ pt - staff was in room at this time. Pt was just d/c on 10/29. Per SW note at this time, pt lives at home w/ Life Partner, independent w/ ADL's and drives.  Note stated that pt had Home O2 through [employer] and had hx of [employer]

## 2018-11-19 NOTE — CONSULTS
[facility]    PATIENT'S NAME: Kelechi Rice   ATTENDING PHYSICIAN: Chad Ornelas DO   CONSULTING PHYSICIAN: Kary Odom.  Darron Christine MD   PATIENT ACCOUNT#:   [id number]    LOCATION:  [address] [place] [address] #:   [id number]       DATE OF BIRTH:  [birth year] bedtime. ALLERGIES:  No actual drug allergies. SOCIAL HISTORY:  He is a former smoker. No alcohol. Minimal caffeine. He is  and lives with a significant other. He has children retired.     PHYSICAL EXAMINATION:    GENERAL:  Well-developed,

## 2018-11-19 NOTE — PROGRESS NOTES
[facility]  Gastroenterology Progress Note    Jorge Arobert Stout Patient Status:  Inpatient    3/8/1932 MRN [id number]   Location [facility] 5SW/SE Attending Mony Mendoza, DO   Hosp Day # 1 PCP Kasey Giron MD     Subjective:  Felix Lee thoracolumbar vertebral body compression fractures, which appear similar in configuration to June, 2008. 11. Lesser incidental findings as above.   Dictated by (CST): Narayan Gillette MD on 11/18/2018 at 17:42     Approved by (CST): Narayan Gillette MD on 11/ colitis. He has had multiple endoscopic evaluations in past EGD last in 2017, colonoscopy was years before this.    -Discussed with patient and wife.  Given hgb drop, persistent dark stools, and now fecal impactions offered evaluation with EGD and colonosco

## 2018-11-20 ENCOUNTER — ANESTHESIA EVENT (OUTPATIENT)
Dept: ENDOSCOPY | Facility: HOSPITAL | Age: 83
DRG: 388 | End: 2018-11-20
Payer: MEDICARE

## 2018-11-20 ENCOUNTER — ANESTHESIA (OUTPATIENT)
Dept: ENDOSCOPY | Facility: HOSPITAL | Age: 83
DRG: 388 | End: 2018-11-20
Payer: MEDICARE

## 2018-11-20 PROCEDURE — 88305 TISSUE EXAM BY PATHOLOGIST: CPT | Performed by: INTERNAL MEDICINE

## 2018-11-20 PROCEDURE — 85018 HEMOGLOBIN: CPT | Performed by: HOSPITALIST

## 2018-11-20 PROCEDURE — 93005 ELECTROCARDIOGRAM TRACING: CPT

## 2018-11-20 PROCEDURE — 0DJD8ZZ INSPECTION OF LOWER INTESTINAL TRACT, VIA NATURAL OR ARTIFICIAL OPENING ENDOSCOPIC: ICD-10-PCS | Performed by: INTERNAL MEDICINE

## 2018-11-20 PROCEDURE — 80048 BASIC METABOLIC PNL TOTAL CA: CPT | Performed by: NURSE PRACTITIONER

## 2018-11-20 PROCEDURE — 88312 SPECIAL STAINS GROUP 1: CPT | Performed by: INTERNAL MEDICINE

## 2018-11-20 PROCEDURE — A4216 STERILE WATER/SALINE, 10 ML: HCPCS | Performed by: HOSPITALIST

## 2018-11-20 PROCEDURE — 93010 ELECTROCARDIOGRAM REPORT: CPT | Performed by: NURSE PRACTITIONER

## 2018-11-20 PROCEDURE — 0DB98ZX EXCISION OF DUODENUM, VIA NATURAL OR ARTIFICIAL OPENING ENDOSCOPIC, DIAGNOSTIC: ICD-10-PCS | Performed by: INTERNAL MEDICINE

## 2018-11-20 PROCEDURE — 85014 HEMATOCRIT: CPT | Performed by: HOSPITALIST

## 2018-11-20 PROCEDURE — 85025 COMPLETE CBC W/AUTO DIFF WBC: CPT | Performed by: NURSE PRACTITIONER

## 2018-11-20 PROCEDURE — C9113 INJ PANTOPRAZOLE SODIUM, VIA: HCPCS | Performed by: HOSPITALIST

## 2018-11-20 RX ORDER — METOCLOPRAMIDE HYDROCHLORIDE 5 MG/ML
5 INJECTION INTRAMUSCULAR; INTRAVENOUS EVERY 8 HOURS PRN
Status: DISCONTINUED | OUTPATIENT
Start: 2018-11-20 | End: 2018-11-27

## 2018-11-20 RX ORDER — PHENYLEPHRINE HCL 10 MG/ML
VIAL (ML) INJECTION AS NEEDED
Status: DISCONTINUED | OUTPATIENT
Start: 2018-11-20 | End: 2018-11-20 | Stop reason: SURG

## 2018-11-20 RX ORDER — SODIUM CHLORIDE, SODIUM LACTATE, POTASSIUM CHLORIDE, CALCIUM CHLORIDE 600; 310; 30; 20 MG/100ML; MG/100ML; MG/100ML; MG/100ML
INJECTION, SOLUTION INTRAVENOUS CONTINUOUS PRN
Status: DISCONTINUED | OUTPATIENT
Start: 2018-11-20 | End: 2018-11-20 | Stop reason: SURG

## 2018-11-20 RX ORDER — SODIUM CHLORIDE 9 MG/ML
INJECTION, SOLUTION INTRAVENOUS CONTINUOUS
Status: DISCONTINUED | OUTPATIENT
Start: 2018-11-20 | End: 2018-11-21

## 2018-11-20 RX ADMIN — SODIUM CHLORIDE, SODIUM LACTATE, POTASSIUM CHLORIDE, CALCIUM CHLORIDE: 600; 310; 30; 20 INJECTION, SOLUTION INTRAVENOUS at 17:12:00

## 2018-11-20 RX ADMIN — PHENYLEPHRINE HCL 100 MCG: 10 MG/ML VIAL (ML) INJECTION at 17:23:00

## 2018-11-20 RX ADMIN — PHENYLEPHRINE HCL 100 MCG: 10 MG/ML VIAL (ML) INJECTION at 17:26:00

## 2018-11-20 NOTE — OPERATIVE REPORT
ESOPHAGOGASTRODUODENOSCOPY AND COLONOSCOPY REPORT    Patient Name:  Stewart Xiong Record #: [id number]  YOB: 1932  Date of Procedure: 11/20/2018    Referring physician: Rita Bravo MD    Surgeon:  Charo Montaño MD    Pre-op d Withdrawal time was not applicable    Findings: The overall prep was poor. There was solid stool in the colon starting in the rectum. Stool was dark in color. There was no persistent impaction. Remainder of the stool was lavaged extensively .  Able to tra

## 2018-11-20 NOTE — PLAN OF CARE
Patient transported by bed cart for procedure. Family present at bedside. Vital signs taken and stable.

## 2018-11-20 NOTE — ANESTHESIA POSTPROCEDURE EVALUATION
Patient: Tasha Bowling    Procedure Summary     Date:  11/20/18 Room / Location:  [address] [place] [address] ENDOSCOPY 05 / [address] [place] [address] ENDOSCOPY    Anesthesia Start:  2079 Anesthesia Stop:  8380    Procedures:       ESOPHAGOGASTRODUODENOSCOPY (EGD) (N/A )      FLEXIBLE SIGMOIDOSCOPY

## 2018-11-20 NOTE — PROGRESS NOTES
[facility] Note:  Renal Dose Adjustment for Metoclopramide (REGLAN)    Ana Dunham has been prescribed Metoclopramide (REGLAN) 10 mg every 8 hours as needed for n/v.    Estimated Creatinine Clearance: 37.5 mL/min (based on SCr of 1 mg/dL).     His ander

## 2018-11-20 NOTE — H&P
PRE-PROCEDURE UPDATE    HPI: Kassy Waldron is a 80year old male. 3/8/1932. Patient presents for a colonoscopy and gastroscopy.  See consult note, patient with recurrent anemia, heme positive and melenic appearing stools, Also with recurrent fecal impa ESOPHAGOGASTRODUODENOSCOPY (EGD)  10/4/2016    Performed by Alyx Aguilar MD at [address] [place] [address] N/A 8/31/2018    Performed by Stephania Ritter MD at [address] [address] N/A 7/29/2016    Performed by Stephania Ritter MD at [address]

## 2018-11-20 NOTE — PLAN OF CARE
Patient informed he needs to drink more Golytely as he will be npo after 0800 today-patient states he is not drinking anymore and he states \"I don't care\" when informed it is important for the procedure scheduled today.   Patient is not very pleasant and

## 2018-11-20 NOTE — PLAN OF CARE
Problem: PAIN - ADULT  Goal: Verbalizes/displays adequate comfort level or patient's stated pain goal  INTERVENTIONS:  - Encourage pt to monitor pain and request assistance  - Assess pain using appropriate pain scale  - Administer analgesics based on type factors contributing to decreased intake, treat as appropriate  - Assist with meals as needed  - Monitor I&O, WT and lab values  - Obtain nutritional consult as needed  - Optimize oral hygiene and moisture  - Encourage food from home; allow for food prefer

## 2018-11-20 NOTE — PROGRESS NOTES
[facility] Hospitalist Team  Progress Note    Eufemia Delong Patient Status:  Inpatient    3/8/1932 MRN [id number]   Location [facility] 5SW/SE Attending Felipe Arora, DO   Hosp Day # 2 PCP Claudia Minaya MD     CC: Follow Up  PCP: Claudia Minaya MD disease  -plavix/ASA for stent, will defer to cards/GI  -no AC for afib with hx GI bleed  -continued cartia 120 XL  -cont statin   -follow with      COPD, chronic Hypoxemic Respiratory failure 2-3L  -continue home O2  -continue home inhalers     Hypothyroi 134*  125*       No results for input(s): ALT, AST, ALB, AMYLASE, LIPASE, LDH in the last 168 hours. Invalid input(s): ALPHOS, TBIL, DBIL, TPROT    No results for input(s): PGLU in the last 168 hours.     No results for input(s): TROP in the last 168 george Cholelithiasis. No CT evidence of acute cholecystitis.  7. Moderate fat containing supraumbilical ventral abdominal wall hernia located approximately 12 cm cephalad to the umbilicus with neck measuring approximately 1 cm. 8. Urinary bladder is decompressed stool with hs of GI bleeding hs of bleeding duodenal ulcer, hs AVM's  - + occult stool  -baseline hgb 10-11, admission hgb 9.8, now 8.9->8.8  -continue ASA and plavix, unable to hold per cards still within 90 days stent  -continue iron per wife going to tr

## 2018-11-20 NOTE — ANESTHESIA PREPROCEDURE EVALUATION
Anesthesia PreOp Note    HPI:     Ashu Jacobson is a 80year old male who presents for preoperative consultation requested by: Yisroel Dubin, MD    Date of Surgery: 11/18/2018 - 11/20/2018    Procedure(s):  COLONOSCOPY  ESOPHAGOGASTRODUODENOSCOPY (EGD) Noted: 02/14/2018      Pulmonary hypertension ([facility] [address]         Date Noted: 02/14/2018      Right to left cardiac shunt [facility])         Date Noted: 02/14/2018      Hypothyroidism, unspecified         Date Noted: 01/10/2018      History of gastrointestinal bleedi Past Surgical History:   Procedure Laterality Date   • CABG     • CATARACT     • CORONARY STENT PLACEMENT     • ESOPHAGOGASTRODUODENOSCOPY (EGD) N/A 4/10/2017    Performed by Simran Roy MD at [facility] ENDOSCOPY   • ESOPHAGOGASTRODUODENOSCOPY (EGD) N mEq total) by mouth daily. Disp:  Rfl: 0 11/18/2018 at Unknown time   Montelukast Sodium 10 MG Oral Tab Take 1 tablet (10 mg total) by mouth nightly.  TAKE 1 TABLET BY MOUTH EVERY EVENING Disp: 90 tablet Rfl: 3 11/17/2018 at Unknown time   CARTIA  MG PRN Valeria Jones DO    Alfuzosin HCl ER (UROXATRAL) 24 hr tab 10 mg 10 mg Oral Daily with breakfast Kelly Meehan NP 10 mg at 11/20/18 0913   aspirin EC tab 81 mg 81 mg Oral Daily Dorothea Swain NP 81 mg at 11/20/18 0913   Clopidogrel Bisulfate (PLAVI Marital status:        Spouse name: Not on file      Number of children: Not on file      Years of education: Not on file      Highest education level: Not on file    Social Needs      Financial resource strain: Not on file      Food insecurity - wor (5') and weight is 56.8 kg (125 lb 4.8 oz). His oral temperature is 97.8 °F (36.6 °C). His blood pressure is 109/60 and his pulse is 84. His respiration is 19 and oxygen saturation is 98%.     11/20/18  1130 11/20/18  1416 11/20/18  1533 11/20/18  1559   BP

## 2018-11-20 NOTE — PROGRESS NOTES
[facility] Heart Cardiology Progress Note      Mahendra Bahena Patient Status:  Inpatient    3/8/1932 MRN [id number]   Location [facility] 5SW/SE Attending Tamia Jimenez, [address] Day # 2 PCP Nikki Willett MD Encounters:  11/18/18 : 125 lb 4.8 oz (56.8 kg)  11/15/18 : 126 lb (57.2 kg)  11/12/18 : 127 lb 12.8 oz (58 kg)  11/01/18 : 131 lb 8 oz (59.6 kg)  10/29/18 : 128 lb 9.6 oz (58.3 kg)  10/24/18 : 125 lb 9.6 oz (57 kg)       Exam  Gen: No acute distress, aler Component Value Date    AST 30 10/27/2018    ALT 22 10/27/2018    PTT 91.0 (H) 08/31/2018    INR 1.2 10/07/2018    PT 31.8 (H) 09/26/2016    T4F 1.4 06/06/2016    TSH 1.01 10/27/2018    LIP 15 (L) 10/07/2018    DDIMER 0.69 06/01/2018    ESRML 77 (H) 06/0

## 2018-11-20 NOTE — PLAN OF CARE
Alex Hemphill  is a 28 y. 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 She took 4 weeks of fluconazole and was doing fine She had a graft placed on the rt arm on 1/19/19 - She developed fever and abdominal pain on 3/6 and blood culture sent from the catheter at dialysis center- The catheter was removed on 3/7 and the graft was accessed last week-   She was given IV gent  at the dialysis center.  As the abdominal pain was getting worse she came to the ED on 3/11 and was admitted  CT abdomen showed Large amount of free intraperitoneal fluid that is  loculated.         Multiple hospitalizations in the past few months  OCt 14-17 /2017 for left lower lobe pneumonia/effusion- was found to have PE on the rt side  10/27-10/30 possible pneumonia- same infiltrate left lower lobe- sent on augmentin     11/26-/11/28 for b/l leg swelling and abdominal swelling     12/12/17-12/22/17- fungal peritonitis- treated with fluconazole for 4 weeks     She had  peritoneal dialysis since 2015- had cath placed in 2015 until it was removed in Dec 2017      subjective  Doing okay  Objective:   VITALS:    Patient Vitals for the past 12 hrs:   Temp Pulse Resp BP SpO2   03/30/18 0904 99.1 °F (37.3 °C) (!) 102 14 (!) 142/107 100 %   03/30/18 0721 - - - - 99 %   03/30/18 0256 98.5 °F (36.9 °C) 95 16 (!) 126/94 98 %   PHYSICAL EXAM:   General:                   no distress  Lungs:                       B/l air entry     Heart:                                  s1s2  Abdomen:                  Drain - reddish fluid  Tender lower abdomen  Extremities:               Rt arm graft   Arm swollen  Rt breast edematous  Lymph:                      Cervical, supraclavicular normal.  Neurologic: Problem: GASTROINTESTINAL - ADULT  Goal: Maintains or returns to baseline bowel function  INTERVENTIONS:  - Assess bowel function  - Maintain adequate hydration with IV or PO as ordered and tolerated  - Evaluate effectiveness of GI medications  - Encourage Grossly non-focal     Pertinent Labs  Wbc3.6   Hb 7.9      Peritoneal fluid- 3524 WBC ( 85% N)  E.coli 3/18  3/28 fluid culture Ng so far     IMAGING RESULTS:             Impression/Recommendation    32 yr female with SLE/ lupus nephritis/HTN -h/o peritoneal dialysis until Dec 2017 /Candida peritonitis in Dec 2017/ treated with 4 weeks of fluconazole     Admitted with abdominal pain and fever of  5 days     E.coli  peritonitis with e.coli bacteremia  Loculated peritoneal fluid- has a drain- repeat imaging from 3/17 /3/23 and 3/30 still shows a collection but significantly reduced- -  still draining 175  cc ./day-Not sure of the end point of when to remove the drain- may have to continue until it all drains  IR or surgery does not want to intervene   The fluid sent for culture on 3/28 is neg   already received 3 weeks of antibiotic- can continue for another week ( if plans for discharge then cefepime 2 grams IV during dialysis days until 4/6/18  After that nephrology will have to observe closely for  recurrence    SLE- on plaquenil and low dose prednisone- Imuran on hold    ID will follow peripherally this weekend-   Call  if needed

## 2018-11-20 NOTE — PLAN OF CARE
Patient strongly encouraged to drink Golytely as ordered for Colonoscopy in AM--patient states he'll drink at his own rate and states he does not have to drink the entire 4L-he states Ottoniel Peres told him he doesn't have to.   Patietn very argumentative, mathias

## 2018-11-21 ENCOUNTER — APPOINTMENT (OUTPATIENT)
Dept: CARDIAC REHAB | Facility: HOSPITAL | Age: 83
End: 2018-11-21
Attending: INTERNAL MEDICINE
Payer: MEDICARE

## 2018-11-21 PROCEDURE — 82728 ASSAY OF FERRITIN: CPT | Performed by: INTERNAL MEDICINE

## 2018-11-21 PROCEDURE — 84466 ASSAY OF TRANSFERRIN: CPT | Performed by: INTERNAL MEDICINE

## 2018-11-21 PROCEDURE — 83540 ASSAY OF IRON: CPT | Performed by: INTERNAL MEDICINE

## 2018-11-21 PROCEDURE — 83735 ASSAY OF MAGNESIUM: CPT | Performed by: INTERNAL MEDICINE

## 2018-11-21 PROCEDURE — 85025 COMPLETE CBC W/AUTO DIFF WBC: CPT | Performed by: NURSE PRACTITIONER

## 2018-11-21 PROCEDURE — 85014 HEMATOCRIT: CPT | Performed by: HOSPITALIST

## 2018-11-21 PROCEDURE — A4216 STERILE WATER/SALINE, 10 ML: HCPCS | Performed by: HOSPITALIST

## 2018-11-21 PROCEDURE — 80048 BASIC METABOLIC PNL TOTAL CA: CPT | Performed by: NURSE PRACTITIONER

## 2018-11-21 PROCEDURE — 85018 HEMOGLOBIN: CPT | Performed by: HOSPITALIST

## 2018-11-21 PROCEDURE — C9113 INJ PANTOPRAZOLE SODIUM, VIA: HCPCS | Performed by: HOSPITALIST

## 2018-11-21 RX ORDER — SENNA AND DOCUSATE SODIUM 50; 8.6 MG/1; MG/1
2 TABLET, FILM COATED ORAL DAILY
Status: DISCONTINUED | OUTPATIENT
Start: 2018-11-21 | End: 2018-11-23

## 2018-11-21 RX ORDER — FUROSEMIDE 10 MG/ML
20 INJECTION INTRAMUSCULAR; INTRAVENOUS ONCE
Status: COMPLETED | OUTPATIENT
Start: 2018-11-21 | End: 2018-11-21

## 2018-11-21 RX ORDER — POLYETHYLENE GLYCOL 3350 17 G/17G
17 POWDER, FOR SOLUTION ORAL DAILY
Status: DISCONTINUED | OUTPATIENT
Start: 2018-11-21 | End: 2018-11-27

## 2018-11-21 NOTE — PROGRESS NOTES
[facility]  Gastroenterology Progress Note    Roro Muñoz Patient Status:  Inpatient    3/8/1932 MRN [id number]   Location [facility] 5SW/SE Attending Ella Mcmanus MD   Hosp Day # 3 PCP Diana Moreno MD     Subjective:  Dorian Rubinstein ejection fraction (HCC)     Acute respiratory failure, unspecified whether with hypoxia or hypercapnia (HCC)     S/P mitral valve clip implantation     Heart failure, systolic and diastolic, chronic (HCC)     Acute chest pain     Syncope, near     Weakness

## 2018-11-21 NOTE — PLAN OF CARE
Problem: Patient Centered Care  Goal: Patient preferences are identified and integrated in the patient's plan of care  Interventions:  - What would you like us to know as we care for you?  \"I don't care\"  - Provide timely, complete, and accurate informati for physical needs  - Identify cognitive and physical deficits and behaviors that affect risk of falls.   - [place] fall precautions as indicated by assessment.  - Educate pt/family on patient safety including physical limitations  - Instruct pt to call f hydration with IV or PO as ordered and tolerated  - Evaluate effectiveness of GI medications  - Encourage mobilization and activity  - Obtain nutritional consult as needed  - Establish a toileting routine/schedule     Outcome: Progressing    Goal: Maintain

## 2018-11-21 NOTE — PLAN OF CARE
Problem: Patient Centered Care  Goal: Patient preferences are identified and integrated in the patient's plan of care  Interventions:  - What would you like us to know as we care for you?  \"I don't care\"  - Provide timely, complete, and accurate informati for physical needs  - Identify cognitive and physical deficits and behaviors that affect risk of falls.   - [place] fall precautions as indicated by assessment.  - Educate pt/family on patient safety including physical limitations  - Instruct pt to call f hydration with IV or PO as ordered and tolerated  - Evaluate effectiveness of GI medications  - Encourage mobilization and activity  - Obtain nutritional consult as needed  - Establish a toileting routine/schedule     Outcome: Progressing    Goal: Maintain

## 2018-11-21 NOTE — PROGRESS NOTES
[facility] Hospitalist Team  Progress Note    Acey Splinter Patient Status:  Inpatient    3/8/1932 MRN [id number]   Location [facility] 5SW/SE Attending [address] Day # 3 PCP Polly Ruiz MD     CC: Follow Up  PCP: Polly Ruiz MD Watchman (sig bleeding on coumadin/eliquis/aspirin), b/l carotid artery disease  -plavix/ASA for stent, will defer to cards/GI  -no AC for afib with hx GI bleed  -continued cartia 120 XL  -cont statin   -follow with Dr. Yogesh Zamora     COPD, chronic Hypoxemic R this interval not displayed.        Recent Labs   Lab  11/18/18   1644  11/20/18   0753  11/21/18   0825   NA  134*  135*  135*   K  4.1  4.0  3.8   CL  104  102  103   CO2  22  26  26   BUN  43*  18  13   CREATSERUM  1.09  1.00  0.92   CA  8.9  8.3*  8.5 impaction in the rectum. Superimposed findings suggesting stercoral colitis/proctitis. 2. No evidence of bowel obstruction, perforated viscus, or drainable intra-abdominal fluid collection. 3. Mild colonic diverticulosis.  No CT evidence of acute diverticul

## 2018-11-21 NOTE — PROGRESS NOTES
[facility] Hospitalist Team  Progress Note    Tara Corona Patient Status:  Inpatient    3/8/1932 MRN [id number]   Location [facility] 5SW/SE Attending [address] Day # 3 PCP Indira Ahumada MD     CC: Follow Up  PCP: Indira Ahumada MD Watchman (sig bleeding on coumadin/eliquis/aspirin), b/l carotid artery disease  -plavix/ASA for stent, will defer to cards/GI  -no AC for afib with hx GI bleed  -continued cartia 120 XL  -cont statin   -follow with Dr. Shannon Wilson     COPD, chronic Hypoxemic R this interval not displayed.        Recent Labs   Lab  11/18/18   1644  11/20/18   0753  11/21/18   0825   NA  134*  135*  135*   K  4.1  4.0  3.8   CL  104  102  103   CO2  22  26  26   BUN  43*  18  13   CREATSERUM  1.09  1.00  0.92   CA  8.9  8.3*  8.5 No evidence of bowel obstruction, perforated viscus, or drainable intra-abdominal fluid collection. 3. Mild colonic diverticulosis. No CT evidence of acute diverticulitis. 4. Small hiatal hernia.  Findings that can be seen with gastroesophageal reflux disea

## 2018-11-21 NOTE — PROGRESS NOTES
[facility] HOSP - [facility]    Cardiology Progress Note    Nuala Shown Patient Status:  Inpatient    3/8/1932 MRN [id number]   Location [facility] 5SW/SE Attending Liban Mckenzie MD   Hosp Day # 3 PCP Jeronimo Livingston MD       CARDIOLOGY ATTENDI Plan:   Fecal impaction   -s/p colonoscopy 11/20 with inadequate bowel prep, diverticulosis, hemorrhoids  -bowel regimen per GI  -stable Hgb    Atrial Fibrillation  -controlled rates on diltiazem 120mg daily  -Has watchman device    CAD s/p CABG S/P AAMIR CX ROVERTO Brooks  S Cardiology  11/21/18

## 2018-11-22 PROCEDURE — C9113 INJ PANTOPRAZOLE SODIUM, VIA: HCPCS | Performed by: HOSPITALIST

## 2018-11-22 PROCEDURE — 82607 VITAMIN B-12: CPT | Performed by: INTERNAL MEDICINE

## 2018-11-22 PROCEDURE — 93010 ELECTROCARDIOGRAM REPORT: CPT | Performed by: INTERNAL MEDICINE

## 2018-11-22 PROCEDURE — 82746 ASSAY OF FOLIC ACID SERUM: CPT | Performed by: INTERNAL MEDICINE

## 2018-11-22 PROCEDURE — 97162 PT EVAL MOD COMPLEX 30 MIN: CPT

## 2018-11-22 PROCEDURE — 93005 ELECTROCARDIOGRAM TRACING: CPT

## 2018-11-22 PROCEDURE — 97165 OT EVAL LOW COMPLEX 30 MIN: CPT

## 2018-11-22 PROCEDURE — 85025 COMPLETE CBC W/AUTO DIFF WBC: CPT | Performed by: NURSE PRACTITIONER

## 2018-11-22 PROCEDURE — A4216 STERILE WATER/SALINE, 10 ML: HCPCS | Performed by: HOSPITALIST

## 2018-11-22 PROCEDURE — 80048 BASIC METABOLIC PNL TOTAL CA: CPT | Performed by: NURSE PRACTITIONER

## 2018-11-22 PROCEDURE — 84443 ASSAY THYROID STIM HORMONE: CPT | Performed by: INTERNAL MEDICINE

## 2018-11-22 PROCEDURE — 97116 GAIT TRAINING THERAPY: CPT

## 2018-11-22 PROCEDURE — 83735 ASSAY OF MAGNESIUM: CPT | Performed by: INTERNAL MEDICINE

## 2018-11-22 PROCEDURE — 97535 SELF CARE MNGMENT TRAINING: CPT

## 2018-11-22 RX ORDER — PANTOPRAZOLE SODIUM 40 MG/1
40 TABLET, DELAYED RELEASE ORAL
Status: DISCONTINUED | OUTPATIENT
Start: 2018-11-22 | End: 2018-11-27

## 2018-11-22 RX ORDER — DILTIAZEM HYDROCHLORIDE 120 MG/1
120 CAPSULE, COATED, EXTENDED RELEASE ORAL DAILY
Status: DISCONTINUED | OUTPATIENT
Start: 2018-11-22 | End: 2018-11-27

## 2018-11-22 RX ORDER — POTASSIUM CHLORIDE 14.9 MG/ML
20 INJECTION INTRAVENOUS ONCE
Status: COMPLETED | OUTPATIENT
Start: 2018-11-22 | End: 2018-11-22

## 2018-11-22 RX ORDER — SODIUM CHLORIDE 450 MG/100ML
INJECTION, SOLUTION INTRAVENOUS CONTINUOUS
Status: DISPENSED | OUTPATIENT
Start: 2018-11-22 | End: 2018-11-22

## 2018-11-22 NOTE — PHYSICAL THERAPY NOTE
PHYSICAL THERAPY EVALUATION - INPATIENT     Room Number: 535/535-A  Evaluation Date: 11/22/2018  Type of Evaluation: Initial   Physician Order: PT Eval and Treat    Presenting Problem: fecal impaction in rectum  Reason for Therapy: Mobility Dysfunction an days. Will continue working with patient while he is here in the hospital. Will re-assess tomorrow. DISCHARGE RECOMMENDATIONS  PT Discharge Recommendations: Home with home health PT;24 hour care/supervision    PLAN  PT Treatment Plan: Bed mobility; Tra • Gout    • Heart attack ([facility] [address]    • High blood pressure    • High cholesterol    • History of gastrointestinal bleeding 1/10/2018   • Hx of CABG 2/14/2018   • Hypothyroid    • Hypothyroidism, unspecified 1/10/2018   • Kyphosis of thoracic region 2/14/201 at home. Pt is able to remain on the main level. SUBJECTIVE  \"Don't help me. \"    PHYSICAL THERAPY EXAMINATION     OBJECTIVE     Fall Risk: Standard fall risk      COGNITION  · Following Commands:  follows multistep commands consistently  · Safety Judg Status NT   Goal #2 Patient is able to demonstrate transfers Sit to/from Stand at assistance level: modified independent with walker - rolling     Goal #2  Current Status Supervision   Goal #3 Patient is able to ambulate 150 feet with assist device: walker

## 2018-11-22 NOTE — PROGRESS NOTES
[facility]  Gastroenterology Progress Note    Peneloperaffi Valadezdereck Patient Status:  Inpatient    3/8/1932 MRN [id number]   Location [facility] 5SW/SE Attending Alex Hare MD   Hosp Day # 4 PCP Delaney uLu MD     Subjective:  Tj Kaur preserved ejection fraction (HCC)     Acute respiratory failure, unspecified whether with hypoxia or hypercapnia (HCC)     S/P mitral valve clip implantation     Heart failure, systolic and diastolic, chronic (HCC)     Acute chest pain     Syncope, near

## 2018-11-22 NOTE — PLAN OF CARE
Problem: Patient Centered Care  Goal: Patient preferences are identified and integrated in the patient's plan of care  Interventions:  - What would you like us to know as we care for you?  \"I don't care\"  - Provide timely, complete, and accurate informati for physical needs  - Identify cognitive and physical deficits and behaviors that affect risk of falls.   - [place] fall precautions as indicated by assessment.  - Educate pt/family on patient safety including physical limitations  - Instruct pt to call f hydration with IV or PO as ordered and tolerated  - Evaluate effectiveness of GI medications  - Encourage mobilization and activity  - Obtain nutritional consult as needed  - Establish a toileting routine/schedule     Outcome: Progressing    Goal: Maintain

## 2018-11-22 NOTE — PLAN OF CARE
Phone call was received from Telemetry reporting that patient heart rate increased to 140's to 150's. Patient had 4 beats of V-tach. Patient was immediately assessed, and patient was sitting up at bedside spitting up in emesis basin at current time.  No res

## 2018-11-22 NOTE — OCCUPATIONAL THERAPY NOTE
OCCUPATIONAL THERAPY EVALUATION - INPATIENT     Room Number: 535/535-A  Evaluation Date: 11/22/2018  Type of Evaluation: Initial  Presenting Problem: fecal impaction.     Physician Order: IP Consult to Occupational Therapy  Reason for Therapy: ADL/IADL Dysf Problem:    Fecal impaction in rectum [facility])  Active Problems:    Urinary retention    Gastrointestinal hemorrhage, unspecified gastrointestinal hemorrhage type      Past Medical History  Past Medical History:   Diagnosis Date   • Abdominal aortic atheroscl 8/31/2018    Performed by Savanna Jane MD at [facility] [address] N/A 7/29/2016    Performed by Savanna Jane MD at [facility] device   • REPAIR  ANAL FISTULA,RECT ADV FLAP     • [address] teeth?: None  -   Eating meals?: None    AM-PAC Score:  Score: 19  Approx Degree of Impairment: 42.8%  Standardized Score (AM-PAC Scale): 40.22  CMS Modifier (G-Code): CK    FUNCTIONAL TRANSFER ASSESSMENT  Supine to Sit : Not tested(sitting EOB)  Sit to [facility]

## 2018-11-23 ENCOUNTER — APPOINTMENT (OUTPATIENT)
Dept: GENERAL RADIOLOGY | Facility: HOSPITAL | Age: 83
DRG: 388 | End: 2018-11-23
Attending: INTERNAL MEDICINE
Payer: MEDICARE

## 2018-11-23 PROCEDURE — 71045 X-RAY EXAM CHEST 1 VIEW: CPT | Performed by: INTERNAL MEDICINE

## 2018-11-23 PROCEDURE — A4216 STERILE WATER/SALINE, 10 ML: HCPCS | Performed by: HOSPITALIST

## 2018-11-23 PROCEDURE — 80048 BASIC METABOLIC PNL TOTAL CA: CPT | Performed by: INTERNAL MEDICINE

## 2018-11-23 PROCEDURE — 82570 ASSAY OF URINE CREATININE: CPT | Performed by: INTERNAL MEDICINE

## 2018-11-23 PROCEDURE — 85027 COMPLETE CBC AUTOMATED: CPT | Performed by: INTERNAL MEDICINE

## 2018-11-23 PROCEDURE — 97535 SELF CARE MNGMENT TRAINING: CPT

## 2018-11-23 PROCEDURE — 97530 THERAPEUTIC ACTIVITIES: CPT

## 2018-11-23 PROCEDURE — 83735 ASSAY OF MAGNESIUM: CPT | Performed by: INTERNAL MEDICINE

## 2018-11-23 PROCEDURE — 84300 ASSAY OF URINE SODIUM: CPT | Performed by: INTERNAL MEDICINE

## 2018-11-23 PROCEDURE — 83935 ASSAY OF URINE OSMOLALITY: CPT | Performed by: INTERNAL MEDICINE

## 2018-11-23 PROCEDURE — 97116 GAIT TRAINING THERAPY: CPT

## 2018-11-23 RX ORDER — FUROSEMIDE 10 MG/ML
20 INJECTION INTRAMUSCULAR; INTRAVENOUS ONCE
Status: COMPLETED | OUTPATIENT
Start: 2018-11-23 | End: 2018-11-23

## 2018-11-23 RX ORDER — ECHINACEA PURPUREA EXTRACT 125 MG
1 TABLET ORAL
Status: DISCONTINUED | OUTPATIENT
Start: 2018-11-23 | End: 2018-11-27

## 2018-11-23 RX ORDER — SENNA AND DOCUSATE SODIUM 50; 8.6 MG/1; MG/1
2 TABLET, FILM COATED ORAL
Status: DISCONTINUED | OUTPATIENT
Start: 2018-11-23 | End: 2018-11-27

## 2018-11-23 RX ORDER — MAGNESIUM OXIDE 400 MG (241.3 MG MAGNESIUM) TABLET
400 TABLET ONCE
Status: COMPLETED | OUTPATIENT
Start: 2018-11-23 | End: 2018-11-23

## 2018-11-23 NOTE — PLAN OF CARE
Problem: Patient Centered Care  Goal: Patient preferences are identified and integrated in the patient's plan of care  Interventions:  - What would you like us to know as we care for you?  \"I don't care\"  - Provide timely, complete, and accurate informati injury  INTERVENTIONS:  - Assess pt frequently for physical needs  - Identify cognitive and physical deficits and behaviors that affect risk of falls.   - Ray fall precautions as indicated by assessment.  - Educate pt/family on patient safety includin Progressing    Goal: Maintains or returns to baseline bowel function  INTERVENTIONS:  - Assess bowel function  - Maintain adequate hydration with IV or PO as ordered and tolerated  - Evaluate effectiveness of GI medications  - Encourage mobilization and ac

## 2018-11-23 NOTE — CM/SW NOTE
ARJUN received MD order for [facility] AT [facility]. SW spoke w/ Precious from [facility] and made referral, due to pt having a hx w/ them.      Julio Jerome

## 2018-11-23 NOTE — PROGRESS NOTES
[facility] Hospitalist Team  Progress Note    Yvon Dejesus Patient Status:  Inpatient    3/8/1932 MRN [id number]   Location [facility] 5SW/SE Attending [address] Day # 4 PCP Shiv Reyes MD     CC: Follow Up  PCP: Shiv Reyes MD to cards/GI  -no AC for afib with hx GI bleed  -continued cartia 120 XL  -cont statin   -follow with Dr. Darron Christine     COPD, chronic Hypoxemic Respiratory failure 2-3L  -continue home O2  -continue home inhalers     Hypothyroidism  -cont home med     GERD, hi 8. 6*  8.5*  8.7*  8.8*  8.3*   MCV  100.5*   --   100.4*   --    --   100.8*  100.0   PLT  239   --   160   --    --   138*  126*   BAND  13*   --    --    --    --    --    --     < > = values in this interval not displayed.        Recent Labs   Lab  11/18 Levothyroxine Sodium (SYNTHROID) tab 150 mcg 150 mcg Oral Before breakfast   Montelukast Sodium (SINGULAIR) tab 10 mg 10 mg Oral Nightly   atorvastatin (LIPITOR) tab 20 mg 20 mg Oral Nightly   umeclidinium-vilanterol (ANORO ELLIPTA) 62.5-25 MCG/INH inhal

## 2018-11-23 NOTE — PROGRESS NOTES
[facility]  Gastroenterology Progress Note    [facility] Patient Status:  Inpatient    3/8/1932 MRN [id number]   Location [facility] 5SW/SE Attending Ry Whitaker MD   Hosp Day # 5 PCP Latoya Soriano MD     Subjective:  Chaya Dodson failure, unspecified whether with hypoxia or hypercapnia (HCC)     S/P mitral valve clip implantation     Heart failure, systolic and diastolic, chronic ([facility])     Acute chest pain     Syncope, near     Weakness generalized     Fecal impaction in rectum (HC

## 2018-11-23 NOTE — HOME CARE LIAISON
Met with patient and family at the bedside. Patient is agreeable to [facility]. Brochure and liaison's card provided with contact information. All questions addressed and answered.

## 2018-11-23 NOTE — PLAN OF CARE
Problem: Patient Centered Care  Goal: Patient preferences are identified and integrated in the patient's plan of care  Interventions:  - What would you like us to know as we care for you?  \"I don't care\"  - Provide timely, complete, and accurate informati Anticipate increased pain with activity and pre-medicate as appropriate  Outcome: Progressing  Pain level is assess using pain scale from 1 to 10. Patient is medicated as needed for pain or discomfort.      Problem: SAFETY ADULT - FALL  Goal: Free from fall system  Outcome: Progressing      Problem: GASTROINTESTINAL - ADULT  Goal: Minimal or absence of nausea and vomiting  INTERVENTIONS:  - Maintain adequate hydration with IV or PO as ordered and tolerated  - Evaluate effectiveness of ordered antiemetic medic level of mobility/gait  Interventions:  - Assess patient's functional ability and stability  - Promote increasing activity/tolerance for mobility and gait  - Educate and engage patient/family in tolerated activity level and precautions  - Recommend use of

## 2018-11-23 NOTE — PROGRESS NOTES
[facility] Heart Cardiology Progress Note      Daniela Morales Patient Status:  Inpatient    3/8/1932 MRN [id number]   Location [facility] 5SW/SE Attending Delroy Eric MD   Hosp Day # 5 PCP Deon Shepherd MD kg)  11/15/18 : 126 lb (57.2 kg)  11/12/18 : 127 lb 12.8 oz (58 kg)  11/01/18 : 131 lb 8 oz (59.6 kg)  10/29/18 : 128 lb 9.6 oz (58.3 kg)  10/24/18 : 125 lb 9.6 oz (57 kg)       Exam  Gen: No acute distress, alert and oriented x3,   Neck:supple,no JVD  Pul 08/31/2018    INR 1.2 10/07/2018    PT 31.8 (H) 09/26/2016    T4F 1.4 06/06/2016    TSH 0.94 11/22/2018    LIP 15 (L) 10/07/2018    DDIMER 0.69 06/01/2018    ESRML 77 (H) 06/06/2016    CRP 7.77 (H) 06/06/2016    MG 1.7 (L) 11/23/2018    PHOS 3.7 07/29/2016

## 2018-11-23 NOTE — PLAN OF CARE
Problem: Patient Centered Care  Goal: Patient preferences are identified and integrated in the patient's plan of care  Interventions:  - What would you like us to know as we care for you?  \"I don't care\"  - Provide timely, complete, and accurate informati for physical needs  - Identify cognitive and physical deficits and behaviors that affect risk of falls.   - [place] fall precautions as indicated by assessment.  - Educate pt/family on patient safety including physical limitations  - Instruct pt to call f Assess bowel function  - Maintain adequate hydration with IV or PO as ordered and tolerated  - Evaluate effectiveness of GI medications  - Encourage mobilization and activity  - Obtain nutritional consult as needed  - Establish a toileting routine/schedule

## 2018-11-23 NOTE — PHYSICAL THERAPY NOTE
PHYSICAL THERAPY TREATMENT NOTE - INPATIENT     Room Number: 690/351-P       Presenting Problem: fecal impaction    Problem List  Principal Problem:    Fecal impaction in rectum [facility])  Active Problems:    Urinary retention    Gastrointestinal hemorrhage, u Restriction: None                PAIN ASSESSMENT   Ratin          BALANCE                                                                                                                     Static Sitting: Good  Dynamic Sitting: Good           Static S #2 Patient is able to demonstrate transfers Sit to/from Stand at assistance level: modified independent with walker - rolling     Goal #2  Current Status Supervision   Goal #3 Patient is able to ambulate 150 feet with assist device: walker - rolling at ass

## 2018-11-23 NOTE — OCCUPATIONAL THERAPY NOTE
OCCUPATIONAL THERAPY TREATMENT NOTE - INPATIENT        Room Number: 666/675-U           Presenting Problem: fecal impaction.     Problem List  Principal Problem:    Fecal impaction in rectum [facility])  Active Problems:    Urinary retention    Gastrointestinal h washing, rinsing, drying)?: A Little  -   Toileting, which includes using toilet, bedpan or urinal? : A Lot  -   Putting on and taking off regular upper body clothing?: A Little  -   Taking care of personal grooming such as brushing teeth?: None  -   Eatin

## 2018-11-23 NOTE — PROGRESS NOTES
[facility] Pharmacy Note: Route Optimization for Pantoprazole (PROTONIX)    Patient is currently on Pantoprazole (PROTONIX) 40 mg IV every 12 hours.    The patient meets the criteria to convert to the oral equivalent as established by the IV to Oral conversion pro

## 2018-11-24 ENCOUNTER — APPOINTMENT (OUTPATIENT)
Dept: ULTRASOUND IMAGING | Facility: HOSPITAL | Age: 83
DRG: 388 | End: 2018-11-24
Attending: INTERNAL MEDICINE
Payer: MEDICARE

## 2018-11-24 PROCEDURE — 93971 EXTREMITY STUDY: CPT | Performed by: INTERNAL MEDICINE

## 2018-11-24 PROCEDURE — 85027 COMPLETE CBC AUTOMATED: CPT | Performed by: INTERNAL MEDICINE

## 2018-11-24 PROCEDURE — 83735 ASSAY OF MAGNESIUM: CPT | Performed by: INTERNAL MEDICINE

## 2018-11-24 PROCEDURE — A4216 STERILE WATER/SALINE, 10 ML: HCPCS | Performed by: HOSPITALIST

## 2018-11-24 PROCEDURE — 80048 BASIC METABOLIC PNL TOTAL CA: CPT | Performed by: INTERNAL MEDICINE

## 2018-11-24 RX ORDER — FUROSEMIDE 10 MG/ML
40 INJECTION INTRAMUSCULAR; INTRAVENOUS ONCE
Status: COMPLETED | OUTPATIENT
Start: 2018-11-24 | End: 2018-11-24

## 2018-11-24 RX ORDER — SODIUM CHLORIDE/ALOE VERA
GEL (GRAM) NASAL AS NEEDED
Status: DISCONTINUED | OUTPATIENT
Start: 2018-11-24 | End: 2018-11-27

## 2018-11-24 RX ORDER — MAGNESIUM OXIDE 400 MG (241.3 MG MAGNESIUM) TABLET
400 TABLET ONCE
Status: COMPLETED | OUTPATIENT
Start: 2018-11-24 | End: 2018-11-24

## 2018-11-24 RX ORDER — TORSEMIDE 20 MG/1
20 TABLET ORAL DAILY
Status: DISCONTINUED | OUTPATIENT
Start: 2018-11-24 | End: 2018-11-24

## 2018-11-24 NOTE — PROGRESS NOTES
[facility] Heart Cardiology Progress Note      Tracy Canton Patient Status:  Inpatient    3/8/1932 MRN [id number]   Location [facility] 5SW/SE Attending Sunil Hagan MD   Hosp Day # 6 PCP Negro Marshall MD Encounters:  11/22/18 : 127 lb 1.6 oz (57.7 kg)  11/15/18 : 126 lb (57.2 kg)  11/12/18 : 127 lb 12.8 oz (58 kg)  11/01/18 : 131 lb 8 oz (59.6 kg)  10/29/18 : 128 lb 9.6 oz (58.3 kg)  10/24/18 : 125 lb 9.6 oz (57 kg)       Exam  Gen: No acute distress, aler 0.04 (HH) 10/26/2018    CK 38 (L) 03/17/2018    B12 >1,500 (H) 11/22/2018       Xr Chest Ap Portable  (cpt=71045)    Result Date: 11/23/2018  CONCLUSION:  1. Interval worsening since most recent study.  Increased cardiomegaly with worsening vascular congest

## 2018-11-24 NOTE — PLAN OF CARE
Problem: Patient Centered Care  Goal: Patient preferences are identified and integrated in the patient's plan of care  Interventions:  - What would you like us to know as we care for you?  I live at home with my significant other.  - Provide timely, complet injury  INTERVENTIONS:  - Assess pt frequently for physical needs  - Identify cognitive and physical deficits and behaviors that affect risk of falls.   - [place] fall precautions as indicated by assessment.  - Educate pt/family on patient safety includin Assess bowel function  - Maintain adequate hydration with IV or PO as ordered and tolerated  - Evaluate effectiveness of GI medications  - Encourage mobilization and activity  - Obtain nutritional consult as needed  - Establish a toileting routine/schedule

## 2018-11-24 NOTE — CONSULTS
NEPHROLOGY CONSULT NOTE     DATE: 11/24/2018    Requesting Physician: Dr. Aide Rashdi     Reason for Consult: hyponatremia, volume overload      HISTORY OF PRESENT ILLNESS: Rogenia Aase is an 80year old male with history of HTN, HLD, CAD s/p CABG, HFpEF and CORONARY STENT PLACEMENT     • ESOPHAGOGASTRODUODENOSCOPY (EGD) N/A 11/20/2018    Performed by Jennifer Gallagher MD at [facility] ENDOSCOPY   • ESOPHAGOGASTRODUODENOSCOPY (EGD) N/A 4/10/2017    Performed by Jl Christina MD at [address] [place] [address] Narrative      Not on file      MEDICATIONS:     Current Facility-Administered Medications:  torsemide (DEMADEX) tab 20 mg 20 mg Oral Daily   Senna-Docusate Sodium (SENOKOT S) 8.6-50 MG tab 2 tablet 2 tablet Oral Daily PRN   Saline Nasal Spray (SALINE MIST daily. Clopidogrel Bisulfate 75 MG Oral Tab Take 1 tablet (75 mg total) by mouth daily. torsemide 20 MG Oral Tab Take 20 mg by mouth daily.  Can take extra 10 mg in afternoon if weight over 2lbs overnight    Potassium Chloride ER 20 MEQ Oral Tab CR Take except for above     PHYSICAL EXAM:  /43 (BP Location: Right arm)   Pulse 91   Temp 98 °F (36.7 °C) (Oral)   Resp 18   Ht 5' (1.524 m)   Wt 127 lb 1.6 oz (57.7 kg)   SpO2 93%   BMI 24.82 kg/m²   GEN: NAD  EYES: anicteric  ENT: MMM  CHEST: bibasilar c hyponatremia in the setting of CHF.   Urine Na is appropriately low   - strict I/Os, will need to fluid restrict if intake > 2L   - Agree with restarting diuretics, may have to switch to 40 IV lasix if does not diurese well  - bladder scan post void to asse

## 2018-11-24 NOTE — PLAN OF CARE
Problem: Patient Centered Care  Goal: Patient preferences are identified and integrated in the patient's plan of care  Interventions:  - What would you like us to know as we care for you?  I live at home with my significant other.  - Provide timely, complet injury  INTERVENTIONS:  - Assess pt frequently for physical needs  - Identify cognitive and physical deficits and behaviors that affect risk of falls.   - [place] fall precautions as indicated by assessment.  - Educate pt/family on patient safety includin Assess bowel function  - Maintain adequate hydration with IV or PO as ordered and tolerated  - Evaluate effectiveness of GI medications  - Encourage mobilization and activity  - Obtain nutritional consult as needed  - Establish a toileting routine/schedule

## 2018-11-24 NOTE — PROGRESS NOTES
[facility] Hospitalist Team  Progress Note    Jasbir Colón Patient Status:  Inpatient    3/8/1932 MRN [id number]   Location [facility] 5SW/SE Attending Pretty 986 Day # 6 PCP Corey Barakat MD     CC: Follow Up  PCP: Corey Barakat MD inhalers     Hypothyroidism  -cont home med     GERD, history of barretts  -cont PPI  -follow up EGD biopsies     Gout  -cont allopurinol    HLD:  -statin    Fecal Impaction (resolved)  -CT-fecal impaction in rectum, s/p colonoscopy/EGD no longer with impa place    DIAGNOSTIC DATA:   Labs:     Recent Labs   Lab  11/18/18   1644   11/20/18   0753   11/21/18   0032  11/21/18   0825  11/22/18   0607  11/23/18   0553  11/24/18   0602   WBC  11.7*   --   10.0   --    --   9.4  9.4  10.4  10.0   HGB  9.8*   < >  8 aspirin EC tab 81 mg 81 mg Oral Daily   Clopidogrel Bisulfate (PLAVIX) tab 75 mg 75 mg Oral Daily   acetaminophen (TYLENOL EXTRA STRENGTH) tab 500 mg 500 mg Oral Q6H PRN   Normal Saline Flush 0.9 % injection 3 mL 3 mL Intravenous PRN   ondansetron HCl (Z

## 2018-11-24 NOTE — PROGRESS NOTES
[facility] Hospitalist Team  Progress Note    Hector School Patient Status:  Inpatient    3/8/1932 MRN [id number]   Location [facility] 5SW/SE Attending Pretty 986 Day # 6 PCP Latoya Soriano MD     CC: Follow Up  PCP: Latoya Soriano MD XL  -cont statin   -follow with Dr. Allyn Horton     COPD, chronic Hypoxemic Respiratory failure 2-3L  Pulmonary HTN  -continue home O2, currently increased to 4L NC  -continue home inhalers  -may need pulm consult if unable to improve oxygenation     Juan Pablo 8.2*  8.1*   MCV  100.5*   --   100.4*   --    --   100.8*  100.0  99.3  98.8   PLT  239   --   160   --    --   138*  126*  120*  119*   BAND  13*   --    --    --    --    --    --    --    --     < > = values in this interval not displayed.        Recent TraMADol HCl (ULTRAM) tab 50 mg 50 mg Oral Q6H PRN   allopurinol (ZYLOPRIM) tab 100 mg 100 mg Oral BID   Levothyroxine Sodium (SYNTHROID) tab 150 mcg 150 mcg Oral Before breakfast   Montelukast Sodium (SINGULAIR) tab 10 mg 10 mg Oral Nightly   atorvastat

## 2018-11-25 PROCEDURE — 30233N1 TRANSFUSION OF NONAUTOLOGOUS RED BLOOD CELLS INTO PERIPHERAL VEIN, PERCUTANEOUS APPROACH: ICD-10-PCS | Performed by: HOSPITALIST

## 2018-11-25 PROCEDURE — 86901 BLOOD TYPING SEROLOGIC RH(D): CPT | Performed by: INTERNAL MEDICINE

## 2018-11-25 PROCEDURE — 83880 ASSAY OF NATRIURETIC PEPTIDE: CPT | Performed by: INTERNAL MEDICINE

## 2018-11-25 PROCEDURE — 86850 RBC ANTIBODY SCREEN: CPT | Performed by: INTERNAL MEDICINE

## 2018-11-25 PROCEDURE — 83735 ASSAY OF MAGNESIUM: CPT | Performed by: INTERNAL MEDICINE

## 2018-11-25 PROCEDURE — 86920 COMPATIBILITY TEST SPIN: CPT

## 2018-11-25 PROCEDURE — 85027 COMPLETE CBC AUTOMATED: CPT | Performed by: INTERNAL MEDICINE

## 2018-11-25 PROCEDURE — A4216 STERILE WATER/SALINE, 10 ML: HCPCS | Performed by: HOSPITALIST

## 2018-11-25 PROCEDURE — 80048 BASIC METABOLIC PNL TOTAL CA: CPT | Performed by: INTERNAL MEDICINE

## 2018-11-25 PROCEDURE — 36430 TRANSFUSION BLD/BLD COMPNT: CPT

## 2018-11-25 PROCEDURE — 86900 BLOOD TYPING SEROLOGIC ABO: CPT | Performed by: INTERNAL MEDICINE

## 2018-11-25 RX ORDER — FUROSEMIDE 10 MG/ML
40 INJECTION INTRAMUSCULAR; INTRAVENOUS ONCE
Status: COMPLETED | OUTPATIENT
Start: 2018-11-25 | End: 2018-11-25

## 2018-11-25 RX ORDER — ACETAMINOPHEN 325 MG/1
650 TABLET ORAL ONCE
Status: COMPLETED | OUTPATIENT
Start: 2018-11-25 | End: 2018-11-25

## 2018-11-25 RX ORDER — FUROSEMIDE 10 MG/ML
40 INJECTION INTRAMUSCULAR; INTRAVENOUS ONCE
Status: COMPLETED | OUTPATIENT
Start: 2018-11-26 | End: 2018-11-26

## 2018-11-25 RX ORDER — SODIUM CHLORIDE 0.9 % (FLUSH) 0.9 %
10 SYRINGE (ML) INJECTION AS NEEDED
Status: DISCONTINUED | OUTPATIENT
Start: 2018-11-25 | End: 2018-11-27

## 2018-11-25 RX ORDER — FUROSEMIDE 10 MG/ML
20 INJECTION INTRAMUSCULAR; INTRAVENOUS ONCE
Status: COMPLETED | OUTPATIENT
Start: 2018-11-25 | End: 2018-11-25

## 2018-11-25 RX ORDER — SODIUM CHLORIDE 9 MG/ML
INJECTION, SOLUTION INTRAVENOUS ONCE
Status: COMPLETED | OUTPATIENT
Start: 2018-11-25 | End: 2018-11-25

## 2018-11-25 RX ORDER — MAGNESIUM OXIDE 400 MG (241.3 MG MAGNESIUM) TABLET
400 TABLET ONCE
Status: COMPLETED | OUTPATIENT
Start: 2018-11-25 | End: 2018-11-25

## 2018-11-25 NOTE — PROGRESS NOTES
NEPHROLOGY DAILY PROGRESS NOTE     Follow up Reason: Hyponatremia     SUBJECTIVE:  Resting comfortably in bed, breathing is better, reports slight cough     OBJECTIVE:    Total Intake/Output:    Intake/Output Summary (Last 24 hours) at 11/25/2018 [facility] aDealio [facility] 305 umeclidinium-vilanterol (ANORO ELLIPTA) 62.5-25 MCG/INH inhaler 1 puff 1 puff Inhalation Daily       Medications Prior to Admission:  PEG 3350 Oral Powd Pack Take 17 g by mouth daily.    OMEPRAZOLE 40 MG Oral Capsule Delayed Release TAKE ONE CAPSULE BY MO (650 mg total) by mouth every 6 (six) hours as needed for Pain.   hydrocortisone 2.5 % Rectal Cream Apply to rectal area 1-2 times daily as needed for rectal pain       LABS:  Patient Labs Reviewed in Detail.  Pertinent Labs as follows:  Lab Results   [place] consulted for hyponatremia.        Hyponatremia - improving   - appears to have chronic hyponatremia with baseline sodium level around 134   - serum osm low, urine Na low and urine osm 297  - TSH wnl   - This may be hypervolemic hyponatremia in the setting

## 2018-11-25 NOTE — PROGRESS NOTES
[id number] Heart Cardiology Progress Note      Tara Corona Patient Status:  Inpatient    3/8/1932 MRN [id number]   Location [facility] 5SW/SE Attending Sandhya Orlando MD   Hosp Day # 7 PCP Indira Ahumada MD kg)  11/15/18 : 126 lb (57.2 kg)  11/12/18 : 127 lb 12.8 oz (58 kg)  11/01/18 : 131 lb 8 oz (59.6 kg)  10/29/18 : 128 lb 9.6 oz (58.3 kg)  10/24/18 : 125 lb 9.6 oz (57 kg)       Exam  Gen: No acute distress, alert and oriented x3,   Neck: + jvd  Pulm: cour 10/26/2018    CK 38 (L) 03/17/2018    B12 >1,500 (H) 11/22/2018       Us Venous Doppler Arm Left - Diag Img (cpt=93971)    Result Date: 11/24/2018  CONCLUSION:  1.  Acute occlusive superficial venous thrombosis left cephalic vein mid to distal left upper ex

## 2018-11-25 NOTE — PLAN OF CARE
Problem: Patient Centered Care  Goal: Patient preferences are identified and integrated in the patient's plan of care  Interventions:  - What would you like us to know as we care for you?  I live at home with my significant other.  - Provide timely, complet injury  INTERVENTIONS:  - Assess pt frequently for physical needs  - Identify cognitive and physical deficits and behaviors that affect risk of falls.   - [place] fall precautions as indicated by assessment.  - Educate pt/family on patient safety includin Assess bowel function  - Maintain adequate hydration with IV or PO as ordered and tolerated  - Evaluate effectiveness of GI medications  - Encourage mobilization and activity  - Obtain nutritional consult as needed  - Establish a toileting routine/schedule

## 2018-11-25 NOTE — PLAN OF CARE
Nocturnalist(Dr. Valeria Paredes) called to see patient--Dr. Valeria Paredes informed patient is very restless, patient wants to take a shower now. Patient informed he cannot take a shower now--also instructed his sats drop dramatically when up.   Patient wants his bed linens c

## 2018-11-25 NOTE — PROGRESS NOTES
[facility] Hospitalist Team  Progress Note    Ana Dunham Patient Status:  Inpatient    3/8/1932 MRN [id number]   Location [facility] 5SW/SE Attending Pretty 986 Day # 7 PCP Evonne Smallwood MD     CC: Follow Up  PCP: Evonne Smallwood MD 8/30/18  -plavix/ASA for stent, will defer to cards/GI  -no AC for afib with hx GI bleed  -continued cartia 120 XL  -cont statin   -follow with Dr. Kendrick Kimball    RUSSELL/Blood Loss Anemia:  -2:2 GI bleeds   -Hgb 7.7 today with hx of CAD would keep goal >8  -give 1 11/18/18   1644   11/21/18   0825  11/22/18   0607  11/23/18   0553  11/24/18   0602  11/25/18   0555   WBC  11.7*   < >  9.4  9.4  10.4  10.0  11.0   HGB  9.8*   < >  8.7*  8.8*  8.3*  8.2*  8.1*  7.6*   MCV  100.5*   < >  100.8*  100.0  99.3  98.8  97.9 aspirin EC tab 81 mg 81 mg Oral Daily   Clopidogrel Bisulfate (PLAVIX) tab 75 mg 75 mg Oral Daily   acetaminophen (TYLENOL EXTRA STRENGTH) tab 500 mg 500 mg Oral Q6H PRN   Normal Saline Flush 0.9 % injection 3 mL 3 mL Intravenous PRN   ondansetron HCl (Z

## 2018-11-26 PROBLEM — R33.9 URINARY RETENTION: Status: RESOLVED | Noted: 2018-11-18 | Resolved: 2018-11-26

## 2018-11-26 PROBLEM — K56.41 FECAL IMPACTION IN RECTUM (HCC): Status: RESOLVED | Noted: 2018-11-18 | Resolved: 2018-11-26

## 2018-11-26 PROCEDURE — A4216 STERILE WATER/SALINE, 10 ML: HCPCS | Performed by: HOSPITALIST

## 2018-11-26 PROCEDURE — 80048 BASIC METABOLIC PNL TOTAL CA: CPT | Performed by: INTERNAL MEDICINE

## 2018-11-26 PROCEDURE — 97116 GAIT TRAINING THERAPY: CPT

## 2018-11-26 PROCEDURE — 85007 BL SMEAR W/DIFF WBC COUNT: CPT | Performed by: NURSE PRACTITIONER

## 2018-11-26 PROCEDURE — 84132 ASSAY OF SERUM POTASSIUM: CPT | Performed by: HOSPITALIST

## 2018-11-26 PROCEDURE — 97110 THERAPEUTIC EXERCISES: CPT

## 2018-11-26 PROCEDURE — 83735 ASSAY OF MAGNESIUM: CPT | Performed by: INTERNAL MEDICINE

## 2018-11-26 PROCEDURE — 85025 COMPLETE CBC W/AUTO DIFF WBC: CPT | Performed by: NURSE PRACTITIONER

## 2018-11-26 PROCEDURE — 85027 COMPLETE CBC AUTOMATED: CPT | Performed by: NURSE PRACTITIONER

## 2018-11-26 RX ORDER — TORSEMIDE 20 MG/1
20 TABLET ORAL DAILY
Status: DISCONTINUED | OUTPATIENT
Start: 2018-11-27 | End: 2018-11-27

## 2018-11-26 RX ORDER — POTASSIUM CHLORIDE 20 MEQ/1
40 TABLET, EXTENDED RELEASE ORAL EVERY 4 HOURS
Status: COMPLETED | OUTPATIENT
Start: 2018-11-26 | End: 2018-11-26

## 2018-11-26 RX ORDER — PANTOPRAZOLE SODIUM 40 MG/1
40 TABLET, DELAYED RELEASE ORAL
Qty: 60 TABLET | Refills: 0 | Status: SHIPPED | OUTPATIENT
Start: 2018-11-26 | End: 2018-12-30

## 2018-11-26 RX ORDER — MAGNESIUM OXIDE 400 MG (241.3 MG MAGNESIUM) TABLET
400 TABLET ONCE
Status: COMPLETED | OUTPATIENT
Start: 2018-11-26 | End: 2018-11-26

## 2018-11-26 RX ORDER — SENNA AND DOCUSATE SODIUM 50; 8.6 MG/1; MG/1
2 TABLET, FILM COATED ORAL
Qty: 60 TABLET | Refills: 0 | Status: SHIPPED | OUTPATIENT
Start: 2018-11-26 | End: 2018-12-03

## 2018-11-26 NOTE — PROGRESS NOTES
[facility] Hospitalist Team  Progress Note    [facility] Patient Status:  Inpatient    3/8/1932 MRN [id number]   Location [facility] [place] 5SW/SE Attending Stefanie Hollingsworth MD   Hosp Day # 8 PCP Latoya Soriano MD     CC: Follow Up  PCP: Latoya Soriano MD polypoid mucousa, colonoscopy with poor prep, no further impaction, diverticulosis and hemorrhoids.  No plans for repeat colonscopy  -pathology with tubular  Adenoma of duodenum  -iron 17, iron sat 9, TIBC 191, transferrin 145  -S/P 1 unit PRBC's 11/25 for NP   [address] Hospitalist Team  Pager [phone]   Answering Service number: [phone]  11/26/2018    SUBJECTIVE:   Up in chair for about 2 hours. Ate all his breakfast. Feels like his breathing is better.  Did drop sats with ambulation wit PRN   Senna-Docusate Sodium (SENOKOT S) 8.6-50 MG tab 2 tablet 2 tablet Oral Daily PRN   Saline Nasal Spray (SALINE MIST) 1 spray 1 spray Each Nare Q3H PRN   DilTIAZem HCl ER Coated Beads (CARDIZEM CD) 24 hr cap 120 mg 120 mg Oral Daily   Pantoprazole Sodi right. 2. Findings suggests acute interval exacerbation of chronic CHF. Can't exclude superimposed pneumonia especially on the left. Clinical correlation and followup studies advised.      Dictated by (CST): Tania Blanc MD on 11/23/2018 at 15:39     Ap with tubular  Adenoma of duodenum  -iron 17, iron sat 9, TIBC 191, transferrin 145  -S/P 1 unit PRBC's 11/25 for hgb 7.6 and issues with hypoxemia/SOB, repeat hgb today 8.8  -S/P IV iron x 5 doses  -plans for bifera as outpt, less constipating as per GI  -

## 2018-11-26 NOTE — PROGRESS NOTES
[facility] AND [facility]  Progress Note    Angie Kiser Patient Status:  Inpatient    3/8/1932 MRN [id number]   Location [facility] 5SW/SE Attending Rafa Hancock MD   Hosp Day # 8 PCP Sara Vaughan MD     Assessment:    1.   Bowels are better regulat Results   Component Value Date     11/26/2018    K 3.5 11/26/2018    CL 98 11/26/2018    CO2 25 11/26/2018    BUN 16 11/26/2018    CREATSERUM 1.06 11/26/2018     11/26/2018    MG 1.8 11/26/2018    CA 8.2 11/26/2018        Lab Results   Compone

## 2018-11-26 NOTE — PLAN OF CARE
Problem: Patient Centered Care  Goal: Patient preferences are identified and integrated in the patient's plan of care  Interventions:  - What would you like us to know as we care for you?  I live at home with my significant other.  - Provide timely, complet from fall injury  INTERVENTIONS:  - Assess pt frequently for physical needs  - Identify cognitive and physical deficits and behaviors that affect risk of falls.   - [place] fall precautions as indicated by assessment.  - Educate pt/family on patient safet Maintains or returns to baseline bowel function  INTERVENTIONS:  - Assess bowel function  - Maintain adequate hydration with IV or PO as ordered and tolerated  - Evaluate effectiveness of GI medications  - Encourage mobilization and activity  - Obtain nutr

## 2018-11-26 NOTE — CM/SW NOTE
HHC orders received w/ BMP. SW notified Liban Medina from [facility] w/ updated orders.      Julio Zapata

## 2018-11-27 VITALS
RESPIRATION RATE: 22 BRPM | SYSTOLIC BLOOD PRESSURE: 106 MMHG | DIASTOLIC BLOOD PRESSURE: 39 MMHG | WEIGHT: 133.81 LBS | HEIGHT: 60 IN | OXYGEN SATURATION: 90 % | BODY MASS INDEX: 26.27 KG/M2 | HEART RATE: 95 BPM | TEMPERATURE: 97 F

## 2018-11-27 PROCEDURE — 80048 BASIC METABOLIC PNL TOTAL CA: CPT | Performed by: NURSE PRACTITIONER

## 2018-11-27 PROCEDURE — 83735 ASSAY OF MAGNESIUM: CPT | Performed by: INTERNAL MEDICINE

## 2018-11-27 PROCEDURE — 85025 COMPLETE CBC W/AUTO DIFF WBC: CPT | Performed by: NURSE PRACTITIONER

## 2018-11-27 PROCEDURE — 85007 BL SMEAR W/DIFF WBC COUNT: CPT | Performed by: NURSE PRACTITIONER

## 2018-11-27 PROCEDURE — 85027 COMPLETE CBC AUTOMATED: CPT | Performed by: NURSE PRACTITIONER

## 2018-11-27 NOTE — TRANSITION NOTE
Mr. Mcfarland Chol is well known to Dr. Mj Amado. He came to the Emergency room with rectal pain and was found to have fecal impaction.   Pt has hx of CABG 1992, AAMIR to RCA and Cx 8/8/18 at [facility],  He is also s/p anne-clip 2016 with residual MR, hx gi bl much to take  · how to take this  · when to take this  · additional instructions      TK ONE C PO  QD   Quantity:  90 capsule  Refills:  1     Levothyroxine Sodium 150 MCG Tabs  Commonly known as:  SYNTHROID  What changed:    · how much to take  · how to t Quantity:  90 tablet  Refills:  1     torsemide 20 MG Tabs  Commonly known as:  DEMADEX      Take 20 mg by mouth daily.  Can take extra 10 mg in afternoon if weight over 2lbs overnight   Quantity:  30 tablet  Refills:  0     umeclidinium-vilanterol 62.5-25

## 2018-11-27 NOTE — PLAN OF CARE
Problem: Patient/Family Goals  Goal: Patient/Family Long Term Goal  Patient's Long Term Goal: To be discharged home    Interventions:  - Follow MD orders  - Pain management, IVF  - See additional Care Plan goals for specific interventions   Outcome: Progre possible discharge home today with [facility]    Problem: DISCHARGE PLANNING  Goal: Discharge to home or other facility with appropriate resources  INTERVENTIONS:  - Identify barriers to discharge w/pt and caregiver  - Include patient/family/discharge partner in WT and lab values  - Obtain nutritional consult as needed  - Optimize oral hygiene and moisture  - Encourage food from home; allow for food preferences  - Enhance eating environment  Outcome: Progressing      Problem: GENITOURINARY - ADULT  Goal: Absence o

## 2018-11-27 NOTE — PROGRESS NOTES
[facility] Heart Cardiology Progress Note      Diane Hawk Patient Status:  Inpatient    3/8/1932 MRN [id number]   Location [facility] 5SW/SE Attending Kelsey Garner MD   Hosp Day # 9 PCP Magdalene Norton MD       As ,no S3 or murmur  Abd: Abdomen soft, nontender, nondistended, no organomegaly, bowel sounds present  Ext:  no clubbing, no cyanosis,no edema  Neuro: no focal deficits  Skin: no rashes or lesions    Scheduled Meds:   • torsemide  20 mg Oral Daily   • DilTIA

## 2018-11-27 NOTE — PLAN OF CARE
Problem: Patient Centered Care  Goal: Patient preferences are identified and integrated in the patient's plan of care  Interventions:  - What would you like us to know as we care for you?  I live at home with my significant other.  - Provide timely, complet injury  INTERVENTIONS:  - Assess pt frequently for physical needs  - Identify cognitive and physical deficits and behaviors that affect risk of falls.   - [place] fall precautions as indicated by assessment.  - Educate pt/family on patient safety includin Assess bowel function  - Maintain adequate hydration with IV or PO as ordered and tolerated  - Evaluate effectiveness of GI medications  - Encourage mobilization and activity  - Obtain nutritional consult as needed  - Establish a toileting routine/schedule

## 2018-11-27 NOTE — CM/SW NOTE
Oxygen sat on room air 85% at rest  3 L nasal cannula applied 92%  Oxygen sat 91% while ambulating with 3 L nasal cannula      Allie Weathers, MSN,CNL  Clinical Transitions Leader  Phone # [phone]    Updated oxygen sats sent to [address] patients home

## 2018-11-27 NOTE — PLAN OF CARE
Problem: Patient Centered Care  Goal: Patient preferences are identified and integrated in the patient's plan of care  Interventions:  - What would you like us to know as we care for you?  I live at home with my significant other.  - Provide timely, complet FALL  Goal: Free from fall injury  INTERVENTIONS:  - Assess pt frequently for physical needs  - Identify cognitive and physical deficits and behaviors that affect risk of falls.   - [place] fall precautions as indicated by assessment.  - Educate pt/family or returns to baseline bowel function  INTERVENTIONS:  - Assess bowel function  - Maintain adequate hydration with IV or PO as ordered and tolerated  - Evaluate effectiveness of GI medications  - Encourage mobilization and activity  - Obtain nutritional co

## 2018-11-27 NOTE — PLAN OF CARE
Problem: Patient Centered Care  Goal: Patient preferences are identified and integrated in the patient's plan of care  Interventions:  - What would you like us to know as we care for you?  I live at home with my significant other.  - Provide timely, complet

## 2018-11-27 NOTE — DISCHARGE SUMMARY
[facility] Hospitalist Discharge Summary   Patient ID:  Kassy Waldron  [id number]  13 year old  3/8/1932    Admit date: 11/18/2018  Discharge date: 11/27/2018    Primary Care Physician: Demetrio Velasco MD   Attending Physician: Ruben Stiles MD   Consults:   Cons with removal of 1000 cc urine. He denies   CP or SOB. He saw the heart failure team recently and told them about the loose stools and he was instructed to decrease his torsemide to 10 mg over the weekend.  He takes ASA and plavix, he had a stent in august a EGD/Colon- with barrettes esophagus, HH, and duodenal polypoid mucousa, colonoscopy with poor prep, no further impaction, diverticulosis and hemorrhoids.  No plans for repeat colonscopy  -pathology with tubular  Adenoma of duodenum  -iron 17, iron sat 9, TI Acute occlusive superficial venous thrombosis left cephalic vein mid to distal left upper extremity. 2. Otherwise normal left upper extremity venous duplex exam. No deep venous thrombosis.      Dictated by (CST): Tyrone Galindo MD on 11/24/2018 at 12:1 EVENING     MULTI VITAMIN MENS Tabs     PEG 3350 Pack  Commonly known as:  MIRALAX     Potassium Chloride ER 20 MEQ Tbcr  Commonly known as:  K-DUR M20     simvastatin 40 MG Tabs  Commonly known as:  ZOCOR  Take 1 tablet (40 mg total) by mouth once daily. week    Disposition: home  Discharged Condition: stable    Additional patient instructions:   Follow up blood test, BMP in 1 week to check on kidney function and sodium level    Please take iron supplement bifera daily    Total Time Coordinating Care:mamta

## 2018-11-28 ENCOUNTER — TELEPHONE (OUTPATIENT)
Dept: CARDIOLOGY UNIT | Facility: HOSPITAL | Age: 83
End: 2018-11-28

## 2018-11-30 ENCOUNTER — APPOINTMENT (OUTPATIENT)
Dept: CARDIAC REHAB | Facility: HOSPITAL | Age: 83
End: 2018-11-30
Attending: INTERNAL MEDICINE
Payer: MEDICARE

## 2018-12-03 ENCOUNTER — APPOINTMENT (OUTPATIENT)
Dept: CARDIAC REHAB | Facility: HOSPITAL | Age: 83
End: 2018-12-03
Attending: INTERNAL MEDICINE
Payer: MEDICARE

## 2018-12-03 PROBLEM — I50.31 ACUTE DIASTOLIC CHF (CONGESTIVE HEART FAILURE) (HCC): Status: ACTIVE | Noted: 2018-12-03

## 2018-12-03 PROBLEM — K56.41 FECAL IMPACTION (HCC): Status: ACTIVE | Noted: 2018-11-18

## 2018-12-04 ENCOUNTER — TELEPHONE (OUTPATIENT)
Dept: CARDIOLOGY CLINIC | Facility: HOSPITAL | Age: 83
End: 2018-12-04

## 2018-12-05 ENCOUNTER — HOSPITAL ENCOUNTER (OUTPATIENT)
Dept: CV DIAGNOSTICS | Facility: HOSPITAL | Age: 83
Discharge: HOME | End: 2018-12-05
Attending: INTERNAL MEDICINE

## 2018-12-05 ENCOUNTER — HOSPITAL ENCOUNTER (OUTPATIENT)
Dept: CARDIOLOGY CLINIC | Facility: HOSPITAL | Age: 83
Discharge: HOME | End: 2018-12-05
Attending: NURSE PRACTITIONER
Payer: MEDICARE

## 2018-12-05 ENCOUNTER — MYAURORA ACCOUNT LINK (OUTPATIENT)
Dept: OTHER | Age: 83
End: 2018-12-05

## 2018-12-05 VITALS
RESPIRATION RATE: 16 BRPM | TEMPERATURE: 97 F | OXYGEN SATURATION: 95 % | DIASTOLIC BLOOD PRESSURE: 65 MMHG | HEART RATE: 73 BPM | SYSTOLIC BLOOD PRESSURE: 123 MMHG

## 2018-12-05 DIAGNOSIS — I34.0 MITRAL VALVE INSUFFICIENCY, UNSPECIFIED ETIOLOGY: ICD-10-CM

## 2018-12-05 PROCEDURE — 99213 OFFICE O/P EST LOW 20 MIN: CPT | Performed by: NURSE PRACTITIONER

## 2018-12-05 NOTE — PROGRESS NOTES
Benny Davis Note    Subjective:   Patient presents for 3 month postop TMVR visit, accompanied by his wife/daughter. He underwent 3rd Mitraclip placement on  9/1/18 with Dr. Cecilio Lazcano for severe, symptomatic mitral regurgitation.   His init ventricle: The cavity size was normal. Wall thickness was     increased in a pattern of mild LVH. Systolic function was     normal. The estimated ejection fraction was 55-60%. Wall motion     was normal; there were no regional wall motion abnormalities.   2 Oral Capsule SR 24 Hr TK ONE C PO  QD (Patient taking differently: Take 120 mg by mouth daily.  ) Disp: 90 capsule Rfl: 1   simvastatin 40 MG Oral Tab Take 1 tablet (40 mg total) by mouth once daily.  Disp: 90 tablet Rfl: 1   Levothyroxine Sodium 150 MCG Or O2      I have spent 35 minutes with this patient with > 50% of my time spent in education, coordination, and counseling related to his care.     Roya Saleem, PRABHU  12/5/2018   8:21 AM

## 2018-12-05 NOTE — PROGRESS NOTES
Pt here for 3 mo post TMVR. Vitals and meds reconciled. Pt's6 month appt with echo scheduled for 3/6/19 11:30 with APN and 12:45 with Echo, Room 1. Pt verbalizes understanding.

## 2018-12-06 ENCOUNTER — TELEPHONE (OUTPATIENT)
Dept: CARDIOLOGY CLINIC | Facility: HOSPITAL | Age: 83
End: 2018-12-06

## 2018-12-06 NOTE — PROGRESS NOTES
I called patient's dtr, Jeremias Laird,  with the results of echo from yesterday and left vm. The patient's EF remains at 60%, clip present with mild-mod regurgitation. Overall, there has been no significant interval changes compared to previous echo on 10/27/18.  P

## 2018-12-07 ENCOUNTER — LAB REQUISITION (OUTPATIENT)
Dept: LAB | Facility: HOSPITAL | Age: 83
End: 2018-12-07
Payer: MEDICARE

## 2018-12-07 ENCOUNTER — PRIOR ORIGINAL RECORDS (OUTPATIENT)
Dept: OTHER | Age: 83
End: 2018-12-07

## 2018-12-07 DIAGNOSIS — I11.0 HYPERTENSIVE HEART DISEASE WITH HEART FAILURE (HCC): ICD-10-CM

## 2018-12-07 PROCEDURE — 85027 COMPLETE CBC AUTOMATED: CPT | Performed by: INTERNAL MEDICINE

## 2018-12-07 PROCEDURE — 85007 BL SMEAR W/DIFF WBC COUNT: CPT | Performed by: INTERNAL MEDICINE

## 2018-12-10 LAB
HEMATOCRIT: 31 %
HEMOGLOBIN: 10.2 G/DL
PLATELETS: 251 K/UL
RED BLOOD COUNT: 3.1 X 10-6/U
WHITE BLOOD COUNT: 7.3 X 10-3/U

## 2018-12-13 ENCOUNTER — LAB REQUISITION (OUTPATIENT)
Dept: LAB | Facility: HOSPITAL | Age: 83
End: 2018-12-13
Payer: MEDICARE

## 2018-12-13 ENCOUNTER — PRIOR ORIGINAL RECORDS (OUTPATIENT)
Dept: OTHER | Age: 83
End: 2018-12-13

## 2018-12-13 DIAGNOSIS — I11.0 HYPERTENSIVE HEART DISEASE WITH HEART FAILURE (HCC): ICD-10-CM

## 2018-12-13 PROCEDURE — 83540 ASSAY OF IRON: CPT | Performed by: INTERNAL MEDICINE

## 2018-12-13 PROCEDURE — 80048 BASIC METABOLIC PNL TOTAL CA: CPT | Performed by: INTERNAL MEDICINE

## 2018-12-13 PROCEDURE — 82728 ASSAY OF FERRITIN: CPT | Performed by: INTERNAL MEDICINE

## 2018-12-13 PROCEDURE — 85025 COMPLETE CBC W/AUTO DIFF WBC: CPT | Performed by: INTERNAL MEDICINE

## 2018-12-13 PROCEDURE — 83550 IRON BINDING TEST: CPT | Performed by: INTERNAL MEDICINE

## 2018-12-17 LAB
BUN: 37 MG/DL
CALCIUM: 9 MG/DL
CHLORIDE: 104 MEQ/L
CREATININE, SERUM: 1.07 MG/DL
GLUCOSE: 97 MG/DL
HEMATOCRIT: 32.7 %
HEMOGLOBIN: 10.3 G/DL
PLATELETS: 153 K/UL
POTASSIUM, SERUM: 4.3 MEQ/L
RED BLOOD COUNT: 3.22 X 10-6/U
SODIUM: 135 MEQ/L
WHITE BLOOD COUNT: 9.8 X 10-3/U

## 2018-12-19 ENCOUNTER — MYAURORA ACCOUNT LINK (OUTPATIENT)
Dept: OTHER | Age: 83
End: 2018-12-19

## 2018-12-19 ENCOUNTER — PRIOR ORIGINAL RECORDS (OUTPATIENT)
Dept: OTHER | Age: 83
End: 2018-12-19

## 2018-12-19 ENCOUNTER — APPOINTMENT (OUTPATIENT)
Dept: CARDIAC REHAB | Facility: HOSPITAL | Age: 83
End: 2018-12-19
Attending: INTERNAL MEDICINE
Payer: MEDICARE

## 2018-12-21 ENCOUNTER — APPOINTMENT (OUTPATIENT)
Dept: CARDIAC REHAB | Facility: HOSPITAL | Age: 83
End: 2018-12-21
Attending: INTERNAL MEDICINE
Payer: MEDICARE

## 2018-12-24 ENCOUNTER — APPOINTMENT (OUTPATIENT)
Dept: CARDIAC REHAB | Facility: HOSPITAL | Age: 83
End: 2018-12-24
Attending: INTERNAL MEDICINE
Payer: MEDICARE

## 2018-12-26 ENCOUNTER — APPOINTMENT (OUTPATIENT)
Dept: CARDIAC REHAB | Facility: HOSPITAL | Age: 83
End: 2018-12-26
Attending: INTERNAL MEDICINE
Payer: MEDICARE

## 2018-12-28 ENCOUNTER — APPOINTMENT (OUTPATIENT)
Dept: CARDIAC REHAB | Facility: HOSPITAL | Age: 83
End: 2018-12-28
Attending: INTERNAL MEDICINE
Payer: MEDICARE

## 2018-12-31 ENCOUNTER — APPOINTMENT (OUTPATIENT)
Dept: CARDIAC REHAB | Facility: HOSPITAL | Age: 83
End: 2018-12-31
Attending: INTERNAL MEDICINE
Payer: MEDICARE

## 2019-01-01 ENCOUNTER — PATIENT MESSAGE (OUTPATIENT)
Dept: CARDIOLOGY CLINIC | Facility: HOSPITAL | Age: 84
End: 2019-01-01

## 2019-01-01 ENCOUNTER — OFFICE VISIT (OUTPATIENT)
Dept: CARDIOLOGY CLINIC | Facility: HOSPITAL | Age: 84
End: 2019-01-01
Attending: CLINICAL NURSE SPECIALIST
Payer: MEDICARE

## 2019-01-01 ENCOUNTER — APPOINTMENT (OUTPATIENT)
Dept: GENERAL RADIOLOGY | Facility: HOSPITAL | Age: 84
DRG: 871 | End: 2019-01-01
Attending: EMERGENCY MEDICINE
Payer: MEDICARE

## 2019-01-01 ENCOUNTER — APPOINTMENT (OUTPATIENT)
Dept: CT IMAGING | Facility: HOSPITAL | Age: 84
End: 2019-01-01
Attending: INTERNAL MEDICINE
Payer: MEDICARE

## 2019-01-01 ENCOUNTER — TELEPHONE (OUTPATIENT)
Dept: CARDIOLOGY | Age: 84
End: 2019-01-01

## 2019-01-01 ENCOUNTER — CARDPULM VISIT (OUTPATIENT)
Dept: CARDIAC REHAB | Facility: HOSPITAL | Age: 84
End: 2019-01-01

## 2019-01-01 ENCOUNTER — APPOINTMENT (OUTPATIENT)
Dept: ULTRASOUND IMAGING | Facility: HOSPITAL | Age: 84
DRG: 871 | End: 2019-01-01
Attending: HOSPITALIST
Payer: MEDICARE

## 2019-01-01 ENCOUNTER — OFFICE VISIT (OUTPATIENT)
Dept: CARDIOLOGY | Age: 84
End: 2019-01-01

## 2019-01-01 ENCOUNTER — APPOINTMENT (OUTPATIENT)
Dept: GENERAL RADIOLOGY | Facility: HOSPITAL | Age: 84
End: 2019-01-01
Attending: EMERGENCY MEDICINE
Payer: MEDICARE

## 2019-01-01 ENCOUNTER — HOSPITAL ENCOUNTER (INPATIENT)
Facility: HOSPITAL | Age: 84
LOS: 4 days | Discharge: HOME HEALTH | DRG: 871 | End: 2019-01-01
Attending: EMERGENCY MEDICINE | Admitting: HOSPITALIST
Payer: MEDICARE

## 2019-01-01 ENCOUNTER — HOSPITAL ENCOUNTER (OUTPATIENT)
Facility: HOSPITAL | Age: 84
Setting detail: OBSERVATION
Discharge: HOME | End: 2019-01-01
Attending: EMERGENCY MEDICINE | Admitting: HOSPITALIST
Payer: MEDICARE

## 2019-01-01 ENCOUNTER — HOSPITAL ENCOUNTER (EMERGENCY)
Facility: HOSPITAL | Age: 84
Discharge: HOME | End: 2019-01-01
Attending: EMERGENCY MEDICINE
Payer: MEDICARE

## 2019-01-01 ENCOUNTER — HOSPITAL ENCOUNTER (OUTPATIENT)
Dept: CV DIAGNOSTICS | Facility: HOSPITAL | Age: 84
Discharge: HOME | End: 2019-01-01
Attending: INTERNAL MEDICINE
Payer: MEDICARE

## 2019-01-01 ENCOUNTER — OFFICE VISIT (OUTPATIENT)
Dept: CARDIOLOGY CLINIC | Facility: HOSPITAL | Age: 84
End: 2019-01-01
Attending: INTERNAL MEDICINE
Payer: MEDICARE

## 2019-01-01 ENCOUNTER — HOSPITAL ENCOUNTER (OUTPATIENT)
Dept: CARDIOLOGY CLINIC | Facility: HOSPITAL | Age: 84
Discharge: HOME | End: 2019-01-01
Attending: INTERNAL MEDICINE
Payer: MEDICARE

## 2019-01-01 ENCOUNTER — EXTERNAL RECORD (OUTPATIENT)
Dept: HEALTH INFORMATION MANAGEMENT | Facility: OTHER | Age: 84
End: 2019-01-01

## 2019-01-01 ENCOUNTER — TELEPHONE (OUTPATIENT)
Dept: CARDIOLOGY CLINIC | Facility: HOSPITAL | Age: 84
End: 2019-01-01

## 2019-01-01 ENCOUNTER — APPOINTMENT (OUTPATIENT)
Dept: MRI IMAGING | Facility: HOSPITAL | Age: 84
End: 2019-01-01
Attending: INTERNAL MEDICINE
Payer: MEDICARE

## 2019-01-01 ENCOUNTER — LAB ENCOUNTER (OUTPATIENT)
Dept: LAB | Facility: HOSPITAL | Age: 84
End: 2019-01-01
Attending: CLINICAL NURSE SPECIALIST
Payer: MEDICARE

## 2019-01-01 ENCOUNTER — HOSPITAL ENCOUNTER (OUTPATIENT)
Dept: GENERAL RADIOLOGY | Facility: HOSPITAL | Age: 84
Discharge: HOME | End: 2019-01-01
Attending: CLINICAL NURSE SPECIALIST
Payer: MEDICARE

## 2019-01-01 ENCOUNTER — APPOINTMENT (OUTPATIENT)
Dept: GENERAL RADIOLOGY | Facility: HOSPITAL | Age: 84
DRG: 871 | End: 2019-01-01
Attending: INTERNAL MEDICINE
Payer: MEDICARE

## 2019-01-01 ENCOUNTER — DOCUMENTATION (OUTPATIENT)
Dept: CARDIOLOGY | Age: 84
End: 2019-01-01

## 2019-01-01 ENCOUNTER — LAB ENCOUNTER (OUTPATIENT)
Dept: LAB | Facility: HOSPITAL | Age: 84
End: 2019-01-01
Attending: INTERNAL MEDICINE
Payer: MEDICARE

## 2019-01-01 VITALS
OXYGEN SATURATION: 91 % | SYSTOLIC BLOOD PRESSURE: 121 MMHG | DIASTOLIC BLOOD PRESSURE: 51 MMHG | HEART RATE: 83 BPM | BODY MASS INDEX: 26 KG/M2 | WEIGHT: 133 LBS

## 2019-01-01 VITALS
HEIGHT: 60 IN | WEIGHT: 124.31 LBS | HEART RATE: 66 BPM | BODY MASS INDEX: 24.41 KG/M2 | SYSTOLIC BLOOD PRESSURE: 114 MMHG | RESPIRATION RATE: 17 BRPM | DIASTOLIC BLOOD PRESSURE: 42 MMHG | OXYGEN SATURATION: 92 % | TEMPERATURE: 98 F

## 2019-01-01 VITALS
BODY MASS INDEX: 26 KG/M2 | OXYGEN SATURATION: 93 % | DIASTOLIC BLOOD PRESSURE: 37 MMHG | HEART RATE: 64 BPM | WEIGHT: 132 LBS | SYSTOLIC BLOOD PRESSURE: 106 MMHG

## 2019-01-01 VITALS
TEMPERATURE: 99 F | RESPIRATION RATE: 18 BRPM | SYSTOLIC BLOOD PRESSURE: 106 MMHG | HEART RATE: 82 BPM | WEIGHT: 134.81 LBS | HEIGHT: 60 IN | DIASTOLIC BLOOD PRESSURE: 55 MMHG | BODY MASS INDEX: 26.47 KG/M2 | OXYGEN SATURATION: 94 %

## 2019-01-01 VITALS
BODY MASS INDEX: 26 KG/M2 | DIASTOLIC BLOOD PRESSURE: 49 MMHG | SYSTOLIC BLOOD PRESSURE: 102 MMHG | HEART RATE: 74 BPM | WEIGHT: 133 LBS | OXYGEN SATURATION: 94 %

## 2019-01-01 VITALS
DIASTOLIC BLOOD PRESSURE: 45 MMHG | SYSTOLIC BLOOD PRESSURE: 107 MMHG | RESPIRATION RATE: 18 BRPM | OXYGEN SATURATION: 90 % | HEART RATE: 66 BPM

## 2019-01-01 VITALS
HEART RATE: 74 BPM | SYSTOLIC BLOOD PRESSURE: 109 MMHG | OXYGEN SATURATION: 91 % | WEIGHT: 132 LBS | DIASTOLIC BLOOD PRESSURE: 69 MMHG | BODY MASS INDEX: 26 KG/M2

## 2019-01-01 VITALS
HEART RATE: 80 BPM | OXYGEN SATURATION: 90 % | SYSTOLIC BLOOD PRESSURE: 113 MMHG | WEIGHT: 128.63 LBS | BODY MASS INDEX: 25 KG/M2 | DIASTOLIC BLOOD PRESSURE: 65 MMHG | TEMPERATURE: 98 F

## 2019-01-01 VITALS
WEIGHT: 133 LBS | BODY MASS INDEX: 26.11 KG/M2 | SYSTOLIC BLOOD PRESSURE: 110 MMHG | HEART RATE: 76 BPM | HEIGHT: 60 IN | DIASTOLIC BLOOD PRESSURE: 60 MMHG

## 2019-01-01 VITALS
HEART RATE: 75 BPM | SYSTOLIC BLOOD PRESSURE: 130 MMHG | BODY MASS INDEX: 26 KG/M2 | DIASTOLIC BLOOD PRESSURE: 65 MMHG | OXYGEN SATURATION: 88 % | WEIGHT: 133.13 LBS

## 2019-01-01 VITALS
DIASTOLIC BLOOD PRESSURE: 63 MMHG | WEIGHT: 132.5 LBS | SYSTOLIC BLOOD PRESSURE: 116 MMHG | OXYGEN SATURATION: 91 % | HEART RATE: 68 BPM | BODY MASS INDEX: 26 KG/M2

## 2019-01-01 VITALS
HEART RATE: 75 BPM | BODY MASS INDEX: 26 KG/M2 | SYSTOLIC BLOOD PRESSURE: 149 MMHG | WEIGHT: 133 LBS | OXYGEN SATURATION: 90 % | DIASTOLIC BLOOD PRESSURE: 43 MMHG

## 2019-01-01 VITALS
SYSTOLIC BLOOD PRESSURE: 105 MMHG | DIASTOLIC BLOOD PRESSURE: 56 MMHG | BODY MASS INDEX: 26 KG/M2 | OXYGEN SATURATION: 89 % | HEART RATE: 89 BPM | WEIGHT: 133.69 LBS

## 2019-01-01 DIAGNOSIS — I25.10 CORONARY ARTERY DISEASE DUE TO CALCIFIED CORONARY LESION: ICD-10-CM

## 2019-01-01 DIAGNOSIS — I50.23 ACUTE ON CHRONIC SYSTOLIC HEART FAILURE (HCC): ICD-10-CM

## 2019-01-01 DIAGNOSIS — I50.33 ACUTE ON CHRONIC HEART FAILURE WITH PRESERVED EJECTION FRACTION (HCC): Primary | ICD-10-CM

## 2019-01-01 DIAGNOSIS — I50.9 HEART FAILURE, UNSPECIFIED (HCC): ICD-10-CM

## 2019-01-01 DIAGNOSIS — J44.9 CHRONIC OBSTRUCTIVE PULMONARY DISEASE, UNSPECIFIED COPD TYPE (HCC): ICD-10-CM

## 2019-01-01 DIAGNOSIS — I50.32 CHRONIC DIASTOLIC HF (HEART FAILURE) (HCC): Primary | ICD-10-CM

## 2019-01-01 DIAGNOSIS — G89.29 CHRONIC RIGHT SHOULDER PAIN: ICD-10-CM

## 2019-01-01 DIAGNOSIS — R07.9 CHEST PAIN OF UNCERTAIN ETIOLOGY: Primary | ICD-10-CM

## 2019-01-01 DIAGNOSIS — N18.30 STAGE 3 CHRONIC KIDNEY DISEASE (HCC): ICD-10-CM

## 2019-01-01 DIAGNOSIS — Z98.890 S/P MITRAL VALVE CLIP IMPLANTATION: ICD-10-CM

## 2019-01-01 DIAGNOSIS — J18.9 PNEUMONIA OF LEFT LOWER LOBE DUE TO INFECTIOUS ORGANISM: Primary | ICD-10-CM

## 2019-01-01 DIAGNOSIS — M25.511 CHRONIC RIGHT SHOULDER PAIN: ICD-10-CM

## 2019-01-01 DIAGNOSIS — I34.0 MITRAL VALVE INSUFFICIENCY, UNSPECIFIED ETIOLOGY: ICD-10-CM

## 2019-01-01 DIAGNOSIS — Z86.79 HISTORY OF ATRIAL FIBRILLATION: ICD-10-CM

## 2019-01-01 DIAGNOSIS — M10.9 ACUTE GOUT OF LEFT HAND, UNSPECIFIED CAUSE: Primary | ICD-10-CM

## 2019-01-01 DIAGNOSIS — I34.0 NON-RHEUMATIC MITRAL REGURGITATION: ICD-10-CM

## 2019-01-01 DIAGNOSIS — I50.32 CHRONIC HEART FAILURE WITH PRESERVED EJECTION FRACTION (HCC): Primary | ICD-10-CM

## 2019-01-01 DIAGNOSIS — I25.10 ATHEROSCLEROSIS OF NATIVE CORONARY ARTERY OF NATIVE HEART WITHOUT ANGINA PECTORIS: Primary | ICD-10-CM

## 2019-01-01 DIAGNOSIS — I50.23 ACUTE ON CHRONIC SYSTOLIC HEART FAILURE (HCC): Primary | ICD-10-CM

## 2019-01-01 DIAGNOSIS — I50.33 ACUTE ON CHRONIC HEART FAILURE WITH PRESERVED EJECTION FRACTION (HCC): ICD-10-CM

## 2019-01-01 DIAGNOSIS — I25.84 CORONARY ARTERY DISEASE DUE TO CALCIFIED CORONARY LESION: ICD-10-CM

## 2019-01-01 DIAGNOSIS — D64.9 ANEMIA, UNSPECIFIED TYPE: ICD-10-CM

## 2019-01-01 DIAGNOSIS — I50.42 HEART FAILURE, SYSTOLIC AND DIASTOLIC, CHRONIC (HCC): Primary | ICD-10-CM

## 2019-01-01 DIAGNOSIS — I50.31: ICD-10-CM

## 2019-01-01 DIAGNOSIS — I48.0 PAROXYSMAL A-FIB (HCC): ICD-10-CM

## 2019-01-01 DIAGNOSIS — M10.9 ACUTE GOUT OF LEFT HAND, UNSPECIFIED CAUSE: ICD-10-CM

## 2019-01-01 DIAGNOSIS — I10 ESSENTIAL HYPERTENSION: ICD-10-CM

## 2019-01-01 DIAGNOSIS — D47.2 MGUS (MONOCLONAL GAMMOPATHY OF UNKNOWN SIGNIFICANCE): ICD-10-CM

## 2019-01-01 DIAGNOSIS — I50.31 ACUTE DIASTOLIC HEART FAILURE (HCC): Primary | ICD-10-CM

## 2019-01-01 DIAGNOSIS — R04.0 EPISTAXIS: Primary | ICD-10-CM

## 2019-01-01 DIAGNOSIS — Z95.818 S/P MITRAL VALVE CLIP IMPLANTATION: ICD-10-CM

## 2019-01-01 LAB
25(OH)D3 SERPL-MCNC: 47.7 NG/ML (ref 30–100)
ABSOLUTE IMMATURE GRANULOCYTES (OFFPRE24): NORMAL
ALBUMIN SERPL-MCNC: 3.4 G/DL (ref 3.4–5)
ALBUMIN SERPL-MCNC: 3.9 G/DL (ref 3.4–5)
ALP LIVER SERPL-CCNC: 147 U/L (ref 45–117)
ALT SERPL-CCNC: 23 U/L (ref 16–61)
ANION GAP SERPL CALC-SCNC: 4 MMOL/L (ref 0–18)
ANION GAP SERPL CALC-SCNC: 5 MMOL/L
ANION GAP SERPL CALC-SCNC: 5 MMOL/L (ref 0–18)
ANION GAP SERPL CALC-SCNC: 6 MMOL/L (ref 0–18)
ANION GAP SERPL CALC-SCNC: 7 MMOL/L (ref 0–18)
ANION GAP SERPL CALC-SCNC: 8 MMOL/L (ref 0–18)
ANION GAP SERPL CALC-SCNC: 8 MMOL/L (ref 0–18)
ANION GAP SERPL CALC-SCNC: 9 MMOL/L (ref 0–18)
ANION GAP SERPL CALC-SCNC: NORMAL MMOL/L
ANION GAP SERPL CALC-SCNC: NORMAL MMOL/L
AST SERPL-CCNC: 27 U/L (ref 15–37)
BASO+EOS+MONOS # BLD: NORMAL 10*3/UL
BASO+EOS+MONOS NFR BLD: NORMAL %
BASOPHILS # BLD AUTO: 0.04 X10(3) UL (ref 0–0.2)
BASOPHILS # BLD AUTO: 0.05 X10(3) UL (ref 0–0.2)
BASOPHILS # BLD: NORMAL 10*3/UL
BASOPHILS NFR BLD AUTO: 0.6 %
BASOPHILS NFR BLD AUTO: 0.8 %
BASOPHILS NFR BLD: NORMAL %
BILIRUB DIRECT SERPL-MCNC: 0.6 MG/DL (ref 0–0.2)
BILIRUB SERPL-MCNC: 1.1 MG/DL (ref 0.1–2)
BILIRUB UR QL: NEGATIVE
BUN BLD-MCNC: 39 MG/DL (ref 7–18)
BUN BLD-MCNC: 41 MG/DL (ref 7–18)
BUN BLD-MCNC: 41 MG/DL (ref 7–18)
BUN BLD-MCNC: 43 MG/DL (ref 7–18)
BUN BLD-MCNC: 43 MG/DL (ref 7–18)
BUN BLD-MCNC: 46 MG/DL (ref 7–18)
BUN BLD-MCNC: 49 MG/DL (ref 7–18)
BUN BLD-MCNC: 49 MG/DL (ref 7–18)
BUN BLD-MCNC: 59 MG/DL (ref 7–18)
BUN SERPL-MCNC: 39 MG/DL
BUN SERPL-MCNC: 53 MG/DL
BUN SERPL-MCNC: 59 MG/DL
BUN/CREAT SERPL: 23 (ref 10–20)
BUN/CREAT SERPL: 24.4
BUN/CREAT SERPL: 24.4 (ref 10–20)
BUN/CREAT SERPL: 25.2 (ref 10–20)
BUN/CREAT SERPL: 26.8 (ref 10–20)
BUN/CREAT SERPL: 27.9 (ref 10–20)
BUN/CREAT SERPL: 29.7 (ref 10–20)
BUN/CREAT SERPL: 31 (ref 10–20)
BUN/CREAT SERPL: 31.3 (ref 10–20)
BUN/CREAT SERPL: 35.8 (ref 10–20)
BUN/CREAT SERPL: NORMAL
BUN/CREAT SERPL: NORMAL
CALCIUM BLD-MCNC: 8.8 MG/DL (ref 8.5–10.1)
CALCIUM BLD-MCNC: 9.1 MG/DL (ref 8.5–10.1)
CALCIUM BLD-MCNC: 9.1 MG/DL (ref 8.5–10.1)
CALCIUM BLD-MCNC: 9.2 MG/DL (ref 8.5–10.1)
CALCIUM BLD-MCNC: 9.3 MG/DL (ref 8.5–10.1)
CALCIUM BLD-MCNC: 9.4 MG/DL (ref 8.5–10.1)
CALCIUM BLD-MCNC: 9.4 MG/DL (ref 8.5–10.1)
CALCIUM SERPL-MCNC: 9.2 MG/DL
CALCIUM SERPL-MCNC: 9.3 MG/DL
CALCIUM SERPL-MCNC: 9.5 MG/DL
CHLORIDE SERPL-SCNC: 103 MMOL/L
CHLORIDE SERPL-SCNC: 104 MMOL/L (ref 98–112)
CHLORIDE SERPL-SCNC: 104 MMOL/L (ref 98–112)
CHLORIDE SERPL-SCNC: 105 MMOL/L (ref 98–112)
CHLORIDE SERPL-SCNC: 106 MMOL/L
CHLORIDE SERPL-SCNC: 106 MMOL/L (ref 98–112)
CHLORIDE SERPL-SCNC: 106 MMOL/L (ref 98–112)
CHLORIDE SERPL-SCNC: 108 MMOL/L
CHLORIDE SERPL-SCNC: 108 MMOL/L (ref 98–112)
CLARITY UR: CLEAR
CO2 SERPL-SCNC: 24 MMOL/L (ref 21–32)
CO2 SERPL-SCNC: 24 MMOL/L (ref 21–32)
CO2 SERPL-SCNC: 25 MMOL/L
CO2 SERPL-SCNC: 25 MMOL/L (ref 21–32)
CO2 SERPL-SCNC: 25 MMOL/L (ref 21–32)
CO2 SERPL-SCNC: 26 MMOL/L (ref 21–32)
CO2 SERPL-SCNC: 26 MMOL/L (ref 21–32)
CO2 SERPL-SCNC: 26.3 MMOL/L
CO2 SERPL-SCNC: 27 MMOL/L (ref 21–32)
CO2 SERPL-SCNC: 29 MMOL/L
CO2 SERPL-SCNC: 29 MMOL/L (ref 21–32)
CO2 SERPL-SCNC: 29 MMOL/L (ref 21–32)
COLOR UR: YELLOW
CREAT BLD-MCNC: 1.45 MG/DL (ref 0.7–1.3)
CREAT BLD-MCNC: 1.47 MG/DL (ref 0.7–1.3)
CREAT BLD-MCNC: 1.47 MG/DL (ref 0.7–1.3)
CREAT BLD-MCNC: 1.58 MG/DL (ref 0.7–1.3)
CREAT BLD-MCNC: 1.6 MG/DL (ref 0.7–1.3)
CREAT BLD-MCNC: 1.63 MG/DL (ref 0.7–1.3)
CREAT BLD-MCNC: 1.65 MG/DL (ref 0.7–1.3)
CREAT BLD-MCNC: 1.83 MG/DL (ref 0.7–1.3)
CREAT BLD-MCNC: 1.87 MG/DL (ref 0.7–1.3)
CREAT SERPL-MCNC: 1.6 MG/DL
CREAT SERPL-MCNC: 1.65 MG/DL
CREAT SERPL-MCNC: 1.7 MG/DL
CREAT UR-SCNC: 22 MG/DL
DEPRECATED RDW RBC AUTO: 64.2 FL (ref 35.1–46.3)
DEPRECATED RDW RBC AUTO: 66.4 FL (ref 35.1–46.3)
DGDOSEF LAST DOSE: NORMAL
DIFFERENTIAL METHOD BLD: NORMAL
DIGOXIN SERPL-MCNC: 0.62 NG/ML
EOSINOPHIL # BLD AUTO: 0.44 X10(3) UL (ref 0–0.7)
EOSINOPHIL # BLD AUTO: 0.47 X10(3) UL (ref 0–0.7)
EOSINOPHIL # BLD: NORMAL 10*3/UL
EOSINOPHIL NFR BLD AUTO: 0.8 %
EOSINOPHIL NFR BLD AUTO: 6.5 %
EOSINOPHIL NFR BLD: NORMAL %
ERYTHROCYTE [DISTWIDTH] IN BLOOD BY AUTOMATED COUNT: 17.2 % (ref 11–15)
ERYTHROCYTE [DISTWIDTH] IN BLOOD BY AUTOMATED COUNT: 17.2 % (ref 11–15)
ERYTHROCYTE [DISTWIDTH] IN BLOOD: NORMAL %
GLUCOSE BLD-MCNC: 101 MG/DL (ref 70–99)
GLUCOSE BLD-MCNC: 108 MG/DL (ref 70–99)
GLUCOSE BLD-MCNC: 108 MG/DL (ref 70–99)
GLUCOSE BLD-MCNC: 118 MG/DL (ref 70–99)
GLUCOSE BLD-MCNC: 126 MG/DL (ref 70–99)
GLUCOSE BLD-MCNC: 159 MG/DL (ref 70–99)
GLUCOSE BLD-MCNC: 211 MG/DL (ref 70–99)
GLUCOSE BLD-MCNC: 96 MG/DL (ref 70–99)
GLUCOSE BLD-MCNC: 99 MG/DL (ref 70–99)
GLUCOSE SERPL-MCNC: 108 MG/DL
GLUCOSE SERPL-MCNC: 150 MG/DL
GLUCOSE SERPL-MCNC: 99 MG/DL
GLUCOSE UR-MCNC: NEGATIVE MG/DL
HAV IGM SER QL: 2.3 MG/DL (ref 1.6–2.6)
HCT VFR BLD AUTO: 34.2 % (ref 39–53)
HCT VFR BLD AUTO: 36.1 % (ref 39–53)
HCT VFR BLD CALC: 34.2 %
HGB BLD-MCNC: 10.8 G/DL
HGB BLD-MCNC: 10.8 G/DL (ref 13–17.5)
HGB BLD-MCNC: 11.2 G/DL (ref 13–17.5)
HGB UR QL STRIP.AUTO: NEGATIVE
IMM GRANULOCYTES # BLD AUTO: 0.05 X10(3) UL (ref 0–1)
IMM GRANULOCYTES # BLD AUTO: 0.08 X10(3) UL (ref 0–1)
IMM GRANULOCYTES NFR BLD: 0 %
IMM GRANULOCYTES NFR BLD: 1.2 %
IMMATURE GRANULOCYTES (OFFPRE25): NORMAL
KETONES UR-MCNC: NEGATIVE MG/DL
LENGTH OF FAST TIME PATIENT: NORMAL H
LEUKOCYTE ESTERASE UR QL STRIP.AUTO: NEGATIVE
LYMPHOCYTES # BLD AUTO: 0.6 X10(3) UL (ref 1–4)
LYMPHOCYTES # BLD AUTO: 0.76 X10(3) UL (ref 1–4)
LYMPHOCYTES # BLD: NORMAL 10*3/UL
LYMPHOCYTES NFR BLD AUTO: 11.3 %
LYMPHOCYTES NFR BLD AUTO: 9.6 %
LYMPHOCYTES NFR BLD: NORMAL %
M PROTEIN MFR SERPL ELPH: 7.5 G/DL (ref 6.4–8.2)
MCH RBC QN AUTO: 32.3 PG (ref 26–34)
MCH RBC QN AUTO: 32.9 PG (ref 26–34)
MCH RBC QN AUTO: NORMAL PG
MCHC RBC AUTO-ENTMCNC: 31 G/DL (ref 31–37)
MCHC RBC AUTO-ENTMCNC: 31.6 G/DL (ref 31–37)
MCHC RBC AUTO-ENTMCNC: NORMAL G/DL
MCV RBC AUTO: 102.4 FL (ref 80–100)
MCV RBC AUTO: 106.2 FL (ref 80–100)
MCV RBC AUTO: NORMAL FL
MONOCYTES # BLD AUTO: 1.12 X10(3) UL (ref 0.1–1)
MONOCYTES # BLD AUTO: 1.34 X10(3) UL (ref 0.1–1)
MONOCYTES # BLD: NORMAL 10*3/UL
MONOCYTES NFR BLD AUTO: 16.6 %
MONOCYTES NFR BLD AUTO: 21.5 %
MONOCYTES NFR BLD: NORMAL %
MPV (OFFPRE2): NORMAL
NEUTROPHILS # BLD AUTO: 3.72 X10 (3) UL (ref 1.5–7.7)
NEUTROPHILS # BLD AUTO: 3.72 X10(3) UL (ref 1.5–7.7)
NEUTROPHILS # BLD AUTO: 4.3 X10 (3) UL (ref 1.5–7.7)
NEUTROPHILS # BLD AUTO: 4.3 X10(3) UL (ref 1.5–7.7)
NEUTROPHILS # BLD: NORMAL 10*3/UL
NEUTROPHILS NFR BLD AUTO: 59.8 %
NEUTROPHILS NFR BLD AUTO: 63.8 %
NEUTROPHILS NFR BLD: NORMAL %
NITRITE UR QL STRIP.AUTO: NEGATIVE
NRBC BLD MANUAL-RTO: NORMAL %
NT-PROBNP SERPL-MCNC: 5119 PG/ML (ref ?–450)
NT-PROBNP SERPL-MCNC: 5430 PG/ML (ref ?–450)
NT-PROBNP SERPL-MCNC: 5937 PG/ML (ref ?–450)
OSMOLALITY SERPL CALC.SUM OF ELEC: 295 MOSM/KG (ref 275–295)
OSMOLALITY SERPL CALC.SUM OF ELEC: 295 MOSM/KG (ref 275–295)
OSMOLALITY SERPL CALC.SUM OF ELEC: 300 MOSM/KG (ref 275–295)
OSMOLALITY SERPL CALC.SUM OF ELEC: 302 MOSM/KG (ref 275–295)
OSMOLALITY SERPL CALC.SUM OF ELEC: 303 MOSM/KG (ref 275–295)
OSMOLALITY SERPL CALC.SUM OF ELEC: 305 MOSM/KG (ref 275–295)
OSMOLALITY SERPL CALC.SUM OF ELEC: 306 MOSM/KG (ref 275–295)
OSMOLALITY SERPL CALC.SUM OF ELEC: 307 MOSM/KG (ref 275–295)
OSMOLALITY SERPL CALC.SUM OF ELEC: 311 MOSM/KG (ref 275–295)
PATIENT FASTING: NO
PH UR: 6 [PH] (ref 5–8)
PHOSPHATE SERPL-MCNC: 3.6 MG/DL (ref 2.5–4.9)
PLAT MORPH BLD: NORMAL
PLATELET # BLD AUTO: 167 10(3)UL (ref 150–450)
PLATELET # BLD: 167 10*3/UL
PLATELET MORPHOLOGY: NORMAL
POTASSIUM SERPL-SCNC: 3.5 MMOL/L (ref 3.5–5.1)
POTASSIUM SERPL-SCNC: 3.6 MMOL/L (ref 3.5–5.1)
POTASSIUM SERPL-SCNC: 3.8 MMOL/L (ref 3.5–5.1)
POTASSIUM SERPL-SCNC: 4 MMOL/L (ref 3.5–5.1)
POTASSIUM SERPL-SCNC: 4 MMOL/L (ref 3.5–5.1)
POTASSIUM SERPL-SCNC: 4.2 MMOL/L (ref 3.5–5.1)
POTASSIUM SERPL-SCNC: 4.23 MMOL/L
POTASSIUM SERPL-SCNC: 4.3 MMOL/L (ref 3.5–5.1)
POTASSIUM SERPL-SCNC: 4.3 MMOL/L (ref 3.5–5.1)
POTASSIUM SERPL-SCNC: 4.4 MMOL/L
POTASSIUM SERPL-SCNC: 4.4 MMOL/L (ref 3.5–5.1)
POTASSIUM SERPL-SCNC: 5.1 MMOL/L
POTASSIUM SERPL-SCNC: 5.1 MMOL/L (ref 3.5–5.1)
PROT UR-MCNC: <5 MG/DL
PROT UR-MCNC: NEGATIVE MG/DL
PTH-INTACT SERPL-MCNC: 144.3 PG/ML (ref 18.5–88)
RBC # BLD AUTO: 3.34 X10(6)UL (ref 3.8–5.8)
RBC # BLD AUTO: 3.4 X10(6)UL (ref 3.8–5.8)
RBC # BLD: 3.34 10*6/UL
RBC MORPH BLD: NORMAL
SODIUM SERPL-SCNC: 137 MMOL/L (ref 136–145)
SODIUM SERPL-SCNC: 137 MMOL/L (ref 136–145)
SODIUM SERPL-SCNC: 138 MMOL/L (ref 136–145)
SODIUM SERPL-SCNC: 140 MMOL/L
SODIUM SERPL-SCNC: 140 MMOL/L (ref 136–145)
SODIUM SERPL-SCNC: 140 MMOL/L (ref 136–145)
SODIUM SERPL-SCNC: 141 MMOL/L (ref 136–145)
SODIUM SERPL-SCNC: 142 MMOL/L
SODIUM SERPL-SCNC: 142 MMOL/L
SODIUM SERPL-SCNC: 142 MMOL/L (ref 136–145)
SP GR UR STRIP: 1.01 (ref 1–1.03)
TROPONIN I SERPL-MCNC: <0.045 NG/ML (ref ?–0.04)
UROBILINOGEN UR STRIP-ACNC: 2
WBC # BLD AUTO: 6.2 X10(3) UL (ref 4–11)
WBC # BLD AUTO: 6.7 X10(3) UL (ref 4–11)
WBC # BLD: 6.7 10*3/UL
WBC MORPH BLD: NORMAL

## 2019-01-01 PROCEDURE — 99212 OFFICE O/P EST SF 10 MIN: CPT | Performed by: CLINICAL NURSE SPECIALIST

## 2019-01-01 PROCEDURE — 80048 BASIC METABOLIC PNL TOTAL CA: CPT | Performed by: CLINICAL NURSE SPECIALIST

## 2019-01-01 PROCEDURE — 96372 THER/PROPH/DIAG INJ SC/IM: CPT

## 2019-01-01 PROCEDURE — 71046 X-RAY EXAM CHEST 2 VIEWS: CPT | Performed by: EMERGENCY MEDICINE

## 2019-01-01 PROCEDURE — 99285 EMERGENCY DEPT VISIT HI MDM: CPT

## 2019-01-01 PROCEDURE — 85025 COMPLETE CBC W/AUTO DIFF WBC: CPT | Performed by: CLINICAL NURSE SPECIALIST

## 2019-01-01 PROCEDURE — 81003 URINALYSIS AUTO W/O SCOPE: CPT | Performed by: EMERGENCY MEDICINE

## 2019-01-01 PROCEDURE — 87040 BLOOD CULTURE FOR BACTERIA: CPT | Performed by: EMERGENCY MEDICINE

## 2019-01-01 PROCEDURE — 99214 OFFICE O/P EST MOD 30 MIN: CPT | Performed by: CLINICAL NURSE SPECIALIST

## 2019-01-01 PROCEDURE — 84484 ASSAY OF TROPONIN QUANT: CPT | Performed by: EMERGENCY MEDICINE

## 2019-01-01 PROCEDURE — 83880 ASSAY OF NATRIURETIC PEPTIDE: CPT | Performed by: EMERGENCY MEDICINE

## 2019-01-01 PROCEDURE — 83880 ASSAY OF NATRIURETIC PEPTIDE: CPT

## 2019-01-01 PROCEDURE — 84156 ASSAY OF PROTEIN URINE: CPT

## 2019-01-01 PROCEDURE — 82306 VITAMIN D 25 HYDROXY: CPT

## 2019-01-01 PROCEDURE — 97110 THERAPEUTIC EXERCISES: CPT

## 2019-01-01 PROCEDURE — 80048 BASIC METABOLIC PNL TOTAL CA: CPT | Performed by: HOSPITALIST

## 2019-01-01 PROCEDURE — 81003 URINALYSIS AUTO W/O SCOPE: CPT | Performed by: INTERNAL MEDICINE

## 2019-01-01 PROCEDURE — 83735 ASSAY OF MAGNESIUM: CPT | Performed by: INTERNAL MEDICINE

## 2019-01-01 PROCEDURE — 97161 PT EVAL LOW COMPLEX 20 MIN: CPT

## 2019-01-01 PROCEDURE — 80048 BASIC METABOLIC PNL TOTAL CA: CPT | Performed by: INTERNAL MEDICINE

## 2019-01-01 PROCEDURE — 80048 BASIC METABOLIC PNL TOTAL CA: CPT | Performed by: EMERGENCY MEDICINE

## 2019-01-01 PROCEDURE — 84100 ASSAY OF PHOSPHORUS: CPT

## 2019-01-01 PROCEDURE — 83883 ASSAY NEPHELOMETRY NOT SPEC: CPT | Performed by: INTERNAL MEDICINE

## 2019-01-01 PROCEDURE — 36415 COLL VENOUS BLD VENIPUNCTURE: CPT

## 2019-01-01 PROCEDURE — 80076 HEPATIC FUNCTION PANEL: CPT | Performed by: EMERGENCY MEDICINE

## 2019-01-01 PROCEDURE — 71046 X-RAY EXAM CHEST 2 VIEWS: CPT | Performed by: CLINICAL NURSE SPECIALIST

## 2019-01-01 PROCEDURE — 71250 CT THORAX DX C-: CPT | Performed by: INTERNAL MEDICINE

## 2019-01-01 PROCEDURE — 86334 IMMUNOFIX E-PHORESIS SERUM: CPT | Performed by: INTERNAL MEDICINE

## 2019-01-01 PROCEDURE — 71045 X-RAY EXAM CHEST 1 VIEW: CPT | Performed by: EMERGENCY MEDICINE

## 2019-01-01 PROCEDURE — 96374 THER/PROPH/DIAG INJ IV PUSH: CPT

## 2019-01-01 PROCEDURE — 82570 ASSAY OF URINE CREATININE: CPT

## 2019-01-01 PROCEDURE — 36415 COLL VENOUS BLD VENIPUNCTURE: CPT | Performed by: CLINICAL NURSE SPECIALIST

## 2019-01-01 PROCEDURE — 84466 ASSAY OF TRANSFERRIN: CPT | Performed by: INTERNAL MEDICINE

## 2019-01-01 PROCEDURE — 93005 ELECTROCARDIOGRAM TRACING: CPT

## 2019-01-01 PROCEDURE — 85025 COMPLETE CBC W/AUTO DIFF WBC: CPT | Performed by: HOSPITALIST

## 2019-01-01 PROCEDURE — 93010 ELECTROCARDIOGRAM REPORT: CPT | Performed by: EMERGENCY MEDICINE

## 2019-01-01 PROCEDURE — 97116 GAIT TRAINING THERAPY: CPT

## 2019-01-01 PROCEDURE — 87798 DETECT AGENT NOS DNA AMP: CPT | Performed by: INTERNAL MEDICINE

## 2019-01-01 PROCEDURE — 84484 ASSAY OF TROPONIN QUANT: CPT | Performed by: HOSPITALIST

## 2019-01-01 PROCEDURE — 85060 BLOOD SMEAR INTERPRETATION: CPT | Performed by: EMERGENCY MEDICINE

## 2019-01-01 PROCEDURE — 83880 ASSAY OF NATRIURETIC PEPTIDE: CPT | Performed by: CLINICAL NURSE SPECIALIST

## 2019-01-01 PROCEDURE — 99214 OFFICE O/P EST MOD 30 MIN: CPT | Performed by: INTERNAL MEDICINE

## 2019-01-01 PROCEDURE — 80053 COMPREHEN METABOLIC PANEL: CPT

## 2019-01-01 PROCEDURE — 74176 CT ABD & PELVIS W/O CONTRAST: CPT | Performed by: INTERNAL MEDICINE

## 2019-01-01 PROCEDURE — 85025 COMPLETE CBC W/AUTO DIFF WBC: CPT | Performed by: EMERGENCY MEDICINE

## 2019-01-01 PROCEDURE — 85027 COMPLETE CBC AUTOMATED: CPT | Performed by: HOSPITALIST

## 2019-01-01 PROCEDURE — 80048 BASIC METABOLIC PNL TOTAL CA: CPT | Performed by: NURSE PRACTITIONER

## 2019-01-01 PROCEDURE — 83970 ASSAY OF PARATHORMONE: CPT

## 2019-01-01 PROCEDURE — 83605 ASSAY OF LACTIC ACID: CPT | Performed by: EMERGENCY MEDICINE

## 2019-01-01 PROCEDURE — 87581 M.PNEUMON DNA AMP PROBE: CPT | Performed by: INTERNAL MEDICINE

## 2019-01-01 PROCEDURE — 97165 OT EVAL LOW COMPLEX 30 MIN: CPT

## 2019-01-01 PROCEDURE — 82784 ASSAY IGA/IGD/IGG/IGM EACH: CPT | Performed by: INTERNAL MEDICINE

## 2019-01-01 PROCEDURE — 99232 SBSQ HOSP IP/OBS MODERATE 35: CPT | Performed by: INTERNAL MEDICINE

## 2019-01-01 PROCEDURE — 36415 COLL VENOUS BLD VENIPUNCTURE: CPT | Performed by: INTERNAL MEDICINE

## 2019-01-01 PROCEDURE — 99283 EMERGENCY DEPT VISIT LOW MDM: CPT

## 2019-01-01 PROCEDURE — 73030 X-RAY EXAM OF SHOULDER: CPT | Performed by: INTERNAL MEDICINE

## 2019-01-01 PROCEDURE — 83540 ASSAY OF IRON: CPT | Performed by: INTERNAL MEDICINE

## 2019-01-01 PROCEDURE — 97535 SELF CARE MNGMENT TRAINING: CPT

## 2019-01-01 PROCEDURE — 93971 EXTREMITY STUDY: CPT | Performed by: HOSPITALIST

## 2019-01-01 PROCEDURE — 96361 HYDRATE IV INFUSION ADD-ON: CPT

## 2019-01-01 PROCEDURE — 30903 CONTROL OF NOSEBLEED: CPT

## 2019-01-01 PROCEDURE — 97162 PT EVAL MOD COMPLEX 30 MIN: CPT

## 2019-01-01 PROCEDURE — 87633 RESP VIRUS 12-25 TARGETS: CPT | Performed by: INTERNAL MEDICINE

## 2019-01-01 PROCEDURE — 85027 COMPLETE CBC AUTOMATED: CPT | Performed by: INTERNAL MEDICINE

## 2019-01-01 PROCEDURE — 82728 ASSAY OF FERRITIN: CPT | Performed by: INTERNAL MEDICINE

## 2019-01-01 PROCEDURE — 97530 THERAPEUTIC ACTIVITIES: CPT

## 2019-01-01 PROCEDURE — 87486 CHLMYD PNEUM DNA AMP PROBE: CPT | Performed by: INTERNAL MEDICINE

## 2019-01-01 PROCEDURE — 94640 AIRWAY INHALATION TREATMENT: CPT

## 2019-01-01 PROCEDURE — 84132 ASSAY OF SERUM POTASSIUM: CPT | Performed by: INTERNAL MEDICINE

## 2019-01-01 PROCEDURE — 99222 1ST HOSP IP/OBS MODERATE 55: CPT | Performed by: INTERNAL MEDICINE

## 2019-01-01 PROCEDURE — 93306 TTE W/DOPPLER COMPLETE: CPT | Performed by: INTERNAL MEDICINE

## 2019-01-01 PROCEDURE — 99214 OFFICE O/P EST MOD 30 MIN: CPT | Performed by: NURSE PRACTITIONER

## 2019-01-01 PROCEDURE — 73030 X-RAY EXAM OF SHOULDER: CPT | Performed by: EMERGENCY MEDICINE

## 2019-01-01 PROCEDURE — 96365 THER/PROPH/DIAG IV INF INIT: CPT

## 2019-01-01 PROCEDURE — 80162 ASSAY OF DIGOXIN TOTAL: CPT | Performed by: EMERGENCY MEDICINE

## 2019-01-01 PROCEDURE — 84165 PROTEIN E-PHORESIS SERUM: CPT | Performed by: INTERNAL MEDICINE

## 2019-01-01 PROCEDURE — 84550 ASSAY OF BLOOD/URIC ACID: CPT

## 2019-01-01 PROCEDURE — 73221 MRI JOINT UPR EXTREM W/O DYE: CPT | Performed by: INTERNAL MEDICINE

## 2019-01-01 PROCEDURE — 97166 OT EVAL MOD COMPLEX 45 MIN: CPT

## 2019-01-01 PROCEDURE — 80069 RENAL FUNCTION PANEL: CPT

## 2019-01-01 PROCEDURE — 99215 OFFICE O/P EST HI 40 MIN: CPT | Performed by: INTERNAL MEDICINE

## 2019-01-01 PROCEDURE — 85025 COMPLETE CBC W/AUTO DIFF WBC: CPT

## 2019-01-01 RX ORDER — CLOPIDOGREL BISULFATE 75 MG/1
75 TABLET ORAL DAILY
Qty: 90 TABLET | Refills: 3 | Status: SHIPPED | OUTPATIENT
Start: 2019-01-01 | End: 2019-01-01 | Stop reason: SDUPTHER

## 2019-01-01 RX ORDER — DILTIAZEM HYDROCHLORIDE 120 MG/1
120 CAPSULE, EXTENDED RELEASE ORAL DAILY
Qty: 30 CAPSULE | Refills: 11 | Status: ON HOLD | COMMUNITY
Start: 2020-01-01 | End: 2020-01-01 | Stop reason: CLARIF

## 2019-01-01 RX ORDER — DIGOXIN 125 MCG
125 TABLET ORAL
Qty: 45 TABLET | Refills: 3 | Status: SHIPPED | OUTPATIENT
Start: 2019-01-01 | End: 2019-01-01 | Stop reason: SDUPTHER

## 2019-01-01 RX ORDER — DOXEPIN HYDROCHLORIDE 50 MG/1
1 CAPSULE ORAL DAILY
Status: DISCONTINUED | OUTPATIENT
Start: 2019-01-01 | End: 2019-01-01

## 2019-01-01 RX ORDER — MONTELUKAST SODIUM 10 MG/1
10 TABLET ORAL NIGHTLY
Status: DISCONTINUED | OUTPATIENT
Start: 2019-01-01 | End: 2019-01-01

## 2019-01-01 RX ORDER — POTASSIUM CHLORIDE 20 MEQ/1
TABLET, EXTENDED RELEASE ORAL
Qty: 90 TABLET | Refills: 0 | Status: SHIPPED | OUTPATIENT
Start: 2019-01-01 | End: 2020-01-01

## 2019-01-01 RX ORDER — ACETAMINOPHEN 325 MG/1
650 TABLET ORAL EVERY 6 HOURS PRN
Status: DISCONTINUED | OUTPATIENT
Start: 2019-01-01 | End: 2019-01-01

## 2019-01-01 RX ORDER — AMOXICILLIN AND CLAVULANATE POTASSIUM 500; 125 MG/1; MG/1
1 TABLET, FILM COATED ORAL 2 TIMES DAILY
Qty: 6 TABLET | Refills: 0 | Status: SHIPPED | OUTPATIENT
Start: 2019-01-01 | End: 2019-01-01

## 2019-01-01 RX ORDER — TORSEMIDE 20 MG/1
20 TABLET ORAL
Status: DISCONTINUED | OUTPATIENT
Start: 2019-01-01 | End: 2019-01-01

## 2019-01-01 RX ORDER — CLOPIDOGREL BISULFATE 75 MG/1
75 TABLET ORAL DAILY
Qty: 90 TABLET | Refills: 3 | Status: SHIPPED | OUTPATIENT
Start: 2019-01-01

## 2019-01-01 RX ORDER — LIDOCAINE HYDROCHLORIDE 20 MG/ML
10 SOLUTION OROPHARYNGEAL ONCE
Status: COMPLETED | OUTPATIENT
Start: 2019-01-01 | End: 2019-01-01

## 2019-01-01 RX ORDER — AMOXICILLIN 500 MG/1
500 TABLET, FILM COATED ORAL 3 TIMES DAILY
Qty: 15 TABLET | Refills: 0 | Status: SHIPPED | OUTPATIENT
Start: 2019-01-01 | End: 2019-01-01

## 2019-01-01 RX ORDER — TORSEMIDE 20 MG/1
TABLET ORAL
Qty: 90 TABLET | Refills: 1 | Status: SHIPPED | OUTPATIENT
Start: 2019-01-01 | End: 2020-01-01 | Stop reason: DRUGHIGH

## 2019-01-01 RX ORDER — GABAPENTIN 100 MG/1
100 CAPSULE ORAL 2 TIMES DAILY
Status: DISCONTINUED | OUTPATIENT
Start: 2019-01-01 | End: 2019-01-01

## 2019-01-01 RX ORDER — HYDROCODONE BITARTRATE AND ACETAMINOPHEN 5; 325 MG/1; MG/1
1 TABLET ORAL EVERY 4 HOURS PRN
Qty: 25 TABLET | Refills: 0 | Status: ON HOLD | OUTPATIENT
Start: 2019-01-01 | End: 2019-01-01

## 2019-01-01 RX ORDER — DILTIAZEM HYDROCHLORIDE 120 MG/1
120 CAPSULE, EXTENDED RELEASE ORAL DAILY
Qty: 30 CAPSULE | Refills: 11 | Status: SHIPPED | OUTPATIENT
Start: 2020-01-01 | End: 2019-01-01

## 2019-01-01 RX ORDER — TRANEXAMIC ACID 100 MG/ML
5 INJECTION, SOLUTION INTRAVENOUS ONCE
Status: COMPLETED | OUTPATIENT
Start: 2019-01-01 | End: 2019-01-01

## 2019-01-01 RX ORDER — DIGOXIN 125 UG/1
TABLET ORAL
Qty: 45 TABLET | Refills: 0 | OUTPATIENT
Start: 2019-01-01

## 2019-01-01 RX ORDER — CHOLECALCIFEROL (VITAMIN D3) 125 MCG
250 CAPSULE ORAL 2 TIMES DAILY
Status: DISCONTINUED | OUTPATIENT
Start: 2019-01-01 | End: 2019-01-01

## 2019-01-01 RX ORDER — POLYETHYLENE GLYCOL 3350 17 G/17G
17 POWDER, FOR SOLUTION ORAL DAILY
Status: DISCONTINUED | OUTPATIENT
Start: 2019-01-01 | End: 2019-01-01

## 2019-01-01 RX ORDER — SENNA AND DOCUSATE SODIUM 50; 8.6 MG/1; MG/1
2 TABLET, FILM COATED ORAL DAILY
Status: DISCONTINUED | OUTPATIENT
Start: 2019-01-01 | End: 2019-01-01

## 2019-01-01 RX ORDER — MORPHINE SULFATE 4 MG/ML
4 INJECTION, SOLUTION INTRAMUSCULAR; INTRAVENOUS ONCE
Status: COMPLETED | OUTPATIENT
Start: 2019-01-01 | End: 2019-01-01

## 2019-01-01 RX ORDER — ASPIRIN 81 MG/1
81 TABLET, CHEWABLE ORAL DAILY
Status: DISCONTINUED | OUTPATIENT
Start: 2019-01-01 | End: 2019-01-01

## 2019-01-01 RX ORDER — POTASSIUM CHLORIDE 20 MEQ/1
40 TABLET, EXTENDED RELEASE ORAL EVERY 4 HOURS
Status: COMPLETED | OUTPATIENT
Start: 2019-01-01 | End: 2019-01-01

## 2019-01-01 RX ORDER — ALBUTEROL SULFATE 90 UG/1
2 AEROSOL, METERED RESPIRATORY (INHALATION) EVERY 6 HOURS PRN
Status: DISCONTINUED | OUTPATIENT
Start: 2019-01-01 | End: 2019-01-01

## 2019-01-01 RX ORDER — CLOPIDOGREL BISULFATE 75 MG/1
75 TABLET ORAL DAILY
Status: DISCONTINUED | OUTPATIENT
Start: 2019-01-01 | End: 2019-01-01

## 2019-01-01 RX ORDER — HYDROCODONE BITARTRATE AND ACETAMINOPHEN 5; 325 MG/1; MG/1
1 TABLET ORAL EVERY 4 HOURS PRN
Status: DISCONTINUED | OUTPATIENT
Start: 2019-01-01 | End: 2019-01-01

## 2019-01-01 RX ORDER — IPRATROPIUM BROMIDE AND ALBUTEROL SULFATE 2.5; .5 MG/3ML; MG/3ML
3 SOLUTION RESPIRATORY (INHALATION) EVERY 6 HOURS PRN
Status: DISCONTINUED | OUTPATIENT
Start: 2019-01-01 | End: 2019-01-01

## 2019-01-01 RX ORDER — ALLOPURINOL 100 MG/1
100 TABLET ORAL 2 TIMES DAILY
Status: DISCONTINUED | OUTPATIENT
Start: 2019-01-01 | End: 2019-01-01

## 2019-01-01 RX ORDER — PANTOPRAZOLE SODIUM 40 MG/1
40 TABLET, DELAYED RELEASE ORAL
Status: DISCONTINUED | OUTPATIENT
Start: 2019-01-01 | End: 2019-01-01

## 2019-01-01 RX ORDER — FAMOTIDINE 20 MG/1
20 TABLET ORAL NIGHTLY
Status: DISCONTINUED | OUTPATIENT
Start: 2019-01-01 | End: 2019-01-01

## 2019-01-01 RX ORDER — HYDROCODONE BITARTRATE AND ACETAMINOPHEN 5; 325 MG/1; MG/1
2 TABLET ORAL EVERY 4 HOURS PRN
Status: DISCONTINUED | OUTPATIENT
Start: 2019-01-01 | End: 2019-01-01

## 2019-01-01 RX ORDER — SENNA AND DOCUSATE SODIUM 50; 8.6 MG/1; MG/1
2 TABLET, FILM COATED ORAL DAILY
Qty: 30 TABLET | Refills: 0 | Status: SHIPPED
Start: 2020-01-01 | End: 2020-01-01 | Stop reason: ALTCHOICE

## 2019-01-01 RX ORDER — TORSEMIDE 20 MG/1
20 TABLET ORAL DAILY
Status: DISCONTINUED | OUTPATIENT
Start: 2019-01-01 | End: 2019-01-01

## 2019-01-01 RX ORDER — DIGOXIN 125 MCG
125 TABLET ORAL
Status: DISCONTINUED | OUTPATIENT
Start: 2019-01-01 | End: 2019-01-01

## 2019-01-01 RX ORDER — DIGOXIN 125 MCG
125 TABLET ORAL EVERY OTHER DAY
Qty: 45 TABLET | Refills: 0 | Status: SHIPPED | OUTPATIENT
Start: 2019-01-01 | End: 2019-01-01

## 2019-01-01 RX ORDER — FAMOTIDINE 10 MG
10 TABLET ORAL NIGHTLY
Status: DISCONTINUED | OUTPATIENT
Start: 2019-01-01 | End: 2019-01-01

## 2019-01-01 RX ORDER — DILTIAZEM HYDROCHLORIDE 120 MG/1
120 CAPSULE, EXTENDED RELEASE ORAL DAILY
Status: DISCONTINUED | OUTPATIENT
Start: 2019-01-01 | End: 2019-01-01

## 2019-01-01 RX ORDER — SODIUM CHLORIDE 9 MG/ML
INJECTION, SOLUTION INTRAVENOUS CONTINUOUS
Status: DISCONTINUED | OUTPATIENT
Start: 2019-01-01 | End: 2019-01-01

## 2019-01-01 RX ORDER — ENOXAPARIN SODIUM 100 MG/ML
30 INJECTION SUBCUTANEOUS DAILY
Status: DISCONTINUED | OUTPATIENT
Start: 2019-01-01 | End: 2019-01-01

## 2019-01-01 RX ORDER — DILTIAZEM HYDROCHLORIDE 120 MG/1
120 CAPSULE, COATED, EXTENDED RELEASE ORAL DAILY
Qty: 90 CAPSULE | Refills: 3 | Status: SHIPPED | OUTPATIENT
Start: 2019-01-01

## 2019-01-01 RX ORDER — TORSEMIDE 20 MG/1
20 TABLET ORAL EVERY OTHER DAY
Status: DISCONTINUED | OUTPATIENT
Start: 2019-01-01 | End: 2019-01-01

## 2019-01-01 RX ORDER — ATORVASTATIN CALCIUM 20 MG/1
20 TABLET, FILM COATED ORAL NIGHTLY
Status: DISCONTINUED | OUTPATIENT
Start: 2019-01-01 | End: 2019-01-01

## 2019-01-01 RX ORDER — CLOPIDOGREL BISULFATE 75 MG/1
TABLET ORAL
Qty: 30 TABLET | Refills: 0 | OUTPATIENT
Start: 2019-01-01

## 2019-01-01 RX ORDER — SODIUM CHLORIDE 0.9 % (FLUSH) 0.9 %
3 SYRINGE (ML) INJECTION AS NEEDED
Status: DISCONTINUED | OUTPATIENT
Start: 2019-01-01 | End: 2019-01-01

## 2019-01-01 RX ORDER — DIGOXIN 125 MCG
125 TABLET ORAL EVERY OTHER DAY
Qty: 15 TABLET | Refills: 0 | Status: SHIPPED | OUTPATIENT
Start: 2019-01-01 | End: 2019-01-01

## 2019-01-01 RX ORDER — ONDANSETRON 2 MG/ML
4 INJECTION INTRAMUSCULAR; INTRAVENOUS EVERY 6 HOURS PRN
Status: DISCONTINUED | OUTPATIENT
Start: 2019-01-01 | End: 2019-01-01

## 2019-01-01 RX ORDER — METOCLOPRAMIDE HYDROCHLORIDE 5 MG/ML
5 INJECTION INTRAMUSCULAR; INTRAVENOUS EVERY 8 HOURS PRN
Status: DISCONTINUED | OUTPATIENT
Start: 2019-01-01 | End: 2019-01-01

## 2019-01-01 RX ORDER — PREDNISONE 20 MG/1
40 TABLET ORAL DAILY
Qty: 5 TABLET | Refills: 0 | Status: SHIPPED | OUTPATIENT
Start: 2019-01-01 | End: 2019-01-01

## 2019-01-01 RX ORDER — DIGOXIN 125 MCG
125 TABLET ORAL
Qty: 36 TABLET | Refills: 3 | Status: SHIPPED | OUTPATIENT
Start: 2019-01-01

## 2019-01-01 RX ORDER — ASPIRIN 81 MG/1
81 TABLET ORAL DAILY
Status: DISCONTINUED | OUTPATIENT
Start: 2019-01-01 | End: 2019-01-01

## 2019-01-01 RX ORDER — HYDROCODONE BITARTRATE AND ACETAMINOPHEN 5; 325 MG/1; MG/1
1 TABLET ORAL EVERY 8 HOURS PRN
Qty: 25 TABLET | Refills: 0 | Status: SHIPPED | COMMUNITY
Start: 2019-01-01 | End: 2020-01-01

## 2019-01-01 RX ORDER — FUROSEMIDE 10 MG/ML
40 INJECTION INTRAMUSCULAR; INTRAVENOUS ONCE
Status: COMPLETED | OUTPATIENT
Start: 2019-01-01 | End: 2019-01-01

## 2019-01-01 RX ORDER — DILTIAZEM HYDROCHLORIDE 120 MG/1
120 CAPSULE, COATED, EXTENDED RELEASE ORAL DAILY
Qty: 90 CAPSULE | Refills: 2 | Status: SHIPPED | OUTPATIENT
Start: 2019-01-01 | End: 2020-01-01

## 2019-01-01 RX ORDER — LEVOTHYROXINE SODIUM 0.15 MG/1
150 TABLET ORAL
Status: DISCONTINUED | OUTPATIENT
Start: 2019-01-01 | End: 2019-01-01

## 2019-01-01 RX ORDER — HYDROCODONE BITARTRATE AND ACETAMINOPHEN 5; 325 MG/1; MG/1
1 TABLET ORAL EVERY 6 HOURS PRN
Status: DISCONTINUED | OUTPATIENT
Start: 2019-01-01 | End: 2019-01-01

## 2019-01-02 ENCOUNTER — PRIOR ORIGINAL RECORDS (OUTPATIENT)
Dept: OTHER | Age: 84
End: 2019-01-02

## 2019-01-02 ENCOUNTER — CARDPULM VISIT (OUTPATIENT)
Dept: CARDIAC REHAB | Facility: HOSPITAL | Age: 84
End: 2019-01-02
Attending: INTERNAL MEDICINE
Payer: MEDICARE

## 2019-01-02 PROCEDURE — 93798 PHYS/QHP OP CAR RHAB W/ECG: CPT

## 2019-01-04 ENCOUNTER — APPOINTMENT (OUTPATIENT)
Dept: CARDIAC REHAB | Facility: HOSPITAL | Age: 84
End: 2019-01-04
Attending: INTERNAL MEDICINE
Payer: MEDICARE

## 2019-01-04 PROCEDURE — 93798 PHYS/QHP OP CAR RHAB W/ECG: CPT

## 2019-01-07 ENCOUNTER — CARDPULM VISIT (OUTPATIENT)
Dept: CARDIAC REHAB | Facility: HOSPITAL | Age: 84
End: 2019-01-07
Attending: INTERNAL MEDICINE
Payer: MEDICARE

## 2019-01-07 PROCEDURE — 93798 PHYS/QHP OP CAR RHAB W/ECG: CPT

## 2019-01-09 ENCOUNTER — APPOINTMENT (OUTPATIENT)
Dept: CARDIAC REHAB | Facility: HOSPITAL | Age: 84
End: 2019-01-09
Attending: INTERNAL MEDICINE
Payer: MEDICARE

## 2019-01-09 PROCEDURE — 93798 PHYS/QHP OP CAR RHAB W/ECG: CPT

## 2019-01-11 ENCOUNTER — APPOINTMENT (OUTPATIENT)
Dept: CARDIAC REHAB | Facility: HOSPITAL | Age: 84
End: 2019-01-11
Attending: INTERNAL MEDICINE
Payer: MEDICARE

## 2019-01-11 PROCEDURE — 93798 PHYS/QHP OP CAR RHAB W/ECG: CPT

## 2019-01-14 ENCOUNTER — CARDPULM VISIT (OUTPATIENT)
Dept: CARDIAC REHAB | Facility: HOSPITAL | Age: 84
End: 2019-01-14
Attending: INTERNAL MEDICINE
Payer: MEDICARE

## 2019-01-14 PROCEDURE — 93798 PHYS/QHP OP CAR RHAB W/ECG: CPT

## 2019-01-16 ENCOUNTER — APPOINTMENT (OUTPATIENT)
Dept: CARDIAC REHAB | Facility: HOSPITAL | Age: 84
End: 2019-01-16
Attending: INTERNAL MEDICINE
Payer: MEDICARE

## 2019-01-16 PROCEDURE — 93798 PHYS/QHP OP CAR RHAB W/ECG: CPT

## 2019-01-18 ENCOUNTER — CARDPULM VISIT (OUTPATIENT)
Dept: CARDIAC REHAB | Facility: HOSPITAL | Age: 84
End: 2019-01-18
Attending: INTERNAL MEDICINE
Payer: MEDICARE

## 2019-01-18 PROCEDURE — 93798 PHYS/QHP OP CAR RHAB W/ECG: CPT

## 2019-01-21 ENCOUNTER — APPOINTMENT (OUTPATIENT)
Dept: CARDIAC REHAB | Facility: HOSPITAL | Age: 84
End: 2019-01-21
Attending: INTERNAL MEDICINE
Payer: MEDICARE

## 2019-01-21 PROCEDURE — 84165 PROTEIN E-PHORESIS SERUM: CPT | Performed by: INTERNAL MEDICINE

## 2019-01-21 PROCEDURE — 83883 ASSAY NEPHELOMETRY NOT SPEC: CPT | Performed by: INTERNAL MEDICINE

## 2019-01-21 PROCEDURE — 82784 ASSAY IGA/IGD/IGG/IGM EACH: CPT | Performed by: INTERNAL MEDICINE

## 2019-01-21 PROCEDURE — 93798 PHYS/QHP OP CAR RHAB W/ECG: CPT

## 2019-01-21 PROCEDURE — 83010 ASSAY OF HAPTOGLOBIN QUANT: CPT | Performed by: INTERNAL MEDICINE

## 2019-01-21 PROCEDURE — 86334 IMMUNOFIX E-PHORESIS SERUM: CPT | Performed by: INTERNAL MEDICINE

## 2019-01-23 ENCOUNTER — APPOINTMENT (OUTPATIENT)
Dept: CARDIAC REHAB | Facility: HOSPITAL | Age: 84
End: 2019-01-23
Attending: INTERNAL MEDICINE
Payer: MEDICARE

## 2019-01-23 PROCEDURE — 93798 PHYS/QHP OP CAR RHAB W/ECG: CPT

## 2019-01-24 ENCOUNTER — OFFICE VISIT (OUTPATIENT)
Dept: CARDIOLOGY CLINIC | Facility: HOSPITAL | Age: 84
End: 2019-01-24
Attending: INTERNAL MEDICINE
Payer: MEDICARE

## 2019-01-24 VITALS
HEART RATE: 87 BPM | WEIGHT: 132.38 LBS | BODY MASS INDEX: 26 KG/M2 | DIASTOLIC BLOOD PRESSURE: 53 MMHG | OXYGEN SATURATION: 93 % | SYSTOLIC BLOOD PRESSURE: 112 MMHG

## 2019-01-24 DIAGNOSIS — I50.32 CHRONIC HEART FAILURE WITH PRESERVED EJECTION FRACTION (HCC): Primary | ICD-10-CM

## 2019-01-24 DIAGNOSIS — I50.9 HEART FAILURE, UNSPECIFIED (HCC): ICD-10-CM

## 2019-01-24 LAB
ANION GAP SERPL CALC-SCNC: 12 MMOL/L (ref 0–18)
BNP SERPL-MCNC: 348 PG/ML (ref 0–100)
BUN SERPL-MCNC: 44 MG/DL (ref 8–20)
BUN/CREAT SERPL: 33.6 (ref 10–20)
CALCIUM SERPL-MCNC: 9 MG/DL (ref 8.5–10.5)
CHLORIDE SERPL-SCNC: 104 MMOL/L (ref 95–110)
CO2 SERPL-SCNC: 20 MMOL/L (ref 22–32)
CREAT SERPL-MCNC: 1.31 MG/DL (ref 0.5–1.5)
GLUCOSE SERPL-MCNC: 131 MG/DL (ref 70–99)
OSMOLALITY UR CALC.SUM OF ELEC: 295 MOSM/KG (ref 275–295)
POTASSIUM SERPL-SCNC: 3.3 MMOL/L (ref 3.3–5.1)
SODIUM SERPL-SCNC: 136 MMOL/L (ref 136–144)

## 2019-01-24 PROCEDURE — 99214 OFFICE O/P EST MOD 30 MIN: CPT | Performed by: CLINICAL NURSE SPECIALIST

## 2019-01-24 PROCEDURE — 80048 BASIC METABOLIC PNL TOTAL CA: CPT | Performed by: CLINICAL NURSE SPECIALIST

## 2019-01-24 PROCEDURE — 36415 COLL VENOUS BLD VENIPUNCTURE: CPT | Performed by: CLINICAL NURSE SPECIALIST

## 2019-01-24 PROCEDURE — 99212 OFFICE O/P EST SF 10 MIN: CPT | Performed by: CLINICAL NURSE SPECIALIST

## 2019-01-24 PROCEDURE — 83880 ASSAY OF NATRIURETIC PEPTIDE: CPT | Performed by: CLINICAL NURSE SPECIALIST

## 2019-01-24 NOTE — PROGRESS NOTES
Alo Crook E Parrish Patient Status:  No patient class for patient encounter    3/8/1932 MRN [id number]   Location MD Dr. Arabella Ramirez is a 80year old male wh cardiac rehab. Has plans to start walking laps in a pool again. Appetite fair. Now drinking 2L fluid/day.      Review of Systems:  Constitutional: negative for fatigue, chills or fever  Respiratory: negative for cough,  negative hemoptysis and wheezing  Car 2) 138 3) 137/// 3) 138 4) 136 /// 5) 135 6) 133 7) 131 8) 126 9) 132  Rehab weight:  9) 125    General appearance: alert, appears stated age and cooperative  Neck: no adenopathy, no carotid bruit, no JVD, supple, symmetrical, trachea midline and thyroid n 7/15/18  CONCLUSION:   Imaging findings suggestive of emphysema with COPD and superimposed fibrotic changes. Right upper lobe airspace opacity suggestive of pneumonia appears new since 6/14/18. Short-term followup suggested to ensure resolution.     Brain weight gain or worsening symptoms. 32–50 ounces of fluid daily    Less than 2000 mg salt/sodium daily.  Common high sodium foods include frozen dinners, soups (not homemade), some cereal, vegetable juice, canned vegetables, lunch meats, processed meats

## 2019-01-25 ENCOUNTER — APPOINTMENT (OUTPATIENT)
Dept: CARDIAC REHAB | Facility: HOSPITAL | Age: 84
End: 2019-01-25
Attending: INTERNAL MEDICINE
Payer: MEDICARE

## 2019-01-28 ENCOUNTER — APPOINTMENT (OUTPATIENT)
Dept: CARDIAC REHAB | Facility: HOSPITAL | Age: 84
End: 2019-01-28
Attending: INTERNAL MEDICINE
Payer: MEDICARE

## 2019-01-30 ENCOUNTER — APPOINTMENT (OUTPATIENT)
Dept: CARDIAC REHAB | Facility: HOSPITAL | Age: 84
End: 2019-01-30
Attending: INTERNAL MEDICINE
Payer: MEDICARE

## 2019-02-01 ENCOUNTER — PRIOR ORIGINAL RECORDS (OUTPATIENT)
Dept: OTHER | Age: 84
End: 2019-02-01

## 2019-02-01 ENCOUNTER — APPOINTMENT (OUTPATIENT)
Dept: CARDIAC REHAB | Facility: HOSPITAL | Age: 84
End: 2019-02-01
Attending: INTERNAL MEDICINE
Payer: MEDICARE

## 2019-02-01 ENCOUNTER — MYAURORA ACCOUNT LINK (OUTPATIENT)
Dept: OTHER | Age: 84
End: 2019-02-01

## 2019-02-04 ENCOUNTER — APPOINTMENT (OUTPATIENT)
Dept: CARDIAC REHAB | Facility: HOSPITAL | Age: 84
End: 2019-02-04
Attending: INTERNAL MEDICINE
Payer: MEDICARE

## 2019-02-04 PROCEDURE — 93798 PHYS/QHP OP CAR RHAB W/ECG: CPT

## 2019-02-06 ENCOUNTER — APPOINTMENT (OUTPATIENT)
Dept: CARDIAC REHAB | Facility: HOSPITAL | Age: 84
End: 2019-02-06
Attending: INTERNAL MEDICINE
Payer: MEDICARE

## 2019-02-06 PROCEDURE — 93798 PHYS/QHP OP CAR RHAB W/ECG: CPT

## 2019-02-08 ENCOUNTER — APPOINTMENT (OUTPATIENT)
Dept: CARDIAC REHAB | Facility: HOSPITAL | Age: 84
End: 2019-02-08
Attending: INTERNAL MEDICINE
Payer: MEDICARE

## 2019-02-08 PROBLEM — S22.000A CLOSED COMPRESSION FRACTURE OF THORACIC VERTEBRA (HCC): Status: ACTIVE | Noted: 2018-02-14

## 2019-02-08 PROBLEM — S32.000A LUMBAR COMPRESSION FRACTURE (HCC): Status: ACTIVE | Noted: 2019-02-08

## 2019-02-08 PROCEDURE — 93798 PHYS/QHP OP CAR RHAB W/ECG: CPT

## 2019-02-11 ENCOUNTER — APPOINTMENT (OUTPATIENT)
Dept: CARDIAC REHAB | Facility: HOSPITAL | Age: 84
End: 2019-02-11
Attending: INTERNAL MEDICINE
Payer: MEDICARE

## 2019-02-11 PROCEDURE — 93798 PHYS/QHP OP CAR RHAB W/ECG: CPT

## 2019-02-13 ENCOUNTER — APPOINTMENT (OUTPATIENT)
Dept: CARDIAC REHAB | Facility: HOSPITAL | Age: 84
End: 2019-02-13
Attending: INTERNAL MEDICINE
Payer: MEDICARE

## 2019-02-15 ENCOUNTER — APPOINTMENT (OUTPATIENT)
Dept: CARDIAC REHAB | Facility: HOSPITAL | Age: 84
End: 2019-02-15
Attending: INTERNAL MEDICINE
Payer: MEDICARE

## 2019-02-15 ENCOUNTER — OFFICE VISIT (OUTPATIENT)
Dept: CARDIOLOGY CLINIC | Facility: HOSPITAL | Age: 84
End: 2019-02-15
Attending: CLINICAL NURSE SPECIALIST
Payer: MEDICARE

## 2019-02-15 VITALS
WEIGHT: 130.63 LBS | DIASTOLIC BLOOD PRESSURE: 58 MMHG | OXYGEN SATURATION: 92 % | HEART RATE: 80 BPM | BODY MASS INDEX: 26 KG/M2 | SYSTOLIC BLOOD PRESSURE: 128 MMHG

## 2019-02-15 DIAGNOSIS — I50.32 CHRONIC HEART FAILURE WITH PRESERVED EJECTION FRACTION (HCC): Primary | ICD-10-CM

## 2019-02-15 DIAGNOSIS — I50.9 HEART FAILURE, UNSPECIFIED (HCC): ICD-10-CM

## 2019-02-15 LAB
ANION GAP SERPL CALC-SCNC: 8 MMOL/L (ref 0–18)
BUN BLD-MCNC: 50 MG/DL (ref 7–18)
BUN/CREAT SERPL: 34.7 (ref 10–20)
CALCIUM BLD-MCNC: 9.2 MG/DL (ref 8.5–10.1)
CHLORIDE SERPL-SCNC: 108 MMOL/L (ref 98–107)
CO2 SERPL-SCNC: 22 MMOL/L (ref 21–32)
CREAT BLD-MCNC: 1.44 MG/DL (ref 0.7–1.3)
GLUCOSE BLD-MCNC: 97 MG/DL (ref 70–99)
OSMOLALITY SERPL CALC.SUM OF ELEC: 299 MOSM/KG (ref 275–295)
POTASSIUM SERPL-SCNC: 3.8 MMOL/L (ref 3.5–5.1)
SODIUM SERPL-SCNC: 138 MMOL/L (ref 136–145)

## 2019-02-15 PROCEDURE — 36415 COLL VENOUS BLD VENIPUNCTURE: CPT | Performed by: CLINICAL NURSE SPECIALIST

## 2019-02-15 PROCEDURE — 80048 BASIC METABOLIC PNL TOTAL CA: CPT | Performed by: CLINICAL NURSE SPECIALIST

## 2019-02-15 PROCEDURE — 99214 OFFICE O/P EST MOD 30 MIN: CPT | Performed by: CLINICAL NURSE SPECIALIST

## 2019-02-15 PROCEDURE — 99212 OFFICE O/P EST SF 10 MIN: CPT | Performed by: CLINICAL NURSE SPECIALIST

## 2019-02-15 PROCEDURE — 93798 PHYS/QHP OP CAR RHAB W/ECG: CPT

## 2019-02-15 NOTE — PROGRESS NOTES
[address]o [address] Patient Status:  No patient class for patient encounter    3/8/1932 MRN [id number]   Location [facility] MD Dr. Felipe Mahoney is a 80year old male wh Has plans to start walking laps in a pool again. Appetite fair. Now drinking 2L fluid/day.      Review of Systems:  Constitutional: negative for fatigue, chills or fever  Respiratory: negative for cough,  negative hemoptysis and wheezing  Cardiovascular: ne 4) 136 /// 5) 135 6) 133 7) 131 8) 126 9) 132  Rehab weight:  9) 125    General appearance: alert, appears stated age and cooperative  Neck: no adenopathy, no carotid bruit, no JVD, supple, symmetrical, trachea midline and thyroid not enlarged, symmetric, suggestive of emphysema with COPD and superimposed fibrotic changes. Right upper lobe airspace opacity suggestive of pneumonia appears new since 6/14/18. Short-term followup suggested to ensure resolution.     Brain CT 7/15/18  CONCLUSION:   Moderate  of breath, difficulty breathing when laying down etc. Call the clinic if any of these signs develop at ph: [phone]    Call if having any dizziness, lightheadedness, heart racing, palpitations, chest pain, shortness of breath, coughing, swelling, weigh

## 2019-02-15 NOTE — PATIENT INSTRUCTIONS
Continue current medications    Repeat labs on Wednesday of next week.      Please call the heart failure clinic if you notice a weight gain of 3 pounds overnight or 5 pounds or more in 5 days.      Monitor yourself for signs and symptoms of fluid overload,

## 2019-02-18 ENCOUNTER — APPOINTMENT (OUTPATIENT)
Dept: CARDIAC REHAB | Facility: HOSPITAL | Age: 84
End: 2019-02-18
Attending: INTERNAL MEDICINE
Payer: MEDICARE

## 2019-02-20 ENCOUNTER — APPOINTMENT (OUTPATIENT)
Dept: CARDIAC REHAB | Facility: HOSPITAL | Age: 84
End: 2019-02-20
Attending: INTERNAL MEDICINE
Payer: MEDICARE

## 2019-02-21 ENCOUNTER — APPOINTMENT (OUTPATIENT)
Dept: LAB | Facility: HOSPITAL | Age: 84
End: 2019-02-21
Attending: CLINICAL NURSE SPECIALIST
Payer: MEDICARE

## 2019-02-21 ENCOUNTER — TELEPHONE (OUTPATIENT)
Dept: CARDIOLOGY CLINIC | Facility: HOSPITAL | Age: 84
End: 2019-02-21

## 2019-02-21 DIAGNOSIS — I50.32 CHRONIC HEART FAILURE WITH PRESERVED EJECTION FRACTION (HCC): Primary | ICD-10-CM

## 2019-02-21 DIAGNOSIS — I50.9 HEART FAILURE, UNSPECIFIED (HCC): ICD-10-CM

## 2019-02-21 LAB
ANION GAP SERPL CALC-SCNC: 10 MMOL/L (ref 0–18)
BUN BLD-MCNC: 57 MG/DL (ref 7–18)
BUN/CREAT SERPL: 37 (ref 10–20)
CALCIUM BLD-MCNC: 9.4 MG/DL (ref 8.5–10.1)
CHLORIDE SERPL-SCNC: 106 MMOL/L (ref 98–107)
CO2 SERPL-SCNC: 24 MMOL/L (ref 21–32)
CREAT BLD-MCNC: 1.54 MG/DL (ref 0.7–1.3)
GLUCOSE BLD-MCNC: 159 MG/DL (ref 70–99)
OSMOLALITY SERPL CALC.SUM OF ELEC: 309 MOSM/KG (ref 275–295)
POTASSIUM SERPL-SCNC: 3.8 MMOL/L (ref 3.5–5.1)
SODIUM SERPL-SCNC: 140 MMOL/L (ref 136–145)

## 2019-02-21 PROCEDURE — 36415 COLL VENOUS BLD VENIPUNCTURE: CPT

## 2019-02-21 PROCEDURE — 80048 BASIC METABOLIC PNL TOTAL CA: CPT

## 2019-02-22 ENCOUNTER — APPOINTMENT (OUTPATIENT)
Dept: CARDIAC REHAB | Facility: HOSPITAL | Age: 84
End: 2019-02-22
Attending: INTERNAL MEDICINE
Payer: MEDICARE

## 2019-02-25 ENCOUNTER — APPOINTMENT (OUTPATIENT)
Dept: CARDIAC REHAB | Facility: HOSPITAL | Age: 84
End: 2019-02-25
Attending: INTERNAL MEDICINE
Payer: MEDICARE

## 2019-02-25 PROCEDURE — 93798 PHYS/QHP OP CAR RHAB W/ECG: CPT

## 2019-02-27 ENCOUNTER — CARDPULM VISIT (OUTPATIENT)
Dept: CARDIAC REHAB | Facility: HOSPITAL | Age: 84
End: 2019-02-27
Attending: INTERNAL MEDICINE
Payer: MEDICARE

## 2019-02-27 PROCEDURE — 93798 PHYS/QHP OP CAR RHAB W/ECG: CPT

## 2019-02-28 VITALS
WEIGHT: 148 LBS | HEART RATE: 82 BPM | BODY MASS INDEX: 26.22 KG/M2 | HEIGHT: 63 IN | SYSTOLIC BLOOD PRESSURE: 110 MMHG | OXYGEN SATURATION: 96 % | DIASTOLIC BLOOD PRESSURE: 60 MMHG

## 2019-02-28 VITALS
HEIGHT: 63 IN | HEART RATE: 102 BPM | OXYGEN SATURATION: 96 % | WEIGHT: 154 LBS | SYSTOLIC BLOOD PRESSURE: 120 MMHG | BODY MASS INDEX: 27.29 KG/M2 | DIASTOLIC BLOOD PRESSURE: 60 MMHG

## 2019-02-28 VITALS
WEIGHT: 137 LBS | HEIGHT: 63 IN | RESPIRATION RATE: 18 BRPM | SYSTOLIC BLOOD PRESSURE: 102 MMHG | BODY MASS INDEX: 24.27 KG/M2 | HEART RATE: 70 BPM | DIASTOLIC BLOOD PRESSURE: 50 MMHG

## 2019-02-28 VITALS
WEIGHT: 137 LBS | HEART RATE: 82 BPM | DIASTOLIC BLOOD PRESSURE: 50 MMHG | OXYGEN SATURATION: 94 % | HEIGHT: 63 IN | BODY MASS INDEX: 24.27 KG/M2 | SYSTOLIC BLOOD PRESSURE: 100 MMHG

## 2019-02-28 VITALS
DIASTOLIC BLOOD PRESSURE: 52 MMHG | HEIGHT: 63 IN | BODY MASS INDEX: 22.15 KG/M2 | HEART RATE: 67 BPM | SYSTOLIC BLOOD PRESSURE: 104 MMHG | WEIGHT: 125 LBS | OXYGEN SATURATION: 93 %

## 2019-02-28 VITALS
WEIGHT: 135 LBS | DIASTOLIC BLOOD PRESSURE: 50 MMHG | HEIGHT: 63 IN | HEART RATE: 70 BPM | SYSTOLIC BLOOD PRESSURE: 90 MMHG | BODY MASS INDEX: 23.92 KG/M2

## 2019-02-28 VITALS
WEIGHT: 160 LBS | SYSTOLIC BLOOD PRESSURE: 120 MMHG | BODY MASS INDEX: 28.35 KG/M2 | OXYGEN SATURATION: 90 % | HEART RATE: 83 BPM | HEIGHT: 63 IN | DIASTOLIC BLOOD PRESSURE: 50 MMHG

## 2019-02-28 VITALS
BODY MASS INDEX: 23.39 KG/M2 | HEART RATE: 70 BPM | DIASTOLIC BLOOD PRESSURE: 62 MMHG | SYSTOLIC BLOOD PRESSURE: 102 MMHG | HEIGHT: 63 IN | OXYGEN SATURATION: 97 % | WEIGHT: 132 LBS

## 2019-02-28 VITALS
HEART RATE: 68 BPM | HEIGHT: 63 IN | BODY MASS INDEX: 23.74 KG/M2 | WEIGHT: 134 LBS | DIASTOLIC BLOOD PRESSURE: 50 MMHG | SYSTOLIC BLOOD PRESSURE: 122 MMHG

## 2019-02-28 VITALS
BODY MASS INDEX: 24.1 KG/M2 | WEIGHT: 136 LBS | OXYGEN SATURATION: 99 % | DIASTOLIC BLOOD PRESSURE: 50 MMHG | HEART RATE: 62 BPM | SYSTOLIC BLOOD PRESSURE: 102 MMHG | HEIGHT: 63 IN

## 2019-02-28 VITALS
DIASTOLIC BLOOD PRESSURE: 54 MMHG | SYSTOLIC BLOOD PRESSURE: 120 MMHG | HEIGHT: 63 IN | HEART RATE: 89 BPM | BODY MASS INDEX: 26.75 KG/M2 | OXYGEN SATURATION: 97 % | WEIGHT: 151 LBS

## 2019-02-28 VITALS
WEIGHT: 124 LBS | SYSTOLIC BLOOD PRESSURE: 118 MMHG | DIASTOLIC BLOOD PRESSURE: 60 MMHG | OXYGEN SATURATION: 90 % | HEART RATE: 78 BPM | BODY MASS INDEX: 21.97 KG/M2 | HEIGHT: 63 IN

## 2019-02-28 VITALS
OXYGEN SATURATION: 88 % | DIASTOLIC BLOOD PRESSURE: 60 MMHG | HEIGHT: 63 IN | SYSTOLIC BLOOD PRESSURE: 110 MMHG | WEIGHT: 152 LBS | BODY MASS INDEX: 26.93 KG/M2 | HEART RATE: 69 BPM

## 2019-03-01 ENCOUNTER — LAB ENCOUNTER (OUTPATIENT)
Dept: LAB | Facility: HOSPITAL | Age: 84
End: 2019-03-01
Attending: CLINICAL NURSE SPECIALIST
Payer: MEDICARE

## 2019-03-01 ENCOUNTER — APPOINTMENT (OUTPATIENT)
Dept: CARDIAC REHAB | Facility: HOSPITAL | Age: 84
End: 2019-03-01
Attending: INTERNAL MEDICINE
Payer: MEDICARE

## 2019-03-01 VITALS
HEIGHT: 63 IN | SYSTOLIC BLOOD PRESSURE: 118 MMHG | RESPIRATION RATE: 18 BRPM | DIASTOLIC BLOOD PRESSURE: 50 MMHG | BODY MASS INDEX: 26.22 KG/M2 | HEART RATE: 64 BPM | WEIGHT: 148 LBS

## 2019-03-01 VITALS
SYSTOLIC BLOOD PRESSURE: 106 MMHG | HEIGHT: 63 IN | OXYGEN SATURATION: 92 % | BODY MASS INDEX: 26.22 KG/M2 | HEART RATE: 74 BPM | DIASTOLIC BLOOD PRESSURE: 52 MMHG | WEIGHT: 148 LBS

## 2019-03-01 VITALS
WEIGHT: 148 LBS | DIASTOLIC BLOOD PRESSURE: 52 MMHG | BODY MASS INDEX: 25.27 KG/M2 | SYSTOLIC BLOOD PRESSURE: 96 MMHG | OXYGEN SATURATION: 97 % | HEART RATE: 86 BPM | HEIGHT: 64 IN

## 2019-03-01 DIAGNOSIS — I50.9 HEART FAILURE, UNSPECIFIED (HCC): ICD-10-CM

## 2019-03-01 DIAGNOSIS — I50.32 CHRONIC HEART FAILURE WITH PRESERVED EJECTION FRACTION (HCC): ICD-10-CM

## 2019-03-01 LAB
ANION GAP SERPL CALC-SCNC: 9 MMOL/L (ref 0–18)
BASOPHILS # BLD AUTO: 0.04 X10(3) UL (ref 0–0.2)
BASOPHILS NFR BLD AUTO: 0.6 %
BUN BLD-MCNC: 50 MG/DL (ref 7–18)
BUN/CREAT SERPL: 32.7 (ref 10–20)
CALCIUM BLD-MCNC: 9.1 MG/DL (ref 8.5–10.1)
CHLORIDE SERPL-SCNC: 105 MMOL/L (ref 98–107)
CO2 SERPL-SCNC: 24 MMOL/L (ref 21–32)
CREAT BLD-MCNC: 1.53 MG/DL (ref 0.7–1.3)
DEPRECATED RDW RBC AUTO: 64.1 FL (ref 35.1–46.3)
EOSINOPHIL # BLD AUTO: 0.51 X10(3) UL (ref 0–0.7)
EOSINOPHIL NFR BLD AUTO: 7.8 %
ERYTHROCYTE [DISTWIDTH] IN BLOOD BY AUTOMATED COUNT: 16.7 % (ref 11–15)
GLUCOSE BLD-MCNC: 186 MG/DL (ref 70–99)
HCT VFR BLD AUTO: 34.3 % (ref 39–53)
HGB BLD-MCNC: 11.1 G/DL (ref 13–17.5)
IMM GRANULOCYTES # BLD AUTO: 0.05 X10(3) UL (ref 0–1)
IMM GRANULOCYTES NFR BLD: 0.8 %
LYMPHOCYTES # BLD AUTO: 0.98 X10(3) UL (ref 1–4)
LYMPHOCYTES NFR BLD AUTO: 15 %
MCH RBC QN AUTO: 33.7 PG (ref 26–34)
MCHC RBC AUTO-ENTMCNC: 32.4 G/DL (ref 31–37)
MCV RBC AUTO: 104.3 FL (ref 80–100)
MONOCYTES # BLD AUTO: 1.1 X10(3) UL (ref 0.1–1)
MONOCYTES NFR BLD AUTO: 16.9 %
NEUTROPHILS # BLD AUTO: 3.84 X10 (3) UL (ref 1.5–7.7)
NEUTROPHILS # BLD AUTO: 3.84 X10(3) UL (ref 1.5–7.7)
NEUTROPHILS NFR BLD AUTO: 58.9 %
OSMOLALITY SERPL CALC.SUM OF ELEC: 304 MOSM/KG (ref 275–295)
PLATELET # BLD AUTO: 136 10(3)UL (ref 150–450)
POTASSIUM SERPL-SCNC: 3.8 MMOL/L (ref 3.5–5.1)
RBC # BLD AUTO: 3.29 X10(6)UL (ref 3.8–5.8)
SODIUM SERPL-SCNC: 138 MMOL/L (ref 136–145)
WBC # BLD AUTO: 6.5 X10(3) UL (ref 4–11)

## 2019-03-01 PROCEDURE — 85025 COMPLETE CBC W/AUTO DIFF WBC: CPT

## 2019-03-01 PROCEDURE — 36415 COLL VENOUS BLD VENIPUNCTURE: CPT

## 2019-03-01 PROCEDURE — 80048 BASIC METABOLIC PNL TOTAL CA: CPT

## 2019-03-04 ENCOUNTER — APPOINTMENT (OUTPATIENT)
Dept: CARDIAC REHAB | Facility: HOSPITAL | Age: 84
End: 2019-03-04
Attending: INTERNAL MEDICINE
Payer: MEDICARE

## 2019-03-04 PROCEDURE — 93798 PHYS/QHP OP CAR RHAB W/ECG: CPT

## 2019-03-06 ENCOUNTER — HOSPITAL ENCOUNTER (OUTPATIENT)
Dept: CARDIOLOGY CLINIC | Facility: HOSPITAL | Age: 84
Discharge: HOME | End: 2019-03-06
Attending: NURSE PRACTITIONER
Payer: MEDICARE

## 2019-03-06 ENCOUNTER — HOSPITAL ENCOUNTER (OUTPATIENT)
Dept: CV DIAGNOSTICS | Facility: HOSPITAL | Age: 84
Discharge: HOME | End: 2019-03-06
Attending: INTERNAL MEDICINE

## 2019-03-06 ENCOUNTER — APPOINTMENT (OUTPATIENT)
Dept: CARDIAC REHAB | Facility: HOSPITAL | Age: 84
End: 2019-03-06
Attending: INTERNAL MEDICINE
Payer: MEDICARE

## 2019-03-06 VITALS — SYSTOLIC BLOOD PRESSURE: 115 MMHG | HEART RATE: 85 BPM | DIASTOLIC BLOOD PRESSURE: 56 MMHG | RESPIRATION RATE: 16 BRPM

## 2019-03-06 DIAGNOSIS — I34.0 MITRAL VALVE INSUFFICIENCY, UNSPECIFIED ETIOLOGY: ICD-10-CM

## 2019-03-06 PROCEDURE — 99214 OFFICE O/P EST MOD 30 MIN: CPT | Performed by: NURSE PRACTITIONER

## 2019-03-06 RX ORDER — TORSEMIDE 10 MG/1
10 TABLET ORAL EVERY OTHER DAY
COMMUNITY
End: 2019-01-01 | Stop reason: ALTCHOICE

## 2019-03-06 NOTE — PROGRESS NOTES
.  Sjudithade 24 Note    Subjective:   Patient presents for routine 6 month postop TMVR visit, accompanied by Wife and Daughter.  He underwent 3rd Mitraclip placement on  9/1/18 with Dr. Eliel Garner for severe, symptomatic mitral regurgitation.   mildly calcified leaflets. Transvalvular      velocity was within the normal range. There was no stenosis. 4.  Mitral valve: Three [id number] MitraClips were present across A2-P2      and A3-P3 leaflet tips. There was mild to moderate regurgitation.  Ela (H) 09/26/2016    INR 1.2 10/07/2018    INR 1.28 (H) 09/01/2018    INR 1.28 (H) 08/31/2018       Medications:    Current Outpatient Medications:  torsemide 10 MG Oral Tab Take 10 mg by mouth every other day.  Disp:  Rfl:    umeclidinium-vilanterol (ANORO EL BREAKFAST ) Disp: 90 tablet Rfl: 3   allopurinol 100 MG Oral Tab Take 1 tablet (100 mg total) by mouth 2 (two) times daily. Disp: 180 tablet Rfl: 3   Multiple Vitamin (MULTI VITAMIN MENS) Oral Tab Take 1 tablet by mouth daily.  Disp:  Rfl:    famoTIDine 20

## 2019-03-07 ENCOUNTER — TELEPHONE (OUTPATIENT)
Dept: CARDIOLOGY CLINIC | Facility: HOSPITAL | Age: 84
End: 2019-03-07

## 2019-03-07 NOTE — PROGRESS NOTES
I called patient with the results of echo from yesterday. The patient's EF remains at 60%, clip present with mild regurgitation. Overall, there has been no significant interval changes compared to previous echo on 12/5/18.  Patient has an appointment in the

## 2019-03-08 ENCOUNTER — APPOINTMENT (OUTPATIENT)
Dept: CARDIAC REHAB | Facility: HOSPITAL | Age: 84
End: 2019-03-08
Attending: INTERNAL MEDICINE
Payer: MEDICARE

## 2019-03-08 PROCEDURE — 93798 PHYS/QHP OP CAR RHAB W/ECG: CPT

## 2019-03-11 ENCOUNTER — APPOINTMENT (OUTPATIENT)
Dept: CARDIAC REHAB | Facility: HOSPITAL | Age: 84
End: 2019-03-11
Attending: INTERNAL MEDICINE
Payer: MEDICARE

## 2019-03-11 PROCEDURE — 93798 PHYS/QHP OP CAR RHAB W/ECG: CPT

## 2019-03-13 ENCOUNTER — CARDPULM VISIT (OUTPATIENT)
Dept: CARDIAC REHAB | Facility: HOSPITAL | Age: 84
End: 2019-03-13
Attending: INTERNAL MEDICINE
Payer: MEDICARE

## 2019-03-13 PROCEDURE — 93798 PHYS/QHP OP CAR RHAB W/ECG: CPT

## 2019-03-15 ENCOUNTER — APPOINTMENT (OUTPATIENT)
Dept: CARDIAC REHAB | Facility: HOSPITAL | Age: 84
End: 2019-03-15
Attending: INTERNAL MEDICINE
Payer: MEDICARE

## 2019-03-15 ENCOUNTER — OFFICE VISIT (OUTPATIENT)
Dept: CARDIOLOGY CLINIC | Facility: HOSPITAL | Age: 84
End: 2019-03-15
Attending: INTERNAL MEDICINE
Payer: MEDICARE

## 2019-03-15 VITALS
WEIGHT: 130.63 LBS | HEART RATE: 86 BPM | OXYGEN SATURATION: 92 % | DIASTOLIC BLOOD PRESSURE: 36 MMHG | BODY MASS INDEX: 26 KG/M2 | SYSTOLIC BLOOD PRESSURE: 117 MMHG

## 2019-03-15 DIAGNOSIS — I50.22 CHRONIC SYSTOLIC HEART FAILURE (HCC): Primary | ICD-10-CM

## 2019-03-15 DIAGNOSIS — I50.9 HEART FAILURE, UNSPECIFIED (HCC): ICD-10-CM

## 2019-03-15 DIAGNOSIS — I34.0 MITRAL VALVE INSUFFICIENCY, UNSPECIFIED ETIOLOGY: Primary | ICD-10-CM

## 2019-03-15 LAB
ANION GAP SERPL CALC-SCNC: 7 MMOL/L (ref 0–18)
BUN BLD-MCNC: 43 MG/DL (ref 7–18)
BUN/CREAT SERPL: 29.3 (ref 10–20)
CALCIUM BLD-MCNC: 9.1 MG/DL (ref 8.5–10.1)
CHLORIDE SERPL-SCNC: 107 MMOL/L (ref 98–107)
CO2 SERPL-SCNC: 26 MMOL/L (ref 21–32)
CREAT BLD-MCNC: 1.47 MG/DL (ref 0.7–1.3)
GLUCOSE BLD-MCNC: 93 MG/DL (ref 70–99)
OSMOLALITY SERPL CALC.SUM OF ELEC: 301 MOSM/KG (ref 275–295)
POTASSIUM SERPL-SCNC: 4.2 MMOL/L (ref 3.5–5.1)
SODIUM SERPL-SCNC: 140 MMOL/L (ref 136–145)

## 2019-03-15 PROCEDURE — 99213 OFFICE O/P EST LOW 20 MIN: CPT | Performed by: CLINICAL NURSE SPECIALIST

## 2019-03-15 PROCEDURE — 93798 PHYS/QHP OP CAR RHAB W/ECG: CPT

## 2019-03-15 PROCEDURE — 36415 COLL VENOUS BLD VENIPUNCTURE: CPT | Performed by: CLINICAL NURSE SPECIALIST

## 2019-03-15 PROCEDURE — 80048 BASIC METABOLIC PNL TOTAL CA: CPT | Performed by: CLINICAL NURSE SPECIALIST

## 2019-03-15 PROCEDURE — 99212 OFFICE O/P EST SF 10 MIN: CPT | Performed by: CLINICAL NURSE SPECIALIST

## 2019-03-15 RX ORDER — DILTIAZEM HYDROCHLORIDE 120 MG/1
120 CAPSULE, EXTENDED RELEASE ORAL DAILY
Qty: 90 CAPSULE | Refills: 1 | Status: SHIPPED | OUTPATIENT
Start: 2019-03-15 | End: 2019-01-01

## 2019-03-15 NOTE — PATIENT INSTRUCTIONS
Continue current medications    Repeat labs in two weeks    Please call the heart failure clinic if you notice a weight gain of 3 pounds overnight or 5 pounds or more in 5 days.      Monitor yourself for signs and symptoms of fluid overload, increased shor

## 2019-03-15 NOTE — PROGRESS NOTES
[facility] [address] Patient Status:  No patient class for patient encounter    3/8/1932 MRN [id number]   Location MD Dr. Kacy Albarado is a 80year old male wh rehab. Has started driving. Appetite fair. Now drinking 2L fluid/day.      Review of Systems:  Constitutional: negative for fatigue, chills or fever  Respiratory: negative for cough,  negative hemoptysis and wheezing  Cardiovascular: negative for chest pain 136 /// 5) 135 6) 133 7) 131 8) 126 9) 132  Rehab weight:  9) 125    General appearance: alert, appears stated age and cooperative  Neck: no adenopathy, no carotid bruit, no JVD, supple, symmetrical, trachea midline and thyroid not enlarged, symmetric, no suggestive of emphysema with COPD and superimposed fibrotic changes. Right upper lobe airspace opacity suggestive of pneumonia appears new since 6/14/18. Short-term followup suggested to ensure resolution.     Brain CT 7/15/18  CONCLUSION:   Moderate  dizziness, lightheadedness, heart racing, palpitations, chest pain, shortness of breath, coughing, swelling, weight gain or worsening symptoms. 32–50 ounces of fluid daily    Less than 2000 mg salt/sodium daily.  Common high sodium foods include frozen

## 2019-03-18 ENCOUNTER — CARDPULM VISIT (OUTPATIENT)
Dept: CARDIAC REHAB | Facility: HOSPITAL | Age: 84
End: 2019-03-18
Attending: INTERNAL MEDICINE
Payer: MEDICARE

## 2019-03-18 PROCEDURE — 93798 PHYS/QHP OP CAR RHAB W/ECG: CPT

## 2019-03-20 ENCOUNTER — CARDPULM VISIT (OUTPATIENT)
Dept: CARDIAC REHAB | Facility: HOSPITAL | Age: 84
End: 2019-03-20
Attending: INTERNAL MEDICINE
Payer: MEDICARE

## 2019-03-20 PROCEDURE — 93798 PHYS/QHP OP CAR RHAB W/ECG: CPT

## 2019-03-22 ENCOUNTER — APPOINTMENT (OUTPATIENT)
Dept: CARDIAC REHAB | Facility: HOSPITAL | Age: 84
End: 2019-03-22
Attending: INTERNAL MEDICINE
Payer: MEDICARE

## 2019-03-22 PROCEDURE — 93798 PHYS/QHP OP CAR RHAB W/ECG: CPT

## 2019-03-25 ENCOUNTER — CARDPULM VISIT (OUTPATIENT)
Dept: CARDIAC REHAB | Facility: HOSPITAL | Age: 84
End: 2019-03-25
Attending: INTERNAL MEDICINE
Payer: MEDICARE

## 2019-03-25 PROCEDURE — 93798 PHYS/QHP OP CAR RHAB W/ECG: CPT

## 2019-03-26 RX ORDER — DILTIAZEM HYDROCHLORIDE 120 MG/1
CAPSULE, COATED, EXTENDED RELEASE ORAL
COMMUNITY
Start: 2018-12-19 | End: 2019-01-01

## 2019-03-26 RX ORDER — UREA 10 %
100 LOTION (ML) TOPICAL 2 TIMES DAILY
COMMUNITY
Start: 2017-11-03

## 2019-03-26 RX ORDER — CLOPIDOGREL BISULFATE 75 MG/1
TABLET ORAL
COMMUNITY
Start: 2018-08-17 | End: 2019-01-01 | Stop reason: SDUPTHER

## 2019-03-26 RX ORDER — TORSEMIDE 20 MG/1
1 TABLET ORAL DAILY
COMMUNITY
Start: 2018-07-27 | End: 2019-01-01 | Stop reason: SDUPTHER

## 2019-03-26 RX ORDER — ALLOPURINOL 100 MG/1
100 TABLET ORAL 2 TIMES DAILY
COMMUNITY
Start: 2017-10-13

## 2019-03-26 RX ORDER — FAMOTIDINE 20 MG/1
20 TABLET, FILM COATED ORAL AT BEDTIME
COMMUNITY
Start: 2017-11-03

## 2019-03-26 RX ORDER — MONTELUKAST SODIUM 10 MG/1
10 TABLET ORAL NIGHTLY
COMMUNITY
Start: 2017-10-13

## 2019-03-26 RX ORDER — DILTIAZEM HYDROCHLORIDE 120 MG/1
CAPSULE, COATED, EXTENDED RELEASE ORAL
COMMUNITY
Start: 2017-10-13 | End: 2019-01-01 | Stop reason: SDUPTHER

## 2019-03-26 RX ORDER — POTASSIUM CHLORIDE 20 MEQ/1
20 TABLET, EXTENDED RELEASE ORAL DAILY
COMMUNITY
Start: 2017-11-03

## 2019-03-26 RX ORDER — SIMVASTATIN 40 MG
40 TABLET ORAL NIGHTLY
COMMUNITY
Start: 2017-10-13

## 2019-03-27 ENCOUNTER — CARDPULM VISIT (OUTPATIENT)
Dept: CARDIAC REHAB | Facility: HOSPITAL | Age: 84
End: 2019-03-27
Attending: INTERNAL MEDICINE
Payer: MEDICARE

## 2019-03-27 PROCEDURE — 93798 PHYS/QHP OP CAR RHAB W/ECG: CPT

## 2019-03-29 ENCOUNTER — CARDPULM VISIT (OUTPATIENT)
Dept: CARDIAC REHAB | Facility: HOSPITAL | Age: 84
End: 2019-03-29
Attending: INTERNAL MEDICINE
Payer: MEDICARE

## 2019-03-29 PROCEDURE — 93798 PHYS/QHP OP CAR RHAB W/ECG: CPT

## 2019-04-02 ENCOUNTER — APPOINTMENT (OUTPATIENT)
Dept: LAB | Facility: HOSPITAL | Age: 84
End: 2019-04-02
Attending: CLINICAL NURSE SPECIALIST
Payer: MEDICARE

## 2019-04-02 DIAGNOSIS — I50.22 CHRONIC SYSTOLIC HEART FAILURE (HCC): ICD-10-CM

## 2019-04-02 DIAGNOSIS — I50.22 CHRONIC SYSTOLIC HEART FAILURE (HCC): Primary | ICD-10-CM

## 2019-04-02 DIAGNOSIS — I50.9 HEART FAILURE, UNSPECIFIED (HCC): ICD-10-CM

## 2019-04-02 PROCEDURE — 36415 COLL VENOUS BLD VENIPUNCTURE: CPT

## 2019-04-02 PROCEDURE — 80048 BASIC METABOLIC PNL TOTAL CA: CPT

## 2019-04-02 PROCEDURE — 83880 ASSAY OF NATRIURETIC PEPTIDE: CPT

## 2019-04-03 ENCOUNTER — CARDPULM VISIT (OUTPATIENT)
Dept: CARDIAC REHAB | Facility: HOSPITAL | Age: 84
End: 2019-04-03
Attending: INTERNAL MEDICINE
Payer: MEDICARE

## 2019-04-03 ENCOUNTER — TELEPHONE (OUTPATIENT)
Dept: CARDIOLOGY CLINIC | Facility: HOSPITAL | Age: 84
End: 2019-04-03

## 2019-04-03 PROCEDURE — 93798 PHYS/QHP OP CAR RHAB W/ECG: CPT

## 2019-04-03 NOTE — TELEPHONE ENCOUNTER
Weight has fluctuated up and down 2-3 lbs over the last several days. Was [phone] (today). Had gone out to eat several times in the last week or so. Overall feeling well, drove to cardiac rehab and appetite good. Denies respiratory complaints.  BMP stab

## 2019-04-05 ENCOUNTER — CARDPULM VISIT (OUTPATIENT)
Dept: CARDIAC REHAB | Facility: HOSPITAL | Age: 84
End: 2019-04-05
Attending: INTERNAL MEDICINE
Payer: MEDICARE

## 2019-04-05 PROCEDURE — 93798 PHYS/QHP OP CAR RHAB W/ECG: CPT

## 2019-04-08 ENCOUNTER — CARDPULM VISIT (OUTPATIENT)
Dept: CARDIAC REHAB | Facility: HOSPITAL | Age: 84
End: 2019-04-08
Attending: INTERNAL MEDICINE
Payer: MEDICARE

## 2019-04-08 PROCEDURE — 93798 PHYS/QHP OP CAR RHAB W/ECG: CPT

## 2019-04-10 ENCOUNTER — CARDPULM VISIT (OUTPATIENT)
Dept: CARDIAC REHAB | Facility: HOSPITAL | Age: 84
End: 2019-04-10
Attending: INTERNAL MEDICINE
Payer: MEDICARE

## 2019-04-10 PROCEDURE — 93798 PHYS/QHP OP CAR RHAB W/ECG: CPT

## 2019-04-12 ENCOUNTER — CARDPULM VISIT (OUTPATIENT)
Dept: CARDIAC REHAB | Facility: HOSPITAL | Age: 84
End: 2019-04-12
Attending: INTERNAL MEDICINE
Payer: MEDICARE

## 2019-04-12 PROCEDURE — 93798 PHYS/QHP OP CAR RHAB W/ECG: CPT

## 2019-04-15 ENCOUNTER — CARDPULM VISIT (OUTPATIENT)
Dept: CARDIAC REHAB | Facility: HOSPITAL | Age: 84
End: 2019-04-15
Attending: INTERNAL MEDICINE
Payer: MEDICARE

## 2019-04-15 PROCEDURE — 93798 PHYS/QHP OP CAR RHAB W/ECG: CPT

## 2019-04-17 ENCOUNTER — CARDPULM VISIT (OUTPATIENT)
Dept: CARDIAC REHAB | Facility: HOSPITAL | Age: 84
End: 2019-04-17
Attending: INTERNAL MEDICINE
Payer: MEDICARE

## 2019-04-17 PROCEDURE — 93798 PHYS/QHP OP CAR RHAB W/ECG: CPT

## 2019-04-19 ENCOUNTER — CARDPULM VISIT (OUTPATIENT)
Dept: CARDIAC REHAB | Facility: HOSPITAL | Age: 84
End: 2019-04-19
Attending: INTERNAL MEDICINE
Payer: MEDICARE

## 2019-04-19 ENCOUNTER — OFFICE VISIT (OUTPATIENT)
Dept: CARDIOLOGY CLINIC | Facility: HOSPITAL | Age: 84
End: 2019-04-19
Attending: INTERNAL MEDICINE
Payer: MEDICARE

## 2019-04-19 VITALS
DIASTOLIC BLOOD PRESSURE: 53 MMHG | WEIGHT: 133.31 LBS | HEART RATE: 79 BPM | SYSTOLIC BLOOD PRESSURE: 127 MMHG | BODY MASS INDEX: 26 KG/M2 | OXYGEN SATURATION: 90 %

## 2019-04-19 DIAGNOSIS — I50.9 HEART FAILURE, UNSPECIFIED (HCC): ICD-10-CM

## 2019-04-19 DIAGNOSIS — I50.32 CHRONIC HEART FAILURE WITH PRESERVED EJECTION FRACTION (HCC): Primary | ICD-10-CM

## 2019-04-19 PROCEDURE — 99212 OFFICE O/P EST SF 10 MIN: CPT | Performed by: CLINICAL NURSE SPECIALIST

## 2019-04-19 PROCEDURE — 80048 BASIC METABOLIC PNL TOTAL CA: CPT | Performed by: CLINICAL NURSE SPECIALIST

## 2019-04-19 PROCEDURE — 93798 PHYS/QHP OP CAR RHAB W/ECG: CPT

## 2019-04-19 PROCEDURE — 99214 OFFICE O/P EST MOD 30 MIN: CPT | Performed by: CLINICAL NURSE SPECIALIST

## 2019-04-19 PROCEDURE — 36415 COLL VENOUS BLD VENIPUNCTURE: CPT | Performed by: CLINICAL NURSE SPECIALIST

## 2019-04-19 PROCEDURE — 85025 COMPLETE CBC W/AUTO DIFF WBC: CPT | Performed by: CLINICAL NURSE SPECIALIST

## 2019-04-19 NOTE — PROGRESS NOTES
[facility] [address] Patient Status:  No patient class for patient encounter    3/8/1932 MRN [id number]   Location MD Dr. Antolin Willard is a 80year old male wh days, he has felt like he needs more during activity. Oxygen levels dropped 84% once. Denies wheezing. Denies orthopnea. Denies dizziness, lightheadedness, chest pain and palpitations. Doing well in cardiac rehab. Has started driving. Appetite fair.  Now  patient and family.        /53   Pulse 79   Wt 133 lb 4.8 oz (60.5 kg)   SpO2 90%   BMI 26.03 kg/m²     D'c weight 138 ///           137                 140  Clinic weights: 1) 147 /// 2) 138 3) 137/// 3) 138 4) 136 /// 5) 135 6) 133 7) 131 8) 126 9) transseptal punctures  The aorta had scattered atherosclerotic plaque but no evidence for mobile atheromata or thrombus. No pericardial effusion.       CXR 7/15/18  CONCLUSION:   Imaging findings suggestive of emphysema with COPD and superimposed fibrotic -Follows with valve clinic      Plan:  Continue current medications    Please call the heart failure clinic if you notice a weight gain of 3 pounds overnight or 5 pounds or more in 5 days.      Monitor yourself for signs and symptoms of fluid overload, i

## 2019-04-22 ENCOUNTER — APPOINTMENT (OUTPATIENT)
Dept: CARDIAC REHAB | Facility: HOSPITAL | Age: 84
End: 2019-04-22
Attending: INTERNAL MEDICINE
Payer: MEDICARE

## 2019-04-24 ENCOUNTER — APPOINTMENT (OUTPATIENT)
Dept: CARDIAC REHAB | Facility: HOSPITAL | Age: 84
End: 2019-04-24
Attending: INTERNAL MEDICINE
Payer: MEDICARE

## 2019-05-13 PROBLEM — I25.10 CORONARY ATHEROSCLEROSIS OF NATIVE CORONARY ARTERY: Status: ACTIVE | Noted: 2019-05-13

## 2019-05-13 PROBLEM — Z95.1 HX OF CABG: Status: ACTIVE | Noted: 2019-05-13

## 2019-05-13 PROBLEM — I50.31: Status: ACTIVE | Noted: 2019-05-13

## 2019-05-13 PROBLEM — I34.0 MITRAL REGURGITATION: Status: ACTIVE | Noted: 2019-05-13

## 2019-05-13 PROBLEM — D64.9 ANEMIA: Status: ACTIVE | Noted: 2019-05-13

## 2019-05-13 RX ORDER — FERROUS SULFATE 325(65) MG
325 TABLET ORAL 2 TIMES DAILY
COMMUNITY
Start: 2017-10-13 | End: 2019-01-01

## 2019-05-13 RX ORDER — PANTOPRAZOLE SODIUM 40 MG/1
40 TABLET, DELAYED RELEASE ORAL DAILY
COMMUNITY
Start: 2018-12-19

## 2019-05-13 RX ORDER — ASPIRIN 81 MG
100 TABLET, DELAYED RELEASE (ENTERIC COATED) ORAL 2 TIMES DAILY
COMMUNITY
Start: 2017-10-13 | End: 2020-01-01 | Stop reason: CLARIF

## 2019-05-13 RX ORDER — DIGOXIN 125 MCG
125 TABLET ORAL DAILY
COMMUNITY
Start: 2018-08-15 | End: 2019-01-01

## 2019-05-13 RX ORDER — OMEPRAZOLE 40 MG/1
40 CAPSULE, DELAYED RELEASE ORAL DAILY
COMMUNITY
Start: 2017-11-03 | End: 2019-01-01

## 2019-05-13 RX ORDER — ACETAMINOPHEN 325 MG/1
325 TABLET ORAL PRN
COMMUNITY
Start: 2018-12-19 | End: 2020-01-01

## 2019-05-13 RX ORDER — LEVOTHYROXINE SODIUM 0.15 MG/1
150 TABLET ORAL DAILY
COMMUNITY
Start: 2017-10-13

## 2019-05-13 RX ORDER — ALBUTEROL SULFATE 90 UG/1
1 AEROSOL, METERED RESPIRATORY (INHALATION) PRN
COMMUNITY
Start: 2018-05-01 | End: 2020-01-01 | Stop reason: CLARIF

## 2019-05-14 ENCOUNTER — OFFICE VISIT (OUTPATIENT)
Dept: CARDIOLOGY | Age: 84
End: 2019-05-14

## 2019-05-14 DIAGNOSIS — I34.0 NON-RHEUMATIC MITRAL REGURGITATION: ICD-10-CM

## 2019-05-14 DIAGNOSIS — I25.10 ATHEROSCLEROSIS OF NATIVE CORONARY ARTERY OF NATIVE HEART WITHOUT ANGINA PECTORIS: Primary | ICD-10-CM

## 2019-05-14 PROCEDURE — 99214 OFFICE O/P EST MOD 30 MIN: CPT | Performed by: INTERNAL MEDICINE

## 2019-05-14 ASSESSMENT — PAIN SCALES - GENERAL: PAINLEVEL: 1-2

## 2019-05-23 ENCOUNTER — OFFICE VISIT (OUTPATIENT)
Dept: CARDIOLOGY CLINIC | Facility: HOSPITAL | Age: 84
End: 2019-05-23
Attending: INTERNAL MEDICINE
Payer: MEDICARE

## 2019-05-23 VITALS
BODY MASS INDEX: 26 KG/M2 | SYSTOLIC BLOOD PRESSURE: 107 MMHG | HEART RATE: 77 BPM | WEIGHT: 132.19 LBS | DIASTOLIC BLOOD PRESSURE: 42 MMHG | OXYGEN SATURATION: 92 %

## 2019-05-23 DIAGNOSIS — I50.32 CHRONIC HEART FAILURE WITH PRESERVED EJECTION FRACTION (HCC): Primary | ICD-10-CM

## 2019-05-23 DIAGNOSIS — I50.9 HEART FAILURE, UNSPECIFIED (HCC): ICD-10-CM

## 2019-05-23 PROCEDURE — 85025 COMPLETE CBC W/AUTO DIFF WBC: CPT | Performed by: CLINICAL NURSE SPECIALIST

## 2019-05-23 PROCEDURE — 99212 OFFICE O/P EST SF 10 MIN: CPT | Performed by: CLINICAL NURSE SPECIALIST

## 2019-05-23 PROCEDURE — 36415 COLL VENOUS BLD VENIPUNCTURE: CPT | Performed by: CLINICAL NURSE SPECIALIST

## 2019-05-23 PROCEDURE — 80048 BASIC METABOLIC PNL TOTAL CA: CPT | Performed by: CLINICAL NURSE SPECIALIST

## 2019-05-23 PROCEDURE — 99214 OFFICE O/P EST MOD 30 MIN: CPT | Performed by: CLINICAL NURSE SPECIALIST

## 2019-05-23 NOTE — PATIENT INSTRUCTIONS
Decrease torsemide to 10 mg daily x3 days. If your weight reaches 126 lbs, before Sunday, please take torsemide 20 mg that day.      Please call the heart failure clinic if you notice a weight gain of 3 pounds overnight or 5 pounds or more in 5 days.      M

## 2019-05-23 NOTE — PROGRESS NOTES
[facility] [address] Patient Status:  No patient class for patient encounter    3/8/1932 MRN [id number]   Location MD Dr. Charline Reddy is a 80year old male wh days, he has felt like he needs more during activity. Oxygen levels dropped 84% once. Denies wheezing. Denies orthopnea. Denies dizziness, lightheadedness, chest pain and palpitations. Doing well in cardiac rehab. Has started driving. Appetite fair.  Now  family.        /42   Pulse 77   Wt 132 lb 3.2 oz (60 kg)   SpO2 92%   BMI 25.82 kg/m²     D'c weight 138 ///           137                 140  Clinic weights: 1) 147 /// 2) 138 3) 137/// 3) 138 4) 136 /// 5) 135 6) 133 7) 131 8) 126 9) 132 10) 133 11 transseptal punctures  The aorta had scattered atherosclerotic plaque but no evidence for mobile atheromata or thrombus. No pericardial effusion.       CXR 7/15/18  CONCLUSION:   Imaging findings suggestive of emphysema with COPD and superimposed fibrotic clinic      Plan:  Decrease torsemide to 10 mg daily x3 days. If your weight reaches 126 lbs, before Sunday, please take torsemide 20 mg that day.      Please call the heart failure clinic if you notice a weight gain of 3 pounds overnight or 5 pounds or mor

## 2019-06-05 ENCOUNTER — LAB ENCOUNTER (OUTPATIENT)
Dept: LAB | Facility: HOSPITAL | Age: 84
End: 2019-06-05
Attending: CLINICAL NURSE SPECIALIST
Payer: MEDICARE

## 2019-06-05 DIAGNOSIS — D47.2 MGUS (MONOCLONAL GAMMOPATHY OF UNKNOWN SIGNIFICANCE): ICD-10-CM

## 2019-06-05 DIAGNOSIS — I50.9 HEART FAILURE, UNSPECIFIED (HCC): ICD-10-CM

## 2019-06-05 PROCEDURE — 86334 IMMUNOFIX E-PHORESIS SERUM: CPT

## 2019-06-05 PROCEDURE — 84165 PROTEIN E-PHORESIS SERUM: CPT

## 2019-06-05 PROCEDURE — 36415 COLL VENOUS BLD VENIPUNCTURE: CPT

## 2019-06-05 PROCEDURE — 82784 ASSAY IGA/IGD/IGG/IGM EACH: CPT

## 2019-06-05 PROCEDURE — 80048 BASIC METABOLIC PNL TOTAL CA: CPT

## 2019-06-06 ENCOUNTER — TELEPHONE (OUTPATIENT)
Dept: CARDIOLOGY CLINIC | Facility: HOSPITAL | Age: 84
End: 2019-06-06

## 2019-06-06 NOTE — TELEPHONE ENCOUNTER
Message left for patient to call regarding BMP results from 6/5. Renal function stable. Currently on Torsemide 20 mg daily.

## 2019-06-10 ENCOUNTER — CARDPULM VISIT (OUTPATIENT)
Dept: CARDIAC REHAB | Facility: HOSPITAL | Age: 84
End: 2019-06-10

## 2019-07-03 NOTE — PATIENT INSTRUCTIONS
Continue current medications.  We will talk after the pro-BNP results.      Monitor yourself for signs and symptoms of fluid overload, increased shortness of breath, difficulty breathing when laying down etc. Call the clinic if any of these signs develop at

## 2019-07-03 NOTE — PROGRESS NOTES
[facility] [address] Patient Status:  No patient class for patient encounter    3/8/1932 MRN [id number]   Location [place] MD Dr. Nuzhat Frost Shoulder is a 80year old male wh pain and palpitations. Reports having dark stools that were mucusy today and yesterday.      Review of Systems:  Constitutional: negative for fatigue, chills or fever  Respiratory: negative for cough,  negative hemoptysis and wheezing  Cardiovascular: nega 2) 138 3) 137/// 3) 138 4) 136 /// 5) 135 6) 133 7) 131 8) 126 9) 132 10) 133 11) 132 12) 132      General appearance: alert, appears stated age and cooperative  Neck: no adenopathy, no carotid bruit, no JVD, supple, symmetrical, trachea midline and thyroi suggestive of emphysema with COPD and superimposed fibrotic changes. Right upper lobe airspace opacity suggestive of pneumonia appears new since 6/14/18. Short-term followup suggested to ensure resolution.     Brain CT 7/15/18  CONCLUSION:   Moderate  Call the clinic if any of these signs develop at ph: [phone]    Call if having any dizziness, lightheadedness, heart racing, palpitations, chest pain, shortness of breath, coughing, swelling, weight gain or worsening symptoms.      32–50 ounces of flui

## 2019-07-12 NOTE — PROGRESS NOTES
[facility] [address] Patient Status:  No patient class for patient encounter    3/8/1932 MRN [id number]   Location MD Dr. Antolin Willard is a 80year old male wh palpitations.        Review of Systems:  Constitutional: negative for fatigue, chills or fever  Respiratory: negative for cough,  negative hemoptysis and wheezing  Cardiovascular: negative for chest pain, exertional chest pressure/discomfort, near-syncope, 68   Wt 132 lb 8 oz (60.1 kg)   SpO2 91%   BMI 25.88 kg/m²       D'c weight 138 ///           137                 140  Clinic weights: 1) 147 /// 2) 138 3) 137/// 3) 138 4) 136 /// 5) 135 6) 133 7) 131 8) 126 9) 132 10) 133 11) 132 12) 132 13) 132      Gen scattered atherosclerotic plaque but no evidence for mobile atheromata or thrombus. No pericardial effusion. CXR 7/15/18  CONCLUSION:   Imaging findings suggestive of emphysema with COPD and superimposed fibrotic changes.    Right upper lobe airspace still 127 lbs on Sunday, please take torsemide 20 mg, otherwise resume torsemide 10 mg alternating with 20 mg.      Continue current medications.      Monitor yourself for signs and symptoms of fluid overload, increased shortness of breath, difficulty breat

## 2019-07-12 NOTE — PATIENT INSTRUCTIONS
If your weight is still 127 lbs on Sunday, please take torsemide 20 mg, otherwise resume torsemide 10 mg alternating with 20 mg.      Continue current medications.      Monitor yourself for signs and symptoms of fluid overload, increased shortness of breath

## 2019-08-01 NOTE — PROGRESS NOTES
[facility] [address] Patient Status:  No patient class for patient encounter    3/8/1932 MRN [id number]   Location MD Dr. Tori Christiansen is a 80year old male wh going to cardiac rehab this week or last.          Review of Systems:  Constitutional: negative for fatigue, chills or fever  Respiratory: negative for cough,  negative hemoptysis and wheezing  Cardiovascular: negative for chest pain, exertional chest pres and family.        /49   Pulse 74   Wt 133 lb (60.3 kg)   SpO2 94%   BMI 25.97 kg/m²       D'c weight 138 ///           137                 140  Clinic weights: 1) 147 /// 2) 138 3) 137/// 3) 138 4) 136 /// 5) 135 6) 133 7) 131 8) 126 9) 132 10) 133 1 consistent with prior transseptal punctures  The aorta had scattered atherosclerotic plaque but no evidence for mobile atheromata or thrombus. No pericardial effusion.       CXR 7/15/18  CONCLUSION:   Imaging findings suggestive of emphysema with COPD and room air at rest in clinic.   - LABA/LAMA (Anoro)  - Oxygen only with activity and sleep  - Reviewed COPD action plan with patient. High risk for exacerbation secondary to changes in weather and air pollutants.      Atrial fibrillation  - CHADS 2 VASC: 3  - failure.

## 2019-08-01 NOTE — PATIENT INSTRUCTIONS
For three days (unless your weight comes down 125 lbs by Saturday) please take torsemide 20 mg. On Sunday, please resume torsemide 10 mg alternating with 20 mg daily.      Please start digoxin 0.125 mg every other day.      Monitor yourself for signs and sy

## 2019-08-02 PROBLEM — N18.30 CKD (CHRONIC KIDNEY DISEASE) STAGE 3, GFR 30-59 ML/MIN (HCC): Status: ACTIVE | Noted: 2019-01-01

## 2019-08-09 NOTE — TELEPHONE ENCOUNTER
Patient's sig other called reporting 2.2 lb weight gain. Yesterday weight 129 lbs, today 131 lbs. Denies respiratory complaints. VSS on current therapy, tolerating digoxin well (119/65, hr 71).  Increase torsemide to 20 mg daily Friday-Sunday (is taking tor

## 2019-08-15 NOTE — PROGRESS NOTES
[facility] [address] Patient Status:  No patient class for patient encounter    3/8/1932 MRN [id number]   Location MD Dr. Rad Richards is a 80year old male wh lightheadedness, chest pain and palpitations. Denies orthopnea. Appetite good.           Review of Systems:  Constitutional: negative for fatigue, chills or fever  Respiratory: negative for cough,  negative hemoptysis and wheezing  Cardiovascular: negative MA:  BMP and pro-BNP reviewed with patient and family.        /69   Pulse 74   Wt 132 lb (59.9 kg)   SpO2 91%   BMI 25.78 kg/m²       D'c weight 138 ///           137                 140  Clinic weights: 1) 147 /// 2) 138 3) 137/// 3) 138 4) 136 /// 5 weight 123-125 lbs for month of June (on home records); weight up to 131 lbs recently, increased torsemide for 3 days. Now 128 lbs. (goal 126 lbs)  - Renal function stable today. Pro-BNP trending up.   - Increase torsemide to 20 mg daily x3 days for weight 9/26/2019      I spent greater than 60 minutes with this patient providing counseling, coordination of care and education related specifically to heart failure.

## 2019-08-15 NOTE — PATIENT INSTRUCTIONS
Please increase Torsemide to 20 mg once daily for the next three days (Friday-Saturday)    Repeat labs before seeing Dr Rad Cardoso. Please do this on a day you DO NOT take digoxin.      Monitor yourself for signs and symptoms of fluid overload, increased short

## 2019-09-04 NOTE — PROGRESS NOTES
Benny Davis Note    Subjective:   Patient presents for routine 1 year postop TMVR visit, accompanied by his wife and daughter. He has been doing fairly well since our last visit.  He continues to follow frequently with the HF clinic with ad sufficient to allow evaluation of LV diastolic function. 2. Mitral valve: Three [id number] MitraClip was present and functioning     normally. There was mild regurgitation.  Mean gradient (D): 5mm Hg at a     heart rate of 85bpm.  3. Left atrium: The lef MOUTH EVERY EVENING Disp: 90 tablet Rfl: 0   LEVOTHYROXINE SODIUM 150 MCG Oral Tab TAKE 1 TABLET(150 MCG) BY MOUTH BEFORE BREAKFAST Disp: 90 tablet Rfl: 3   allopurinol 100 MG Oral Tab Take 1 tablet (100 mg total) by mouth 2 (two) times daily.  Disp: 180 ta tablet Rfl: 1   CARTIA  MG Oral Capsule SR 24 Hr Take 1 capsule (120 mg total) by mouth daily.  Disp: 90 capsule Rfl: 1   Levothyroxine Sodium 150 MCG Oral Tab TAKE 1 TABLET(150 MCG) BY MOUTH BEFORE BREAKFAST (Patient taking differently: Take 150 mcg APRN  9/4/2019   10:01 AM

## 2019-09-05 NOTE — TELEPHONE ENCOUNTER
I called patient's daughter with the results of echo from yesterday. The patient's EF remains at 60%, clip present with mild regurgitation. Overall, there has been no significant interval changes compared to previous echo on 3/6/19.  Patient has completed 1

## 2019-09-17 PROBLEM — Z86.79 HISTORY OF ATRIAL FIBRILLATION: Status: ACTIVE | Noted: 2019-01-01

## 2019-09-20 NOTE — PROGRESS NOTES
Reviewed.   Please call patient    - creatinine is stable    - Vitamin D level is within range   - PTH is elevated, we will continue to monitor this   - no proteinuria

## 2019-09-25 PROBLEM — N25.81 SECONDARY HYPERPARATHYROIDISM (HCC): Status: ACTIVE | Noted: 2019-01-01

## 2019-09-25 PROBLEM — N18.30 ANEMIA DUE TO STAGE 3 CHRONIC KIDNEY DISEASE (HCC): Status: ACTIVE | Noted: 2019-01-01

## 2019-09-25 PROBLEM — D63.1 ANEMIA DUE TO STAGE 3 CHRONIC KIDNEY DISEASE: Status: ACTIVE | Noted: 2019-01-01

## 2019-09-25 PROBLEM — D63.1 ANEMIA DUE TO STAGE 3 CHRONIC KIDNEY DISEASE (HCC): Status: ACTIVE | Noted: 2019-01-01

## 2019-09-25 PROBLEM — N18.30 ANEMIA DUE TO STAGE 3 CHRONIC KIDNEY DISEASE: Status: ACTIVE | Noted: 2019-01-01

## 2019-09-26 NOTE — PATIENT INSTRUCTIONS
Please increase Torsemide to 30 mg once daily for the next two days (Thursday and Friday)    Recommend wearing oxygen for 3-5 minutes after activity to recover and to remove oxygen only if oxygen saturations >90%.      Monitor yourself for signs and symptom

## 2019-09-26 NOTE — PROGRESS NOTES
[facility] [address] Patient Status:  No patient class for patient encounter    3/8/1932 MRN [id number]   Location MD Dr. Toya Hutchinson is a 80year old male wh palpitations. Denies orthopnea.         Review of Systems:  Constitutional: negative for fatigue, chills or fever  Respiratory: negative for cough,  negative hemoptysis and wheezing  Cardiovascular: negative for chest pain, exertional chest pressure/discom /56   Pulse 89   Wt 133 lb 11.2 oz (60.6 kg)   SpO2 (!) 89%   BMI 26.11 kg/m²       D'c weight 138 ///           137                 140  Clinic weights: 1) 147 /// 2) 138 3) 137/// 3) 138 4) 136 /// 5) 135 6) 133 7) 131 8) 126 9) 132 10) 133 1 Suspicious for pulm edema on exam. CXR does not demonstrate fluid. Echo from 9/4 at valve clinic showed elevated pulmonary pressures and right ventircular stress. Recommend increasing torsemide to 30 mg x2 days.  Patient's dtr to call Monday with an up date symptoms. 32–50 ounces of fluid daily    Less than 2000 mg salt/sodium daily.  Common high sodium foods include frozen dinners, soups (not homemade), some cereal, vegetable juice, canned vegetables, lunch meats, processed meats like hotdogs, sausage, ba

## 2019-11-01 NOTE — PATIENT INSTRUCTIONS
Hold Torsemide tomorrow. Sunday and Monday 10 mg once daily. Resume Torsemide 20 mg daily on Tuesday. Please make sure you are drinking at least 1500 ml (6 cups of fluid) a day.      Monitor yourself for signs and symptoms of fluid overload, increased s

## 2019-11-01 NOTE — PROGRESS NOTES
[address] Patient Status:  No patient class for patient encounter    3/8/1932 MRN [id number]   Location MD Dr. Brian Strickland List is a 80year old male wh chills or fever  Respiratory: negative for cough,  negative hemoptysis and wheezing  Cardiovascular: negative for chest pain, exertional chest pressure/discomfort, near-syncope, orthopnea and palpitations  Gastrointestinal: negative for abdominal pain, patrick ///           Yojanauth: 1) 147 /// 2) 138 3) 137/// 3) 138 4) 136 /// 5) 135 6) 133 7) 131 8) 126 9) 132 10) 133 11) 132 12) 132 13) 132 14) 133 15) 132 16) 133 17) 133    Home weight: 14) 127 15) 128 16) 128 17) 125    Gener weight ~128 lbs as established by Dr Astrid Coughlin. Now down 126 lbs. - Hold Torsemide tomorrow.  Resume Torsemide 10 mg on Sunday and Monday and then go back to 20 mg once daily.   - PO fluids ~1800 ml recommended  - Next appointment with Dr Astrid Coughlin as scheduled than 60 minutes with this patient providing counseling, coordination of care and education related specifically to heart failure.

## 2019-11-15 NOTE — PROGRESS NOTES
[facility] [address] Patient Status:  No patient class for patient encounter    3/8/1932 MRN [id number]   Location MD Dr. Ela Prasad is a 80year old male wh and wheezing  Cardiovascular: negative for chest pain, exertional chest pressure/discomfort, near-syncope, orthopnea and palpitations  Gastrointestinal: negative for abdominal pain, diarrhea, melena, nausea and vomiting  Hematologic/lymphatic: negative  Mu appearance: alert, appears stated age and cooperative  Neck: no adenopathy, no carotid bruit, no JVD, supple, symmetrical, trachea midline and thyroid not enlarged, symmetric, no tenderness/mass/nodules  Lungs: clear to auscultation bilaterally  Chest wall stable      COPD  - LABA/LAMA (Anoro)  - Oxygen only with activity and sleep  - Reviewed COPD action plan with patient. High risk for exacerbation secondary to changes in weather and air pollutants. - KRAMER progressively worse over the last couple weeks.  C

## 2019-11-15 NOTE — PATIENT INSTRUCTIONS
Take Prednisone 40 mg for 5 days then stop    If shortness of breath does not improve, please call Dr Collin Galindo office    Monitor yourself for signs and symptoms of fluid overload, increased shortness of breath, difficulty breathing when laying down etc

## 2019-11-23 PROBLEM — J18.9 PNEUMONIA OF LEFT LOWER LOBE DUE TO INFECTIOUS ORGANISM: Status: ACTIVE | Noted: 2019-01-01

## 2019-11-23 NOTE — PROGRESS NOTES
[facility] Note:  Renal Dose Adjustment for Metoclopramide (REGLAN)    Mehreen Navarrete has been prescribed Metoclopramide (REGLAN) 10 mg every 8 hours as needed for nausea,vomiting.     Estimated Creatinine Clearance: 23.1 mL/min (A) (based on SCr of 1.59

## 2019-11-23 NOTE — H&P
[facility] Hospitalist H&P     CC: Patient presents with:  Fever (infectious)     PCP: Sisi Taylor MD    Date of Admission: 11/23/2019 11:45 AM    ASSESSMENT / PLAN:       Mr. Nasir Gupta is an 81 yo M with PMH of COPD on home 2L, Afib not on AC due to bleeds, noticed feeling very warm, significant other states they couldn't find a thermometer but she is she he had a fever. No sick contacts. She states he had an episode of delirium this AM which sometimes happens when he is sick, more oriented now.  He lives at h ENDOSCOPY   • ESOPHAGOGASTRODUODENOSCOPY (EGD) N/A 4/10/2017    Performed by Bryan Bonilla MD at [facility] ENDOSCOPY   • ESOPHAGOGASTRODUODENOSCOPY (EGD) N/A 10/12/2016    Performed by Bryan Bonilla MD at [facility] ENDOSCOPY   • ESOPHAGOGASTRODUODENOSCOPY (EGD) Inhalation Aerosol Powder, Breath Activated, Inhale 1 puff into the lungs daily. , Disp: 3 each, Rfl: 3  PEG 3350 Oral Powd Pack, Take 17 g by mouth daily. , Disp: , Rfl:   acetaminophen 325 MG Oral Tab, Take 2 tablets (650 mg total) by mouth every 6 (six) h dry  EXT: no edema    Diagnostic Data:    CBC/Chem    Recent Labs   Lab 11/23/19  1156   RBC 3.49*   HGB 12.0*   HCT 37.1*   .3*   MCH 34.4*   MCHC 32.3   RDW 16.3*   NEPRELIM 16.67*   WBC 21.0*   .0*         Recent Labs   Lab 11/23/19  1156

## 2019-11-23 NOTE — ED PROVIDER NOTES
Patient Seen in: [facility] AND [facility] Emergency Department      History   Patient presents with:  Fever (infectious)    Stated Complaint: fever    HPI    49-year-old male with past medical history significant for AAA, CHF, anemia, chronic kidney disease sta 2/14/2018   • Hypothyroid    • Hypothyroidism, unspecified 1/10/2018   • Kyphosis of thoracic region 2/14/2018   • Lumbar compression fracture ([address] 2/8/2019   • MI, old    • Pulmonary hypertension ([address] 2/14/2018   • Right to left cardiac shunt ([address] 2/14 clinical condition    Physical Exam     ED Triage Vitals [11/23/19 1150]   /65   Pulse 81   Resp 20   Temp 98.8 °F (37.1 °C)   Temp src Temporal   SpO2 91 %   O2 Device Nasal cannula       Current:/38   Pulse 64   Temp 98.8 °F (37.1 °C) (Tempor Joellen (*)     All other components within normal limits   CBC W/ DIFFERENTIAL - Abnormal; Notable for the following components:    WBC 21.0 (*)     RBC 3.49 (*)     HGB 12.0 (*)     HCT 37.1 (*)     .3 (*)     MCH 34.4 (*)     RDW-SD 63.4 (*)     RD family understand results and they agree with admission. Discussed with [facility] hospitalist who is accepted patient for admission.       [facility]      Sepsis Reassessment Note    /38   Pulse 64   Temp 98.8 °F (37.1 °C) (Temporal)   Re

## 2019-11-23 NOTE — PROGRESS NOTES
[facility] Dosing Service  Antibiotic Dosing    Radha Edgar is a 80year old male for whom pharmacy is dosing Zosyn for treatment of  pneumonia.      Allergies: is allergic to coumadin [warfarin]; eliquis [apixaban]; and pradaxa [dabigatran etexilate

## 2019-11-23 NOTE — ED INITIAL ASSESSMENT (HPI)
Luke Babcock arrives with family with complaints of right shoulder pain, fever, hallucinations per family.

## 2019-11-24 NOTE — PLAN OF CARE
Pt resting in bed, ambulates with assistance. Wearing 2L O2, weaning as able. IV abx. Pt refused all 2100 meds stating \"I am too cold and its too late\". Educated pt on timing and need for medications however pt still refused. Mild fever.      Problem: Anya Jobs unsuccessful or patient reports new pain  - Anticipate increased pain with activity and pre-medicate as appropriate  Outcome: Progressing     Problem: RISK FOR INFECTION - ADULT  Goal: Absence of fever/infection during anticipated neutropenic period  Descr

## 2019-11-24 NOTE — PLAN OF CARE
Pt from home with partner Tamra Mcdonnell. Plan for IV antibiotics. Family at bedside. No new complaints.      Problem: Patient Centered Care  Goal: Patient preferences are identified and integrated in the patient's plan of care  Description  Interventions:  - What wo RISK FOR INFECTION - ADULT  Goal: Absence of fever/infection during anticipated neutropenic period  Description  INTERVENTIONS  - Monitor WBC  - Administer growth factors as ordered  - Implement neutropenic guidelines  Outcome: Progressing     Problem: SAF

## 2019-11-24 NOTE — PLAN OF CARE
Problem: Patient Centered Care  Goal: Patient preferences are identified and integrated in the patient's plan of care  Description  Interventions:  - What would you like us to know as we care for you?  I dont feel good and need to sleep   - Provide timely Monitor for opioid side effects  - Notify MD/LIP if interventions unsuccessful or patient reports new pain  - Anticipate increased pain with activity and pre-medicate as appropriate  11/24/2019 0935 by Bishop Amarjit RN  Outcome: Not Progressing  11/24/201 appropriate  - Assess patient's ability to be responsible for managing their own health  - Refer to Case Management Department for coordinating discharge planning if the patient needs post-hospital services based on physician/LIP order or complex needs rel

## 2019-11-24 NOTE — PROGRESS NOTES
[facility] Hospitalist Progress Note     CC: Hospital Follow up    PCP: Nancy Santiago MD       Assessment/Plan:     Principal Problem:    Pneumonia of left lower lobe due to infectious organism    Mr. Eric Busby is an 79 yo M with PMH of COPD on home 2L, Afib not %.    Temp:  [98.6 °F (37 °C)-99.3 °F (37.4 °C)] 99.1 °F (37.3 °C)  Pulse:  [65-76] 76  Resp:  [18-20] 20  BP: (103-115)/(54) 115/54      Intake/Output:    Intake/Output Summary (Last 24 hours) at 11/24/2019 151  Last data filed at 11/24/2019 0900  Gross Oral BID   • aspirin  81 mg Oral Daily   • Clopidogrel Bisulfate  75 mg Oral Daily   • [START ON 11/25/2019] digoxin  125 mcg Oral Q MWF   • dilTIAZem HCl ER Coated Beads  120 mg Oral Daily   • Levothyroxine Sodium  150 mcg Oral Before breakfast   • Shadi

## 2019-11-24 NOTE — PROGRESS NOTES
Pt received from ED. Per ED RN pt received 1000mL IV bolus in the ED. Pt sent up with 750mL IVF bolus to be given. Pt received on 3L of oxygen. Pt orientated to room. Call light within reach, bed alarm activated.

## 2019-11-25 NOTE — PLAN OF CARE
Pt resting in bed, ambulates with assistance. Wearing 2L O2 - weaning as tolerated. Receiving IV abx. Received PRN tylenol for right shoulder pain. Fluid restriction. PT/OT eval ordered.    Pt refused labs/blood draw at 05:30 stating \"its too early and I a Consider cultural and social influences on pain and pain management  - Manage/alleviate anxiety  - Utilize distraction and/or relaxation techniques  - Monitor for opioid side effects  - Notify MD/LIP if interventions unsuccessful or patient reports new melida if the patient needs post-hospital services based on physician/LIP order or complex needs related to functional status, cognitive ability or social support system  Outcome: Progressing

## 2019-11-25 NOTE — CM/SW NOTE
Per RN rounds - pt may benefit from SNF vs HH. SW initiated ref to [facility]/ [facility]. Received notice from [place] - pt is agreeable to [facility]. Pending PT/OT eval when/if appropriate. If d/c w/ HH - need HHC orders and F2F prior to d/c.     PLAN: SNF vs ho

## 2019-11-25 NOTE — CONSULTS
Cardiology (consult dictated)    Assessment:  1. Admitted on 11/23/2019 with fever, mild confusion, and right shoulder pain. Etiology of the shoulder pain is uncertain but likely arthritis. Pain persists. 2.  Chest x-ray showing mild CHF.   History of

## 2019-11-25 NOTE — DIETARY NOTE
ADULT NUTRITION INITIAL ASSESSMENT    Pt is at moderate nutrition risk. Pt does not meet malnutrition criteria.     \    RECOMMENDATIONS TO MD:  See Nutrition Intervention and CPM     NUTRITION DIAGNOSIS/PROBLEM:  Inadequate oral intake related to decrease 30-59 ml/min ([facility] (8/2/2019), Congestive heart disease ([facility] COPD (chronic obstructive pulmonary disease) ([facility] Coronary atherosclerosis, Disorder of thyroid, Fecal impaction ([facility] (11/18/2018), Gout, Heart attack ([facility] High blood pressure, High choles piperacillin-tazobactam  3.375 g Intravenous Q8H   • allopurinol  100 mg Oral BID   • aspirin  81 mg Oral Daily   • Clopidogrel Bisulfate  75 mg Oral Daily   • digoxin  125 mcg Oral Q MWF   • dilTIAZem HCl ER Coated Beads  120 mg Oral Daily   • Levothyroxi

## 2019-11-25 NOTE — HOME CARE LIAISON
Received referral from [address], for residential home health. Met with patient, daughter and life partner, Kenneth Katz, at the bedside. Patient resting. Daughter and Kenneth Katz are agreeable to [facility] services upon discharge.  Kenneth Katz given brochure and

## 2019-11-25 NOTE — PLAN OF CARE
Pt received sleeping with family at bedside. Continues to c/o pain to right shoulder. U/S of right shoulder done with no blood clots seen. Respiratory panel collected. On droplet iso until results are back. Lasix 40mg IV x1 given. Call light within reach.

## 2019-11-25 NOTE — CONSULTS
[facility]    PATIENT'S NAME: Leida Bowles   ATTENDING PHYSICIAN: Drew Gerard MD   CONSULTING PHYSICIAN: Stuart Saleh.  Douglas Sauceda MD   PATIENT ACCOUNT#:   [id number]    LOCATION:  [address] [place] [address] Road #:   [id number]       DATE OF BIRTH:  0 complaining of right shoulder pain, alert and oriented x3. VITAL SIGNS:  Blood pressure 112/60, pulse 83, respirations 18, saturations 90%, afebrile. HEENT:  Unremarkable. NECK:  There is increased jugular venous pressure.   Carotids are normal without

## 2019-11-25 NOTE — CONSULTS
Pulmonary Consult     Assessment / Plan:  1. Acute on chronic respiratory failure - due to PNA.  CXR with possible pneumonia and reticular markings that may represent fibrosis vs pulmonary edema  - wean O2 as able; at baseline uses 1 LPM with exertion and s fracture ([address] 2/8/2019   • MI, old    • Pulmonary hypertension ([address] 2/14/2018   • Right to left cardiac shunt ([address] 2/14/2018   • Secondary hyperparathyroidism ([address] 9/25/2019   • Shortness of breath    • Thoracic aorta atherosclerosis ([address] 2/14/2018 (125 mcg total) by mouth every other day., Disp: 45 tablet, Rfl: 0, Past Week at Unknown time  dilTIAZem HCl ER Coated Beads 120 MG Oral Capsule SR 24 Hr, Take 1 capsule (120 mg total) by mouth daily. , Disp: 90 capsule, Rfl: 2, 11/23/2019 at Unknown time EVENING, Disp: 90 tablet, Rfl: 0, Taking  Albuterol Sulfate  (90 Base) MCG/ACT Inhalation Aero Soln, Inhale 2 puffs into the lungs every 6 (six) hours as needed for Wheezing., Disp: 3 Inhaler, Rfl: 3, Unknown at Unknown time  Pantoprazole Sodium 40 hours as needed for Pain. (Patient taking differently: Take 650 mg by mouth every 6 (six) hours as needed for Pain (prn). ), Disp: 30 tablet, Rfl: 0  aspirin 81 MG Oral Tab EC, Take 1 tablet (81 mg total) by mouth daily. , Disp: 30 tablet, Rfl: 11  Clopido Medications:  Reviewed in EMR    Imaging:   Chest imaging reviewed

## 2019-11-25 NOTE — PROGRESS NOTES
[facility] Hospitalist Progress Note     CC: Hospital Follow up    PCP: Sharla Courtney MD       Assessment/Plan:     Principal Problem:    Pneumonia of left lower lobe due to infectious organism    Mr. Marisa Boone is an 79 yo M with PMH of COPD on home 2L, Afib not has had for several days but did not mention to staff    OBJECTIVE:    Blood pressure 112/60, pulse 83, temperature 98.1 °F (36.7 °C), temperature source Oral, resp. rate 18, height 5' (1.524 m), weight 127 lb 6.4 oz (57.8 kg), SpO2 90 %.     Temp:  [97.7 ° the right upper extremity. Dictated by (CST): Jagjit Yeh MD on 11/25/2019 at 11:47     Approved by (CST): Jagjit Yeh MD on 11/25/2019 at 11:48          Xr Chest Ap Portable  (cpt=71045)    Result Date: 11/23/2019  CONCLUSION:  1.   Cardiomegaly

## 2019-11-26 NOTE — CM/SW NOTE
Received MDO for d/c planning. Pending PT/OT notes. Referral initiated to [facility] - pt agreeable. Received MDO [facility] orders. F2F completed. Pending PT/OT eval when/if appropriate.     PLAN: SNF vs [facility], [facility] orders in, F2F completed    SW/CM to remain

## 2019-11-26 NOTE — PLAN OF CARE
Patient denies CP or SOB. Very eager to go home, refuses care at times. Continuing IV Zosyn at this time for pneumonia. Plan for PT and OT to see patient this evening or tomorrow. Trying to wean home O2 dose, currently on 1.5L O2.      Problem: Patient Cent unsuccessful or patient reports new pain  - Anticipate increased pain with activity and pre-medicate as appropriate  Outcome: Progressing     Problem: RISK FOR INFECTION - ADULT  Goal: Absence of fever/infection during anticipated neutropenic period  Descr

## 2019-11-26 NOTE — PHYSICAL THERAPY NOTE
PHYSICAL THERAPY EVALUATION - INPATIENT     Room Number: 318/318-A  Evaluation Date: 11/26/2019  Type of Evaluation: Initial   Physician Order: PT Eval and Treat    Presenting Problem: pneumonia  Reason for Therapy: Mobility Dysfunction and Discharge Plan ([facility] 12/3/2018   • Anemia due to stage 3 chronic kidney disease ([facility] 9/25/2019   • Arrhythmia     Afib   • Arthritis of lumbar spine 2/14/2018   • Asthma    • Atrial fibrillation [facility])    • Morris esophagus    • Bilateral carotid artery disease ([facility] 2/ MITRACLIP N/A 8/31/2018    Performed by Roberta Hernández MD at [address] N/A 7/29/2016    Performed by Roberta Hernández MD at [facility] device   • REPAIR  ANAL FISTULA,RECT ADV FLAP     • [facility] have...  -   Turning over in bed (including adjusting bedclothes, sheets and blankets)?: None   -   Sitting down on and standing up from a chair with arms (e.g., wheelchair, bedside commode, etc.): None   -   Moving from lying on back to sitting on the eyal

## 2019-11-26 NOTE — PLAN OF CARE
Pt resting in bed, ambulates with assistance. Wearing 2L O2, weaning as able. IV abx. PT/OT ordered.      Problem: Patient Centered Care  Goal: Patient preferences are identified and integrated in the patient's plan of care  Description  Interventions:  - W Problem: RISK FOR INFECTION - ADULT  Goal: Absence of fever/infection during anticipated neutropenic period  Description  INTERVENTIONS  - Monitor WBC  - Administer growth factors as ordered  - Implement neutropenic guidelines  Outcome: Progressing     P

## 2019-11-26 NOTE — PROGRESS NOTES
[facility]  Progress Note    Ashu Jacobson Patient Status:  Inpatient    3/8/1932 MRN [id number]   Location [facility] 3W/SW Attending Yarelis Padilla MD   Hosp Day # 3 PCP Denton Litten, MD     Assessment:    1.   Admitted on 2019 w JVD, carotids 2+ no bruits. Cardiac: Regular rate and rhythm, S1, S2 normal, no murmur  Lungs: Clear without wheezes, rales, rhonchi. Normal excursions and effort. Abdomen: Soft, non-tender. Extremities: Without clubbing, cyanosis or edema.   Ruth Buttsose

## 2019-11-26 NOTE — PROGRESS NOTES
[facility] Hospitalist Progress Note     CC: Hospital Follow up    PCP: Sisi Taylor MD       Assessment/Plan:     Principal Problem:    Pneumonia of left lower lobe due to infectious organism    Mr. Nasir Gupta is an 81 yo M with PMH of COPD on home 2L, Afib not Hospitalist  Answering Service number: [phone]       Subjective:     No overnight events. Right shoulder pain improved. Appetite improved. OBJECTIVE:    Blood pressure 113/58, pulse 74, temperature 98.1 °F (36.7 °C), temperature source Oral, resp. hours.    Invalid input(s): ALPHOS, TBIL, DBIL, TPROT      Imaging:  Us Venous Doppler Arm Right - Diag Img (cpt=93971)    Result Date: 11/25/2019  CONCLUSION:  Negative for sonographic evidence of deep venous thrombosis in the right upper extremity.     Davion Somers

## 2019-11-26 NOTE — PROGRESS NOTES
Pulmonary Progress Note     Assessment / Plan:  1. Acute on chronic respiratory failure - due to PNA.  CXR with possible pneumonia and reticular markings that may represent fibrosis vs pulmonary edema  - wean O2 as able; at baseline uses 1 LPM with exertion

## 2019-11-27 NOTE — PROGRESS NOTES
[facility] AND [facility]  Progress Note    Christopher Palomares Patient Status:  Inpatient    3/8/1932 MRN [id number]   Location [facility] 3W/SW Attending Mica Valenzuela MD   Hosp Day # 4 PCP Kiki Vargas MD     Assessment:    1.  Admitted on 2019 w rales, rhonchi. Normal excursions and effort. Abdomen: Soft, non-tender. Extremities: Without clubbing, cyanosis or edema. Peripheral pulses are 2+. Skin: Warm and dry.      Labs:  Lab Results   Component Value Date    WBC 9.3 11/27/2019    HGB 9.8 11

## 2019-11-27 NOTE — CONSULTS
NEPHROLOGY CONSULT NOTE     DATE: 11/27/2019    Requesting Physician: Dr. Zainab Hutchison     Reason for Consult:  ROBBY on CKD     HISTORY OF PRESENT ILLNESS: Penelope Lamb is an 80year old male with history of HTN, dyslipidemia, CAD s/p CABG, MR s/p mitral clip, • Thoracic arthritis 2/14/2018   • Thoracic compression fracture ([address] 2/14/2018   • Unspecified essential hypertension        SURGICAL HISTORY:   Past Surgical History:   Procedure Laterality Date   • CABG     • CATARACT     • CORONARY STENT PLACEMENT Substance and Sexual Activity      Alcohol use: No        Comment: etoh abuse quit 1988      Drug use: No      Sexual activity: Not on file    Lifestyle      Physical activity:        Days per week: Not on file        Minutes per session: Not on file Daily  Levothyroxine Sodium (SYNTHROID) tab 150 mcg, 150 mcg, Oral, Before breakfast  Montelukast Sodium (SINGULAIR) tab 10 mg, 10 mg, Oral, Nightly  Pantoprazole Sodium (PROTONIX) EC tab 40 mg, 40 mg, Oral, QAM AC  atorvastatin (LIPITOR) tab 20 mg, 20 mg, EVENING  Albuterol Sulfate  (90 Base) MCG/ACT Inhalation Aero Soln, Inhale 2 puffs into the lungs every 6 (six) hours as needed for Wheezing.   [DISCONTINUED] PANTOPRAZOLE SODIUM 40 MG Oral Tab EC, TAKE 1 TABLET BY MOUTH TWICE DAILY BEFORE MEALS  [DI HCT 29.6 (L) 11/27/2019    .0 (L) 11/27/2019    INR 1.2 10/07/2018    PTT 91.0 (H) 08/31/2018    AST 36 01/21/2019    ALT 37 01/21/2019     Lab Results   Component Value Date    CA 8.7 11/27/2019         INTAKE/OUTPUT:    Intake/Output Summary (Last

## 2019-11-27 NOTE — PLAN OF CARE
Problem: Patient Centered Care  Goal: Patient preferences are identified and integrated in the patient's plan of care  Description  Interventions:  - What would you like us to know as we care for you?  I dont feel good and need to sleep   - Provide timely period  Description  INTERVENTIONS  - Monitor WBC  - Administer growth factors as ordered  - Implement neutropenic guidelines  Outcome: Progressing     Problem: SAFETY ADULT - FALL  Goal: Free from fall injury  Description  INTERVENTIONS:  - Assess pt freq

## 2019-11-27 NOTE — OCCUPATIONAL THERAPY NOTE
OCCUPATIONAL THERAPY EVALUATION - INPATIENT     Room Number: 318/318-A  Evaluation Date: 11/27/2019  Type of Evaluation: Initial  Presenting Problem: PNA    Physician Order: IP Consult to Occupational Therapy  Reason for Therapy: ADL/IADL Dysfunction and D calculated based on documentation in the [facility] '6 clicks' Inpatient Daily Activity Short Form. Research supports that patients with this level of impairment may benefit from home.      DISCHARGE RECOMMENDATIONS  OT Discharge Recommendations: Home  OT Devic of breath    • Thoracic aorta atherosclerosis ([address] 2/14/2018   • Thoracic arthritis 2/14/2018   • Thoracic compression fracture ([address] 2/14/2018   • Unspecified essential hypertension        Past Surgical History  Past Surgical History:   Procedure Lateral (O2)  Fall Risk: Standard fall risk    PAIN ASSESSMENT  Ratin          ACTIVITY TOLERANCE                         O2 SATURATIONS        SPO2 Ambulation on Oxygen: 87  Ambulation oxygen flow (liters per minute): 3    COGNITION  Following Commands:  foll shoulder ROM- educated in use of toilet aide  Upper Extremity Dressing: NT  Lower Extremity Dressing: Min A to don slippers    Education Provided: role of OT, use of AE for toileting  Patient End of Session: Up in chair;Needs met;Call light within reach;RN

## 2019-11-27 NOTE — PROGRESS NOTES
Pulmonary Progress Note     Assessment / Plan:  1. Acute on chronic respiratory failure - due to PNA  - wean O2 as able; at baseline uses 1 LPM with exertion and sleep  - abx as below  - increase activity  2.  PNA  - zosyn empirically; dc on augmentin to co

## 2019-11-27 NOTE — PLAN OF CARE
Problem: Patient Centered Care  Goal: Patient preferences are identified and integrated in the patient's plan of care  Description  Interventions:  - What would you like us to know as we care for you?  I live at home with my wife  - Provide timely, comple appropriate resources  Description  INTERVENTIONS:  - Identify barriers to discharge w/pt and caregiver  - Include patient/family/discharge partner in discharge planning  - Arrange for needed discharge resources and transportation as appropriate  - Identif

## 2019-11-27 NOTE — PLAN OF CARE
Problem: Patient Centered Care  Goal: Patient preferences are identified and integrated in the patient's plan of care  Description  Interventions:  - What would you like us to know as we care for you?  I live at home with my wife  - Provide timely, comple cultural and social influences on pain and pain management  - Manage/alleviate anxiety  - Utilize distraction and/or relaxation techniques  - Monitor for opioid side effects  - Notify MD/LIP if interventions unsuccessful or patient reports new pain  - Anti their own health  - Refer to Case Management Department for coordinating discharge planning if the patient needs post-hospital services based on physician/LIP order or complex needs related to functional status, cognitive ability or social support system

## 2019-11-28 NOTE — DISCHARGE SUMMARY
[facility] Hospitalist Discharge Summary   Patient ID:  Mehreen Navarrete [id number]  80 year old 3/8/1932    Admit date: 11/23/2019  Discharge date: 11/27/2019  Risk of Readmission Lace+ Score: 75  59-90 High Risk  29-58 Medium Risk  0-28   Low Risk    Primary Car -po intake has been poor  -plan to resume at 20 mg q48 hrs  -follow up with Dr. Oropeza Fu 12/5     R arm pain  - UE doppler negative for DVT, norco PRN  - x ray with mild arthritis  - given PM&R information to follow up if still persistent     COPD  - home 2 CONCLUSION:  Negative for sonographic evidence of deep venous thrombosis in the right upper extremity.     Dictated by (CST): Shana Pham MD on 11/25/2019 at 11:47     Approved by (CST): Shana Pham MD on 11/25/2019 at 11:48            Discharge Ins Take 1 tablet (100 mg total) by mouth 2 (two) times daily. aspirin 81 MG Tbec  Take 1 tablet (81 mg total) by mouth daily. Clopidogrel Bisulfate 75 MG Tabs  Commonly known as:  PLAVIX  Take 1 tablet (75 mg total) by mouth daily.      digoxin 0.125 M Wound Care: none needed  Code Status: Full Code    Important follow up:   Follow-up Information     Roxi Bull MD.    Specialty:  NEPHROLOGY  Why:  12/5 appointment  Contact information:  [address] [facility]  [id number] [place] [phone]

## 2019-11-29 NOTE — TELEPHONE ENCOUNTER
Message left for patient's sig other, Margaret Mariee and Binfireelaine message sent to patient regarding BMP and pro-BNP results. Pro-BNP significantly elevated (was 5K, now >33K). Margaret Mariee earlier reported weight up approx 3 lbs since discharge.  Recommend resuming Torsem

## 2019-12-01 NOTE — ED PROVIDER NOTES
Patient Seen in: [facility] AND [facility] Emergency Department      History   Patient presents with:  Nose Bleed (nasopharyngeal)    Stated Complaint: \"gusher of a nosebleed\"    HPI    80year old male with multiple medical problems including CAD s/p CABG, [facility] aorta atherosclerosis ([facility] 2/14/2018   • Thoracic arthritis 2/14/2018   • Thoracic compression fracture ([facility] 2/14/2018   • Unspecified essential hypertension               Past Surgical History:   Procedure Laterality Date   • CABG     • CATARACT     • C distress. Appearance: He is well-developed. He is not diaphoretic. HENT:      Head: Normocephalic and atraumatic. Right Ear: Hearing normal.      Left Ear: Hearing normal.      Nose: No nasal deformity, signs of injury or nasal tenderness. vasoconstrictor medication then 5.5cm ant rhino rocket placed and balloon inflated with good control of bleeding. Following the procedure the patient was re-examined and the bleeding was well controlled. The patient tolerated the procedure well.  The proc

## 2019-12-04 NOTE — PROGRESS NOTES
Reviewed.     - increase in creatinine noted to 2.13   - sodium is low at 130   - proteinuria noted   - phosphorus within range      Will discuss results with patient during upcoming clinic visit on 12/5

## 2019-12-06 PROBLEM — S32.000A LUMBAR COMPRESSION FRACTURE (HCC): Status: RESOLVED | Noted: 2019-02-08 | Resolved: 2019-01-01

## 2019-12-06 PROBLEM — N18.30 ANEMIA DUE TO STAGE 3 CHRONIC KIDNEY DISEASE: Status: RESOLVED | Noted: 2019-01-01 | Resolved: 2019-01-01

## 2019-12-06 PROBLEM — D63.1 ANEMIA DUE TO STAGE 3 CHRONIC KIDNEY DISEASE: Status: RESOLVED | Noted: 2019-01-01 | Resolved: 2019-01-01

## 2019-12-06 PROBLEM — J96.10 CHRONIC RESPIRATORY FAILURE, UNSPECIFIED WHETHER WITH HYPOXIA OR HYPERCAPNIA (HCC): Status: RESOLVED | Noted: 2018-01-10 | Resolved: 2019-01-01

## 2019-12-06 PROBLEM — I70.0 THORACIC AORTA ATHEROSCLEROSIS (HCC): Status: RESOLVED | Noted: 2018-02-14 | Resolved: 2019-01-01

## 2019-12-06 PROBLEM — I70.0 ABDOMINAL AORTIC ATHEROSCLEROSIS (HCC): Status: RESOLVED | Noted: 2018-02-14 | Resolved: 2019-01-01

## 2019-12-06 PROBLEM — E03.9 HYPOTHYROIDISM, UNSPECIFIED: Status: RESOLVED | Noted: 2018-01-10 | Resolved: 2019-01-01

## 2019-12-06 PROBLEM — S22.000A CLOSED COMPRESSION FRACTURE OF THORACIC VERTEBRA (HCC): Status: RESOLVED | Noted: 2018-02-14 | Resolved: 2019-01-01

## 2019-12-06 PROBLEM — I50.30 (HFPEF) HEART FAILURE WITH PRESERVED EJECTION FRACTION (HCC): Status: RESOLVED | Noted: 2018-06-13 | Resolved: 2019-01-01

## 2019-12-06 PROBLEM — D63.1 ANEMIA DUE TO STAGE 3 CHRONIC KIDNEY DISEASE (HCC): Status: RESOLVED | Noted: 2019-01-01 | Resolved: 2019-01-01

## 2019-12-06 PROBLEM — N18.30 CKD (CHRONIC KIDNEY DISEASE) STAGE 3, GFR 30-59 ML/MIN (HCC): Status: RESOLVED | Noted: 2019-01-01 | Resolved: 2019-01-01

## 2019-12-06 PROBLEM — J18.9 PNEUMONIA OF LEFT LOWER LOBE DUE TO INFECTIOUS ORGANISM: Status: RESOLVED | Noted: 2019-01-01 | Resolved: 2019-01-01

## 2019-12-06 PROBLEM — N18.30 ANEMIA DUE TO STAGE 3 CHRONIC KIDNEY DISEASE (HCC): Status: RESOLVED | Noted: 2019-01-01 | Resolved: 2019-01-01

## 2019-12-13 NOTE — PATIENT INSTRUCTIONS
Please take an extra Torsemide 20 mg tomorrow. Okay to continue Torsemide 20 mg every other day, if your weight remains 127 lbs or less.  If you reach 129 lbs, please call the [address] [place] [address], we may need to go back to every day torsemide or 20 mg

## 2019-12-28 PROBLEM — R07.9 CHEST PAIN OF UNCERTAIN ETIOLOGY: Status: ACTIVE | Noted: 2019-01-01

## 2019-12-28 PROBLEM — M25.511 CHRONIC RIGHT SHOULDER PAIN: Status: ACTIVE | Noted: 2019-01-01

## 2019-12-28 PROBLEM — G89.29 CHRONIC RIGHT SHOULDER PAIN: Status: ACTIVE | Noted: 2019-01-01

## 2019-12-28 NOTE — ED PROVIDER NOTES
Patient Seen in: [facility] AND [facility] Emergency Department      History   Patient presents with:  Upper Extremity Injury    Stated Complaint: atraumatic left shoulder pain    HPI    20-year-old male admitted about a month ago at that time with complaints of 2/14/2018   • Thoracic compression fracture ([facility] 2/14/2018   • Unspecified essential hypertension               Past Surgical History:   Procedure Laterality Date   • CABG     • CATARACT     • CORONARY STENT PLACEMENT     • ESOPHAGOGASTRODUODENOSCOPY (EGD (5')   Wt 56.7 kg   SpO2 92%   BMI 24.41 kg/m²         Physical Exam    Constitutional: Oriented to person, place, and time. Appears well-developed. No distress. Head: Normocephalic and atraumatic.    Eyes: Conjunctivae are normal. Pupils are equal, roun 17.2 (*)     Lymphocyte Absolute 0.76 (*)     Monocyte Absolute 1.12 (*)     All other components within normal limits   TROPONIN I - Normal   CBC WITH DIFFERENTIAL WITH PLATELET    Narrative:      The following orders were created for panel order CBC WITH CHEST (CPT=71046), 11/15/2019, 14:34. INDICATIONS: Focal psychomotor deficit. Weakness of acute onset. Abnormal electrocardiogram (ECG/EKG; R94.31). TECHNIQUE:   Two views. FINDINGS: POSITION: The patient is moderately rotated to the right.  CARDIAC/VAS INDICATIONS: Atraumatic right shoulder pain of acute onset. History of gout. TECHNIQUE: 3 views were obtained. FINDINGS:  BONES: No acute fracture or dislocation is apparent. The acromioclavicular and glenohumeral joints are congruent.  Mild degeneration pain    Disposition:  Admit  12/28/2019  7:28 pm    Follow-up:  No follow-up provider specified.   We recommend that you schedule follow up care with a primary care provider within the next three months to obtain basic health screening including reassessmen

## 2019-12-28 NOTE — ED INITIAL ASSESSMENT (HPI)
Patient states having sudden and severe right shoulder pain.  Denies any fall or known mechanism of injury

## 2019-12-29 NOTE — PLAN OF CARE
Problem: Patient/Family Goals  Goal: Patient/Family Long Term Goal  Description  Patient's Long Term Goal: GET HOME    Interventions:  - COMPLETE TESTING  -MONITOR   -O2   - See additional Care Plan goals for specific interventions   Outcome: Progressing frequently for physical needs  - Identify cognitive and physical deficits and behaviors that affect risk of falls.   - [place] fall precautions as indicated by assessment.  - Educate pt/family on patient safety including physical limitations  - Instruct p

## 2019-12-29 NOTE — CONSULTS
[facility] [employer] Patient Status:  Observation    3/8/1932 MRN [id number]   Location [facility] Attending Ry Whitaker MD   Hosp Day # 0 PCP Saeid Estevez MD     Date of Admission: 2019  3:54 PM  Admission Diagnosis: High cholesterol    • History of gastrointestinal bleeding 1/10/2018   • Hx of CABG 2/14/2018   • Hypothyroid    • Hypothyroidism, unspecified 1/10/2018   • Kyphosis of thoracic region 2/14/2018   • Lumbar compression fracture ([address] 2/8/2019   • MI, old History:  Family History   Problem Relation Age of Onset   • Other (Other) Father         cerebral hemorrhage    • Other (Other) Mother         gallbladder          Home Medications:  digoxin 0.125 MG Oral Tab, Take 125 mcg by mouth.  THREE TIMES A WEEK (M, inhaler 2 puff, 2 puff, Inhalation, Q6H PRN  •  allopurinol (ZYLOPRIM) tab 100 mg, 100 mg, Oral, BID  •  Clopidogrel Bisulfate (PLAVIX) tab 75 mg, 75 mg, Oral, Daily  •  [START ON 12/30/2019] digoxin (LANOXIN) tab 125 mcg, 125 mcg, Oral, Once per day on Mo 24 hrs:   BP Temp Temp src Pulse Resp SpO2 Height Weight   12/29/19 0909 105/44 97.5 °F (36.4 °C) Oral 71 16 94 % — —   12/29/19 0555 106/64 98.2 °F (36.8 °C) Oral 79 18 93 % — —   12/29/19 0532 — — — — — — — 124 lb 4.8 oz (56.4 kg)   12/28/19 2143 — — — 8 3.6 12/28/2019     12/28/2019    CO2 27.0 12/28/2019    BUN 43 12/28/2019    CREATSERUM 1.45 12/28/2019     12/28/2019    CA 9.1 12/28/2019    ALKPHO 147 12/28/2019    ALT 23 12/28/2019    AST 27 12/28/2019    BILT 1.1 12/28/2019    ALB 3.4 12

## 2019-12-29 NOTE — PROGRESS NOTES
[facility] Pharmacy Note:  Renal Dose Adjustment for Enoxaparin (LOVENOX)    Ashu Jacobson has been prescribed Enoxaparin (LOVENOX) 40 mg subcutaneously every 24 hours. Estimated Creatinine Clearance: 25.4 mL/min (A) (based on SCr of 1.45 mg/dL (H)).     His

## 2019-12-29 NOTE — CM/SW NOTE
Patient current with residential [facility] AT [facility]    Message left with [facility] that services will need to be continued at [place]

## 2019-12-29 NOTE — CONSULTS
[facility]  Electrophysiology Initial Consult Note      Jourdan Hobson Patient Status:  Observation    3/8/1932 MRN [id number]   Location [facility] Attending Jeff Spaulding MD   Hosp Day # 0 PCP Renzo Castillo MD • Right to left cardiac shunt ([address] 2/14/2018   • Secondary hyperparathyroidism ([address] 9/25/2019   • Shortness of breath    • Thoracic aorta atherosclerosis ([address] 2/14/2018   • Thoracic arthritis 2/14/2018   • Thoracic compression fracture ([address] 2/14/2018 PRN  •  allopurinol (ZYLOPRIM) tab 100 mg, 100 mg, Oral, BID  •  Clopidogrel Bisulfate (PLAVIX) tab 75 mg, 75 mg, Oral, Daily  •  [START ON 12/30/2019] digoxin (LANOXIN) tab 125 mcg, 125 mcg, Oral, Once per day on Mon Wed Fri  •  famoTIDine (PEPCID) tab 20 Lab 12/28/19  1634 12/28/19  2349   * 211*   BUN 41* 43*   CREATSERUM 1.47* 1.45*   GFRAA 49* 50*   GFRNAA 42* 43*   CA 9.2 9.1    138   K 3.8 3.6    105   CO2 26.0 27.0         Recent Labs   Lab 12/28/19  1634   RBC 3.34*   HGB 10.8* ([address]     Right to left cardiac shunt (HCC)     S/P mitral valve clip implantation     Heart failure, systolic and diastolic, chronic (HCC)     Fecal impaction (HCC)     Gastrointestinal hemorrhage, unspecified gastrointestinal hemorrhage type     Paroxysm

## 2019-12-29 NOTE — PROGRESS NOTES
[facility] Pharmacy Note:  Therapeutic Interchange    Madonna Linder was previously taking simvastatin 40 mg PO q24h at home prior to admission. Per therapeutic interchange, the patient will be switched to atorvastatin 20 mg PO q24h during hospitalization.

## 2019-12-29 NOTE — PLAN OF CARE
Problem: Patient/Family Goals  Goal: Patient/Family Long Term Goal  Description  Patient's Long Term Goal: discharge home    Interventions:  - CT imaging  - Increase activity as tolerated  - Up in chair for meals  - See additional Care Plan goals for spe completed. Norco prn for pain. Family at bedside  updated on plan of care.

## 2019-12-29 NOTE — H&P
[facility] Hospitalist H&P     CC: Patient presents with:  Upper Extremity Injury       PCP: Joao Car MD    Admission Date: 12/28/2019    ASSESSMENT / PLAN:   Mr. Ayana Mccall is a 80year old male with PMH of COPD on home 2L, Afib not on AC due to bleeds, house    Patient and/or patient's family given opportunity to ask questions and note understanding and agreeing with therapeutic plan as outlined    Davey Boothe MD  [facility] Hospitalist  Answering Service number: [phone]    HPI       History of Present Ill hyperparathyroidism ([facility] 9/25/2019   • Shortness of breath    • Thoracic aorta atherosclerosis ([facility] 2/14/2018   • Thoracic arthritis 2/14/2018   • Thor[facility] compression fracture ([facility] 2/14/2018   • Unspecified essential hypertension         PSH  Past Surg tablet, Rfl: 3  simvastatin 40 MG Oral Tab, Take 1 tablet (40 mg total) by mouth nightly., Disp: 90 tablet, Rfl: 1  allopurinol 100 MG Oral Tab, Take 1 tablet (100 mg total) by mouth 2 (two) times daily. , Disp: 180 tablet, Rfl: 3  Albuterol Sulfate  pulses  ABD: Soft, non-tender, non-distended, +BS  MSK: moving all extremities  Neuro: Grossly normal, CN intact, sensory intact  Psych: Affect- normal  SKIN: warm, dry, no bruising or rashes on right side, ttp to even gentle touch  EXT: no edema    Diagno visible acute osseous injury of the right shoulder. 2. 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   3. Radiographic findings raising the possibility of chronic full-thickness rota

## 2019-12-29 NOTE — PLAN OF CARE
Cardiology follow-up from AM consult    Appears compensated on exam  Monitor volume status closely  Daily weights, I/O, BMP    R shoulder pain  - likely Non cardiac  - work-up per primary

## 2019-12-30 NOTE — OCCUPATIONAL THERAPY NOTE
Patient imaging with chronic R rotator cuff tear; pending MRI this AM- will follow up after MRI    [address], OTR/L   5 [facility]

## 2019-12-30 NOTE — PROGRESS NOTES
[facility]    Progress Note    Madonna Linder Patient Status:  Observation    3/8/1932 MRN [id number]   Location [facility] 5SW/SE Attending Rojelio Kevin MD   Hosp Day # 0 PCP Agustin Kaba MD         CARDIOLOGY ATTENDING yesterday)     Persistent atrial fibrillation  -currently in AF rates 60-80s - continue digoxin  -s/p watchman 2016  -CHADS-VASC score 3 (age, vascular disease) - no AC d/t h/o GI bleed     Mitral regurgitation s/p mitral clip 9/2018  -stable  -follows at (CST): Elmira Giles MD on 12/28/2019 at 17:45     Approved by (CST): Elmira Giles MD on 12/28/2019 at 17:48          Xr Shoulder, Complete (min 2 Views), Right (cpt=73030)    Result Date: 12/28/2019  CONCLUSION:  1.  No radiographically visible acute 16:30     Approved by (CST): Malachi Monk MD on 12/29/2019 at 18:20          Ekg 12-lead    Result Date: 12/28/2019  ECG Report  Interpretation  -------------------------- Atrial flutter-fibrillation Low voltage in limb leads.  ABNORMAL When compared with

## 2019-12-30 NOTE — PLAN OF CARE
Problem: Patient/Family Goals  Goal: Patient/Family Long Term Goal  Description  Patient's Long Term Goal: discharge home    Interventions:  - CT imaging  - Increase activity as tolerated  - Up in chair for meals  - See additional Care Plan goals for spe injury  Description  INTERVENTIONS:  - Assess pt frequently for physical needs  - Identify cognitive and physical deficits and behaviors that affect risk of falls.   - [place] fall precautions as indicated by assessment.  - Educate pt/family on patient sa

## 2019-12-30 NOTE — PHYSICAL THERAPY NOTE
PHYSICAL THERAPY EVALUATION - INPATIENT     Room Number: 553/553-A  Evaluation Date: 12/30/2019  Type of Evaluation: Initial   Physician Order: PT Eval and Treat    Presenting Problem: chest pain and right shoulder pain chronic in nature   -pt with sign c not willing to wait for RN staff getting self up to bathroom. Chair alarm set at end of session and reinforced with pt and pt family need to use call light for assisted mobility.     As therapy leaving room , pt already getting himself up setting off chair allowed and decrease gait quality . Pt stood for tolieting with close CGA support provided for pt safety using toliet. Pt then agreed to ambulation back to bed approx 15 ft again min to CGA provided as pt allowing with HFR and unsteady gait.      Pt w mobility of staff at all times . D./c plans pending pt ability to  progress with therapy this admission , support in the home and further acute management. SW to work with pt and pt family on optimal d/c plan.    Family does report they have a RW at 1.4-1.8  - home lasix 20 q48, increase to daily while here and monitor renal function   -follows with Dr. Thiago Zheng     #HFpEF:  -BNP 5000 but improved from prior, euvoleumic on exam  -increase lasix to 20 po daily while here     #MR s/p Evangelina Clip:  -on petey atherosclerosis    • Disorder of thyroid    • Fecal impaction ([facility] 11/18/2018   • Gout    • Heart attack [facility]    • High blood pressure    • High cholesterol    • History of gastrointestinal bleeding 1/10/2018   • Hx of CABG 2/14/2018   • Hypothyroid    • walker(shower chair )  Patient Regularly Uses: None    Prior Level of [facility]: see assessment     SUBJECTIVE  \"dont help me \"      Pt did report mild SOB with activity   Pt and pt family goal for d/c to home     [facility]  NEUROLOGICAL FINDINGS      pt denies any loss of sensation in LE   Generalized weakness    Pt does appear groggy for meds                 ACTIVITY TOLERANCE--decrease / poor   All activity on  3 1/2L     SOB reported with activity        resting / Patient is able to demonstrate transfers EOB to/from Chair/Wheelchair at assistance level: supervision with none     Goal #2  Current Status    Goal #3 Patient is able to ambulate 150  feet with assist device: walker - rolling and LRAD at assistance level:

## 2019-12-30 NOTE — PHYSICAL THERAPY NOTE
Chart reviewed    Pt status discussed with OT who checked in with RN staff     Per RN staff hold for now pending MRI of shoulder         PT is following and will initiate care following MRI . Winnie Flores ..attempt x 1

## 2019-12-30 NOTE — PLAN OF CARE
Problem: Patient/Family Goals  Goal: Patient/Family Long Term Goal  Description  Patient's Long Term Goal: discharge home    Interventions:  - CT imaging  - Increase activity as tolerated  - Up in chair for meals  - See additional Care Plan goals for spe injury  Description  INTERVENTIONS:  - Assess pt frequently for physical needs  - Identify cognitive and physical deficits and behaviors that affect risk of falls.   - [place] fall precautions as indicated by assessment.  - Educate pt/family on patient sa

## 2019-12-30 NOTE — PROGRESS NOTES
Pulmonary Progress Note     Assessment / Plan:  1. Acute on chronic respiratory failure - due to pain, COPD and heart failure  - wean O2 as able  2. COPD - not in acute exacerbation  - anoro  - singulair  - bd protocol  3.  HFpEF, valvular disease  - per ca

## 2019-12-30 NOTE — PROGRESS NOTES
[facility] Hospitalist Progress Note     Reason for Admission:   PCP: Arnel Stout MD     Assessment/Plan:     Principal Problem:    Chest pain of uncertain etiology  Active Problems:    Chronic right shoulder pain    Mr. Molly Walter is a 80year old male with MD  [facility] Hospitalist     Subjective:     Pain resolved this morning but taking norco 10 mg. Awaiting MRI right shoulder. OBJECTIVE:    Blood pressure 105/50, pulse 70, temperature 97.3 °F (36.3 °C), temperature source Oral, resp.  rate 18, height 152.4 c of COPD. Dictated by (CST): Gudelia Lambert MD on 12/28/2019 at 17:45     Approved by (CST): Gudelia Lambert MD on 12/28/2019 at 17:48          Xr Shoulder, Complete (min 2 Views), Right (cpt=73030)    Result Date: 12/28/2019  CONCLUSION:  1.  No radiog MD on 12/29/2019 at 16:30     Approved by (CST): Andres Boxer, MD on 12/29/2019 at 18:20              Meds:     • diltiazem  120 mg Oral Daily   • torsemide  20 mg Oral Daily   • Senna-Docusate Sodium  2 tablet Oral Daily   • Pantoprazole Sodium  40 mg Or

## 2019-12-30 NOTE — PROGRESS NOTES
Patient transferred from cv obs. Patient in stable condition. Vital signs obtained. Patient on 4L o2 via NC. No complaints of pain at this time. Rating pain 0/10. Tele in place, SCDs placed on patient. Continue to monitor.

## 2019-12-30 NOTE — OCCUPATIONAL THERAPY NOTE
OCCUPATIONAL THERAPY EVALUATION - INPATIENT      Room Number: 553/553-A  Evaluation Date: 12/30/2019  Type of Evaluation: Initial       Physician Order: IP Consult to Occupational Therapy  Reason for Therapy: ADL/IADL Dysfunction and Discharge Planning ); saturating 91% with functional activities; pt is good with  Monitoring 02 at home and adjusts 02 accordingly (per spouse anywhere from 1-3 L).  The patient's Approx Degree of Impairment: 38.32% has been calculated based on documentation in the [facility] '6 c 2/14/2018   • Right to left cardiac shunt ([address] 2/14/2018   • Secondary hyperparathyroidism ([address] 9/25/2019   • Shortness of breath    • Thoracic aorta atherosclerosis ([address] 2/14/2018   • Thoracic arthritis 2/14/2018   • Thoracic compression fracture ([address] RESTRICTION: None at the time of evaluation       COGNITION  Poor safety awareness    RANGE OF MOTION   Upper extremity ROM is grossly WNL for LUE; did not assess RUE 2/2 pain         ACTIVITIES OF DAILY LIVING ASSESSMENT  AM-PAC ‘6-Clicks’ Inpatient Daily

## 2019-12-31 NOTE — PROGRESS NOTES
Pulmonary Progress Note     Assessment / Plan:  1. Chronic respiratory failure - due to pain, COPD and heart failure  - wean O2 as able  2. COPD - not in acute exacerbation  - anoro  - singulair  - bd protocol  3. HFpEF, valvular disease  - per cards  4.  R

## 2019-12-31 NOTE — PHYSICAL THERAPY NOTE
PHYSICAL THERAPY TREATMENT NOTE - INPATIENT     Room Number: 553/553-A       Presenting Problem: chest pain and right shoulder pain chronic in nature   -pt with sign cardiac hx and CHF exacerbation this admission     Problem List  Principal Problem:    Lizzette sheets and blankets)?: A Little   -   Sitting down on and standing up from a chair with arms (e.g., wheelchair, bedside commode, etc.): A Little   -   Moving from lying on back to sitting on the side of the bed?: A Little   How much help from another perso for improved tolerance , pacing and safety with fxn mobility in prep for d/c to home with family.     Goal #5   Current Status In progress   Goal #6 Family training for possible progression of pt for a d/c to home with family providing 24 hr care    Goal #6

## 2019-12-31 NOTE — CM/SW NOTE
HHC orders have been entered. [facility] is aware of MD orders.      [phone]

## 2019-12-31 NOTE — DISCHARGE SUMMARY
[facility] Hospitalist Discharge Summary   Patient ID:  Waylon Richardson [id number]  48 year old 3/8/1932    Admit date: 12/28/2019  Discharge date: 12/31/2019  Risk of Readmission Lace+ Score: 84  59-90 High Risk  29-58 Medium Risk  0-28   Low Risk    Primary Car Pain with Radiation (resolved)  #Chronic Rotator Cuff Tear  -CT c/a/p with no explanation for right side pain  -MRI with chronic rotator cuff tear and possible bursitis  -continue home PT  -follow up with orthopedics, if pain re-occurs may need steroid inj 17:45     Approved by (CST): Milly Quiñonez MD on 12/28/2019 at 17:48          Xr Shoulder, Complete (min 2 Views), Right (cpt=73030)    Result Date: 12/28/2019  CONCLUSION:  1. No radiographically visible acute osseous injury of the right shoulder.   2. M stool in the colon. 9. Nonobstructing bilateral renal calculi.  10. Renal cysts including an 8.7 cm right renal cyst. 11. A 9 mm left lower pole renal lesion either representing a small stable solid lesion or a complex cyst. 12. Ventral epigastric incisiona before meals.      PEG 3350 Pack  Commonly known as:  MIRALAX     Potassium Chloride ER 20 MEQ Tbcr  Commonly known as:  K-DUR M20  Take every other day  with torsemide     simvastatin 40 MG Tabs  Commonly known as:  ZOCOR  Take 1 tablet (40 mg total) by mo

## 2019-12-31 NOTE — DISCHARGE PLANNING
Patient a/ox4; denies pain; VSS; on 3L O2; order to discharge patient acknowledged; patient will go home with significant other; worked with PT today, recommended d/c to home; discussed discharge paperwork; Insentive spirometer for home; PIV removed.

## 2019-12-31 NOTE — PLAN OF CARE
Problem: Patient/Family Goals  Goal: Patient/Family Long Term Goal  Description  Patient's Long Term Goal: discharge home    Interventions:  - CT imaging  - Increase activity as tolerated  - Up in chair for meals  - See additional Care Plan goals for spe injury  Description  INTERVENTIONS:  - Assess pt frequently for physical needs  - Identify cognitive and physical deficits and behaviors that affect risk of falls.   - [place] fall precautions as indicated by assessment.  - Educate pt/family on patient sa

## 2020-01-01 ENCOUNTER — DOCUMENTATION (OUTPATIENT)
Dept: CARDIOLOGY | Age: 85
End: 2020-01-01

## 2020-01-01 ENCOUNTER — ANCILLARY PROCEDURE (OUTPATIENT)
Dept: CARDIOLOGY | Age: 85
End: 2020-01-01
Attending: INTERNAL MEDICINE

## 2020-01-01 ENCOUNTER — HOSPITAL ENCOUNTER (INPATIENT)
Facility: HOSPITAL | Age: 85
LOS: 1 days | DRG: 150 | End: 2020-01-01
Attending: INTERNAL MEDICINE | Admitting: INTERNAL MEDICINE
Payer: OTHER MISCELLANEOUS

## 2020-01-01 ENCOUNTER — E-ADVICE (OUTPATIENT)
Dept: CARDIOLOGY | Age: 85
End: 2020-01-01

## 2020-01-01 ENCOUNTER — OFFICE VISIT (OUTPATIENT)
Dept: CARDIOLOGY CLINIC | Facility: HOSPITAL | Age: 85
End: 2020-01-01
Attending: INTERNAL MEDICINE
Payer: MEDICARE

## 2020-01-01 ENCOUNTER — TELEPHONE (OUTPATIENT)
Dept: CARDIOLOGY | Age: 85
End: 2020-01-01

## 2020-01-01 ENCOUNTER — APPOINTMENT (OUTPATIENT)
Dept: GENERAL RADIOLOGY | Facility: HOSPITAL | Age: 85
DRG: 291 | End: 2020-01-01
Attending: HOSPITALIST
Payer: MEDICARE

## 2020-01-01 ENCOUNTER — TELEPHONE (OUTPATIENT)
Dept: CARDIOLOGY CLINIC | Facility: HOSPITAL | Age: 85
End: 2020-01-01

## 2020-01-01 ENCOUNTER — OFFICE VISIT (OUTPATIENT)
Dept: CARDIOLOGY | Age: 85
End: 2020-01-01

## 2020-01-01 ENCOUNTER — HOSPITAL ENCOUNTER (EMERGENCY)
Facility: HOSPITAL | Age: 85
Discharge: HOME | End: 2020-01-01
Attending: EMERGENCY MEDICINE
Payer: MEDICARE

## 2020-01-01 ENCOUNTER — V-VISIT (OUTPATIENT)
Dept: CARDIOLOGY | Age: 85
End: 2020-01-01

## 2020-01-01 ENCOUNTER — APPOINTMENT (OUTPATIENT)
Dept: ULTRASOUND IMAGING | Facility: HOSPITAL | Age: 85
DRG: 150 | End: 2020-01-01
Attending: INTERNAL MEDICINE
Payer: MEDICARE

## 2020-01-01 ENCOUNTER — APPOINTMENT (OUTPATIENT)
Dept: GENERAL RADIOLOGY | Facility: HOSPITAL | Age: 85
DRG: 150 | End: 2020-01-01
Attending: INTERNAL MEDICINE
Payer: MEDICARE

## 2020-01-01 ENCOUNTER — APPOINTMENT (OUTPATIENT)
Dept: CARDIOLOGY | Age: 85
End: 2020-01-01

## 2020-01-01 ENCOUNTER — APPOINTMENT (OUTPATIENT)
Dept: HEMATOLOGY/ONCOLOGY | Facility: HOSPITAL | Age: 85
End: 2020-01-01
Attending: INTERNAL MEDICINE
Payer: MEDICARE

## 2020-01-01 ENCOUNTER — PATIENT OUTREACH (OUTPATIENT)
Dept: CASE MANAGEMENT | Age: 85
End: 2020-01-01

## 2020-01-01 ENCOUNTER — APPOINTMENT (OUTPATIENT)
Dept: LAB | Facility: HOSPITAL | Age: 85
End: 2020-01-01
Attending: INTERNAL MEDICINE
Payer: MEDICARE

## 2020-01-01 ENCOUNTER — OFFICE VISIT (OUTPATIENT)
Dept: CARDIOLOGY CLINIC | Facility: HOSPITAL | Age: 85
End: 2020-01-01
Attending: NURSE PRACTITIONER
Payer: MEDICARE

## 2020-01-01 ENCOUNTER — RESEARCH ENCOUNTER (OUTPATIENT)
Dept: CARDIOLOGY | Age: 85
End: 2020-01-01

## 2020-01-01 ENCOUNTER — HOSPITAL ENCOUNTER (OUTPATIENT)
Dept: INTERVENTIONAL RADIOLOGY/VASCULAR | Facility: HOSPITAL | Age: 85
Discharge: HOME | End: 2020-01-01
Attending: INTERNAL MEDICINE | Admitting: INTERNAL MEDICINE
Payer: MEDICARE

## 2020-01-01 ENCOUNTER — OFFICE VISIT (OUTPATIENT)
Dept: CARDIOLOGY CLINIC | Facility: HOSPITAL | Age: 85
DRG: 291 | End: 2020-01-01
Attending: NURSE PRACTITIONER
Payer: MEDICARE

## 2020-01-01 ENCOUNTER — CLINICAL ABSTRACT (OUTPATIENT)
Dept: CARDIOLOGY | Age: 85
End: 2020-01-01

## 2020-01-01 ENCOUNTER — APPOINTMENT (OUTPATIENT)
Dept: LAB | Facility: HOSPITAL | Age: 85
End: 2020-01-01
Attending: NURSE PRACTITIONER
Payer: MEDICARE

## 2020-01-01 ENCOUNTER — APPOINTMENT (OUTPATIENT)
Dept: GENERAL RADIOLOGY | Facility: HOSPITAL | Age: 85
DRG: 150 | End: 2020-01-01
Attending: HOSPITALIST
Payer: MEDICARE

## 2020-01-01 ENCOUNTER — APPOINTMENT (OUTPATIENT)
Dept: GENERAL RADIOLOGY | Facility: HOSPITAL | Age: 85
End: 2020-01-01
Attending: EMERGENCY MEDICINE
Payer: MEDICARE

## 2020-01-01 ENCOUNTER — HOSPITAL ENCOUNTER (EMERGENCY)
Facility: HOSPITAL | Age: 85
Discharge: HOME | DRG: 150 | End: 2020-01-01
Attending: EMERGENCY MEDICINE
Payer: MEDICARE

## 2020-01-01 ENCOUNTER — OFFICE VISIT (OUTPATIENT)
Dept: CARDIOLOGY CLINIC | Facility: HOSPITAL | Age: 85
End: 2020-01-01
Attending: CLINICAL NURSE SPECIALIST
Payer: MEDICARE

## 2020-01-01 ENCOUNTER — HOSPITAL ENCOUNTER (INPATIENT)
Facility: HOSPITAL | Age: 85
LOS: 7 days | Discharge: HOME HEALTH | DRG: 291 | End: 2020-01-01
Attending: EMERGENCY MEDICINE | Admitting: INTERNAL MEDICINE
Payer: MEDICARE

## 2020-01-01 ENCOUNTER — APPOINTMENT (OUTPATIENT)
Dept: GENERAL RADIOLOGY | Facility: HOSPITAL | Age: 85
DRG: 291 | End: 2020-01-01
Attending: EMERGENCY MEDICINE
Payer: MEDICARE

## 2020-01-01 ENCOUNTER — HOSPITAL ENCOUNTER (INPATIENT)
Facility: HOSPITAL | Age: 85
LOS: 8 days | Discharge: HOSPICE | DRG: 150 | End: 2020-01-01
Attending: EMERGENCY MEDICINE | Admitting: HOSPITALIST
Payer: MEDICARE

## 2020-01-01 VITALS
HEART RATE: 65 BPM | DIASTOLIC BLOOD PRESSURE: 32 MMHG | SYSTOLIC BLOOD PRESSURE: 110 MMHG | BODY MASS INDEX: 22 KG/M2 | OXYGEN SATURATION: 93 % | WEIGHT: 131 LBS

## 2020-01-01 VITALS
OXYGEN SATURATION: 78 % | TEMPERATURE: 98 F | BODY MASS INDEX: 17.88 KG/M2 | DIASTOLIC BLOOD PRESSURE: 61 MMHG | HEART RATE: 62 BPM | WEIGHT: 118 LBS | SYSTOLIC BLOOD PRESSURE: 134 MMHG | HEIGHT: 68 IN | RESPIRATION RATE: 20 BRPM

## 2020-01-01 VITALS
RESPIRATION RATE: 20 BRPM | HEART RATE: 68 BPM | WEIGHT: 118.63 LBS | OXYGEN SATURATION: 95 % | BODY MASS INDEX: 18 KG/M2 | TEMPERATURE: 98 F | DIASTOLIC BLOOD PRESSURE: 66 MMHG | SYSTOLIC BLOOD PRESSURE: 101 MMHG

## 2020-01-01 VITALS
HEART RATE: 69 BPM | SYSTOLIC BLOOD PRESSURE: 103 MMHG | RESPIRATION RATE: 18 BRPM | OXYGEN SATURATION: 89 % | DIASTOLIC BLOOD PRESSURE: 40 MMHG | TEMPERATURE: 97 F

## 2020-01-01 VITALS
OXYGEN SATURATION: 91 % | BODY MASS INDEX: 26 KG/M2 | SYSTOLIC BLOOD PRESSURE: 129 MMHG | DIASTOLIC BLOOD PRESSURE: 75 MMHG | HEART RATE: 61 BPM | WEIGHT: 134 LBS

## 2020-01-01 VITALS
BODY MASS INDEX: 22 KG/M2 | WEIGHT: 128 LBS | RESPIRATION RATE: 20 BRPM | SYSTOLIC BLOOD PRESSURE: 107 MMHG | HEART RATE: 68 BPM | OXYGEN SATURATION: 100 % | DIASTOLIC BLOOD PRESSURE: 32 MMHG

## 2020-01-01 VITALS
SYSTOLIC BLOOD PRESSURE: 115 MMHG | HEIGHT: 64 IN | TEMPERATURE: 99 F | HEART RATE: 69 BPM | OXYGEN SATURATION: 96 % | WEIGHT: 120.19 LBS | BODY MASS INDEX: 20.52 KG/M2 | DIASTOLIC BLOOD PRESSURE: 34 MMHG | RESPIRATION RATE: 20 BRPM

## 2020-01-01 VITALS
OXYGEN SATURATION: 91 % | SYSTOLIC BLOOD PRESSURE: 115 MMHG | DIASTOLIC BLOOD PRESSURE: 51 MMHG | BODY MASS INDEX: 25 KG/M2 | WEIGHT: 128 LBS | HEART RATE: 77 BPM

## 2020-01-01 VITALS
HEART RATE: 64 BPM | RESPIRATION RATE: 18 BRPM | DIASTOLIC BLOOD PRESSURE: 38 MMHG | OXYGEN SATURATION: 96 % | SYSTOLIC BLOOD PRESSURE: 122 MMHG | TEMPERATURE: 98 F

## 2020-01-01 VITALS
OXYGEN SATURATION: 99 % | WEIGHT: 131 LBS | BODY MASS INDEX: 26 KG/M2 | SYSTOLIC BLOOD PRESSURE: 126 MMHG | DIASTOLIC BLOOD PRESSURE: 38 MMHG | HEART RATE: 73 BPM

## 2020-01-01 VITALS
DIASTOLIC BLOOD PRESSURE: 41 MMHG | SYSTOLIC BLOOD PRESSURE: 115 MMHG | BODY MASS INDEX: 22 KG/M2 | OXYGEN SATURATION: 95 % | WEIGHT: 126 LBS | HEART RATE: 78 BPM

## 2020-01-01 VITALS
HEART RATE: 67 BPM | OXYGEN SATURATION: 88 % | SYSTOLIC BLOOD PRESSURE: 135 MMHG | DIASTOLIC BLOOD PRESSURE: 48 MMHG | BODY MASS INDEX: 26 KG/M2 | WEIGHT: 131.5 LBS

## 2020-01-01 VITALS
HEART RATE: 67 BPM | RESPIRATION RATE: 15 BRPM | OXYGEN SATURATION: 92 % | BODY MASS INDEX: 24.54 KG/M2 | HEIGHT: 60 IN | WEIGHT: 125 LBS | SYSTOLIC BLOOD PRESSURE: 120 MMHG | DIASTOLIC BLOOD PRESSURE: 59 MMHG

## 2020-01-01 VITALS
WEIGHT: 137 LBS | OXYGEN SATURATION: 84 % | OXYGEN SATURATION: 96 % | HEART RATE: 71 BPM | RESPIRATION RATE: 18 BRPM | BODY MASS INDEX: 22.2 KG/M2 | HEIGHT: 64 IN | SYSTOLIC BLOOD PRESSURE: 110 MMHG | DIASTOLIC BLOOD PRESSURE: 40 MMHG | BODY MASS INDEX: 23.39 KG/M2 | RESPIRATION RATE: 18 BRPM | HEIGHT: 64 IN | WEIGHT: 130 LBS | SYSTOLIC BLOOD PRESSURE: 110 MMHG | HEART RATE: 73 BPM | DIASTOLIC BLOOD PRESSURE: 50 MMHG

## 2020-01-01 VITALS
HEIGHT: 64 IN | HEART RATE: 79 BPM | WEIGHT: 128.8 LBS | DIASTOLIC BLOOD PRESSURE: 57 MMHG | SYSTOLIC BLOOD PRESSURE: 132 MMHG | BODY MASS INDEX: 21.99 KG/M2 | RESPIRATION RATE: 18 BRPM

## 2020-01-01 VITALS
SYSTOLIC BLOOD PRESSURE: 124 MMHG | HEIGHT: 64 IN | WEIGHT: 118 LBS | DIASTOLIC BLOOD PRESSURE: 52 MMHG | HEART RATE: 77 BPM | RESPIRATION RATE: 18 BRPM | BODY MASS INDEX: 20.14 KG/M2

## 2020-01-01 VITALS
DIASTOLIC BLOOD PRESSURE: 57 MMHG | BODY MASS INDEX: 21 KG/M2 | HEART RATE: 40 BPM | SYSTOLIC BLOOD PRESSURE: 114 MMHG | OXYGEN SATURATION: 94 % | WEIGHT: 123 LBS

## 2020-01-01 VITALS
SYSTOLIC BLOOD PRESSURE: 118 MMHG | HEART RATE: 62 BPM | TEMPERATURE: 98 F | BODY MASS INDEX: 20 KG/M2 | WEIGHT: 118.63 LBS | OXYGEN SATURATION: 95 % | RESPIRATION RATE: 19 BRPM | DIASTOLIC BLOOD PRESSURE: 55 MMHG

## 2020-01-01 VITALS
OXYGEN SATURATION: 80 % | HEART RATE: 54 BPM | SYSTOLIC BLOOD PRESSURE: 62 MMHG | RESPIRATION RATE: 22 BRPM | TEMPERATURE: 100 F | DIASTOLIC BLOOD PRESSURE: 26 MMHG

## 2020-01-01 VITALS
SYSTOLIC BLOOD PRESSURE: 122 MMHG | HEART RATE: 78 BPM | DIASTOLIC BLOOD PRESSURE: 52 MMHG | BODY MASS INDEX: 21.61 KG/M2 | WEIGHT: 126.6 LBS | HEIGHT: 64 IN | RESPIRATION RATE: 18 BRPM

## 2020-01-01 VITALS
HEART RATE: 75 BPM | TEMPERATURE: 98 F | WEIGHT: 128.63 LBS | BODY MASS INDEX: 25 KG/M2 | DIASTOLIC BLOOD PRESSURE: 52 MMHG | OXYGEN SATURATION: 89 % | SYSTOLIC BLOOD PRESSURE: 144 MMHG | RESPIRATION RATE: 18 BRPM

## 2020-01-01 DIAGNOSIS — I50.9 HEART FAILURE, UNSPECIFIED (HCC): Primary | ICD-10-CM

## 2020-01-01 DIAGNOSIS — I50.33 ACUTE ON CHRONIC DIASTOLIC HEART FAILURE (HCC): ICD-10-CM

## 2020-01-01 DIAGNOSIS — I48.20 CHRONIC ATRIAL FIBRILLATION (HCC): ICD-10-CM

## 2020-01-01 DIAGNOSIS — I27.20 PROGRESSIVE PULMONARY HYPERTENSION (HCC): ICD-10-CM

## 2020-01-01 DIAGNOSIS — I50.33 ACUTE ON CHRONIC HEART FAILURE WITH PRESERVED EJECTION FRACTION (HFPEF) (HCC): Primary | ICD-10-CM

## 2020-01-01 DIAGNOSIS — J44.9 CHRONIC OBSTRUCTIVE PULMONARY DISEASE, UNSPECIFIED COPD TYPE (HCC): ICD-10-CM

## 2020-01-01 DIAGNOSIS — R42 ORTHOSTATIC DIZZINESS: Primary | ICD-10-CM

## 2020-01-01 DIAGNOSIS — D50.0 IRON DEFICIENCY ANEMIA SECONDARY TO BLOOD LOSS (CHRONIC): ICD-10-CM

## 2020-01-01 DIAGNOSIS — I27.20 PULMONARY HYPERTENSION (CMD): ICD-10-CM

## 2020-01-01 DIAGNOSIS — I27.20 PULMONARY HYPERTENSION (CMD): Primary | ICD-10-CM

## 2020-01-01 DIAGNOSIS — I50.9 HEART FAILURE, UNSPECIFIED (HCC): ICD-10-CM

## 2020-01-01 DIAGNOSIS — I50.23 ACUTE ON CHRONIC SYSTOLIC HEART FAILURE (HCC): ICD-10-CM

## 2020-01-01 DIAGNOSIS — N18.30 CKD (CHRONIC KIDNEY DISEASE) STAGE 3, GFR 30-59 ML/MIN (HCC): ICD-10-CM

## 2020-01-01 DIAGNOSIS — I34.0 NONRHEUMATIC MITRAL VALVE REGURGITATION: ICD-10-CM

## 2020-01-01 DIAGNOSIS — Z00.6 RESEARCH EXAM: Primary | ICD-10-CM

## 2020-01-01 DIAGNOSIS — Z95.818 S/P MITRAL VALVE CLIP IMPLANTATION: ICD-10-CM

## 2020-01-01 DIAGNOSIS — R04.0 EPISTAXIS: Primary | ICD-10-CM

## 2020-01-01 DIAGNOSIS — D50.8 OTHER IRON DEFICIENCY ANEMIA: ICD-10-CM

## 2020-01-01 DIAGNOSIS — I50.31: Primary | ICD-10-CM

## 2020-01-01 DIAGNOSIS — I50.42 HEART FAILURE, SYSTOLIC AND DIASTOLIC, CHRONIC (HCC): ICD-10-CM

## 2020-01-01 DIAGNOSIS — I25.10 ATHEROSCLEROSIS OF NATIVE CORONARY ARTERY OF NATIVE HEART WITHOUT ANGINA PECTORIS: ICD-10-CM

## 2020-01-01 DIAGNOSIS — I27.20 PULMONARY HYPERTENSION (HCC): ICD-10-CM

## 2020-01-01 DIAGNOSIS — I27.21 SEVERE PULMONARY ARTERIAL SYSTOLIC HYPERTENSION (HCC): ICD-10-CM

## 2020-01-01 DIAGNOSIS — N18.4 ANEMIA DUE TO STAGE 4 CHRONIC KIDNEY DISEASE (HCC): ICD-10-CM

## 2020-01-01 DIAGNOSIS — I50.810 RVF (RIGHT VENTRICULAR FAILURE) (HCC): ICD-10-CM

## 2020-01-01 DIAGNOSIS — Z98.890 S/P MITRAL VALVE CLIP IMPLANTATION: ICD-10-CM

## 2020-01-01 DIAGNOSIS — I50.33 ACUTE ON CHRONIC HEART FAILURE WITH PRESERVED EJECTION FRACTION (CMD): Primary | ICD-10-CM

## 2020-01-01 DIAGNOSIS — I50.23 ACUTE ON CHRONIC HFREF (HEART FAILURE WITH REDUCED EJECTION FRACTION) (HCC): ICD-10-CM

## 2020-01-01 DIAGNOSIS — I50.31: ICD-10-CM

## 2020-01-01 DIAGNOSIS — I50.33 ACUTE ON CHRONIC HEART FAILURE WITH PRESERVED EJECTION FRACTION (HFPEF) (HCC): ICD-10-CM

## 2020-01-01 DIAGNOSIS — I50.813 ACUTE ON CHRONIC RIGHT-SIDED HEART FAILURE (HCC): ICD-10-CM

## 2020-01-01 DIAGNOSIS — J96.11 CHRONIC RESPIRATORY FAILURE WITH HYPOXIA (HCC): ICD-10-CM

## 2020-01-01 DIAGNOSIS — E87.6 HYPOKALEMIA: ICD-10-CM

## 2020-01-01 DIAGNOSIS — D50.9 CHRONIC IRON DEFICIENCY ANEMIA: ICD-10-CM

## 2020-01-01 DIAGNOSIS — D63.1 ANEMIA DUE TO STAGE 4 CHRONIC KIDNEY DISEASE (HCC): ICD-10-CM

## 2020-01-01 DIAGNOSIS — J41.0 SIMPLE CHRONIC BRONCHITIS (HCC): ICD-10-CM

## 2020-01-01 DIAGNOSIS — N18.4 ANEMIA DUE TO STAGE 4 CHRONIC KIDNEY DISEASE (HCC): Primary | ICD-10-CM

## 2020-01-01 DIAGNOSIS — I27.20 PULMONARY HYPERTENSION (HCC): Primary | ICD-10-CM

## 2020-01-01 DIAGNOSIS — D50.9 IRON DEFICIENCY ANEMIA, UNSPECIFIED IRON DEFICIENCY ANEMIA TYPE: ICD-10-CM

## 2020-01-01 DIAGNOSIS — E87.1 HYPONATREMIA: ICD-10-CM

## 2020-01-01 DIAGNOSIS — N25.81 SECONDARY HYPERPARATHYROIDISM (HCC): ICD-10-CM

## 2020-01-01 DIAGNOSIS — R80.9 NON-NEPHROTIC RANGE PROTEINURIA: ICD-10-CM

## 2020-01-01 DIAGNOSIS — D50.0 IRON DEFICIENCY ANEMIA SECONDARY TO BLOOD LOSS (CHRONIC): Primary | ICD-10-CM

## 2020-01-01 DIAGNOSIS — I50.42 HEART FAILURE, SYSTOLIC AND DIASTOLIC, CHRONIC (HCC): Primary | ICD-10-CM

## 2020-01-01 DIAGNOSIS — I50.33 ACUTE ON CHRONIC DIASTOLIC HEART FAILURE (HCC): Primary | ICD-10-CM

## 2020-01-01 DIAGNOSIS — R06.02 SHORTNESS OF BREATH: ICD-10-CM

## 2020-01-01 DIAGNOSIS — R06.09 CHRONIC DYSPNEA: ICD-10-CM

## 2020-01-01 DIAGNOSIS — D63.1 ANEMIA DUE TO STAGE 4 CHRONIC KIDNEY DISEASE (HCC): Primary | ICD-10-CM

## 2020-01-01 LAB
25(OH)D3 SERPL-MCNC: 47 NG/ML (ref 30–100)
ABSOLUTE IMMATURE GRANULOCYTES (OFFPRE24): NORMAL
ALBUMIN SERPL-MCNC: 3.4 G/DL
ALP SERPL-CCNC: 131 U/L
ALT SERPL-CCNC: 26 U/L
ANION GAP SERPL CALC-SCNC: 10 MMOL/L (ref 0–18)
ANION GAP SERPL CALC-SCNC: 13 MMOL/L (ref 0–18)
ANION GAP SERPL CALC-SCNC: 16 MMOL/L
ANION GAP SERPL CALC-SCNC: 3 MMOL/L (ref 0–18)
ANION GAP SERPL CALC-SCNC: 5 MMOL/L (ref 0–18)
ANION GAP SERPL CALC-SCNC: 5 MMOL/L (ref 0–18)
ANION GAP SERPL CALC-SCNC: 7 MMOL/L (ref 0–18)
ANION GAP SERPL CALC-SCNC: 7 MMOL/L (ref 0–18)
ANION GAP SERPL CALC-SCNC: 8 MMOL/L (ref 0–18)
ANION GAP SERPL CALC-SCNC: 9 MMOL/L
ANION GAP SERPL CALC-SCNC: 9 MMOL/L
ANION GAP SERPL CALC-SCNC: 9 MMOL/L (ref 0–18)
ANION GAP SERPL CALC-SCNC: 9 MMOL/L (ref 0–18)
ANION GAP SERPL CALC-SCNC: NORMAL MMOL/L
ANTIBODY SCREEN: NEGATIVE
ANTIBODY SCREEN: NEGATIVE
APTT PPP: 41 SECONDS (ref 23.2–35.3)
AST SERPL-CCNC: 27 U/L
BASE EXCESS BLD CALC-SCNC: 2.3 MMOL/L (ref ?–2)
BASE EXCESS BLD CALC-SCNC: 4.7 MMOL/L (ref ?–2)
BASO+EOS+MONOS # BLD: NORMAL 10*3/UL
BASO+EOS+MONOS NFR BLD: NORMAL %
BASOPHILS # BLD AUTO: 0.02 X10(3) UL (ref 0–0.2)
BASOPHILS # BLD AUTO: 0.03 X10(3) UL (ref 0–0.2)
BASOPHILS # BLD AUTO: 0.04 X10(3) UL (ref 0–0.2)
BASOPHILS # BLD AUTO: 0.04 X10(3) UL (ref 0–0.2)
BASOPHILS # BLD AUTO: 0.05 X10(3) UL (ref 0–0.2)
BASOPHILS # BLD AUTO: 0.06 X10(3) UL (ref 0–0.2)
BASOPHILS # BLD AUTO: 0.08 X10(3) UL (ref 0–0.2)
BASOPHILS # BLD: NORMAL 10*3/UL
BASOPHILS NFR BLD AUTO: 0.2 %
BASOPHILS NFR BLD AUTO: 0.3 %
BASOPHILS NFR BLD AUTO: 0.4 %
BASOPHILS NFR BLD AUTO: 0.5 %
BASOPHILS NFR BLD AUTO: 0.6 %
BASOPHILS NFR BLD AUTO: 0.9 %
BASOPHILS NFR BLD AUTO: 1 %
BASOPHILS NFR BLD: NORMAL %
BILIRUB SERPL-MCNC: 0.7 MG/DL
BILIRUB UR QL: NEGATIVE
BLOOD TYPE BARCODE: 6200
BLOOD TYPE BARCODE: 6200
BUN BLD-MCNC: 35 MG/DL (ref 7–18)
BUN BLD-MCNC: 36 MG/DL (ref 7–18)
BUN BLD-MCNC: 36 MG/DL (ref 7–18)
BUN BLD-MCNC: 42 MG/DL (ref 7–18)
BUN BLD-MCNC: 42 MG/DL (ref 7–18)
BUN BLD-MCNC: 43 MG/DL (ref 7–18)
BUN BLD-MCNC: 46 MG/DL (ref 7–18)
BUN BLD-MCNC: 47 MG/DL (ref 7–18)
BUN BLD-MCNC: 48 MG/DL (ref 7–18)
BUN BLD-MCNC: 50 MG/DL (ref 7–18)
BUN BLD-MCNC: 64 MG/DL (ref 7–18)
BUN BLD-MCNC: 65 MG/DL (ref 7–18)
BUN SERPL-MCNC: 42 MG/DL
BUN SERPL-MCNC: 42 MG/DL
BUN SERPL-MCNC: 47 MG/DL
BUN SERPL-MCNC: 49 MG/DL
BUN SERPL-MCNC: 74 MG/DL
BUN/CREAT SERPL: 20 (ref 10–20)
BUN/CREAT SERPL: 22.5 (ref 10–20)
BUN/CREAT SERPL: 23.4 (ref 10–20)
BUN/CREAT SERPL: 24.1 (ref 10–20)
BUN/CREAT SERPL: 25.1 (ref 10–20)
BUN/CREAT SERPL: 26.7 (ref 10–20)
BUN/CREAT SERPL: 26.7 (ref 10–20)
BUN/CREAT SERPL: 27.3 (ref 10–20)
BUN/CREAT SERPL: 27.7
BUN/CREAT SERPL: 28.3 (ref 10–20)
BUN/CREAT SERPL: 34.7 (ref 10–20)
BUN/CREAT SERPL: 34.8 (ref 10–20)
BUN/CREAT SERPL: 39.8
BUN/CREAT SERPL: 43.6 (ref 10–20)
BUN/CREAT SERPL: NORMAL
CALCIUM BLD-MCNC: 8.1 MG/DL (ref 8.5–10.1)
CALCIUM BLD-MCNC: 8.2 MG/DL (ref 8.5–10.1)
CALCIUM BLD-MCNC: 8.3 MG/DL (ref 8.5–10.1)
CALCIUM BLD-MCNC: 8.3 MG/DL (ref 8.5–10.1)
CALCIUM BLD-MCNC: 8.4 MG/DL (ref 8.5–10.1)
CALCIUM BLD-MCNC: 8.4 MG/DL (ref 8.5–10.1)
CALCIUM BLD-MCNC: 8.5 MG/DL (ref 8.5–10.1)
CALCIUM BLD-MCNC: 8.8 MG/DL (ref 8.5–10.1)
CALCIUM BLD-MCNC: 8.9 MG/DL (ref 8.5–10.1)
CALCIUM BLD-MCNC: 9 MG/DL (ref 8.5–10.1)
CALCIUM BLD-MCNC: 9.1 MG/DL (ref 8.5–10.1)
CALCIUM BLD-MCNC: 9.3 MG/DL (ref 8.5–10.1)
CALCIUM SERPL-MCNC: 8 MG/DL
CALCIUM SERPL-MCNC: 8.7 MG/DL
CALCIUM SERPL-MCNC: 9 MG/DL
CALCIUM SERPL-MCNC: 9.3 MG/DL
CHLORIDE SERPL-SCNC: 100 MMOL/L
CHLORIDE SERPL-SCNC: 103 MMOL/L
CHLORIDE SERPL-SCNC: 104 MMOL/L (ref 98–112)
CHLORIDE SERPL-SCNC: 105 MMOL/L
CHLORIDE SERPL-SCNC: 105 MMOL/L (ref 98–112)
CHLORIDE SERPL-SCNC: 105 MMOL/L (ref 98–112)
CHLORIDE SERPL-SCNC: 106 MMOL/L
CHLORIDE SERPL-SCNC: 106 MMOL/L (ref 98–112)
CHLORIDE SERPL-SCNC: 106 MMOL/L (ref 98–112)
CHLORIDE SERPL-SCNC: 107 MMOL/L
CHLORIDE SERPL-SCNC: 107 MMOL/L (ref 98–112)
CHLORIDE SERPL-SCNC: 107 MMOL/L (ref 98–112)
CHLORIDE SERPL-SCNC: 98 MMOL/L (ref 98–112)
CHLORIDE SERPL-SCNC: 99 MMOL/L (ref 98–112)
CHOLEST SERPL-MCNC: 145 MG/DL
CLARITY UR: CLEAR
CO2 SERPL-SCNC: 18 MMOL/L (ref 21–32)
CO2 SERPL-SCNC: 19 MMOL/L
CO2 SERPL-SCNC: 22 MMOL/L (ref 21–32)
CO2 SERPL-SCNC: 23 MMOL/L
CO2 SERPL-SCNC: 23 MMOL/L (ref 21–32)
CO2 SERPL-SCNC: 25 MMOL/L (ref 21–32)
CO2 SERPL-SCNC: 26 MMOL/L (ref 21–32)
CO2 SERPL-SCNC: 27 MMOL/L
CO2 SERPL-SCNC: 27 MMOL/L (ref 21–32)
CO2 SERPL-SCNC: 29 MMOL/L (ref 21–32)
CO2 SERPL-SCNC: 33 MMOL/L (ref 21–32)
CO2 SERPL-SCNC: 35 MMOL/L (ref 21–32)
CO2 SERPL-SCNC: 35 MMOL/L (ref 21–32)
CO2 SERPL-SCNC: NORMAL MMOL/L
COLOR UR: YELLOW
CREAT BLD-MCNC: 1.21 MG/DL (ref 0.7–1.3)
CREAT BLD-MCNC: 1.27 MG/DL (ref 0.7–1.3)
CREAT BLD-MCNC: 1.35 MG/DL (ref 0.7–1.3)
CREAT BLD-MCNC: 1.45 MG/DL (ref 0.7–1.3)
CREAT BLD-MCNC: 1.49 MG/DL (ref 0.7–1.3)
CREAT BLD-MCNC: 1.54 MG/DL (ref 0.7–1.3)
CREAT BLD-MCNC: 1.84 MG/DL (ref 0.7–1.3)
CREAT BLD-MCNC: 1.84 MG/DL (ref 0.7–1.3)
CREAT BLD-MCNC: 1.87 MG/DL (ref 0.7–1.3)
CREAT BLD-MCNC: 1.91 MG/DL (ref 0.7–1.3)
CREAT BLD-MCNC: 2.09 MG/DL (ref 0.7–1.3)
CREAT BLD-MCNC: 2.3 MG/DL (ref 0.7–1.3)
CREAT SERPL-MCNC: 1.54 MG/DL
CREAT SERPL-MCNC: 1.6 MG/DL
CREAT SERPL-MCNC: 1.77 MG/DL
CREAT SERPL-MCNC: 1.86 MG/DL
CREAT SERPL-MCNC: 1.95 MG/DL
CREAT UR-SCNC: <13 MG/DL
DEPRECATED HBV CORE AB SER IA-ACNC: 617.1 NG/ML (ref 30–530)
DEPRECATED RDW RBC AUTO: 64.9 FL (ref 35.1–46.3)
DEPRECATED RDW RBC AUTO: 82.2 FL (ref 35.1–46.3)
DEPRECATED RDW RBC AUTO: 82.7 FL (ref 35.1–46.3)
DEPRECATED RDW RBC AUTO: 82.8 FL (ref 35.1–46.3)
DEPRECATED RDW RBC AUTO: 90.3 FL (ref 35.1–46.3)
DEPRECATED RDW RBC AUTO: 91.5 FL (ref 35.1–46.3)
DEPRECATED RDW RBC AUTO: 91.6 FL (ref 35.1–46.3)
DEPRECATED RDW RBC AUTO: 91.6 FL (ref 35.1–46.3)
DEPRECATED RDW RBC AUTO: 91.9 FL (ref 35.1–46.3)
DEPRECATED RDW RBC AUTO: 93 FL (ref 35.1–46.3)
DEPRECATED RDW RBC AUTO: 94.2 FL (ref 35.1–46.3)
DEPRECATED RDW RBC AUTO: 94.6 FL (ref 35.1–46.3)
DIFFERENTIAL METHOD BLD: NORMAL
DIGOXIN SERPL-MCNC: 1.22 NG/ML (ref 0.8–2)
EOSINOPHIL # BLD AUTO: 0.06 X10(3) UL (ref 0–0.7)
EOSINOPHIL # BLD AUTO: 0.08 X10(3) UL (ref 0–0.7)
EOSINOPHIL # BLD AUTO: 0.11 X10(3) UL (ref 0–0.7)
EOSINOPHIL # BLD AUTO: 0.12 X10(3) UL (ref 0–0.7)
EOSINOPHIL # BLD AUTO: 0.17 X10(3) UL (ref 0–0.7)
EOSINOPHIL # BLD AUTO: 0.22 X10(3) UL (ref 0–0.7)
EOSINOPHIL # BLD AUTO: 0.23 X10(3) UL (ref 0–0.7)
EOSINOPHIL # BLD AUTO: 0.28 X10(3) UL (ref 0–0.7)
EOSINOPHIL # BLD AUTO: 0.29 X10(3) UL (ref 0–0.7)
EOSINOPHIL # BLD AUTO: 0.39 X10(3) UL (ref 0–0.7)
EOSINOPHIL # BLD AUTO: 0.58 X10(3) UL (ref 0–0.7)
EOSINOPHIL # BLD: NORMAL 10*3/UL
EOSINOPHIL NFR BLD AUTO: 0.6 %
EOSINOPHIL NFR BLD AUTO: 0.8 %
EOSINOPHIL NFR BLD AUTO: 1.2 %
EOSINOPHIL NFR BLD AUTO: 1.2 %
EOSINOPHIL NFR BLD AUTO: 1.7 %
EOSINOPHIL NFR BLD AUTO: 2.2 %
EOSINOPHIL NFR BLD AUTO: 2.5 %
EOSINOPHIL NFR BLD AUTO: 3.4 %
EOSINOPHIL NFR BLD AUTO: 3.5 %
EOSINOPHIL NFR BLD AUTO: 5.5 %
EOSINOPHIL NFR BLD AUTO: 7.1 %
EOSINOPHIL NFR BLD: NORMAL %
ERYTHROCYTE [DISTWIDTH] IN BLOOD BY AUTOMATED COUNT: 17.3 % (ref 11–15)
ERYTHROCYTE [DISTWIDTH] IN BLOOD BY AUTOMATED COUNT: 24 % (ref 11–15)
ERYTHROCYTE [DISTWIDTH] IN BLOOD BY AUTOMATED COUNT: 24 % (ref 11–15)
ERYTHROCYTE [DISTWIDTH] IN BLOOD BY AUTOMATED COUNT: 24.3 % (ref 11–15)
ERYTHROCYTE [DISTWIDTH] IN BLOOD BY AUTOMATED COUNT: 24.4 % (ref 11–15)
ERYTHROCYTE [DISTWIDTH] IN BLOOD BY AUTOMATED COUNT: 24.5 % (ref 11–15)
ERYTHROCYTE [DISTWIDTH] IN BLOOD BY AUTOMATED COUNT: 24.8 % (ref 11–15)
ERYTHROCYTE [DISTWIDTH] IN BLOOD BY AUTOMATED COUNT: 24.8 % (ref 11–15)
ERYTHROCYTE [DISTWIDTH] IN BLOOD BY AUTOMATED COUNT: 25.2 % (ref 11–15)
ERYTHROCYTE [DISTWIDTH] IN BLOOD BY AUTOMATED COUNT: 25.6 % (ref 11–15)
ERYTHROCYTE [DISTWIDTH] IN BLOOD BY AUTOMATED COUNT: 25.7 % (ref 11–15)
ERYTHROCYTE [DISTWIDTH] IN BLOOD BY AUTOMATED COUNT: 26.5 % (ref 11–15)
ERYTHROCYTE [DISTWIDTH] IN BLOOD: NORMAL %
GLUCOSE BLD-MCNC: 102 MG/DL (ref 70–99)
GLUCOSE BLD-MCNC: 112 MG/DL (ref 70–99)
GLUCOSE BLD-MCNC: 117 MG/DL (ref 70–99)
GLUCOSE BLD-MCNC: 132 MG/DL (ref 70–99)
GLUCOSE BLD-MCNC: 132 MG/DL (ref 70–99)
GLUCOSE BLD-MCNC: 138 MG/DL (ref 70–99)
GLUCOSE BLD-MCNC: 139 MG/DL (ref 70–99)
GLUCOSE BLD-MCNC: 139 MG/DL (ref 70–99)
GLUCOSE BLD-MCNC: 144 MG/DL (ref 70–99)
GLUCOSE BLD-MCNC: 155 MG/DL (ref 70–99)
GLUCOSE BLD-MCNC: 77 MG/DL (ref 70–99)
GLUCOSE BLD-MCNC: 88 MG/DL (ref 70–99)
GLUCOSE SERPL-MCNC: 110 MG/DL
GLUCOSE SERPL-MCNC: 124 MG/DL
GLUCOSE SERPL-MCNC: 139 MG/DL
GLUCOSE SERPL-MCNC: 181 MG/DL
GLUCOSE SERPL-MCNC: 189 MG/DL
GLUCOSE UR-MCNC: NEGATIVE MG/DL
HAV IGM SER QL: 2 MG/DL (ref 1.6–2.6)
HAV IGM SER QL: 2 MG/DL (ref 1.6–2.6)
HAV IGM SER QL: 2.1 MG/DL (ref 1.6–2.6)
HAV IGM SER QL: 2.2 MG/DL (ref 1.6–2.6)
HCO3 BLDA-SCNC: 26.7 MEQ/L (ref 21–27)
HCO3 BLDA-SCNC: 28.4 MEQ/L (ref 21–27)
HCT VFR BLD AUTO: 22 % (ref 39–53)
HCT VFR BLD AUTO: 22.5 % (ref 39–53)
HCT VFR BLD AUTO: 23.7 % (ref 39–53)
HCT VFR BLD AUTO: 24.3 % (ref 39–53)
HCT VFR BLD AUTO: 24.4 % (ref 39–53)
HCT VFR BLD AUTO: 24.9 % (ref 39–53)
HCT VFR BLD AUTO: 25.4 % (ref 39–53)
HCT VFR BLD AUTO: 25.7 % (ref 39–53)
HCT VFR BLD AUTO: 26.2 % (ref 39–53)
HCT VFR BLD AUTO: 26.3 % (ref 39–53)
HCT VFR BLD AUTO: 27.5 % (ref 39–53)
HCT VFR BLD AUTO: 28.6 % (ref 39–53)
HCT VFR BLD AUTO: 29.7 % (ref 39–53)
HCT VFR BLD AUTO: 33.5 % (ref 39–53)
HCT VFR BLD CALC: 26.8 %
HCT VFR BLD CALC: 28.2 %
HCT VFR BLD CALC: 30.1 %
HCT VFR BLD CALC: 33.5 %
HDLC SERPL-MCNC: 56 MG/DL
HGB BLD-MCNC: 10.4 G/DL
HGB BLD-MCNC: 10.4 G/DL (ref 13–17.5)
HGB BLD-MCNC: 6.8 G/DL (ref 13–17.5)
HGB BLD-MCNC: 7 G/DL (ref 13–17.5)
HGB BLD-MCNC: 7.3 G/DL (ref 13–17.5)
HGB BLD-MCNC: 7.5 G/DL (ref 13–17.5)
HGB BLD-MCNC: 7.7 G/DL (ref 13–17.5)
HGB BLD-MCNC: 7.8 G/DL
HGB BLD-MCNC: 7.8 G/DL (ref 13–17.5)
HGB BLD-MCNC: 7.8 G/DL (ref 13–17.5)
HGB BLD-MCNC: 8 G/DL (ref 13–17.5)
HGB BLD-MCNC: 8 G/DL (ref 13–17.5)
HGB BLD-MCNC: 8.2 G/DL (ref 13–17.5)
HGB BLD-MCNC: 8.3 G/DL (ref 13–17.5)
HGB BLD-MCNC: 8.5 G/DL (ref 13–17.5)
HGB BLD-MCNC: 8.6 G/DL
HGB BLD-MCNC: 9.1 G/DL (ref 13–17.5)
HGB BLD-MCNC: 9.2 G/DL
HGB UR QL STRIP.AUTO: NEGATIVE
IMM GRANULOCYTES # BLD AUTO: 0.06 X10(3) UL (ref 0–1)
IMM GRANULOCYTES # BLD AUTO: 0.07 X10(3) UL (ref 0–1)
IMM GRANULOCYTES # BLD AUTO: 0.08 X10(3) UL (ref 0–1)
IMM GRANULOCYTES # BLD AUTO: 0.09 X10(3) UL (ref 0–1)
IMM GRANULOCYTES # BLD AUTO: 0.11 X10(3) UL (ref 0–1)
IMM GRANULOCYTES # BLD AUTO: 0.12 X10(3) UL (ref 0–1)
IMM GRANULOCYTES # BLD AUTO: 0.21 X10(3) UL (ref 0–1)
IMM GRANULOCYTES NFR BLD: 0.6 %
IMM GRANULOCYTES NFR BLD: 0.8 %
IMM GRANULOCYTES NFR BLD: 0.8 %
IMM GRANULOCYTES NFR BLD: 0.9 %
IMM GRANULOCYTES NFR BLD: 1 %
IMM GRANULOCYTES NFR BLD: 1.1 %
IMM GRANULOCYTES NFR BLD: 1.6 %
IMM GRANULOCYTES NFR BLD: 2.6 %
IMMATURE GRANULOCYTES (OFFPRE25): NORMAL
INR BLD: 1.3 (ref 0.9–1.2)
IRON SATURATION: 57 % (ref 20–50)
IRON SERPL-MCNC: 228 UG/DL (ref 65–175)
KETONES UR-MCNC: NEGATIVE MG/DL
LACTATE SERPL-SCNC: 1.5 MMOL/L (ref 0.4–2)
LDLC SERPL CALC-MCNC: 54 MG/DL
LENGTH OF FAST TIME PATIENT: NORMAL H
LEUKOCYTE ESTERASE UR QL STRIP.AUTO: NEGATIVE
LYMPHOCYTES # BLD AUTO: 0.39 X10(3) UL (ref 1–4)
LYMPHOCYTES # BLD AUTO: 0.4 X10(3) UL (ref 1–4)
LYMPHOCYTES # BLD AUTO: 0.49 X10(3) UL (ref 1–4)
LYMPHOCYTES # BLD AUTO: 0.5 X10(3) UL (ref 1–4)
LYMPHOCYTES # BLD AUTO: 0.53 X10(3) UL (ref 1–4)
LYMPHOCYTES # BLD AUTO: 0.57 X10(3) UL (ref 1–4)
LYMPHOCYTES # BLD AUTO: 0.59 X10(3) UL (ref 1–4)
LYMPHOCYTES # BLD AUTO: 0.6 X10(3) UL (ref 1–4)
LYMPHOCYTES # BLD AUTO: 0.61 X10(3) UL (ref 1–4)
LYMPHOCYTES # BLD AUTO: 0.61 X10(3) UL (ref 1–4)
LYMPHOCYTES # BLD AUTO: 0.67 X10(3) UL (ref 1–4)
LYMPHOCYTES # BLD: NORMAL 10*3/UL
LYMPHOCYTES NFR BLD AUTO: 4.7 %
LYMPHOCYTES NFR BLD AUTO: 5 %
LYMPHOCYTES NFR BLD AUTO: 5.1 %
LYMPHOCYTES NFR BLD AUTO: 5.4 %
LYMPHOCYTES NFR BLD AUTO: 5.7 %
LYMPHOCYTES NFR BLD AUTO: 5.9 %
LYMPHOCYTES NFR BLD AUTO: 6 %
LYMPHOCYTES NFR BLD AUTO: 6.9 %
LYMPHOCYTES NFR BLD AUTO: 7.1 %
LYMPHOCYTES NFR BLD AUTO: 7.2 %
LYMPHOCYTES NFR BLD AUTO: 7.4 %
LYMPHOCYTES NFR BLD: NORMAL %
MCH RBC QN AUTO: 29 PG
MCH RBC QN AUTO: 30 PG (ref 26–34)
MCH RBC QN AUTO: 30.1 PG (ref 26–34)
MCH RBC QN AUTO: 30.1 PG (ref 26–34)
MCH RBC QN AUTO: 30.4 PG (ref 26–34)
MCH RBC QN AUTO: 30.6 PG (ref 26–34)
MCH RBC QN AUTO: 30.7 PG (ref 26–34)
MCH RBC QN AUTO: 30.7 PG (ref 26–34)
MCH RBC QN AUTO: 31.1 PG (ref 26–34)
MCH RBC QN AUTO: 31.3 PG (ref 26–34)
MCH RBC QN AUTO: 31.5 PG (ref 26–34)
MCH RBC QN AUTO: 31.5 PG (ref 26–34)
MCH RBC QN AUTO: 31.8 PG (ref 26–34)
MCH RBC QN AUTO: NORMAL PG
MCHC RBC AUTO-ENTMCNC: 29.7 G/DL (ref 31–37)
MCHC RBC AUTO-ENTMCNC: 30.2 G/DL (ref 31–37)
MCHC RBC AUTO-ENTMCNC: 30.4 G/DL (ref 31–37)
MCHC RBC AUTO-ENTMCNC: 30.4 G/DL (ref 31–37)
MCHC RBC AUTO-ENTMCNC: 30.5 G/DL
MCHC RBC AUTO-ENTMCNC: 30.5 G/DL (ref 31–37)
MCHC RBC AUTO-ENTMCNC: 30.7 G/DL (ref 31–37)
MCHC RBC AUTO-ENTMCNC: 30.7 G/DL (ref 31–37)
MCHC RBC AUTO-ENTMCNC: 30.8 G/DL (ref 31–37)
MCHC RBC AUTO-ENTMCNC: 30.9 G/DL (ref 31–37)
MCHC RBC AUTO-ENTMCNC: 30.9 G/DL (ref 31–37)
MCHC RBC AUTO-ENTMCNC: 31 G/DL (ref 31–37)
MCHC RBC AUTO-ENTMCNC: 31.1 G/DL (ref 31–37)
MCHC RBC AUTO-ENTMCNC: NORMAL G/DL
MCV RBC AUTO: 100.8 FL (ref 80–100)
MCV RBC AUTO: 101.4 FL (ref 80–100)
MCV RBC AUTO: 101.5 FL (ref 80–100)
MCV RBC AUTO: 101.9 FL (ref 80–100)
MCV RBC AUTO: 102 FL (ref 80–100)
MCV RBC AUTO: 103 FL (ref 80–100)
MCV RBC AUTO: 94.9 FL
MCV RBC AUTO: 97.5 FL (ref 80–100)
MCV RBC AUTO: 97.8 FL (ref 80–100)
MCV RBC AUTO: 99.2 FL (ref 80–100)
MCV RBC AUTO: 99.6 FL (ref 80–100)
MCV RBC AUTO: NORMAL FL
MODIFIED ALLEN TEST: POSITIVE
MONOCYTES # BLD AUTO: 0.87 X10(3) UL (ref 0.1–1)
MONOCYTES # BLD AUTO: 0.96 X10(3) UL (ref 0.1–1)
MONOCYTES # BLD AUTO: 1.27 X10(3) UL (ref 0.1–1)
MONOCYTES # BLD AUTO: 1.38 X10(3) UL (ref 0.1–1)
MONOCYTES # BLD AUTO: 1.42 X10(3) UL (ref 0.1–1)
MONOCYTES # BLD AUTO: 1.46 X10(3) UL (ref 0.1–1)
MONOCYTES # BLD AUTO: 1.48 X10(3) UL (ref 0.1–1)
MONOCYTES # BLD AUTO: 1.5 X10(3) UL (ref 0.1–1)
MONOCYTES # BLD AUTO: 1.63 X10(3) UL (ref 0.1–1)
MONOCYTES # BLD AUTO: 1.64 X10(3) UL (ref 0.1–1)
MONOCYTES # BLD AUTO: 1.71 X10(3) UL (ref 0.1–1)
MONOCYTES # BLD: NORMAL 10*3/UL
MONOCYTES NFR BLD AUTO: 11.7 %
MONOCYTES NFR BLD AUTO: 12.4 %
MONOCYTES NFR BLD AUTO: 13.2 %
MONOCYTES NFR BLD AUTO: 14.3 %
MONOCYTES NFR BLD AUTO: 15.4 %
MONOCYTES NFR BLD AUTO: 16.6 %
MONOCYTES NFR BLD AUTO: 16.9 %
MONOCYTES NFR BLD AUTO: 17 %
MONOCYTES NFR BLD AUTO: 17.1 %
MONOCYTES NFR BLD AUTO: 17.5 %
MONOCYTES NFR BLD AUTO: 17.6 %
MONOCYTES NFR BLD: NORMAL %
MPV (OFFPRE2): NORMAL
MRSA DNA SPEC QL NAA+PROBE: NEGATIVE
NEUTROPHILS # BLD AUTO: 5.21 X10 (3) UL (ref 1.5–7.7)
NEUTROPHILS # BLD AUTO: 5.21 X10(3) UL (ref 1.5–7.7)
NEUTROPHILS # BLD AUTO: 5.68 X10 (3) UL (ref 1.5–7.7)
NEUTROPHILS # BLD AUTO: 5.68 X10(3) UL (ref 1.5–7.7)
NEUTROPHILS # BLD AUTO: 6 X10 (3) UL (ref 1.5–7.7)
NEUTROPHILS # BLD AUTO: 6 X10(3) UL (ref 1.5–7.7)
NEUTROPHILS # BLD AUTO: 6.13 X10 (3) UL (ref 1.5–7.7)
NEUTROPHILS # BLD AUTO: 6.13 X10(3) UL (ref 1.5–7.7)
NEUTROPHILS # BLD AUTO: 6.36 X10 (3) UL (ref 1.5–7.7)
NEUTROPHILS # BLD AUTO: 6.36 X10(3) UL (ref 1.5–7.7)
NEUTROPHILS # BLD AUTO: 6.84 X10 (3) UL (ref 1.5–7.7)
NEUTROPHILS # BLD AUTO: 6.84 X10(3) UL (ref 1.5–7.7)
NEUTROPHILS # BLD AUTO: 6.99 X10 (3) UL (ref 1.5–7.7)
NEUTROPHILS # BLD AUTO: 6.99 X10(3) UL (ref 1.5–7.7)
NEUTROPHILS # BLD AUTO: 7.29 X10 (3) UL (ref 1.5–7.7)
NEUTROPHILS # BLD AUTO: 7.29 X10(3) UL (ref 1.5–7.7)
NEUTROPHILS # BLD AUTO: 7.46 X10 (3) UL (ref 1.5–7.7)
NEUTROPHILS # BLD AUTO: 7.46 X10(3) UL (ref 1.5–7.7)
NEUTROPHILS # BLD AUTO: 7.66 X10 (3) UL (ref 1.5–7.7)
NEUTROPHILS # BLD AUTO: 7.66 X10(3) UL (ref 1.5–7.7)
NEUTROPHILS # BLD AUTO: 8.1 X10 (3) UL (ref 1.5–7.7)
NEUTROPHILS # BLD AUTO: 8.1 X10(3) UL (ref 1.5–7.7)
NEUTROPHILS # BLD: NORMAL 10*3/UL
NEUTROPHILS NFR BLD AUTO: 70.5 %
NEUTROPHILS NFR BLD AUTO: 71.4 %
NEUTROPHILS NFR BLD AUTO: 72.5 %
NEUTROPHILS NFR BLD AUTO: 72.9 %
NEUTROPHILS NFR BLD AUTO: 73.6 %
NEUTROPHILS NFR BLD AUTO: 73.9 %
NEUTROPHILS NFR BLD AUTO: 74.2 %
NEUTROPHILS NFR BLD AUTO: 74.8 %
NEUTROPHILS NFR BLD AUTO: 76.9 %
NEUTROPHILS NFR BLD AUTO: 77.1 %
NEUTROPHILS NFR BLD AUTO: 79.4 %
NEUTROPHILS NFR BLD: NORMAL %
NITRITE UR QL STRIP.AUTO: NEGATIVE
NONHDLC SERPL-MCNC: 89 MG/DL
NRBC BLD MANUAL-RTO: NORMAL %
NT-PROBNP SERPL-MCNC: 6391 PG/ML (ref ?–450)
NT-PROBNP SERPL-MCNC: 7414 PG/ML
NT-PROBNP SERPL-MCNC: NORMAL PG/ML
O2 CT BLD-SCNC: 10 VOL% (ref 15–23)
O2 CT BLD-SCNC: 9.5 VOL% (ref 15–23)
O2/TOTAL GAS SETTING VFR VENT: 45 %
OSMOLALITY SERPL CALC.SUM OF ELEC: 289 MOSM/KG (ref 275–295)
OSMOLALITY SERPL CALC.SUM OF ELEC: 293 MOSM/KG (ref 275–295)
OSMOLALITY SERPL CALC.SUM OF ELEC: 294 MOSM/KG (ref 275–295)
OSMOLALITY SERPL CALC.SUM OF ELEC: 295 MOSM/KG (ref 275–295)
OSMOLALITY SERPL CALC.SUM OF ELEC: 298 MOSM/KG (ref 275–295)
OSMOLALITY SERPL CALC.SUM OF ELEC: 299 MOSM/KG (ref 275–295)
OSMOLALITY SERPL CALC.SUM OF ELEC: 302 MOSM/KG (ref 275–295)
OSMOLALITY SERPL CALC.SUM OF ELEC: 303 MOSM/KG (ref 275–295)
OSMOLALITY SERPL CALC.SUM OF ELEC: 309 MOSM/KG (ref 275–295)
OSMOLALITY SERPL CALC.SUM OF ELEC: 314 MOSM/KG (ref 275–295)
OXYGEN LITERS/MINUTE: 10 L/MIN
OXYGEN LITERS/MINUTE: 4 L/MIN
PATIENT FASTING Y/N/NP: NO
PCO2 BLDA: 29 MM HG (ref 35–45)
PCO2 BLDA: 30 MM HG (ref 35–45)
PH BLDA: 7.54 [PH] (ref 7.35–7.45)
PH BLDA: 7.56 [PH] (ref 7.35–7.45)
PH UR: 6 [PH] (ref 5–8)
PLAT MORPH BLD: NORMAL
PLATELET # BLD AUTO: 104 10(3)UL (ref 150–450)
PLATELET # BLD AUTO: 114 10(3)UL (ref 150–450)
PLATELET # BLD AUTO: 117 10(3)UL (ref 150–450)
PLATELET # BLD AUTO: 121 10(3)UL (ref 150–450)
PLATELET # BLD AUTO: 126 10(3)UL (ref 150–450)
PLATELET # BLD AUTO: 132 10(3)UL (ref 150–450)
PLATELET # BLD AUTO: 150 10(3)UL (ref 150–450)
PLATELET # BLD AUTO: 159 10(3)UL (ref 150–450)
PLATELET # BLD AUTO: 181 10(3)UL (ref 150–450)
PLATELET # BLD AUTO: 191 10(3)UL (ref 150–450)
PLATELET # BLD AUTO: 227 10(3)UL (ref 150–450)
PLATELET # BLD AUTO: 233 10(3)UL (ref 150–450)
PLATELET # BLD: 191 10*3/UL
PLATELET # BLD: 214 10*3/UL
PLATELET # BLD: 231 10*3/UL
PLATELET # BLD: 268 10*3/UL
PLATELET MORPHOLOGY: NORMAL
PO2 BLDA: 43 MM HG (ref 80–100)
PO2 BLDA: 59 MM HG (ref 80–100)
POTASSIUM SERPL-SCNC: 3 MMOL/L (ref 3.5–5.1)
POTASSIUM SERPL-SCNC: 3.1 MMOL/L
POTASSIUM SERPL-SCNC: 3.1 MMOL/L (ref 3.5–5.1)
POTASSIUM SERPL-SCNC: 3.2 MMOL/L (ref 3.5–5.1)
POTASSIUM SERPL-SCNC: 3.2 MMOL/L (ref 3.5–5.1)
POTASSIUM SERPL-SCNC: 3.4 MMOL/L (ref 3.5–5.1)
POTASSIUM SERPL-SCNC: 3.5 MMOL/L
POTASSIUM SERPL-SCNC: 3.5 MMOL/L (ref 3.5–5.1)
POTASSIUM SERPL-SCNC: 3.6 MMOL/L (ref 3.5–5.1)
POTASSIUM SERPL-SCNC: 3.7 MMOL/L
POTASSIUM SERPL-SCNC: 3.7 MMOL/L (ref 3.5–5.1)
POTASSIUM SERPL-SCNC: 3.7 MMOL/L (ref 3.5–5.1)
POTASSIUM SERPL-SCNC: 3.9 MMOL/L (ref 3.5–5.1)
POTASSIUM SERPL-SCNC: 4 MMOL/L (ref 3.5–5.1)
POTASSIUM SERPL-SCNC: 4.1 MMOL/L
POTASSIUM SERPL-SCNC: 4.1 MMOL/L (ref 3.5–5.1)
POTASSIUM SERPL-SCNC: 4.3 MMOL/L (ref 3.5–5.1)
POTASSIUM SERPL-SCNC: 4.3 MMOL/L (ref 3.5–5.1)
POTASSIUM SERPL-SCNC: 4.4 MMOL/L (ref 3.5–5.1)
POTASSIUM SERPL-SCNC: 4.4 MMOL/L (ref 3.5–5.1)
POTASSIUM SERPL-SCNC: 5 MMOL/L
PROCALCITONIN SERPL-MCNC: 0.31 NG/ML (ref ?–0.16)
PROCALCITONIN SERPL-MCNC: 0.35 NG/ML (ref ?–0.16)
PROT SERPL-MCNC: 6.3 G/DL
PROT UR-MCNC: <5 MG/DL
PROT UR-MCNC: NEGATIVE MG/DL
PROTHROMBIN TIME: 16 SECONDS (ref 11.8–14.5)
PTH-INTACT SERPL-MCNC: 97.4 PG/ML (ref 18.5–88)
PUNCTURE CHARGE: YES
PUNCTURE CHARGE: YES
RBC # BLD AUTO: 2.16 X10(6)UL (ref 3.8–5.8)
RBC # BLD AUTO: 2.3 X10(6)UL (ref 3.8–5.8)
RBC # BLD AUTO: 2.36 X10(6)UL (ref 3.8–5.8)
RBC # BLD AUTO: 2.43 X10(6)UL (ref 3.8–5.8)
RBC # BLD AUTO: 2.45 X10(6)UL (ref 3.8–5.8)
RBC # BLD AUTO: 2.49 X10(6)UL (ref 3.8–5.8)
RBC # BLD AUTO: 2.57 X10(6)UL (ref 3.8–5.8)
RBC # BLD AUTO: 2.59 X10(6)UL (ref 3.8–5.8)
RBC # BLD AUTO: 2.61 X10(6)UL (ref 3.8–5.8)
RBC # BLD AUTO: 2.7 X10(6)UL (ref 3.8–5.8)
RBC # BLD AUTO: 2.82 X10(6)UL (ref 3.8–5.8)
RBC # BLD AUTO: 3.3 X10(6)UL (ref 3.8–5.8)
RBC # BLD: 2.7 10*6/UL
RBC # BLD: 2.97 10*6/UL
RBC # BLD: 3.28 10*6/UL
RBC # BLD: 3.3 10*6/UL
RBC MORPH BLD: NORMAL
RH BLOOD TYPE: POSITIVE
RH BLOOD TYPE: POSITIVE
SAO2 % BLDA: 85.5 % (ref 94–100)
SAO2 % BLDA: 95.9 % (ref 94–100)
SARS-COV-2 RNA RESP QL NAA+PROBE: NOT DETECTED
SODIUM SERPL-SCNC: 135 MMOL/L (ref 136–145)
SODIUM SERPL-SCNC: 135 MMOL/L (ref 136–145)
SODIUM SERPL-SCNC: 136 MMOL/L
SODIUM SERPL-SCNC: 136 MMOL/L (ref 136–145)
SODIUM SERPL-SCNC: 137 MMOL/L (ref 136–145)
SODIUM SERPL-SCNC: 138 MMOL/L
SODIUM SERPL-SCNC: 138 MMOL/L (ref 136–145)
SODIUM SERPL-SCNC: 139 MMOL/L
SODIUM SERPL-SCNC: 139 MMOL/L (ref 136–145)
SODIUM SERPL-SCNC: 142 MMOL/L (ref 136–145)
SP GR UR STRIP: 1.01 (ref 1–1.03)
TOTAL IRON BINDING CAPACITY: 398 UG/DL (ref 240–450)
TRANSFERRIN SERPL-MCNC: 267 MG/DL (ref 200–360)
TRIGL SERPL-MCNC: 177 MG/DL
UROBILINOGEN UR STRIP-ACNC: <2
VLDLC SERPL CALC-MCNC: 35 MG/DL
WBC # BLD AUTO: 10.1 X10(3) UL (ref 4–11)
WBC # BLD AUTO: 10.5 X10(3) UL (ref 4–11)
WBC # BLD AUTO: 7 X10(3) UL (ref 4–11)
WBC # BLD AUTO: 7.7 X10(3) UL (ref 4–11)
WBC # BLD AUTO: 8.1 X10(3) UL (ref 4–11)
WBC # BLD AUTO: 8.2 X10(3) UL (ref 4–11)
WBC # BLD AUTO: 8.6 X10(3) UL (ref 4–11)
WBC # BLD AUTO: 8.8 X10(3) UL (ref 4–11)
WBC # BLD AUTO: 9.3 X10(3) UL (ref 4–11)
WBC # BLD AUTO: 9.3 X10(3) UL (ref 4–11)
WBC # BLD AUTO: 9.5 X10(3) UL (ref 4–11)
WBC # BLD AUTO: 9.7 X10(3) UL (ref 4–11)
WBC # BLD: 10.9 10*3/UL
WBC # BLD: 17 10*3/UL
WBC # BLD: 7.7 10*3/UL
WBC # BLD: 9.4 10*3/UL
WBC MORPH BLD: NORMAL

## 2020-01-01 PROCEDURE — 83880 ASSAY OF NATRIURETIC PEPTIDE: CPT

## 2020-01-01 PROCEDURE — 83880 ASSAY OF NATRIURETIC PEPTIDE: CPT | Performed by: NURSE PRACTITIONER

## 2020-01-01 PROCEDURE — 85025 COMPLETE CBC W/AUTO DIFF WBC: CPT | Performed by: EMERGENCY MEDICINE

## 2020-01-01 PROCEDURE — 30903 CONTROL OF NOSEBLEED: CPT

## 2020-01-01 PROCEDURE — 99215 OFFICE O/P EST HI 40 MIN: CPT | Performed by: INTERNAL MEDICINE

## 2020-01-01 PROCEDURE — 99212 OFFICE O/P EST SF 10 MIN: CPT | Performed by: CLINICAL NURSE SPECIALIST

## 2020-01-01 PROCEDURE — 99222 1ST HOSP IP/OBS MODERATE 55: CPT | Performed by: NURSE PRACTITIONER

## 2020-01-01 PROCEDURE — 80048 BASIC METABOLIC PNL TOTAL CA: CPT | Performed by: EMERGENCY MEDICINE

## 2020-01-01 PROCEDURE — 85027 COMPLETE CBC AUTOMATED: CPT

## 2020-01-01 PROCEDURE — 80048 BASIC METABOLIC PNL TOTAL CA: CPT | Performed by: CLINICAL NURSE SPECIALIST

## 2020-01-01 PROCEDURE — 99213 OFFICE O/P EST LOW 20 MIN: CPT | Performed by: NURSE PRACTITIONER

## 2020-01-01 PROCEDURE — 82306 VITAMIN D 25 HYDROXY: CPT

## 2020-01-01 PROCEDURE — 99443 TELEPHONE E&M BY PHYSICIAN EST PT NOT ORIG PREV 7 DAYS 21-30 MIN: CPT | Performed by: INTERNAL MEDICINE

## 2020-01-01 PROCEDURE — 85025 COMPLETE CBC W/AUTO DIFF WBC: CPT | Performed by: NURSE PRACTITIONER

## 2020-01-01 PROCEDURE — 99215 OFFICE O/P EST HI 40 MIN: CPT | Performed by: NURSE PRACTITIONER

## 2020-01-01 PROCEDURE — 36415 COLL VENOUS BLD VENIPUNCTURE: CPT

## 2020-01-01 PROCEDURE — 99232 SBSQ HOSP IP/OBS MODERATE 35: CPT | Performed by: INTERNAL MEDICINE

## 2020-01-01 PROCEDURE — 85730 THROMBOPLASTIN TIME PARTIAL: CPT | Performed by: EMERGENCY MEDICINE

## 2020-01-01 PROCEDURE — 80048 BASIC METABOLIC PNL TOTAL CA: CPT

## 2020-01-01 PROCEDURE — 96365 THER/PROPH/DIAG IV INF INIT: CPT | Performed by: NURSE PRACTITIONER

## 2020-01-01 PROCEDURE — 93451 RIGHT HEART CATH: CPT

## 2020-01-01 PROCEDURE — 99232 SBSQ HOSP IP/OBS MODERATE 35: CPT | Performed by: REGISTERED NURSE

## 2020-01-01 PROCEDURE — 71045 X-RAY EXAM CHEST 1 VIEW: CPT | Performed by: HOSPITALIST

## 2020-01-01 PROCEDURE — 99233 SBSQ HOSP IP/OBS HIGH 50: CPT | Performed by: NURSE PRACTITIONER

## 2020-01-01 PROCEDURE — 36415 COLL VENOUS BLD VENIPUNCTURE: CPT | Performed by: NURSE PRACTITIONER

## 2020-01-01 PROCEDURE — 80048 BASIC METABOLIC PNL TOTAL CA: CPT | Performed by: NURSE PRACTITIONER

## 2020-01-01 PROCEDURE — 93463 DRUG ADMIN & HEMODYNMIC MEAS: CPT

## 2020-01-01 PROCEDURE — 71045 X-RAY EXAM CHEST 1 VIEW: CPT | Performed by: EMERGENCY MEDICINE

## 2020-01-01 PROCEDURE — 93463 DRUG ADMIN & HEMODYNMIC MEAS: CPT | Performed by: INTERNAL MEDICINE

## 2020-01-01 PROCEDURE — 93010 ELECTROCARDIOGRAM REPORT: CPT | Performed by: EMERGENCY MEDICINE

## 2020-01-01 PROCEDURE — 96365 THER/PROPH/DIAG IV INF INIT: CPT

## 2020-01-01 PROCEDURE — 93005 ELECTROCARDIOGRAM TRACING: CPT

## 2020-01-01 PROCEDURE — 4A023N6 MEASUREMENT OF CARDIAC SAMPLING AND PRESSURE, RIGHT HEART, PERCUTANEOUS APPROACH: ICD-10-PCS | Performed by: INTERNAL MEDICINE

## 2020-01-01 PROCEDURE — 85027 COMPLETE CBC AUTOMATED: CPT | Performed by: EMERGENCY MEDICINE

## 2020-01-01 PROCEDURE — 99231 SBSQ HOSP IP/OBS SF/LOW 25: CPT | Performed by: OTOLARYNGOLOGY

## 2020-01-01 PROCEDURE — 99212 OFFICE O/P EST SF 10 MIN: CPT | Performed by: NURSE PRACTITIONER

## 2020-01-01 PROCEDURE — 96366 THER/PROPH/DIAG IV INF ADDON: CPT | Performed by: NURSE PRACTITIONER

## 2020-01-01 PROCEDURE — 99214 OFFICE O/P EST MOD 30 MIN: CPT | Performed by: INTERNAL MEDICINE

## 2020-01-01 PROCEDURE — 83970 ASSAY OF PARATHORMONE: CPT

## 2020-01-01 PROCEDURE — 96374 THER/PROPH/DIAG INJ IV PUSH: CPT | Performed by: NURSE PRACTITIONER

## 2020-01-01 PROCEDURE — 99222 1ST HOSP IP/OBS MODERATE 55: CPT | Performed by: REGISTERED NURSE

## 2020-01-01 PROCEDURE — 85007 BL SMEAR W/DIFF WBC COUNT: CPT | Performed by: EMERGENCY MEDICINE

## 2020-01-01 PROCEDURE — 83880 ASSAY OF NATRIURETIC PEPTIDE: CPT | Performed by: EMERGENCY MEDICINE

## 2020-01-01 PROCEDURE — 2Y41X5Z PACKING OF NASAL REGION USING PACKING MATERIAL: ICD-10-PCS | Performed by: EMERGENCY MEDICINE

## 2020-01-01 PROCEDURE — 93306 TTE W/DOPPLER COMPLETE: CPT | Performed by: INTERNAL MEDICINE

## 2020-01-01 PROCEDURE — 99214 OFFICE O/P EST MOD 30 MIN: CPT | Performed by: CLINICAL NURSE SPECIALIST

## 2020-01-01 PROCEDURE — 99284 EMERGENCY DEPT VISIT MOD MDM: CPT

## 2020-01-01 PROCEDURE — 99222 1ST HOSP IP/OBS MODERATE 55: CPT | Performed by: INTERNAL MEDICINE

## 2020-01-01 PROCEDURE — 99221 1ST HOSP IP/OBS SF/LOW 40: CPT | Performed by: OTOLARYNGOLOGY

## 2020-01-01 PROCEDURE — 99214 OFFICE O/P EST MOD 30 MIN: CPT | Performed by: NURSE PRACTITIONER

## 2020-01-01 PROCEDURE — 71045 X-RAY EXAM CHEST 1 VIEW: CPT | Performed by: INTERNAL MEDICINE

## 2020-01-01 PROCEDURE — 84156 ASSAY OF PROTEIN URINE: CPT

## 2020-01-01 PROCEDURE — 3E073KZ INTRODUCTION OF OTHER DIAGNOSTIC SUBSTANCE INTO CORONARY ARTERY, PERCUTANEOUS APPROACH: ICD-10-PCS | Performed by: INTERNAL MEDICINE

## 2020-01-01 PROCEDURE — 30233N1 TRANSFUSION OF NONAUTOLOGOUS RED BLOOD CELLS INTO PERIPHERAL VEIN, PERCUTANEOUS APPROACH: ICD-10-PCS | Performed by: HOSPITALIST

## 2020-01-01 PROCEDURE — 93451 RIGHT HEART CATH: CPT | Performed by: INTERNAL MEDICINE

## 2020-01-01 PROCEDURE — 80061 LIPID PANEL: CPT | Performed by: EMERGENCY MEDICINE

## 2020-01-01 PROCEDURE — 83880 ASSAY OF NATRIURETIC PEPTIDE: CPT | Performed by: CLINICAL NURSE SPECIALIST

## 2020-01-01 PROCEDURE — 76604 US EXAM CHEST: CPT | Performed by: INTERNAL MEDICINE

## 2020-01-01 PROCEDURE — 99232 SBSQ HOSP IP/OBS MODERATE 35: CPT | Performed by: NURSE PRACTITIONER

## 2020-01-01 PROCEDURE — 99152 MOD SED SAME PHYS/QHP 5/>YRS: CPT

## 2020-01-01 PROCEDURE — 99153 MOD SED SAME PHYS/QHP EA: CPT

## 2020-01-01 PROCEDURE — 85610 PROTHROMBIN TIME: CPT | Performed by: EMERGENCY MEDICINE

## 2020-01-01 PROCEDURE — 84484 ASSAY OF TROPONIN QUANT: CPT | Performed by: EMERGENCY MEDICINE

## 2020-01-01 PROCEDURE — 36415 COLL VENOUS BLD VENIPUNCTURE: CPT | Performed by: CLINICAL NURSE SPECIALIST

## 2020-01-01 PROCEDURE — 82570 ASSAY OF URINE CREATININE: CPT

## 2020-01-01 RX ORDER — POTASSIUM CHLORIDE 20 MEQ/1
20 TABLET, EXTENDED RELEASE ORAL DAILY
Refills: 0 | COMMUNITY
Start: 2020-01-01 | End: 2020-01-01

## 2020-01-01 RX ORDER — QUETIAPINE 25 MG/1
50 TABLET, FILM COATED ORAL 2 TIMES DAILY PRN
Status: DISCONTINUED | OUTPATIENT
Start: 2020-01-01 | End: 2020-01-01

## 2020-01-01 RX ORDER — METOCLOPRAMIDE HYDROCHLORIDE 5 MG/ML
5 INJECTION INTRAMUSCULAR; INTRAVENOUS EVERY 8 HOURS PRN
Status: DISCONTINUED | OUTPATIENT
Start: 2020-01-01 | End: 2020-01-01

## 2020-01-01 RX ORDER — LORAZEPAM 2 MG/ML
1 CONCENTRATE ORAL EVERY 8 HOURS PRN
Status: DISCONTINUED | OUTPATIENT
Start: 2020-01-01 | End: 2020-01-01

## 2020-01-01 RX ORDER — ACETAMINOPHEN 325 MG/1
650 TABLET ORAL EVERY 6 HOURS PRN
Status: ON HOLD | COMMUNITY
End: 2020-01-01

## 2020-01-01 RX ORDER — TORSEMIDE 20 MG/1
20 TABLET ORAL
Status: DISCONTINUED | OUTPATIENT
Start: 2020-01-01 | End: 2020-01-01

## 2020-01-01 RX ORDER — POTASSIUM CHLORIDE 14.9 MG/ML
20 INJECTION INTRAVENOUS ONCE
Status: COMPLETED | OUTPATIENT
Start: 2020-01-01 | End: 2020-01-01

## 2020-01-01 RX ORDER — IPRATROPIUM BROMIDE AND ALBUTEROL SULFATE 2.5; .5 MG/3ML; MG/3ML
3 SOLUTION RESPIRATORY (INHALATION) EVERY 6 HOURS PRN
Status: DISCONTINUED | OUTPATIENT
Start: 2020-01-01 | End: 2020-01-01

## 2020-01-01 RX ORDER — MONTELUKAST SODIUM 10 MG/1
10 TABLET ORAL NIGHTLY
Status: DISCONTINUED | OUTPATIENT
Start: 2020-01-01 | End: 2020-01-01

## 2020-01-01 RX ORDER — VANCOMYCIN HYDROCHLORIDE 125 MG/1
125 CAPSULE ORAL DAILY
Status: DISCONTINUED | OUTPATIENT
Start: 2020-01-01 | End: 2020-01-01

## 2020-01-01 RX ORDER — FUROSEMIDE 10 MG/ML
40 INJECTION INTRAMUSCULAR; INTRAVENOUS EVERY 8 HOURS PRN
Status: DISCONTINUED | OUTPATIENT
Start: 2020-01-01 | End: 2020-01-01

## 2020-01-01 RX ORDER — TORSEMIDE 20 MG/1
20 TABLET ORAL DAILY
Status: DISCONTINUED | OUTPATIENT
Start: 2020-01-01 | End: 2020-01-01

## 2020-01-01 RX ORDER — METOLAZONE 2.5 MG/1
5 TABLET ORAL
Refills: 0 | COMMUNITY
Start: 2020-01-01 | End: 2020-01-01

## 2020-01-01 RX ORDER — LORAZEPAM 2 MG/ML
1 INJECTION INTRAMUSCULAR EVERY 4 HOURS PRN
Status: DISCONTINUED | OUTPATIENT
Start: 2020-01-01 | End: 2020-01-01

## 2020-01-01 RX ORDER — HYDROCODONE BITARTRATE AND ACETAMINOPHEN 5; 325 MG/1; MG/1
1 TABLET ORAL EVERY 6 HOURS PRN
Status: DISCONTINUED | OUTPATIENT
Start: 2020-01-01 | End: 2020-01-01

## 2020-01-01 RX ORDER — HYDRALAZINE HYDROCHLORIDE 10 MG/1
10 TABLET, FILM COATED ORAL 3 TIMES DAILY
COMMUNITY

## 2020-01-01 RX ORDER — POTASSIUM CHLORIDE 20 MEQ/1
TABLET, EXTENDED RELEASE ORAL
Status: DISCONTINUED
Start: 2020-01-01 | End: 2020-01-01

## 2020-01-01 RX ORDER — POTASSIUM CHLORIDE 20 MEQ/1
20 TABLET, EXTENDED RELEASE ORAL 2 TIMES DAILY
Qty: 70 TABLET | Refills: 1 | Status: ON HOLD | OUTPATIENT
Start: 2020-01-01 | End: 2020-01-01 | Stop reason: CLARIF

## 2020-01-01 RX ORDER — POTASSIUM CHLORIDE 20 MEQ/1
TABLET, EXTENDED RELEASE ORAL
Status: COMPLETED
Start: 2020-01-01 | End: 2020-01-01

## 2020-01-01 RX ORDER — SODIUM CHLORIDE 0.9 % (FLUSH) 0.9 %
3 SYRINGE (ML) INJECTION AS NEEDED
Status: DISCONTINUED | OUTPATIENT
Start: 2020-01-01 | End: 2020-01-01

## 2020-01-01 RX ORDER — TORSEMIDE 20 MG/1
TABLET ORAL
Qty: 90 TABLET | Refills: 0 | Status: SHIPPED | OUTPATIENT
Start: 2020-01-01

## 2020-01-01 RX ORDER — ALBUTEROL SULFATE 90 UG/1
2 AEROSOL, METERED RESPIRATORY (INHALATION) EVERY 6 HOURS PRN
Status: DISCONTINUED | OUTPATIENT
Start: 2020-01-01 | End: 2020-01-01

## 2020-01-01 RX ORDER — CHOLECALCIFEROL (VITAMIN D3) 125 MCG
250 CAPSULE ORAL 2 TIMES DAILY
Status: DISCONTINUED | OUTPATIENT
Start: 2020-01-01 | End: 2020-01-01

## 2020-01-01 RX ORDER — PANTOPRAZOLE SODIUM 40 MG/1
40 TABLET, DELAYED RELEASE ORAL
Status: DISCONTINUED | OUTPATIENT
Start: 2020-01-01 | End: 2020-01-01

## 2020-01-01 RX ORDER — HALOPERIDOL 5 MG/ML
2 INJECTION INTRAMUSCULAR
Status: DISCONTINUED | OUTPATIENT
Start: 2020-01-01 | End: 2020-01-01

## 2020-01-01 RX ORDER — DIGOXIN 125 MCG
125 TABLET ORAL
Status: DISCONTINUED | OUTPATIENT
Start: 2020-01-01 | End: 2020-01-01

## 2020-01-01 RX ORDER — DOCUSATE SODIUM 100 MG/1
100 CAPSULE, LIQUID FILLED ORAL 2 TIMES DAILY
COMMUNITY
End: 2020-01-01

## 2020-01-01 RX ORDER — LEVOTHYROXINE SODIUM 0.15 MG/1
150 TABLET ORAL
Status: DISCONTINUED | OUTPATIENT
Start: 2020-01-01 | End: 2020-01-01

## 2020-01-01 RX ORDER — DILTIAZEM HYDROCHLORIDE 120 MG/1
120 CAPSULE, EXTENDED RELEASE ORAL DAILY
Status: DISCONTINUED | OUTPATIENT
Start: 2020-01-01 | End: 2020-01-01

## 2020-01-01 RX ORDER — BUMETANIDE 0.25 MG/ML
INJECTION, SOLUTION INTRAMUSCULAR; INTRAVENOUS
Status: COMPLETED
Start: 2020-01-01 | End: 2020-01-01

## 2020-01-01 RX ORDER — ALLOPURINOL 100 MG/1
100 TABLET ORAL 2 TIMES DAILY
Status: DISCONTINUED | OUTPATIENT
Start: 2020-01-01 | End: 2020-01-01

## 2020-01-01 RX ORDER — PREDNISONE 20 MG/1
40 TABLET ORAL
Status: COMPLETED | OUTPATIENT
Start: 2020-01-01 | End: 2020-01-01

## 2020-01-01 RX ORDER — HEPARIN SODIUM 1000 [USP'U]/ML
INJECTION, SOLUTION INTRAVENOUS; SUBCUTANEOUS
Status: COMPLETED
Start: 2020-01-01 | End: 2020-01-01

## 2020-01-01 RX ORDER — FUROSEMIDE 40 MG/1
40 TABLET ORAL EVERY 8 HOURS PRN
Status: DISCONTINUED | OUTPATIENT
Start: 2020-01-01 | End: 2020-01-01

## 2020-01-01 RX ORDER — HYDRALAZINE HYDROCHLORIDE 10 MG/1
5 TABLET, FILM COATED ORAL 3 TIMES DAILY
Qty: 90 TABLET | Refills: 2 | Status: SHIPPED | OUTPATIENT
Start: 2020-01-01 | End: 2020-01-01

## 2020-01-01 RX ORDER — SODIUM CHLORIDE 0.9 % (FLUSH) 0.9 %
10 SYRINGE (ML) INJECTION AS NEEDED
Status: DISCONTINUED | OUTPATIENT
Start: 2020-01-01 | End: 2020-01-01

## 2020-01-01 RX ORDER — BUMETANIDE 0.25 MG/ML
INJECTION, SOLUTION INTRAMUSCULAR; INTRAVENOUS
Status: DISCONTINUED
Start: 2020-01-01 | End: 2020-01-01

## 2020-01-01 RX ORDER — TRANEXAMIC ACID 100 MG/ML
5 INJECTION, SOLUTION INTRAVENOUS ONCE
Status: COMPLETED | OUTPATIENT
Start: 2020-01-01 | End: 2020-01-01

## 2020-01-01 RX ORDER — LORAZEPAM 0.5 MG/1
0.5 TABLET ORAL EVERY 4 HOURS PRN
Status: DISCONTINUED | OUTPATIENT
Start: 2020-01-01 | End: 2020-01-01

## 2020-01-01 RX ORDER — POTASSIUM CHLORIDE 20 MEQ/1
40 TABLET, EXTENDED RELEASE ORAL EVERY 4 HOURS
Status: COMPLETED | OUTPATIENT
Start: 2020-01-01 | End: 2020-01-01

## 2020-01-01 RX ORDER — ACETAMINOPHEN 325 MG/1
650 TABLET ORAL EVERY 6 HOURS PRN
Status: DISCONTINUED | OUTPATIENT
Start: 2020-01-01 | End: 2020-01-01

## 2020-01-01 RX ORDER — FAMOTIDINE 20 MG/1
20 TABLET ORAL NIGHTLY
Status: DISCONTINUED | OUTPATIENT
Start: 2020-01-01 | End: 2020-01-01

## 2020-01-01 RX ORDER — POTASSIUM CHLORIDE 20 MEQ/1
TABLET, EXTENDED RELEASE ORAL
Status: DISPENSED
Start: 2020-01-01 | End: 2020-01-01

## 2020-01-01 RX ORDER — HEPARIN SODIUM 5000 [USP'U]/ML
5000 INJECTION, SOLUTION INTRAVENOUS; SUBCUTANEOUS EVERY 8 HOURS SCHEDULED
Status: DISCONTINUED | OUTPATIENT
Start: 2020-01-01 | End: 2020-01-01

## 2020-01-01 RX ORDER — LORAZEPAM 2 MG/ML
0.5 INJECTION INTRAMUSCULAR EVERY 4 HOURS PRN
Status: DISCONTINUED | OUTPATIENT
Start: 2020-01-01 | End: 2020-01-01

## 2020-01-01 RX ORDER — MORPHINE SULFATE 20 MG/ML
10 SOLUTION ORAL EVERY 2 HOUR PRN
Status: DISCONTINUED | OUTPATIENT
Start: 2020-01-01 | End: 2020-01-01

## 2020-01-01 RX ORDER — ACETAMINOPHEN 500 MG
1000 TABLET ORAL 3 TIMES DAILY
Status: DISCONTINUED | OUTPATIENT
Start: 2020-01-01 | End: 2020-01-01

## 2020-01-01 RX ORDER — POLYETHYLENE GLYCOL 3350 17 G/17G
17 POWDER, FOR SOLUTION ORAL DAILY PRN
Status: DISCONTINUED | OUTPATIENT
Start: 2020-01-01 | End: 2020-01-01

## 2020-01-01 RX ORDER — BISACODYL 10 MG
10 SUPPOSITORY, RECTAL RECTAL
Status: DISCONTINUED | OUTPATIENT
Start: 2020-01-01 | End: 2020-01-01

## 2020-01-01 RX ORDER — SILDENAFIL CITRATE 20 MG/1
20 TABLET ORAL 3 TIMES DAILY
Qty: 90 TABLET | Refills: 11 | Status: SHIPPED | OUTPATIENT
Start: 2020-01-01 | End: 2020-01-01 | Stop reason: CLARIF

## 2020-01-01 RX ORDER — POTASSIUM CHLORIDE 20 MEQ/1
TABLET, EXTENDED RELEASE ORAL
Qty: 90 TABLET | Refills: 0 | COMMUNITY
Start: 2020-01-01 | End: 2020-01-01

## 2020-01-01 RX ORDER — METHYLPREDNISOLONE SODIUM SUCCINATE 125 MG/2ML
60 INJECTION, POWDER, LYOPHILIZED, FOR SOLUTION INTRAMUSCULAR; INTRAVENOUS EVERY 12 HOURS
Status: COMPLETED | OUTPATIENT
Start: 2020-01-01 | End: 2020-01-01

## 2020-01-01 RX ORDER — SODIUM CHLORIDE 9 MG/ML
INJECTION, SOLUTION INTRAVENOUS ONCE
Status: COMPLETED | OUTPATIENT
Start: 2020-01-01 | End: 2020-01-01

## 2020-01-01 RX ORDER — ATORVASTATIN CALCIUM 20 MG/1
20 TABLET, FILM COATED ORAL NIGHTLY
Status: DISCONTINUED | OUTPATIENT
Start: 2020-01-01 | End: 2020-01-01

## 2020-01-01 RX ORDER — MORPHINE SULFATE 20 MG/ML
10 SOLUTION ORAL ONCE
Status: COMPLETED | OUTPATIENT
Start: 2020-01-01 | End: 2020-01-01

## 2020-01-01 RX ORDER — DOCUSATE SODIUM 100 MG/1
100 CAPSULE, LIQUID FILLED ORAL 2 TIMES DAILY
Status: DISCONTINUED | OUTPATIENT
Start: 2020-01-01 | End: 2020-01-01

## 2020-01-01 RX ORDER — HALOPERIDOL 5 MG/ML
1 INJECTION INTRAMUSCULAR
Status: DISCONTINUED | OUTPATIENT
Start: 2020-01-01 | End: 2020-01-01

## 2020-01-01 RX ORDER — LORAZEPAM 1 MG/1
1 TABLET ORAL EVERY 4 HOURS PRN
Status: DISCONTINUED | OUTPATIENT
Start: 2020-01-01 | End: 2020-01-01

## 2020-01-01 RX ORDER — POTASSIUM CHLORIDE 20 MEQ/1
40 TABLET, EXTENDED RELEASE ORAL ONCE
Status: COMPLETED | OUTPATIENT
Start: 2020-01-01 | End: 2020-01-01

## 2020-01-01 RX ORDER — CLOPIDOGREL BISULFATE 75 MG/1
75 TABLET ORAL DAILY
Status: DISCONTINUED | OUTPATIENT
Start: 2020-01-01 | End: 2020-01-01

## 2020-01-01 RX ORDER — GLYCOPYRROLATE 0.2 MG/ML
0.4 INJECTION, SOLUTION INTRAMUSCULAR; INTRAVENOUS
Status: DISCONTINUED | OUTPATIENT
Start: 2020-01-01 | End: 2020-01-01

## 2020-01-01 RX ORDER — MORPHINE SULFATE 20 MG/ML
10 SOLUTION ORAL EVERY 4 HOURS PRN
Status: DISCONTINUED | OUTPATIENT
Start: 2020-01-01 | End: 2020-01-01

## 2020-01-01 RX ORDER — FUROSEMIDE 10 MG/ML
20 INJECTION INTRAMUSCULAR; INTRAVENOUS ONCE
Status: COMPLETED | OUTPATIENT
Start: 2020-01-01 | End: 2020-01-01

## 2020-01-01 RX ORDER — METOLAZONE 2.5 MG/1
2.5 TABLET ORAL
Qty: 100 TABLET | Refills: 3 | Status: ON HOLD | OUTPATIENT
Start: 2020-01-01 | End: 2020-01-01 | Stop reason: CLARIF

## 2020-01-01 RX ORDER — METOLAZONE 2.5 MG/1
2.5 TABLET ORAL
Qty: 15 TABLET | Refills: 6 | Status: SHIPPED | OUTPATIENT
Start: 2020-01-01

## 2020-01-01 RX ORDER — LORAZEPAM 2 MG/ML
2 INJECTION INTRAMUSCULAR EVERY 4 HOURS PRN
Status: DISCONTINUED | OUTPATIENT
Start: 2020-01-01 | End: 2020-01-01

## 2020-01-01 RX ORDER — SODIUM CHLORIDE 9 MG/ML
INJECTION, SOLUTION INTRAVENOUS ONCE
Status: DISCONTINUED | OUTPATIENT
Start: 2020-01-01 | End: 2020-01-01

## 2020-01-01 RX ORDER — DILTIAZEM HYDROCHLORIDE 120 MG/1
CAPSULE, COATED, EXTENDED RELEASE ORAL
Qty: 90 CAPSULE | Refills: 0 | Status: ON HOLD | OUTPATIENT
Start: 2020-01-01 | End: 2020-01-01

## 2020-01-01 RX ORDER — HYDRALAZINE HYDROCHLORIDE 10 MG/1
5 TABLET, FILM COATED ORAL 3 TIMES DAILY
Status: DISCONTINUED | OUTPATIENT
Start: 2020-01-01 | End: 2020-01-01

## 2020-01-01 RX ORDER — LORAZEPAM 1 MG/1
2 TABLET ORAL EVERY 4 HOURS PRN
Status: DISCONTINUED | OUTPATIENT
Start: 2020-01-01 | End: 2020-01-01

## 2020-01-01 RX ORDER — ATROPINE SULFATE 10 MG/ML
2 SOLUTION/ DROPS OPHTHALMIC EVERY 2 HOUR PRN
Status: DISCONTINUED | OUTPATIENT
Start: 2020-01-01 | End: 2020-01-01

## 2020-01-01 RX ORDER — SODIUM CHLORIDE 9 MG/ML
INJECTION, SOLUTION INTRAVENOUS
Status: COMPLETED | OUTPATIENT
Start: 2020-01-01 | End: 2020-01-01

## 2020-01-01 RX ORDER — TORSEMIDE 20 MG/1
20 TABLET ORAL 2 TIMES DAILY
Refills: 0 | COMMUNITY
Start: 2020-01-01 | End: 2020-01-01

## 2020-01-01 RX ORDER — HYDROCODONE BITARTRATE AND ACETAMINOPHEN 5; 325 MG/1; MG/1
1 TABLET ORAL EVERY 6 HOURS PRN
COMMUNITY

## 2020-01-01 RX ORDER — METOLAZONE 5 MG/1
TABLET ORAL
Status: DISCONTINUED
Start: 2020-01-01 | End: 2020-01-01

## 2020-01-01 RX ORDER — ACETAMINOPHEN 325 MG/1
TABLET ORAL EVERY 6 HOURS PRN
Status: DISCONTINUED | OUTPATIENT
Start: 2020-01-01 | End: 2020-01-01

## 2020-01-01 RX ORDER — HYDRALAZINE HYDROCHLORIDE 10 MG/1
10 TABLET, FILM COATED ORAL 3 TIMES DAILY
Status: DISCONTINUED | OUTPATIENT
Start: 2020-01-01 | End: 2020-01-01

## 2020-01-01 RX ORDER — MIDAZOLAM HYDROCHLORIDE 1 MG/ML
INJECTION INTRAMUSCULAR; INTRAVENOUS
Status: COMPLETED
Start: 2020-01-01 | End: 2020-01-01

## 2020-01-01 RX ORDER — HYDRALAZINE HYDROCHLORIDE 10 MG/1
10 TABLET, FILM COATED ORAL 3 TIMES DAILY
Qty: 90 TABLET | Refills: 1 | Status: ON HOLD | OUTPATIENT
Start: 2020-01-01 | End: 2020-01-01

## 2020-01-01 RX ORDER — MELATONIN
325 2 TIMES DAILY WITH MEALS
Status: ON HOLD | COMMUNITY
End: 2020-01-01

## 2020-01-01 RX ORDER — TORSEMIDE 20 MG/1
20 TABLET ORAL 2 TIMES DAILY
Qty: 180 TABLET | Refills: 0 | Status: ON HOLD | OUTPATIENT
Start: 2020-01-01 | End: 2020-01-01

## 2020-01-01 RX ORDER — AMOXICILLIN AND CLAVULANATE POTASSIUM 500; 125 MG/1; MG/1
1 TABLET, FILM COATED ORAL 2 TIMES DAILY
Qty: 3 TABLET | Refills: 0 | Status: SHIPPED | OUTPATIENT
Start: 2020-01-01 | End: 2020-01-01

## 2020-01-01 RX ORDER — AMOXICILLIN 875 MG/1
875 TABLET, COATED ORAL 2 TIMES DAILY
Qty: 10 TABLET | Refills: 0 | Status: ON HOLD | OUTPATIENT
Start: 2020-01-01 | End: 2020-01-01 | Stop reason: CLARIF

## 2020-01-01 RX ORDER — TORSEMIDE 20 MG/1
20 TABLET ORAL EVERY OTHER DAY
Status: SHIPPED | COMMUNITY
Start: 2020-01-01 | End: 2020-01-01

## 2020-01-01 RX ORDER — MORPHINE SULFATE 2 MG/ML
1 INJECTION, SOLUTION INTRAMUSCULAR; INTRAVENOUS
Status: DISCONTINUED | OUTPATIENT
Start: 2020-01-01 | End: 2020-01-01

## 2020-01-01 RX ORDER — ONDANSETRON 2 MG/ML
4 INJECTION INTRAMUSCULAR; INTRAVENOUS EVERY 6 HOURS PRN
Status: DISCONTINUED | OUTPATIENT
Start: 2020-01-01 | End: 2020-01-01

## 2020-01-01 RX ORDER — LIDOCAINE HYDROCHLORIDE 20 MG/ML
INJECTION, SOLUTION EPIDURAL; INFILTRATION; INTRACAUDAL; PERINEURAL
Status: COMPLETED
Start: 2020-01-01 | End: 2020-01-01

## 2020-01-01 RX ORDER — FERROUS SULFATE 325(65) MG
325 TABLET ORAL 2 TIMES DAILY
COMMUNITY

## 2020-01-01 RX ORDER — SCOLOPAMINE TRANSDERMAL SYSTEM 1 MG/1
1 PATCH, EXTENDED RELEASE TRANSDERMAL
Status: DISCONTINUED | OUTPATIENT
Start: 2020-01-01 | End: 2020-01-01

## 2020-01-01 RX ORDER — HYDRALAZINE HYDROCHLORIDE 10 MG/1
10 TABLET, FILM COATED ORAL 3 TIMES DAILY
Qty: 90 TABLET | Refills: 2 | COMMUNITY
Start: 2020-01-01 | End: 2020-01-01

## 2020-01-01 RX ORDER — POTASSIUM CHLORIDE 20 MEQ/1
TABLET, EXTENDED RELEASE ORAL
Qty: 90 TABLET | Refills: 0 | OUTPATIENT
Start: 2020-01-01

## 2020-01-01 RX ORDER — METOLAZONE 2.5 MG/1
2.5 TABLET ORAL SEE ADMIN INSTRUCTIONS
COMMUNITY
End: 2020-01-01

## 2020-01-01 RX ORDER — MORPHINE SULFATE IN 0.9 % NACL 1 MG/ML
1 PLASTIC BAG, INJECTION (ML) INTRAVENOUS CONTINUOUS PRN
Status: DISCONTINUED | OUTPATIENT
Start: 2020-01-01 | End: 2020-01-01

## 2020-01-01 RX ORDER — FUROSEMIDE 10 MG/ML
40 INJECTION INTRAMUSCULAR; INTRAVENOUS
Status: DISCONTINUED | OUTPATIENT
Start: 2020-01-01 | End: 2020-01-01

## 2020-01-01 RX ORDER — TRANEXAMIC ACID 100 MG/ML
5 INJECTION, SOLUTION INTRAVENOUS ONCE
Status: DISCONTINUED | OUTPATIENT
Start: 2020-01-01 | End: 2020-01-01

## 2020-01-01 RX ORDER — MELATONIN
325 2 TIMES DAILY WITH MEALS
Status: DISCONTINUED | OUTPATIENT
Start: 2020-01-01 | End: 2020-01-01

## 2020-01-01 RX ADMIN — POTASSIUM CHLORIDE 40 MEQ: 20 TABLET, EXTENDED RELEASE ORAL at 12:15:00

## 2020-01-01 RX ADMIN — BUMETANIDE 1 MG: 0.25 INJECTION, SOLUTION INTRAMUSCULAR; INTRAVENOUS at 11:50:00

## 2020-01-01 RX ADMIN — POTASSIUM CHLORIDE 40 MEQ: 20 TABLET, EXTENDED RELEASE ORAL at 13:15:00

## 2020-01-01 RX ADMIN — POTASSIUM CHLORIDE 40 MEQ: 20 TABLET, EXTENDED RELEASE ORAL at 11:40:00

## 2020-01-01 RX ADMIN — POTASSIUM CHLORIDE 40 MEQ: 20 TABLET, EXTENDED RELEASE ORAL at 11:45:00

## 2020-01-01 RX ADMIN — POTASSIUM CHLORIDE 40 MEQ: 20 TABLET, EXTENDED RELEASE ORAL at 14:58:00

## 2020-01-01 RX ADMIN — POTASSIUM CHLORIDE 40 MEQ: 20 TABLET, EXTENDED RELEASE ORAL at 16:16:00

## 2020-01-01 RX ADMIN — SODIUM CHLORIDE: 9 INJECTION, SOLUTION INTRAVENOUS at 07:45:00

## 2020-01-01 ASSESSMENT — ENCOUNTER SYMPTOMS
SUSPICIOUS LESIONS: 0
HEMOPTYSIS: 0
HEMOPTYSIS: 0
WEIGHT GAIN: 0
HEMOPTYSIS: 0
BRUISES/BLEEDS EASILY: 0
COUGH: 1
COUGH: 0
ALLERGIC/IMMUNOLOGIC COMMENTS: NO NEW FOOD ALLERGIES
SUSPICIOUS LESIONS: 0
HEMATOCHEZIA: 0
FEVER: 0
DECREASED APPETITE: 1
HEMATOCHEZIA: 0
WEIGHT LOSS: 0
WEIGHT LOSS: 0
BLOATING: 1
FEVER: 0
WEIGHT LOSS: 0
CHILLS: 0
ALLERGIC/IMMUNOLOGIC COMMENTS: NO NEW FOOD ALLERGIES
DIZZINESS: 1
WEIGHT GAIN: 1
COUGH: 0
ALLERGIC/IMMUNOLOGIC COMMENTS: NO NEW FOOD ALLERGIES
HEMATOCHEZIA: 0
LOSS OF BALANCE: 1
WEAKNESS: 1
CHILLS: 0
SHORTNESS OF BREATH: 1
FEVER: 0
SUSPICIOUS LESIONS: 0
SHORTNESS OF BREATH: 1
BRUISES/BLEEDS EASILY: 0
CHILLS: 0
BRUISES/BLEEDS EASILY: 0

## 2020-01-01 ASSESSMENT — PATIENT HEALTH QUESTIONNAIRE - PHQ9
CLINICAL INTERPRETATION OF PHQ9 SCORE: NO FURTHER SCREENING NEEDED
1. LITTLE INTEREST OR PLEASURE IN DOING THINGS: NOT AT ALL
SUM OF ALL RESPONSES TO PHQ9 QUESTIONS 1 AND 2: 0
1. LITTLE INTEREST OR PLEASURE IN DOING THINGS: NOT AT ALL
2. FEELING DOWN, DEPRESSED OR HOPELESS: NOT AT ALL
2. FEELING DOWN, DEPRESSED OR HOPELESS: NOT AT ALL
SUM OF ALL RESPONSES TO PHQ9 QUESTIONS 1 AND 2: 0
CLINICAL INTERPRETATION OF PHQ2 SCORE: NO FURTHER SCREENING NEEDED

## 2020-01-02 NOTE — TELEPHONE ENCOUNTER
Patient not seen at [facility] cardiology clinic. Seen at [facility]. Refill denied and message sent to pharmacy.

## 2020-01-08 PROBLEM — K56.41 FECAL IMPACTION (HCC): Status: RESOLVED | Noted: 2018-11-18 | Resolved: 2020-01-01

## 2020-01-08 PROBLEM — G89.29 CHRONIC RIGHT SHOULDER PAIN: Status: RESOLVED | Noted: 2019-01-01 | Resolved: 2020-01-01

## 2020-01-08 PROBLEM — M25.511 CHRONIC RIGHT SHOULDER PAIN: Status: RESOLVED | Noted: 2019-01-01 | Resolved: 2020-01-01

## 2020-01-08 PROBLEM — M75.121 NONTRAUMATIC COMPLETE TEAR OF RIGHT ROTATOR CUFF: Status: ACTIVE | Noted: 2020-01-01

## 2020-01-08 PROBLEM — R07.9 CHEST PAIN OF UNCERTAIN ETIOLOGY: Status: RESOLVED | Noted: 2019-01-01 | Resolved: 2020-01-01

## 2020-01-24 NOTE — PATIENT INSTRUCTIONS
Okay to continue Torsemide 20 mg every other day, if your weight remains 126 lbs or less. If you reach 128 lbs, please call the [address] [place] [address], we may need to go back to every day torsemide or 20 mg alternating with 10 mg daily.     Monitor yourself

## 2020-01-24 NOTE — PROGRESS NOTES
[address] Patient Status:  No patient class for patient encounter    3/8/1932 MRN [id number]   Location [facility] MD Dr. Aly Cruz is a 80year old male oxygen  Hypothyroidism   Gout   CKD Stage III-IV       Subjective:  Here today with his significant other and dtr. Feeling okay. Breathing okay; oxygen saturations lower than normal today, but KRAMER at baseline. Doing stairs at home.  Denies dizziness, lighth (H) 11/22/2018 06:07 AM    PGLU 111 (H) 04/13/2017 08:14 AM       Clinical labs drawn by MA: [place] reviewed with patient and family.        /48   Pulse 67   Wt 131 lb 8 oz (59.6 kg)   SpO2 (!) 88%   BMI 25.68 kg/m²       D'c weight 138 ///           137 medication regimen s/s heart failure exacerbation and when to call APN/clinic. Patient and family receptive. Assessment:  HFpEF  - ACC/AHA Stage C, NYHA III  -Diuretics:  Torsemide 20 mg EOD. (previously on 20 mg alternating with 10 mg daily)   - Renal frozen dinners, soups (not homemade), some cereal, vegetable juice, canned vegetables, lunch meats, processed meats like hotdogs, sausage, zarco, pepperoni, soy sauce, pre-packaged rice or potatoes.  Please remember to read nutrition labels for sodium wili

## 2020-02-12 NOTE — PROGRESS NOTES
Reviewed   - no proteinuria    - PTH level improving      Patient informed via [address] staff

## 2020-02-14 PROBLEM — K92.2 GASTROINTESTINAL HEMORRHAGE, UNSPECIFIED GASTROINTESTINAL HEMORRHAGE TYPE: Status: RESOLVED | Noted: 2018-11-18 | Resolved: 2020-01-01

## 2020-02-14 PROBLEM — Z95.1 HX OF CABG: Status: RESOLVED | Noted: 2018-02-14 | Resolved: 2020-01-01

## 2020-02-14 PROBLEM — Z87.19 HISTORY OF GASTROINTESTINAL BLEEDING: Status: RESOLVED | Noted: 2018-01-10 | Resolved: 2020-01-01

## 2020-02-14 PROBLEM — M75.121 NONTRAUMATIC COMPLETE TEAR OF RIGHT ROTATOR CUFF: Status: RESOLVED | Noted: 2020-01-01 | Resolved: 2020-01-01

## 2020-02-14 PROBLEM — J96.11 CHRONIC RESPIRATORY FAILURE WITH HYPOXIA (HCC): Status: ACTIVE | Noted: 2018-01-10

## 2020-02-14 NOTE — TELEPHONE ENCOUNTER
Patient' dtr, Cynthia called after PCP appointment today. Plan for patient to rest at home after muscle strain. Weight down 117 lbs from 126 lbs three weeks ago. Dtr reports fair appetite, drinking at least one Ensure drink/day. Taking torsemide 20 mg EOD.  R

## 2020-03-05 NOTE — PROCEDURES
Priyank Rose  3/8/1932    Procedure:  1) Right heart Catheterization          2) Vasodilator Challenge    Reason for Procedure:     Procedure Summary:  1. Risks and Benefits were explained to patient. Informed Consent was obtained  2.  Patient prepped an

## 2020-03-05 NOTE — INTERVAL H&P NOTE
Pre-op Diagnosis: CHF, PAH    The above referenced H&P was reviewed by Bairon Burris MD on 3/5/2020, the patient was examined and no significant changes have occurred in the patient's condition since the H&P was performed.   I discussed with the patient and

## 2020-03-05 NOTE — PRE-SEDATION ASSESSMENT
Physician Pre-Sedation Assessment    Pre-Sedation Assessment:    Sedation History: Airway Assessed    Cardiac: normal S1, S2  Respiratory: breath sounds clear bilaterally   Abdomen: soft, BS (+), non-tender    ASA Classification: 4.  Patient with severe sys

## 2020-03-25 PROBLEM — I36.1 NONRHEUMATIC TRICUSPID VALVE REGURGITATION: Status: ACTIVE | Noted: 2020-01-01

## 2020-03-25 PROBLEM — I27.20 PULMONARY HYPERTENSION (CMD): Status: ACTIVE | Noted: 2020-01-01

## 2020-04-09 NOTE — ED PROVIDER NOTES
Patient Seen in: [facility] Emergency Department      History   Patient presents with:  Epistaxis    Stated Complaint: epistaxis    HPI    80-year-old male presents the ER for nosebleed.   According the patient's wife, the patient has had a continu Pulmonary hypertension ([address] 2/14/2018   • Right to left cardiac shunt ([address] 2/14/2018   • Secondary hyperparathyroidism ([address] 9/25/2019   • Shortness of breath    • Thoracic aorta atherosclerosis ([address] 2/14/2018   • Thoracic arthritis 2/14/2018   • Thorac Nasal cannula       Current:/52   Pulse 75   Temp 97.8 °F (36.6 °C)   Resp 18   Wt 58.3 kg   SpO2 (!) 89%   BMI 25.12 kg/m²         Physical Exam  Vitals signs and nursing note reviewed. Constitutional:       Appearance: Normal appearance.  He is no METABOLIC PANEL (8)   CBC WITH DIFFERENTIAL WITH PLATELET    Narrative: The following orders were created for panel order CBC WITH DIFFERENTIAL WITH PLATELET.   Procedure                               Abnormality         Status                     ----- months to obtain basic health screening including reassessment of your blood pressure.       Medications Prescribed:  Current Discharge Medication List

## 2020-04-09 NOTE — ED NOTES
Called and spoke to patient. Did inform her of provider's recommendations below. Patient understood and had no other questions at this time. She has not checked with her insurance yet to see if visit will be covered but she will do this and inform office.      Harlan Ends

## 2020-04-09 NOTE — ED NOTES
Verbal order per Abhilash Allred, no TXA at this time. Patient is stable. Bleeding is controlled. Statement Selected

## 2020-04-09 NOTE — ED NOTES
Reviewed discharge information with patient. Patient verbalized understanding, no further questions or complaints at this time. Patient is alert and orientated x4, in no apparent distress. IVs discontinued.  Instructed patient to return to ED for any new or

## 2020-04-09 NOTE — ED NOTES
Upon discharge, patient's nose began to bleed again once standing up out of bed to transfer into wheelchair. MD aware, verbal order to now given TXA, applied to gauze and placed in R nare.  Patient instructed by Dr Bettie Zhang to take gauze out tomorrow morning

## 2020-04-09 NOTE — ED NOTES
Patient normally on 3 liters O2 at home, and normally sats 89-90%. Patient to room sating 84%on 5 liters. Non rebreather applied at 6 liters.

## 2020-04-22 PROBLEM — I27.21 SEVERE PULMONARY ARTERIAL SYSTOLIC HYPERTENSION (HCC): Chronic | Status: ACTIVE | Noted: 2020-01-01

## 2020-04-22 PROBLEM — D50.9 CHRONIC IRON DEFICIENCY ANEMIA: Chronic | Status: ACTIVE | Noted: 2020-01-01

## 2020-04-22 PROBLEM — I50.33 ACUTE ON CHRONIC HEART FAILURE WITH PRESERVED EJECTION FRACTION (HFPEF) (HCC): Chronic | Status: ACTIVE | Noted: 2020-01-01

## 2020-04-22 PROBLEM — R06.09 CHRONIC DYSPNEA: Chronic | Status: ACTIVE | Noted: 2020-01-01

## 2020-04-22 PROBLEM — I50.810 RVF (RIGHT VENTRICULAR FAILURE) (HCC): Chronic | Status: ACTIVE | Noted: 2020-01-01

## 2020-04-22 NOTE — PATIENT INSTRUCTIONS
Increase torsemide 20 mg tab to twice daily in the morning and at 2 pm    Increase potassium chloride to 20 meq 1 tab daily    Begin hydralazine 10 mg one tab 3 times daily , in morning about 8 am, at 2 pm and again before bedtime     Continue all your lewis Pace your activity to prevent shortness of breath or fatigue.  Stop exercise if you develop chest pain, lightheadedness, or significant shortness of breath

## 2020-04-22 NOTE — PROGRESS NOTES
[address] Patient Status:  No patient class for patient encounter    3/8/1932 MRN [id number]   Location 602 [facility] MD Dr. Yogesh Leo Dr. is a 80 ye right shoulder pain r/t rotator cuff tear. Discharged home. Problem List:  Acute on chronic HFpEF with diastolic dysfunction, RV failure and severe pulmonary HTN.    CAD s/p CABG and PCI  Severe MR s/p mitral clip X3   Chronic respiratory failure on h 04/09/2020 04:47 AM    CREATSERUM 1.66 (H) 04/09/2020 04:47 AM    BUN 58 (H) 04/09/2020 04:47 AM     (L) 04/09/2020 04:47 AM    K 3.9 04/09/2020 04:47 AM     04/09/2020 04:47 AM    CO2 20.0 (L) 04/09/2020 04:47 AM     (H) 04/09/2020 04:4 diminished mick Lower lobes   Chest wall: no tenderness  Heart: S1, S2 normal, no S3 or S4, irregular rate and rhythm,+ CARMELA  Abdomen: soft, non-tender; bowel sounds normal; no masses,  no organomegaly, mild abdominal bloating and soft reducable umbilical he nosebleeds decreased  -Signs of significant fluid overload with a 1-1/2 pound wt gain overnight 129 lbs,  back on torsemide 20 mg daily, home weight up 3 pounds in the last week with decreased appetite and early satiety  -Patient also with chronic iron def again before bedtime     Continue all your same medications    Call if having any dizziness, lightheadedness, heart racing, palpitations, chest pain, shortness of breath, coughing,  wheezing, swelling, weight gain,  or weakness    Weigh yourself daily in t

## 2020-04-28 PROBLEM — I50.23 ACUTE ON CHRONIC SYSTOLIC HEART FAILURE (HCC): Status: ACTIVE | Noted: 2020-01-01

## 2020-04-28 PROBLEM — D50.0 IRON DEFICIENCY ANEMIA SECONDARY TO BLOOD LOSS (CHRONIC): Status: ACTIVE | Noted: 2020-01-01

## 2020-04-28 PROBLEM — I27.20 PROGRESSIVE PULMONARY HYPERTENSION (HCC): Status: ACTIVE | Noted: 2020-01-01

## 2020-04-29 NOTE — PROGRESS NOTES
[address] Patient Status:  No patient class for patient encounter    3/8/1932 MRN [id number]   Location 602 [facility] MD Dr. William Garner Dr. is a 80 ye Hospitalized 12/28-12/31/2019 for chest pain. Cardiac cause ruled out. Severe right shoulder pain r/t rotator cuff tear. Discharged home. Problem List:  Acute on chronic HFpEF with diastolic dysfunction, RV failure and severe pulmonary HTN.    CAD 10:42 AM     04/29/2020 10:42 AM    CO2 26.0 04/29/2020 10:42 AM     (H) 04/29/2020 10:42 AM    CA 9.1 04/29/2020 10:42 AM    ALB 3.4 12/28/2019 04:34 PM    ALKPHO 147 (H) 12/28/2019 04:34 PM    BILT 1.1 12/28/2019 04:34 PM    TP 7.5 12/28/201 diminished  mick Lower lobes   Chest wall: no tenderness  Heart: S1, S2 normal, no S3 or S4, irregular rate and rhythm,+ CARMELA  Abdomen: soft, non-tender; bowel sounds normal; no masses,  no organomegaly, mild abdominal bloating and soft reducable umbilical h home on 3- 3.5 L n/c, 02 sat 88-90% on r 3 Ln/c at rest, 02 sat up to 94% on 4 L n/c , pt breathing thru mouth   -home bp 101//56, HR 70-80's.   -continued significant fluid overload with KRAMER with minimal exertion,   -plan for iron infusions for  meq one tab daily     Continue torsemide 20 mg one tablet twice daily     Continue all your same medications    Call if having any dizziness, lightheadedness, heart racing, palpitations, chest pain, shortness of breath, coughing,  wheezing, swelling, weigh

## 2020-04-29 NOTE — PATIENT INSTRUCTIONS
Increase potassium chloride to 20 meq one tab daily     Continue torsemide 20 mg one tablet twice daily     Continue all your same medications    Call if having any dizziness, lightheadedness, heart racing, palpitations, chest pain, shortness of breath, co

## 2020-05-06 PROBLEM — I50.33 ACUTE ON CHRONIC DIASTOLIC HEART FAILURE (HCC): Status: ACTIVE | Noted: 2020-01-01

## 2020-05-06 PROBLEM — J44.9 CHRONIC OBSTRUCTIVE PULMONARY DISEASE, UNSPECIFIED COPD TYPE (HCC): Status: ACTIVE | Noted: 2020-01-01

## 2020-05-06 PROBLEM — I50.30 DIASTOLIC HEART FAILURE (HCC): Status: ACTIVE | Noted: 2020-01-01

## 2020-05-06 PROBLEM — I50.33 CHF (CONGESTIVE HEART FAILURE), NYHA CLASS IV, ACUTE ON CHRONIC, DIASTOLIC (HCC): Status: ACTIVE | Noted: 2020-01-01

## 2020-05-06 NOTE — ED NOTES
Patient care assumed from heart failure clinic. IV bumex running from clinic, patient on 4 liters O2 via nasal cannula. Per patient, he typically wears 2-3 liters, but had needed to raise it to 4 in the last three days.

## 2020-05-06 NOTE — PROGRESS NOTES
[address] Patient Status:  No patient class for patient encounter    3/8/1932 MRN [id number]   Location 602 [facility] MD Dr. Fany Taylor Dr. is a 80 ye Hospitalized 12/28-12/31/2019 for chest pain. Cardiac cause ruled out. Severe right shoulder pain r/t rotator cuff tear. Discharged home. Problem List:  Acute on chronic HFpEF with diastolic dysfunction, RV failure and severe pulmonary HTN.    CAD 04/29/2020 10:42 AM    K 3.0 (L) 04/29/2020 10:42 AM     04/29/2020 10:42 AM    CO2 26.0 04/29/2020 10:42 AM     (H) 04/29/2020 10:42 AM    CA 9.1 04/29/2020 10:42 AM    ALB 3.4 12/28/2019 04:34 PM    ALKPHO 147 (H) 12/28/2019 04:34 PM    BILT CARMELA  Abdomen: soft, non-tender; bowel sounds normal; no masses,  no organomegaly, mild abdominal bloating and soft reducable umbilical hernia  Extremities: no  LE edema  Pulses: 2+ and symmetric  Neurologic: Grossly normal    Diagnostic Tests:  ECHO: 2/202 sodium 138  - Pro-BNP up today 29964 <----11,202 from 10,739 <---17,000  <---20,000  - recommending admission for aggressive diuretics , discussed with partner Milly Arzola, Daughter and son.  Pt agreeable  - notified Dr. Tammi Smith of pt status and plan for ED/admissi

## 2020-05-06 NOTE — CONSULTS
[facility] AND [facility]  [facility] Cardiology Consult Note    Mack Columbia Patient Status:  Emergency    3/8/1932 MRN [id number]   Location [address] Attending Dejuan Werner MD   Hosp Day # 0 PCP Arnel Stout MD     [facility] to atorvastatin d/t interaction with diltiazem     CKD stage III - IV, stable  - BL cr.  1.4 - 1.6  - monitor closely while diuresing      PLAN  - bumex gtt at 2mg/hour  - echo to evaluate LV, RV, valves, any acute changes  - add CMP evaluate albumin  - enc 2/14/2018   • Hypothyroid    • Hypothyroidism, unspecified 1/10/2018   • Kyphosis of thoracic region 2/14/2018   • Lumbar compression fracture ([address] 2/8/2019   • MI, old    • Pulmonary hypertension ([address] 2/14/2018   • Right to left cardiac shunt ([address] 2/14 1430    Physical Exam:     General: Alert and oriented x 3. No apparent distress. No respiratory or constitutional distress. HEENT: Normocephalic, anicteric sclera, neck supple. Neck: No JVD, carotids 2+, no bruits. Cardiac: irreg irreg, +M.   Lungs: + w

## 2020-05-06 NOTE — H&P
[facility] Hospitalist H&P       CC: Patient presents with:  Dyspnea WAGNER SOB       PCP: Rosie Chan MD    History of Present Illness: 79 y/o m chf w dd, pulm htn s/p rhc 3/5/20, MR s/p mitral clip x3, chronic resp failure on 3-4L nc, afib s/p watchmans n CABG     • CATARACT     • CORONARY STENT PLACEMENT     • ESOPHAGOGASTRODUODENOSCOPY (EGD) N/A 11/20/2018    Performed by Monica Joyce MD at [facility] ENDOSCOPY   • ESOPHAGOGASTRODUODENOSCOPY (EGD) N/A 4/10/2017    Performed by Yaw Garvey MD at [facility] Temp 98 °F (36.7 °C) (Oral)   Resp (!) 27   Wt 132 lb 4.4 oz (60 kg)   SpO2 94%   BMI 25.83 kg/m²   General:  Alert, no distress   Head:  Normocephalic, without obvious abnormality, atraumatic.    Eyes:  Sclera anicteric, No conjunctival pallor, EOMs intac ASSESSMENT / PLAN:     chf w dd acute on chronic on torsemide outpt and off sildenafil  -on bumex drip here  -cxr w b/l chronic infiltrates    Copd cont inhalers: wife prefers anora, ok to use if she brings from home    Chronic resp failure on 4L n

## 2020-05-06 NOTE — ED PROVIDER NOTES
Patient Seen in: [facility] AND [facility] Emergency Department      History   Patient presents with:  Dyspnea WAGNER SOB    Stated Complaint: sob    HPI    27-year-old male with history of COPD and chronic diastolic heart failure as well as history of MitraClip an arthritis 2/14/2018   • Thoracic compression fracture ([facility] 2/14/2018   • Unspecified essential hypertension               Past Surgical History:   Procedure Laterality Date   • CABG     • CATARACT     • CORONARY STENT PLACEMENT     • ESOPHAGOGASTRODUODENO No distress. Head: Normocephalic and atraumatic. Eyes: Conjunctivae are normal. Pupils are equal, round, and reactive to light. Neck: Normal range of motion. Neck supple. Cardiovascular: Normal rate, regular rhythm and intact distal pulses.     Pulm DIFFERENTIAL WITH PLATELET.   Procedure                               Abnormality         Status                     ---------                               -----------         ------                     CBC W/ DIFFERENTIAL[[id number]]          Abnormal interstitial congestion    Dictated by (CST): Maxime Jalloh MD on 5/06/2020 at 2:14 PM     Finalized by (CST): Maxime Jalloh MD on 5/06/2020 at 2:19 PM          Xr Chest Ap Portable  (cpt=71045)    Result Date: 4/9/2020  PROCEDURE: XR CHEST AP central upper abdomen. CONCLUSION:  1. Radiographic findings of COPD with extensive interstitial airspace disease which may reflect pulmonary interstitial edema or underlying interstitial lung disease.   2. Blunting of the left costophrenic angle,

## 2020-05-07 NOTE — CM/SW NOTE
Per RN rounds - pt is from home w/ his life partner. Pt typically wears 2-3L O2 at home. At the time of RN rounds - pt was on 5-6L O2.    SW attempted to see pt multiple times for home evaluation and to initiate d/c planning.      Per chart review: pt is no

## 2020-05-07 NOTE — PROGRESS NOTES
I saw this patient informally around noon today. He was weak but not dyspneic. There was no chest pain. Vital signs were stable and oxygenation was adequate.     As described in the nurse's notes, the patient developed fairly abrupt onset of labored dominic

## 2020-05-07 NOTE — CONSULTS
Critical Care H&P/Consult     NAME: Jasbir Colón - ROOM: [address] [place] [address] - MRN: [id number] - Age: 80year old - :  3/8/1932    Date of Admission: 2020  1:02 PM  Admission Diagnosis: Acute on chronic diastolic heart failure ([facility] [I50.33]  Pulmonary hyp heart disease ([facility]    • COPD (chronic obstructive pulmonary disease) ([facility]     on home oxygen    • Coronary atherosclerosis    • Disorder of thyroid    • Fecal impaction ([facility] 11/18/2018   • Gout    • Heart attack ([facility]    • High blood pressure    • High since quittin.3      Smokeless tobacco: Former User    Alcohol use: No      Comment: etoh abuse quit     Family History:  He indicated that his mother is . He indicated that his father is .       Allergies:  Coumadin [Warfarin] infusion 2 mg/hr (05/07/20 0949)     Review of systems:  12 systems reviewed, negative except as stated in HPI    Objective:     Intake/Output Summary (Last 24 hours) at 5/7/2020 1347  Last data filed at 5/7/2020 1205  Gross per 24 hour   Intake 566.67 ml CK in the last 168 hours. Imaging: I independently visualized all relevant chest imaging in PACS, agree with radiology interpretation except where noted.

## 2020-05-07 NOTE — PLAN OF CARE
Bumex drip continues. Diuresing well. Problem: Patient Centered Care  Goal: Patient preferences are identified and integrated in the patient's plan of care  Description  Interventions:  - What would you like us to know as we care for you?  Home with wife of pain and evaluate response  - Implement non-pharmacological measures as appropriate and evaluate response  - Consider cultural and social influences on pain and pain management  - Manage/alleviate anxiety  - Utilize distraction and/or relaxation techniq

## 2020-05-07 NOTE — PROGRESS NOTES
[facility] Hospitalist Progress Note     CC: Hospital Follow up    PCP: Grecia Santos MD       Assessment/Plan:     Principal Problem:    Acute on chronic diastolic heart failure ([address]  Active Problems:    Pulmonary hypertension (HCC)    Chronic obstructive Last 3 Weights  05/07/20 0408 : 124 lb 12.8 oz (56.6 kg)  05/06/20 1846 : 129 lb 11.2 oz (58.8 kg)  05/06/20 1239 : 132 lb 4.4 oz (60 kg)  05/06/20 1007 : 131 lb (59.4 kg)  04/29/20 1041 : 128 lb (58.1 kg)      Exam   GEN: NAD  HEENT: EZEQUIEL LEWIS HCl  10 mg Oral TID   • Levothyroxine Sodium  150 mcg Oral Before breakfast   • Montelukast Sodium  10 mg Oral Nightly   • atorvastatin  20 mg Oral Nightly   • docusate sodium  100 mg Oral BID     • bumetanide (BUMEX) 0.125 mg/ml infusion 2 mg/hr (05/07/20

## 2020-05-07 NOTE — PROGRESS NOTES
Pt arrives to room 202 accompanied by RN and transporter. VSS. Pt reports breathing is \"bad\". Sats 96% placed on 10L via NRB.

## 2020-05-07 NOTE — PROGRESS NOTES
[facility]    Cardiology Progress Note    Tasha Bowling Patient Status:  Inpatient    3/8/1932 MRN [id number]   Location [facility] 3W/SW Attending Shellie Crigler, MD   Hosp Day # 1 PCP Sonny Nageotte, MD     80year old male, c resumed  Vitals are stable. On exam he is fluid overloaded. +JVD, +LE edema. IRIR. Lungs BL decreased BS and wheezing. Tele with AF. Agree with resumption of bumex gtt as above. Would hold off on sidlenafil today. Continue to wean O2 as tolerated.  Rapid S (L) 05/07/2020    ALKPHO 111 05/07/2020    BILT 1.2 05/07/2020    TP 7.1 05/07/2020    AST 20 05/07/2020    ALT 16 05/07/2020    PTT 40.9 (H) 04/09/2020    INR 1.43 (H) 04/09/2020    PT 31.8 (H) 09/26/2016    T4F 1.4 06/06/2016    TSH 0.94 11/22/2018    LI

## 2020-05-07 NOTE — PROGRESS NOTES
RRT    *See RRT Documentation Record*    Reason the RRT was called: Patient breathing was labored upon entering the room. Pulse ox applied and patient was at 60% O2. Increasing O2 needs.   Assessment of patient leading up to RRT: drowsiness/increased O2 nee

## 2020-05-07 NOTE — PROGRESS NOTES
Received patient was RRT on floor placed on 10L NRB and placed on continuous cardiac monitor. Patient denied any CP however trouble breathing. Sats 96%, placed in high owusu position.

## 2020-05-08 PROBLEM — Z71.89 ADVANCE CARE PLANNING: Status: ACTIVE | Noted: 2020-01-01

## 2020-05-08 PROBLEM — Z71.89 GOALS OF CARE, COUNSELING/DISCUSSION: Status: ACTIVE | Noted: 2020-01-01

## 2020-05-08 NOTE — PROGRESS NOTES
[facility] CLINICS  Progress Note    [facility] Patient Status:  Inpatient    3/8/1932 MRN [id number]   Location [facility] 2W/SW Attending Jessica Alexis MD   [address] Day # 2 PCP Saeid Estevez MD     Assessment:    1.   Acute on chronic respi wheezing. Normal excursions and effort. Abdomen: Soft, non-tender. Extremities: Without clubbing, cyanosis or edema. Peripheral pulses are 2+. Skin: Warm and dry.      Labs:  Lab Results   Component Value Date    WBC 7.0 05/08/2020    HGB 9.5 05/08/20

## 2020-05-08 NOTE — CONSULTS
[address] [place] [address] Patient Status:  Inpatient    3/8/1932 MRN [id number]   Location [facility] 2W/SW Attending Kojo Yang MD   [address] Day # 2 PCP Joao Car MD     Date of [facility] diastolic CHF (congestive heart failure) ([facility] 12/3/2018   • Anemia due to stage 3 chronic kidney disease ([facility] 9/25/2019   • Arrhythmia     Afib   • Arthritis of lumbar spine 2/14/2018   • Asthma    • Atrial fibrillation (HCC)    • Morris esophagus    • ENDOSCOPY   • MITRACLIP N/A 8/31/2018    Performed by Stephania Ritter MD at [address] N/A 7/29/2016    Performed by Stephania Ritter MD at [address] [place] [address] device   • REPAIR  ANAL FISTULA,RECT ADV FLAP     • TRANSCA Daily  •  digoxin (LANOXIN) tab 125 mcg, 125 mcg, Oral, Once per day on Mon Wed Fri  •  diltiazem (CARDIZEM CD) 24 hr cap 120 mg, 120 mg, Oral, Daily  •  hydrALAzine HCl (APRESOLINE) tab 10 mg, 10 mg, Oral, TID  •  HYDROcodone-acetaminophen (Beola Gobble) 5-325 M 26.0 05/08/2020     (H) 05/08/2020    CA 9.2 05/08/2020    ALB 2.9 (L) 05/07/2020    ALKPHO 111 05/07/2020    BILT 1.2 05/07/2020    TP 7.1 05/07/2020    AST 20 05/07/2020    ALT 16 05/07/2020    DDIMER 0.75 05/07/2020    MG 2.3 05/08/2020    PHOS 3 gallop. Lungs: Diminished bilaterally. Normal excursions and effort. Abdomen: Soft, non-tender, normal bowel sounds X 4 quadrants, no rebound or guarding  Extremities: Without clubbing, cyanosis. Peripheral pulses are 2+. Trace BLE Edema   Neurologic:  Al much discussion today asked me to keep my visit short. He says he is too tired to talk and gets dyspneic with talking. He closed his eyes and would not engage in further conversation. He gave me permission to call his dtr.  I did not address code status wit +resuscitation.   -Pt was not receptive to palliative care/GOC today-discussed with his dtr and will attempt again on Monday  -Agreeable to palliative care following  -Dispo: TBD home vs DUSTIN and recommend community palliative care to follow if pt agreeable

## 2020-05-08 NOTE — PROGRESS NOTES
[facility] Hospitalist Progress Note     CC: Hospital Follow up    PCP: Emeterio Day MD       Assessment/Plan:     Principal Problem:    Acute on chronic diastolic heart failure ([address]  Active Problems:    Pulmonary hypertension (HCC)    Chronic obstructive Weights  05/08/20 0513 : 120 lb 3.2 oz (54.5 kg)  05/07/20 0408 : 124 lb 12.8 oz (56.6 kg)  05/06/20 1846 : 129 lb 11.2 oz (58.8 kg)  05/06/20 1239 : 132 lb 4.4 oz (60 kg)  05/06/20 1007 : 131 lb (59.4 kg)  04/29/20 1041 : 128 lb (58.1 kg)      Exam   GEN: Portable  (cpt=71045)    Result Date: 5/6/2020  CONCLUSION:  1. Cardiomegaly. CABG. Several mitral-clips visualized. 2. Diffuse pulmonary interstitial prominence /fibrosis . 3.  Superimposed pulmonary venous distention and or chronic pulmonary interstiti

## 2020-05-08 NOTE — SLP NOTE
ADULT SWALLOWING EVALUATION    ASSESSMENT    ASSESSMENT/OVERALL IMPRESSION:  PPE REQUIRED. THIS SLP WORE GOWN, GLOVES, AND DROPLET MASK. HANDS SANITIZED/WASHED UPON ENTRANCE/EXIT. SLP BSSE orders received and acknowledged.  Per MD note, pt with severe u CSA with thin liquids. Results and recommendations reviewed with RN. SLP collaborated with RN for MD diet orders.    FCM SCORE: 5/7       RECOMMENDATIONS   Diet Recommendations - Solids: Mechanical soft chopped  Diet Recommendations - Liquid: Thin(No Straw 11/18/2018   • Gout    • Heart attack ([facility]    • High blood pressure    • High cholesterol    • History of gastrointestinal bleeding 1/10/2018   • Hx of CABG 2/14/2018   • Hypothyroid    • Hypothyroidism, unspecified 1/10/2018   • Kyphosis of thoracic eyal O2;Nasal cannula  Consistencies Trialed: Thin liquids; Nectar thick liquids;Puree; Soft solid;Hard solid  Method of Presentation: Self presentation;Single sips;Straw;Spoon;Cup  Patient Positioning: Upright;Midline    Oral Phase of Swallow: Impaired  Bolus Re

## 2020-05-08 NOTE — PROGRESS NOTES
Critical Care Progress Note     Assessment / Plan:  1. Acute on chronic respiratory failure - DDx includes pulm edema vs AECOPD. Less likely PNA. Rapid SARS-COV-2 test negative.  D-dimer is normal when adjusted for age  - wean O2 as able, on HFNC  - bumex g

## 2020-05-08 NOTE — PLAN OF CARE
Problem: CARDIOVASCULAR - ADULT  Goal: Maintains optimal cardiac output and hemodynamic stability  Description  INTERVENTIONS:  - Monitor vital signs, rhythm, and trends  - Monitor for bleeding, hypotension and signs of decreased cardiac output  - Evalua optimal ventilation and oxygenation  Description  INTERVENTIONS:  - Assess for changes in respiratory status  - Assess for changes in mentation and behavior  - Position to facilitate oxygenation and minimize respiratory effort  - Oxygen supplementation bas

## 2020-05-08 NOTE — PLAN OF CARE
Patient alert, drowsy, forgetful. Resting in bed throughout shift. Oxygenating on 5L HFNC, dyspnea at rest and tachypneic. Left nare nose bleeds. Afebrile. HR Afib with frequent PVCs. Potassium replaced. BP stable. Poor appetite.  Swallow evaluation, choppe CARDIOVASCULAR - ADULT  Goal: Maintains optimal cardiac output and hemodynamic stability  Description  INTERVENTIONS:  - Monitor vital signs, rhythm, and trends  - Monitor for bleeding, hypotension and signs of decreased cardiac output  - Evaluate effectiv changes in respiratory status  - Assess for changes in mentation and behavior  - Position to facilitate oxygenation and minimize respiratory effort  - Oxygen supplementation based on oxygen saturation or ABGs  - Provide Smoking Cessation handout, if applic

## 2020-05-09 NOTE — PLAN OF CARE
Patient in bed. Called several times over night requesting something to drink despite fluid restriction. States he wants ice cold liquids. Reminded of fluid restriction and states \"I know\". Updated Patient's significant other, Arley Martinez.  Patient has call [facility] to participate in care and decision-making at the level they choose  - Honor patient and family perspectives and choices  Outcome: Not Progressing     Problem: Patient/Family Goals  Goal: Patient/Family Long Term Goal  Description  Patient's Long Term Goal non-pharmacological measures as appropriate and evaluate response  - Consider cultural and social influences on pain and pain management  - Manage/alleviate anxiety  - Utilize distraction and/or relaxation techniques  - Monitor for opioid side effects  - N

## 2020-05-09 NOTE — PROGRESS NOTES
[facility] Hospitalist Progress Note     CC: Hospital Follow up    PCP: Shruthi Borrero MD       Assessment/Plan:     Principal Problem:    Acute on chronic diastolic heart failure ([address]  Active Problems:    Pulmonary hypertension (HCC)    Chronic obstructive 119 lb 14.4 oz (54.4 kg)  05/08/20 0513 : 120 lb 3.2 oz (54.5 kg)  05/07/20 0408 : 124 lb 12.8 oz (56.6 kg)  05/06/20 1846 : 129 lb 11.2 oz (58.8 kg)  05/06/20 1239 : 132 lb 4.4 oz (60 kg)  05/06/20 1007 : 131 lb (59.4 kg)  04/29/20 1041 : 128 lb (58.1 kg) MD on 5/07/2020 at 3:54 PM     Finalized by (CST): Cassandra Payne MD on 5/07/2020 at 3:57 PM          Xr Chest Ap Portable  (cpt=71045)    Result Date: 5/6/2020  CONCLUSION:  1. Cardiomegaly. CABG. Several mitral-clips visualized.  2. Diffuse pulmonary in

## 2020-05-09 NOTE — CM/SW NOTE
SW received MDO for completion of advanced directives. Per chart review, no previous documents scanned into [employer] for [address] [facility] records.      ARJUN spoke to RN/Ritesh who states that patient is confused/forgetful at times and may not be appropriate for completion of

## 2020-05-09 NOTE — PROGRESS NOTES
Critical Care Progress Note     Assessment / Plan:  1. Acute on chronic respiratory failure - DDx includes pulm edema vs AECOPD. Less likely PNA. Rapid SARS-COV-2 test negative.  D-dimer is normal when adjusted for age  - wean O2 as able, on HFNC, decreasin

## 2020-05-09 NOTE — PROGRESS NOTES
[facility] AND CLINICS  Progress Note    Ana Dunham Patient Status:  Inpatient    3/8/1932 MRN [id number]   Location [facility] 2W/SW Attending Ronan Arriaga MD   [facility] Day # 3 PCP Carie Ocampo MD     Assessment:    1.   Greater than 6 L diure bruits. Cardiac: Regular rate and rhythm, S1, S2 normal, 2/6 end systolic murmur at apex. Lungs: Clear without wheezes, rales, rhonchi. Normal excursions and effort. Abdomen: Soft, non-tender. Extremities: No edema.   Peripheral pulses are diminished

## 2020-05-10 NOTE — PLAN OF CARE
Problem: Patient Centered Care  Goal: Patient preferences are identified and integrated in the patient's plan of care  Description  Interventions:  - What would you like us to know as we care for you?   - Provide timely, complete, and accurate informatio Assess for signs of decreased coronary artery perfusion - ex.  Angina  - Evaluate fluid balance, assess for edema, trend weights  5/10/2020 0213 by Gillian Chopra, RN  Outcome: Progressing  5/10/2020 0211 by Gillian Chopra, RN  Outcome: Progressing  G saturation or ABGs  - Provide Smoking Cessation handout, if applicable  - Encourage broncho-pulmonary hygiene including cough, deep breathe, Incentive Spirometry  - Assess the need for suctioning and perform as needed  - Assess and instruct to report SOB o

## 2020-05-10 NOTE — PROGRESS NOTES
[facility]    Progress Note    Eufemia Delong Patient Status:  Inpatient    3/8/1932 MRN [id number]   Location [facility] 3W/SW Attending Melida Thomas MD   [facility] Day # 4 PCP Dylan Gunter MD         CARDIOLOGY ATTENDING digoxin  -s/p watchman device    COPD on home O2  -per pulmonary  -being treated for exacerbation  -back on home o2 level    Plan  Ok to hold diuretics today as patient appears mildly dry. Encourage PO intake. Reassess tomorrow.         Results:     Lab Res

## 2020-05-10 NOTE — PLAN OF CARE
VSS. Pt drowsy and weak, sleeping most of the day. Poor appetite, continue to encourage. Pt reports being thirsty, often asking for cold drinks. Will continue to monitor pt.      Problem: Patient Centered Care  Goal: Patient preferences are identified and i weights  Outcome: Progressing  Goal: Absence of cardiac arrhythmias or at baseline  Description  INTERVENTIONS:  - Continuous cardiac monitoring, monitor vital signs, obtain 12 lead EKG if indicated  - Evaluate effectiveness of antiarrhythmic and heart rat Problem: Impaired Functional Mobility  Goal: Achieve highest/safest level of mobility/gait  Description  Interventions:  - Assess patient's functional ability and stability  - Promote increasing activity/tolerance for mobility and gait  - Educate and eng

## 2020-05-10 NOTE — PROGRESS NOTES
[facility] Hospitalist Progress Note     CC: Hospital Follow up    PCP: Phoebe Galarza MD       Assessment/Plan:     Principal Problem:    Acute on chronic diastolic heart failure ([address]  Active Problems:    Pulmonary hypertension (HCC)    Chronic obstructive Output 940 ml   Net -190 ml       Last 3 Weights  05/10/20 0510 : 118 lb 12.8 oz (53.9 kg)  05/09/20 0200 : 119 lb 14.4 oz (54.4 kg)  05/08/20 0513 : 120 lb 3.2 oz (54.5 kg)  05/07/20 0408 : 124 lb 12.8 oz (56.6 kg)  05/06/20 1846 : 129 lb 11.2 oz (58.8 underlying interstitial edema which is worsening. Small left pleural effusion. Follow-up studies are advised.     Dictated by (CST): Coreen Bell MD on 5/07/2020 at 3:54 PM     Finalized by (CST): Coreen Bell MD on 5/07/2020 at 3:57 PM              M

## 2020-05-10 NOTE — CM/SW NOTE
SW received notification from [facility], pt is current with their services, will need resume orders at discharge. SW/CM to remain available for support and/or discharge planning.      [address], [place] [id number]

## 2020-05-11 NOTE — PALLIATIVE CARE NOTE
[facility]  Palliative Care Follow Up    Nuala Shown Patient Status:  Inpatient    3/8/1932 MRN [id number]   Location [facility] 3W/SW Attending Sisi Serrano MD   Hosp Day # 5 PCP Maren De Paz MD     Date of Consult 10/2016  Eliquis [Apixaban]      BLEEDING  Pradaxa [Dabigatran*    BLEEDING    Medications:     Current Facility-Administered Medications:   •  predniSONE (DELTASONE) tab 40 mg, 40 mg, Oral, Daily with breakfast  •  Piperacillin Sod-Tazobactam So Results   Component Value Date    PT 31.8 (H) 09/26/2016    INR 1.43 (H) 04/09/2020    PTT 40.9 (H) 04/09/2020       Chemistry:  Lab Results   Component Value Date    CREATSERUM 1.92 (H) 05/11/2020    BUN 76 (H) 05/11/2020     05/11/2020    K 4.4 05/ (dtr)  Healthcare Agent's Phone Number: [phone]          Spiritual needs addressed: Patient/family declined Spiritual Care    Disposition: ongoing goals of care discussions      Assessment/Recommendations:     Acute on chronic hypoxemic respiratory fa

## 2020-05-11 NOTE — PROGRESS NOTES
Critical Care Progress Note     Assessment / Plan:  1. Acute on chronic respiratory failure - DDx includes pulm edema vs AECOPD. Less likely PNA. Rapid SARS-COV-2 test negative.  D-dimer is normal when adjusted for age  - wean O2 as able, stable oxygen requ

## 2020-05-11 NOTE — PHYSICAL THERAPY NOTE
PHYSICAL THERAPY EVALUATION - INPATIENT     Room Number: 327/327-A  Evaluation Date: 5/11/2020  Type of Evaluation: Initial   Physician Order: PT Eval and Treat    Presenting Problem: CHF  Reason for Therapy: Mobility Dysfunction and Discharge Planning from continued IP PT services to address these deficits in preparation for discharge. DISCHARGE RECOMMENDATIONS  PT Discharge Recommendations: 24 hour care/supervision;Sub-acute rehabilitation    PLAN  PT Treatment Plan: Bed mobility; Body mechanics; Kari • Pulmonary hypertension ([facility] 2/14/2018   • Right to left cardiac shunt ([address] 2/14/2018   • Secondary hyperparathyroidism ([facility] [facility] 9/25/2019   • Shortness of breath    • Thoracic aorta atherosclerosis ([facility] [address] [facility] 2/14/2018   • Thoracic arthritis 2/14/2018   •  RESTRICTION  Weight Bearing Restriction: None                PAIN ASSESSMENT  Ratin          COGNITION  · Overall Cognitive Status:  WFL - within functional limits    RANGE OF MOTION AND STRENGTH ASSESSMENT    Lower extremity ROM is within functional l demonstrate transfers Sit to/from Stand at assistance level: modified independent with walker - rolling     Goal #2  Current Status    Goal #3 Patient is able to ambulate 100 feet with assist device: walker - rolling at assistance level: supervision   Goal

## 2020-05-11 NOTE — PROGRESS NOTES
[facility]    Progress Note    Tara Corona Patient Status:  Inpatient    3/8/1932 MRN [id number]   Location [facility] 3W/SW Attending Virginia Hidalgo MD   [facility] Day # 5 PCP Sonali Arnold MD           Subjective:     Respir hold diuretics today. Resume torsemide 20 mg BID tomorrow. Will need HF clinic follow up.   Plan for dc to rehab when ready    Discussed with APN/nursing staff    Jasper العراقي MD  Advanced Heart Failure  [facility]        Results:     Lab Results

## 2020-05-11 NOTE — PLAN OF CARE
Problem: Patient Centered Care  Goal: Patient preferences are identified and integrated in the patient's plan of care  Description  Interventions:  - What would you like us to know as we care for you?   - Provide timely, complete, and accurate informatio indicated  - Evaluate effectiveness of antiarrhythmic and heart rate control medications as ordered  - Initiate emergency measures for life threatening arrhythmias  - Monitor electrolytes and administer replacement therapy as ordered  Outcome: Progressing increasing activity/tolerance for mobility and gait  - Educate and engage patient/family in tolerated activity level and precautions  {Additional Mobility Interventions:  Outcome: Progressing     Problem: Impaired Activities of Daily Living  Goal: Achieve

## 2020-05-11 NOTE — PLAN OF CARE
VSS. Patient still gets SOB with activity, but seems to have more energy today. Up in the chair for lunch, dangled for dinner. Able to feed self. Pt only c/o dry mouth, asking for cold drinks often. Will continue to monitor pt.     Problem: Patient Centered balance, assess for edema, trend weights  Outcome: Progressing  Goal: Absence of cardiac arrhythmias or at baseline  Description  INTERVENTIONS:  - Continuous cardiac monitoring, monitor vital signs, obtain 12 lead EKG if indicated  - Evaluate effectivenes retention  Outcome: Progressing     Problem: Impaired Functional Mobility  Goal: Achieve highest/safest level of mobility/gait  Description  Interventions:  - Assess patient's functional ability and stability  - Promote increasing activity/tolerance for mo

## 2020-05-11 NOTE — PROGRESS NOTES
[facility] Hospitalist Progress Note     CC: Hospital Follow up    PCP: Carie Ocampo MD       Assessment/Plan:     Principal Problem:    Acute on chronic diastolic heart failure ([address]  Active Problems:    Pulmonary hypertension (HCC)    Chronic obstructive 20  BP: (109-117)/(55-69) 111/69      Intake/Output:    Intake/Output Summary (Last 24 hours) at 5/11/2020 1727  Last data filed at 5/11/2020 1706  Gross per 24 hour   Intake 1863 ml   Output 870 ml   Net 993 ml       Last 3 Weights  05/11/20 0521 : 120 lb breakfast   • piperacillin-tazobactam  3.375 g Intravenous Q8H   • Heparin Sodium (Porcine)  5,000 Units Subcutaneous [facility]   • allopurinol  100 mg Oral BID   • umeclidinium-vilanterol  1 puff Inhalation Daily   • Clopidogrel Bisulfate  75 mg Oral Daily

## 2020-05-11 NOTE — CERTIFICATION
Case Management/ Progression of Care    MDO received for discharge, Planning and community Palliative Care after discharge,   This CM met with patient at the bedside, to review   DISCHARGE RECOMMENDATIONS  By therapy for:  PT Discharge Recommendations: 24

## 2020-05-11 NOTE — SLP NOTE
SPEECH DAILY NOTE - INPATIENT    ASSESSMENT & PLAN   ASSESSMENT  Patient seen to monitor tolerance of PO diet and train compensatory strategies. Pt afebrile on 3L HF at 99%, sitting upright in bed.   RN reported the patient's diet was never altered for no Administration Recommendations: Crushed in puree    Patient Experiencing Pain: No                Discharge Recommendations  Discharge Recommendations/Plan: Undetermined    Treatment Plan  Treatment Plan/Recommendations: Aspiration precautions; Dysphagia the

## 2020-05-12 NOTE — PROGRESS NOTES
[facility] Pharmacy Note:  Renal Adjustment for piperacillin/tazobactam (Maciel Perez)    Jasbir Colón is a 80year old male who has been prescribed piperacillin/tazobactam (ZOSYN) 3.375 gm every 8 hrs.   CrCl is estimated creatinine clearance is 20.3 mL/min (A) (base

## 2020-05-12 NOTE — PROGRESS NOTES
[facility] Hospitalist Progress Note     CC: Hospital Follow up    PCP: Patrick Kaur MD       Assessment/Plan:     Principal Problem:    Acute on chronic diastolic heart failure ([address]  Active Problems:    Pulmonary hypertension (HCC)    Chronic obstructive °C)  Pulse:  [61-77] 77  Resp:  [18-19] 18  BP: (108-113)/(51-58) 108/51      Intake/Output:    Intake/Output Summary (Last 24 hours) at 5/12/2020 1822  Last data filed at 5/12/2020 1300  Gross per 24 hour   Intake 660 ml   Output 750 ml   Net -90 ml • allopurinol  100 mg Oral BID   • umeclidinium-vilanterol  1 puff Inhalation Daily   • Clopidogrel Bisulfate  75 mg Oral Daily   • digoxin  125 mcg Oral Once per day on Mon Wed Fri   • dilTIAZem HCl ER Beads  120 mg Oral Daily   • Levothyroxine Sodium

## 2020-05-12 NOTE — PROGRESS NOTES
[facility]    Progress Note    Giuliano Miller Patient Status:  Inpatient    3/8/1932 MRN [id number]   Location [facility] 3W/SW Attending Eufemia Boo MD   [facility] Day # 6 PCP Delphine Lagunas MD           Subjective:     Respir Lab Results   Component Value Date    WBC 12.4 (H) 05/12/2020    HGB 8.4 (L) 05/12/2020    HCT 27.9 (L) 05/12/2020    .0 05/12/2020    CREATSERUM 1.94 (H) 05/12/2020    BUN 75 (H) 05/12/2020     05/12/2020    K 4.2 05/12/2020     05/

## 2020-05-12 NOTE — PROGRESS NOTES
Pulmonary Progress Note     Assessment / Plan:  1. Acute on chronic respiratory failure - DDx includes pulm edema vs AECOPD. Less likely PNA. Rapid SARS-COV-2 test negative.  D-dimer is normal when adjusted for age  - wean O2 as able, stable oxygen requirem

## 2020-05-12 NOTE — CM/SW NOTE
Case Management/ Progression of Care    Received a call from Jeremiah Cummings Girlfriend, [phone]. Patient will need a larger home concentrator  in case patient decides to go home vs. Sub Acute Rehabilitation.      Patient and family have concerns about going to

## 2020-05-13 NOTE — PLAN OF CARE
PT on IV zosyn and PO bumex. Sats are 90-93% on RA. Pt was having bloody noses per dayshift and heparin held. One occurrence of a small bloody nose this evening, stopped quickly with pressure. Gauze and small amount of vaseline in nostril.  Mostly dried blo stability  - Monitor arterial and/or venous puncture sites for bleeding and/or hematoma  - Assess quality of pulses, skin color and temperature  - Assess for signs of decreased coronary artery perfusion - ex.  Angina  - Evaluate fluid balance, assess for ed Assess the need for suctioning and perform as needed  - Assess and instruct to report SOB or any respiratory difficulty  - Respiratory Therapy support as indicated  - Manage/alleviate anxiety  - Monitor for signs/symptoms of CO2 retention  Outcome: Irlanda

## 2020-05-13 NOTE — PHYSICAL THERAPY NOTE
PHYSICAL THERAPY TREATMENT NOTE - INPATIENT     Room Number: 327/327-A       Presenting Problem: CHF    Problem List  Principal Problem:    Acute on chronic diastolic heart failure ([facility] [facility]  Active Problems:    Pulmonary hypertension (HCC)    Chronic obstruct Discharge Recommendations: Home with home health PT;24 hour care/supervision     PLAN  PT Treatment Plan: Bed mobility; Endurance; Energy conservation;Patient education;Gait training;Neuromuscular re-educate;Strengthening;Transfer training;Balance training (increased time in double limb support, very slow gait )  Stoop/Curb Assistance: Not tested      Patient End of Session: Up in chair;Needs met;Call light within reach;RN aware of session/findings; All patient questions and concerns addressed; Alarm set    CU

## 2020-05-13 NOTE — PLAN OF CARE
Patient ambulated from chair to bathroom on room air. Desaturated to 76 percent during ambulation and remained 80 percent at rest. O2 applied at 2 liters and saturation went up to 93 percent at rest and remained 91-93 with ambulation.

## 2020-05-13 NOTE — SLP NOTE
SPEECH DAILY NOTE - INPATIENT    ASSESSMENT & PLAN   ASSESSMENT  PPE REQUIRED. THIS THERAPIST WORE DROPLET MASK AND GLOVES FOR DURATION OF THERAPY SESSION. HANDS WASHED UPON ENTRANCE/EXIT.     SLP f/u for ongoing meal assessment per recommendations of zayra bites and sips; Alternate consistencies;Multiple swallows; No straws; Extra sauce/gravy  Aspiration Precautions: Upright position; Slow rate;Small bites and sips; No straw  Medication Administration Recommendations: Crushed in puree    Patient Experiencing Pain Date: 05/12/20  Number of Visits to Meet Established Goals: 2    Session: 2 following BSE    If you have any questions, please contact Santana Grimes [id number] [facility]  Speech Language Pathologist  Phone Number Ext. [phone]

## 2020-05-13 NOTE — PLAN OF CARE
Problem: Patient Centered Care  Goal: Patient preferences are identified and integrated in the patient's plan of care  Description  Interventions:  - What would you like us to know as we care for you?  Home with significant other.   - Provide timely, comp obtain 12 lead EKG if indicated  - Evaluate effectiveness of antiarrhythmic and heart rate control medications as ordered  - Initiate emergency measures for life threatening arrhythmias  - Monitor electrolytes and administer replacement therapy as ordered stability  - Promote increasing activity/tolerance for mobility and gait  - Educate and engage patient/family in tolerated activity level and precautions    Outcome: Progressing     Problem: Impaired Activities of Daily Living  Goal: Achieve highest/safest

## 2020-05-13 NOTE — CM/SW NOTE
05/13/20 1100   Discharge disposition   Expected discharge disposition Home-Health   Additional Home Care/Hospice Provider Residential   Discharge transportation [address] Stuart MDO received for discharge, Patient discharging Home and declining [facility]

## 2020-05-13 NOTE — PLAN OF CARE
Patient ambulating in the room today with 1 person assist to stand then SBA with the walker. 2 episodes of nose bleeds observed- minimal bleeding which was able to be stopped with gauze/ slight pressure. Patient instructed to stop picking at his nose.  Eliel Frias Monitor for bleeding, hypotension and signs of decreased cardiac output  - Evaluate effectiveness of vasoactive medications to optimize hemodynamic stability  - Monitor arterial and/or venous puncture sites for bleeding and/or hematoma  - Assess quality of ABGs  - Provide Smoking Cessation handout, if applicable  - Encourage broncho-pulmonary hygiene including cough, deep breathe, Incentive Spirometry  - Assess the need for suctioning and perform as needed  - Assess and instruct to report SOB or any respirat

## 2020-05-13 NOTE — PROGRESS NOTES
1st attempt SCC/HF apt request    [facility]  [address] [facility]  [phone]    LVM for CB

## 2020-05-14 NOTE — ED INITIAL ASSESSMENT (HPI)
Arrived with EMS from home for c/o episode of dizziness which began suddenly after arising from couch. Also c/o some mild dyspnea. Recent hx of treatment for CHF - discharged home yesterday.

## 2020-05-14 NOTE — ED PROVIDER NOTES
Patient Seen in: [facility] AND [facility] Emergency Department      History   Patient presents with:  Dyspnea WAGNER SOB    Stated Complaint: dyspnea, dizziness    HPI    History is provided by patient and patient's wife.     80-year-old male who was recently in th hyperparathyroidism ([facility] 9/25/2019   • Shortness of breath    • Thoracic aorta atherosclerosis ([facility] 2/14/2018   • Thoracic arthritis 2/14/2018   • Thoracic compression fracture ([facility] 2/14/2018   • Unspecified essential hypertension               Past Christianne frequency. Musculoskeletal: Negative for back pain. Skin: Negative for rash. Neurological: Negative for weakness, light-headedness and headaches. All other systems reviewed and are negative.       Positive for stated complaint: dyspnea, dizziness  O ED Course   EKG: rate 82, rhythm afib, axis right, ST depressions in V4-6  Interpretation: normal for rate, abnormal for rhythm    EKG: rate 72, rhythm afib, axis right, ST depressions in V4-6  Interpretation: normal for rate, abnormal for rhyt 91.8 80.0 - 100.0 fL    MCH 28.0 26.0 - 34.0 pg    MCHC 30.6 (L) 31.0 - 37.0 g/dL    RDW-SD 62.9 (H) 35.1 - 46.3 fL    RDW 19.0 (H) 11.0 - 15.0 %    .0 150.0 - 450.0 10(3)uL    Neutrophil Absolute Prelim 13.01 (H) 1.50 - 7.70 x10 (3) uL   MANUAL DIF by (CST): Anna Moe MD on 5/14/2020 at 6:58 PM     Finalized by (CST): Anna Moe MD on 5/14/2020 at 7:00 PM              [address] [place] [address] AND TREATMENT:  Patient's condition was stable during Emergency Department eval provider within the next three months to obtain basic health screening including reassessment of your blood pressure.       Medications Prescribed:  Current Discharge Medication List

## 2020-05-15 NOTE — ED NOTES
Discharge instructions reviewed with pt. Pt denies any further questions at this time. Pt understands to follow up with PCP. Pt understands to return to ED for worsening symptoms.

## 2020-05-15 NOTE — DISCHARGE SUMMARY
[facility] Internal Medicine Discharge Summary   Patient ID:  Mahendra Bahena  [id number]  80year old  3/8/1932    Admit date: 5/6/2020    Discharge date and time: 5/13/2020  3:08 PM     Attending Physician: No att. providers found     Primary Care Physician:  Mary Ponce Tabs  Commonly known as:  ZYLOPRIM  Take 1 tablet (100 mg total) by mouth 2 (two) times daily.      Anoro Ellipta 62.5-25 MCG/INH Aepb  Generic drug:  umeclidinium-vilanterol  INHALE 1 PUFF INTO THE LUNGS DAILY     Clopidogrel Bisulfate 75 MG Tabs  Commonly drip, did not tolerate sildenafil so off, no cough/f/c/s, no cp, no n/v/d, wt up 3lbs  [facility] course  Mr Bibi Magana is an 79 yo with history of COPD, chronic resp failure who presented with actue on chronic diastolic heart failure exacerbation.   Also mike decrease in the amount of interstitial edema/infiltrate throughout both lungs as compared to prior study. Left basilar pleural effusion has resolved. BONES: No fracture or visible bony lesion. OTHER: Negative. CONCLUSION:  1.  Improved appearance underlying interstitial edema which is worsening. Small left pleural effusion. Follow-up studies are advised.     Dictated by (CST): Manas Banegas MD on 5/07/2020 at 3:54 PM     Finalized by (CST): Manas Banegas MD on 5/07/2020 at 3:57 PM          Xr Ch alert and orientedx3  Lungs Clear  Heart Regular  Abdomen Benign    Total Time Coordinating Care: > than 30 minutes  Did d/w dtr on day of d/c and left message for GF    Patient had opportunity to ask questions and state understand and agree with therapeut

## 2020-05-15 NOTE — ED NOTES
Care assumed from [facility]. Pt currently resting comfortably on hospital cart. No distress noted at this time. Pt wearing 2LNC at this time which is what pt usually wears at home. Will continue to monitor.

## 2020-05-18 PROBLEM — D50.9 CHRONIC IRON DEFICIENCY ANEMIA: Chronic | Status: RESOLVED | Noted: 2020-01-01 | Resolved: 2020-01-01

## 2020-05-18 PROBLEM — I50.33 ACUTE ON CHRONIC HEART FAILURE WITH PRESERVED EJECTION FRACTION (HFPEF) (HCC): Chronic | Status: RESOLVED | Noted: 2020-01-01 | Resolved: 2020-01-01

## 2020-05-18 PROBLEM — IMO0002 RIGHT TO LEFT CARDIAC SHUNT: Status: RESOLVED | Noted: 2018-02-14 | Resolved: 2020-01-01

## 2020-05-18 PROBLEM — I50.30 DIASTOLIC HEART FAILURE (HCC): Status: RESOLVED | Noted: 2020-01-01 | Resolved: 2020-01-01

## 2020-05-18 PROBLEM — I28.0 RIGHT TO LEFT CARDIAC SHUNT (HCC): Status: RESOLVED | Noted: 2018-02-14 | Resolved: 2020-01-01

## 2020-05-18 PROBLEM — R06.09 CHRONIC DYSPNEA: Chronic | Status: RESOLVED | Noted: 2020-01-01 | Resolved: 2020-01-01

## 2020-05-18 PROBLEM — I27.20 PULMONARY HYPERTENSION (HCC): Status: RESOLVED | Noted: 2018-02-14 | Resolved: 2020-01-01

## 2020-05-18 NOTE — PROGRESS NOTES
[address] Patient Status: out patient    3/8/1932 MRN [id number]   Location MD Dr. Bishop Edith [address] Dr. [place] is a 80year old male who presents to weakness and poor po intake. Hospitalized 11/18-11/27/18 for fecal impaction, hyponatremia and anemia. Hospitalized 11/23-11/27/2019 for sepsis/PNA. Presented to ED with fever and confusion. Nephrology and pulmonology on consult.  Treated with IV an negative  Musculoskeletal: negative for myalgias    Objective:  Lab Results   Component Value Date/Time    WBC 13.2 (H) 05/19/2020 10:25 AM    HGB 8.5 (L) 05/19/2020 10:25 AM    HCT 27.4 (L) 05/19/2020 10:25 AM    .0 05/19/2020 10:25 AM    CREATSERU 19) 127 20) 126 21) 129 22) 126  23) 127 24) 123.4     General appearance: alert, appears stated age and cooperative  Neck: + JVD 2 cm below jawline  Lungs: clear to auscultation bilaterally  diminished  mick Lower lobes , few crackles L base  Chest wall: n 100% on 2.5 L n/c, less 02 needs since discharge, respiratory rate normal  -bun 51, cr 1.8, K 3.2, sodium 132, pro bnp 14,000 up from 7400, renal function improving, baseline cr 1.5-1.6  -Kdur 40 meq po x1 with bumex 1 mg iv push, tolerated well, VSS  - co

## 2020-05-19 NOTE — PATIENT INSTRUCTIONS
Skip torsemide dose this afternoon    Begin potassium chloride 20 meq one tab daily starting today    Increase hydralazine to 10 mg 3 times a day     Continue all your same medications    Call if having any dizziness, lightheadedness, heart racing, palpita

## 2020-05-22 PROBLEM — I50.30 (HFPEF) HEART FAILURE WITH PRESERVED EJECTION FRACTION (CMD): Status: ACTIVE | Noted: 2020-01-01

## 2020-05-27 PROBLEM — N18.30 CKD (CHRONIC KIDNEY DISEASE) STAGE 3, GFR 30-59 ML/MIN (HCC): Status: ACTIVE | Noted: 2020-01-01

## 2020-05-27 PROBLEM — I50.33 ACUTE ON CHRONIC HEART FAILURE WITH PRESERVED EJECTION FRACTION (HFPEF) (HCC): Chronic | Status: ACTIVE | Noted: 2020-01-01

## 2020-05-27 NOTE — PATIENT INSTRUCTIONS
Stop hydralazine     Only take metolazone as directed     Continue all your same medications    Call if having any dizziness, lightheadedness, heart racing, palpitations, chest pain, shortness of breath, coughing,  wheezing, swelling, weight gain,  or weak

## 2020-05-27 NOTE — PROGRESS NOTES
[address] Patient Status: out patient    3/8/1932 MRN [id number]   Location MD Dr. [address] [place] is a 80year old male who presents to weakness and poor po intake. Hospitalized 11/18-11/27/18 for fecal impaction, hyponatremia and anemia. Hospitalized 11/23-11/27/2019 for sepsis/PNA. Presented to ED with fever and confusion. Nephrology and pulmonology on consult.  Treated with IV an nausea and vomiting  Hematologic/lymphatic: negative  Musculoskeletal: negative for myalgias    Objective:  Lab Results   Component Value Date/Time    WBC 13.2 (H) 05/19/2020 10:25 AM    HGB 8.5 (L) 05/19/2020 10:25 AM    HCT 27.4 (L) 05/19/2020 10:25 AM 25) 131  IV     Home weight: 14) 127 15) 128 16) 128 17) 125 18) 126 /// 19) 127 20) 126 21) 129 22) 126  23) 127 24) 123.4  25)     General appearance: alert, appears stated age and cooperative  Neck: + JVD 2 cm below jawline  Lungs: clear to auscultation dose, pt decreased to 20 mg daily since 5/25  -increased fluid overload , wt up another 3 lbs , total of 8  lbs since discharge despite starting metolazone intermittent mathias with episode last night requiring increased 02 , no orthopnea, +fatigue, orthostati EOD  - rates stable.    - On plavix    Mitral regurgitation  -Mitral Clip x3 at [facility]:  Stop hydralazine    Change metolazone 2.5 mg to as directed for now    Continue all your same medications including torsemide 20 mg bid     Call if

## 2020-05-29 NOTE — PATIENT INSTRUCTIONS
Change metolazone to 5 mg  ( 2 - 2.5 mg tabs) twice weekly on Saturdays and Wednesdays, starting tomorrow 5/30/20 .  Take 30 minutes prior to morning torsemide dose    Take 2 additional potassium chloride 20 meq tabs with dinner tonight, the continue potass

## 2020-05-29 NOTE — PROGRESS NOTES
[address] Patient Status: out patient    3/8/1932 MRN [id number]   Location MD Dr. Fany Celestin Dr. is a 80year old male who presents to Torsemide resumed at discharge. Hospitalized 10/26-10/29. Admitted for dehydration, weakness and poor po intake. Hospitalized 11/18-11/27/18 for fecal impaction, hyponatremia and anemia. Hospitalized 11/23-11/27/2019 for sepsis/PNA.  Presented to 05/19/2020 10:25 AM    .0 05/19/2020 10:25 AM    CREATSERUM 1.95 (H) 05/26/2020 12:40 PM    BUN 47 (H) 05/26/2020 12:40 PM     05/26/2020 12:40 PM    K 3.7 05/26/2020 12:40 PM     05/26/2020 12:40 PM    CO2 26.0 05/26/2020 12:40 PM    GL normal, no S3 or S4, irregular rate and rhythm,+ CARMELA  Abdomen: soft, non-tender; bowel sounds normal; no masses,  no organomegaly, mild abdominal bloating and soft reducable umbilical hernia  Extremities: no  LE edema  Pulses: 2+ and symmetric  Neurologic: Saturday, next dose tomorrow 5/30  -Take an additional K-Dur 40 mEq tonight  - continue torsemide at 20 mg bid and  kdur 20 meq daily with extra K-Dur 20 mEq on metolazone days (Wednesday/saturday)  -keep fluids at 48 oz/day  -Repeat BMP on 6/1 when gettin update on status           Ryan LAYNE, 5/29/2020

## 2020-06-01 NOTE — PROGRESS NOTES
Pt to infusion area via wheelchair for injectafer infusion. HGB on 5/19/20 was 8.5 Pt was having a nose bleed when he arrived. Had pt apply some pressure, but he refused to leave it. States he has been having nosebleeds since a new medication was added.  He

## 2020-06-01 NOTE — TELEPHONE ENCOUNTER
Spoke with Sherry Hancock. Pt getting iron infusion, today. Kidney function overall stable, potassium level is low at 2.9. recommend taking potassium chloride 20 meq 2 tabs when you get home and 1 tab with dinner .  Increase daily potassium chloride to 20 meq one tab

## 2020-06-05 NOTE — PROGRESS NOTES
[address] Patient Status: out patient    3/8/1932 MRN [id number]   Location MD Dr. Shannon Baker Dr., Do is a 80year old male who presents to felt good trying stairs . Appetite decreased , eating small meals, ensure x1 /day. Also spoke with patient's dtr Nivia Reyes),  on the  phone at visit. Review of Systems:  Constitutional: negative for chills or fever but always cold.   Resp D'c weight 138 ///           137                 140 ///  134   /// 5/13/20 120  Clinic weights: 1) 147 /// 2) 138 3) 137/// 3) 138 4) 136 /// 5) 135 6) 133 7) 131 8) 126 9) 132 10) 133 11) 132 12) 132 13) 132 14) 133 15) 132 16) 133 17) 133 18) 133 pulmonary hypertension, PA 77/20, meanf 41, PCW 12 and elevated PVR.  Intolerant to sildenafil , increased fatigue and nose bleeds.   -Weight was up 8 pounds within 5 days after recent discharge 5/13/20, diuresing and weight remaining stable with addition o Saturdays and Wednesdays    Continue all your same medications including torsemide 20 mg twice daily     Call if having any dizziness, lightheadedness, heart racing, palpitations, chest pain, shortness of breath, coughing,  wheezing, swelling, weight gain,

## 2020-06-05 NOTE — PATIENT INSTRUCTIONS
Take an additional potassium chloride 20 meq - 2 tabs this afternoon about 3 pm and again at 8 pm     Hold metolazone dose tomorrow 6/6/20 and decrease metolazone 2.5 mg twice weekly on Wednesdays and Saturdays.      Increase potassium chloride 20 meq tabs

## 2020-06-07 NOTE — ED INITIAL ASSESSMENT (HPI)
Patient on Plavix, left-sided epistaxis since 8pm. Wallet returned to patient from security. Pt denies pain or discomfort @ this time. Pt states she is ready to be discharged home @ this time. PIV dc'd with tip intact. Dressing placed. Discharge instructions reviewed with patient and family, with time allotted for questions. Pt and family verbalize understanding of instructions and state they have no further questions @ this time. Wheeled out to family's car.

## 2020-06-07 NOTE — ED PROVIDER NOTES
Patient Seen in: [facility] AND [facility] Emergency Department      History   Patient presents with:  Nose Bleed    Stated Complaint:     HPI    59-year-old male with past medical history significant for CAD, CHF, chronic kidney disease stage III, anemia, atria 2/14/2018   • Right to left cardiac shunt ([address] 2/14/2018   • Secondary hyperparathyroidism ([address] 9/25/2019   • Shortness of breath    • Thoracic aorta atherosclerosis ([address] 2/14/2018   • Thoracic arthritis 2/14/2018   • Thoracic compression fracture ([address] 109/63   Pulse 76   Temp 97.8 °F (36.6 °C) (Temporal)   Resp 18   Ht 172.7 cm (5' 8\")   Wt 53.5 kg   SpO2 96%   BMI 17.94 kg/m²         Physical Exam    Physical Exam   Constitutional: AAOx3, cachectic  Head: Normocephalic and atraumatic.    Ears: TM's chu health screening including reassessment of your blood pressure.       Medications Prescribed:  Current Discharge Medication List    START taking these medications    amoxicillin 875 MG Oral Tab  Take 1 tablet (875 mg total) by mouth 2 (two) times daily for

## 2020-06-08 NOTE — PROGRESS NOTES
Bennie to infusion today for 2 of 2 weekly Injectafer. He arrives alert and via wheelchair. Oxygen via nasal cannula at 3L. Positive for shortness of breath and fatigue.  He had been in the emergency room yesterday for 1-2 hours for continued intermittent epi

## 2020-06-08 NOTE — ED INITIAL ASSESSMENT (HPI)
Ellie Lawson is here for right naris epistaxis. Seen yesterday for left naris epistaxis. Has rhinorocket to left naris.

## 2020-06-09 PROBLEM — R04.0 EPISTAXIS: Status: ACTIVE | Noted: 2020-01-01

## 2020-06-09 NOTE — PROGRESS NOTES
S: uncomfortable, disoriented, arousable    O:   Blood pressure 138/59, pulse 73, temperature 98 °F (36.7 °C), temperature source Oral, resp. rate 20, weight 117 lb 4.8 oz (53.2 kg), SpO2 95 %.        06/09/20  0400 06/09/20  0700 06/09/20  0822 06/09/20  1 3 g Intravenous Q12H   • potassium chloride  20 mEq Intravenous Once   • umeclidinium-vilanterol  1 puff Inhalation Daily   • dilTIAZem HCl ER Coated Beads  120 mg Oral Daily   • Montelukast Sodium  10 mg Oral Nightly           Patient Active Problem List:

## 2020-06-09 NOTE — PLAN OF CARE
Problem: Patient/Family Goals  Goal: Patient/Family Long Term Goal  Description  Patient's Long Term Goal: bleeding controlled to nares    Interventions:  - maintain hob up  45 degrees  - maintain the rhinojet  - discourage any blowing of nose   - See ad

## 2020-06-09 NOTE — ED NOTES
Discharge instructions reviewed. Pt verbalized understanding with no further questions. Pain controlled. Exit ER via wheechair. Speaking in full clear sentences at discharge. Spouse present at bedside for discharge instructions.  Spouse reports they have an

## 2020-06-09 NOTE — H&P
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 Clopidogrel Bisulfate 75 MG Oral Tab, Take 1 tablet (75 mg total) by mouth daily. Multiple Vitamin (MULTI VITAMIN MENS) Oral Tab, Take 1 tablet by mouth daily. famoTIDine 20 MG Oral Tab, Take 20 mg by mouth nightly.   Vitamin B-12 250 MCG Or TROP <0.045 05/14/2020    CK 38 (L) 03/17/2018    B12 >1,500 (H) 11/22/2018                     Assessment/Plan:     Mahendra Bahena is a(n) 80year old male w/ PMHx of Afib s/p Watchman, CAD, dCHF, eHTN, HLD, MR s/p mitraclip, pulmonary hypertension, GERD

## 2020-06-09 NOTE — ED NOTES
When patient stood up to get into wheelchair, nose started to bleed again. Dr. Fernando Carvalho at bedside for re-assessment.

## 2020-06-09 NOTE — CM/SW NOTE
Received MDO for d/c planning. SW contacted pt's dtr, Asif Matos, via her mobile phone # listed. Asif Matos verified pt's address and confirmed he lives w/ his [employer] in [place]. Per Soraya Smallwood is w/ pt 24hrs/day 7days/week.     Asif Matos reported they have

## 2020-06-09 NOTE — ED PROVIDER NOTES
Patient Seen in: [facility] AND 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     Physical Exam     ED Triage Vitals [06/08/20 0007]   /52   Pulse 57   Resp 24   Temp 98.1 °F (36.7 °C)   Temp src    SpO2 100 %   O2 Device Non-rebreather mask       Current:/48   Pulse 59   Temp Comments: Pt is cooperative but upset and does not want procedures done to him, wants to be \"covered up and left alone\"           ED Course     Labs Reviewed   RBC MORPHOLOGY SCAN - Abnormal; Notable for the following components:       Result Value    RB normal for rate and rhythm       Critical Care:  I spent a total of 30 minutes of critical care time in obtaining history, performing a physical exam, bedside monitoring of interventions, collecting and interpreting tests and discussion with consultants but

## 2020-06-09 NOTE — PLAN OF CARE
Follow-up note from early am cardiology consult note    Patient admitted with recurrent Epistaxis  - ENT following w/ bilateral Rhino rockets in place with no current bleeding.  - patient with Chronic Afib s/p Watchman device  - CAD last PCI 8/2018 - was o

## 2020-06-09 NOTE — CONSULTS
[facility] HOSP - [facility]    Report of Consultation    Jasbir Colón Patient Status:  Inpatient    3/8/1932 MRN [id number]   Location [facility] 3W/SW Attending Jack Chicas MD   Hosp Day # 0 PCP Lo Marlow MD     Date of Admission: Diagnosis Date   • Abdominal aortic atherosclerosis ([facility] 2/14/2018   • Acute diastolic CHF (congestive heart failure) ([facility] 12/3/2018   • Anemia due to stage 3 chronic kidney disease ([facility] 9/25/2019   • Arrhythmia     Afib   • Arthritis of lumbar spine Avi Correa MD at [address] [place] [address]  11/20/2018    Performed by Dakota Em MD at [facility] ENDOSCOPY   • [facility] N/A 8/31/2018    Performed by Savanna Jane MD at [place] [address] N/A 7/29/2016    Performed by Savanna Jane MD a times daily. With extra dose 20 meq tab on Metolazone days  Saturdays and wednesdays and as directed  metolazone 2.5 MG Oral Tab, Take 1 tablet (2.5 mg total) by mouth twice a week. On Saturdays and Wednesdays.   Take 30 minutes before morning torsemide dos BLEEDING  Pradaxa [Dabigatran*    BLEEDING    Review of Systems:   Recurrent nosebleed with chronic shortness of breath otherwise  A comprehensive review of systems was negative except for as described in HPI    Physical Exam:   Blood pressure 126/53, puls (H) 06/08/2020     (L) 06/08/2020    K 3.2 (L) 06/08/2020    CL 96 (L) 06/08/2020    CO2 31.0 06/08/2020     (H) 06/08/2020    CA 8.8 06/08/2020    ALB 2.9 (L) 05/07/2020    ALKPHO 111 05/07/2020    TP 7.1 05/07/2020    AST 20 05/07/2020    AL

## 2020-06-09 NOTE — CONSULTS
[facility]    Report of Consultation    Ashu Jacobson Patient Status:  Inpatient    3/8/1932 MRN [id number]   Location [facility] 3W/SW Attending Dario Shell, [address] [facility] Day # 0 PCP Grecia Santos MD     Date of Admissio the effects on his platelets with FFP due to his underlying congestive heart failure history.   At home he uses O2 24 hours a day with intranasal cannula which appears to be a possible source of some of his nosebleeds in the past.  Currently using a fullfac • ESOPHAGOGASTRODUODENOSCOPY (EGD) N/A 11/20/2018    Performed by Dakota Em MD at [facility] ENDOSCOPY   • ESOPHAGOGASTRODUODENOSCOPY (EGD) N/A 4/10/2017    Performed by Jackie Corral MD at [facility] ENDOSCOPY   • ESOPHAGOGASTRODUODENOSCOPY (EGD) N/A 10/12/ Once    Followed by  potassium chloride IVPB premix 20 mEq, 20 mEq, Intravenous, Once  umeclidinium-vilanterol (ANORO ELLIPTA) 62.5-25 MCG/INH inhaler 1 puff, 1 puff, Inhalation, Daily  diltiazem (CARDIZEM CD) 24 hr cap 120 mg, 120 mg, Oral, Daily  Montelu Comment:Needed 12 units of blood at [facility]             10/2016  Eliquis [Apixaban]      BLEEDING  Pradaxa [Dabigatran*    BLEEDING    Review of Systems:   A comprehensive review of systems was negative except for: Ears, nose, mouth, throat, and 06/09/2020    CREATSERUM 1.49 (H) 06/09/2020    BUN 65 (H) 06/09/2020     06/09/2020    K 3.2 (L) 06/09/2020    CL 99 06/09/2020    CO2 35.0 (H) 06/09/2020     (H) 06/09/2020    CA 9.0 06/09/2020    ALB 2.9 (L) 05/07/2020    ALKPHO 111 05/07/2 with 2 7.5 cm Rhino Rocket's. Plavix currently being held but no attempts being made to reverse due to his underlying history of CHF.   At this time I would simply wait a few days until the effects of Plavix on his platelets slowly dissipate and at that ti

## 2020-06-10 NOTE — PLAN OF CARE
This evening patient is comfortable, denies SOB/CP, and remains on the non-rebreather with both rhino-rockets intact. Plan is to monitor for any additional bleeding and discharge home with Altaf Cordova once medically discharged.  Bed locked Progressing     Problem: CARDIOVASCULAR - ADULT  Goal: Maintains optimal cardiac output and hemodynamic stability  Description  INTERVENTIONS:  - Monitor vital signs, rhythm, and trends  - Monitor for bleeding, hypotension and signs of decreased cardiac ou pre-medicate as appropriate  6/10/2020 0137 by Wan Martini RN  Outcome: Progressing  6/10/2020 0136 by Wan Martini RN  Outcome: Progressing     Problem: RISK FOR INFECTION - ADULT  Goal: Absence of fever/infection during anticipated neutropenic period post-hospital services based on physician/LIP order or complex needs related to functional status, cognitive ability or social support system  6/10/2020 0137 by Zachary Portillo RN  Outcome: Progressing  6/10/2020 0136 by Zachary Portillo RN  Outcome: Progressin

## 2020-06-10 NOTE — PLAN OF CARE
Pt A&Ox3, VSS, afebrile, pt pulled out right rhino rocket, no active bleeding from right nares, Dr Velazquez  aware, pt lethargic today, awakens with verbal stimuli, asking for ginger ale only, declining additional po, b/m x1. O2 7L mask.     Problem: Patient/F of pulses, skin color and temperature  - Assess for signs of decreased coronary artery perfusion - ex.  Angina  - Evaluate fluid balance, assess for edema, trend weights  Outcome: Progressing  Goal: Absence of cardiac arrhythmias or at baseline  Description limitations  - Instruct pt to call for assistance with activity based on assessment  - Modify environment to reduce risk of injury  - Provide assistive devices as appropriate  - Consider OT/PT consult to assist with strengthening/mobility  - Encourage toil

## 2020-06-10 NOTE — PROGRESS NOTES
[facility] Hospitalist Progress Note   CC: follow-up hospital admission    SUBJECTIVE:  Interval History:   - confused early this am, pulled out one of his Rhinorocket   - cont to require additional o2 compared to home  - he cannot elaborate on how the rhinorocke range of motion of all extremities. No swelling noted. Integument: No lesions. No erythema. Psychiatric: Appropriate mood and affect.     Data Review:   CBC:   Recent Labs   Lab 06/08/20  1220 06/08/20  2210 06/09/20  0430 06/09/20  1522 06/10/20  0515 [phone]  Answering Service: [phone]

## 2020-06-10 NOTE — PROGRESS NOTES
Left message w/service for Dr Haile Calle re:report of 13 beats of V-tach, pt asymptomatic, sleeping at time of tele call, O2 7L face mask.

## 2020-06-10 NOTE — PROGRESS NOTES
Jourdan Hobson is a 80year old male. Patient presents with:  Nose Bleed      HISTORY OF PRESENT ILLNESS  Patient was admitted to the hospital last evening with epistaxis. He does have a long history of recurrent episodes of nosebleeds.   He is on Plavix complaints. 6/10/2020 did well overnight with no bleeding. This morning she took out his own [employer] on the right side. He has had no bleeding since.   Currently with only the left-sided Rhino Rocket in according to the nurse seems to be a bit les • Gout    • Heart attack ([facility]    • High blood pressure    • High cholesterol    • History of gastrointestinal bleeding 1/10/2018   • Hx of CABG 2/14/2018   • Hypothyroid    • Hypothyroidism, unspecified 1/10/2018   • Kyphosis of thoracic region 2/14/201 GI Negative Abdominal pain and diarrhea. Endocrine Negative Cold intolerance and heat intolerance. Neuro Negative Tremors. Psych Negative Anxiety and depression. Integumentary Negative Frequent skin infections, pigment change and rash.    Hema/Lym with 1 left-sided nasal pack the patient may be discharged to home if otherwise doing well.   He will have to follow-up with his ENT to have this left nasal pack removed and to discuss whether any more permanent procedure can be performed to prevent bleedin

## 2020-06-10 NOTE — DIETARY NOTE
ADULT NUTRITION INITIAL ASSESSMENT    Pt is at moderate nutrition risk. Pt meets moderate malnutrition criteria.       CRITERIA FOR MALNUTRITION DIAGNOSIS:  Criteria for non-severe malnutrition diagnosis: chronic illness related to energy intake less than7 be determined    ADMITTING DIAGNOSIS:   Epistaxis     PERTINENT PAST MEDICAL HISTORY:    has a past medical history of Abdominal aortic atherosclerosis ([facility] (4/45/2715), Acute diastolic CHF (congestive heart failure) ([facility] (12/3/2018), Anemia due to stage lb)  05/29/20 : 57.2 kg (126 lb)  05/27/20 : 59.4 kg (131 lb)  05/19/20 : 58.1 kg (128 lb)  05/18/20 : 55.3 kg (122 lb)  05/14/20 : 53.8 kg (118 lb 9.6 oz)  05/13/20 : 54.5 kg (120 lb 3.2 oz)  05/06/20 : 59.4 kg (131 lb)    GASTROINTESTINAL: GI intact  Las above  ESTIMATED NUTRITION NEEDS:  Calories: 6711-7019 calories/day (30-35 calories per kg Current wt)  Protein: 63-74 grams protein/day (1.2-1.4 grams protein per kg Current wt)    MONITOR AND EVALUATE/NUTRITION GOALS:  - Food and Nutrient Intake:       Agustín Green

## 2020-06-10 NOTE — PROGRESS NOTES
Upon assessment patient was visualized to have removed his rhino-rocket from his right nostril. At this time no additional drainage is seen. Rhino-rocket was found at bedside fully inflated.  Patient currently denies any pain however presents increasingly n

## 2020-06-10 NOTE — OCCUPATIONAL THERAPY NOTE
Chart reviewed and attempted OT evaluation. Patient received in bed on non-rebreather 7L. Apparently he had increasing AMS overnight, pulling out his rhino rocket. He was lethargic in bed, minimally arousable. Will reattempt OT evaluation as appropriate.  R

## 2020-06-10 NOTE — PHYSICAL THERAPY NOTE
Attempted to see patient twice today, both times lethargic and unable to consistently respond to therapist, eyes always closed. Patient also shakes his head when asked if he wants to get up.  Will attempt to see patient tomorrow for physical therapy evaluat

## 2020-06-10 NOTE — OCCUPATIONAL THERAPY NOTE
Reattempted OT evaluation - patient slightly more responsive, but declining any activity. Will reattempt 6/11. RN aware.

## 2020-06-10 NOTE — PROGRESS NOTES
[facility]  Progress Note    Radford Baumgarten Patient Status:  Inpatient    3/8/1932 MRN [id number]   Location [facility] 3W/SW Attending Mckenzie Wang, [address] [facility] Day # 1 PCP Tiffanie Styles MD     Assessment:    1. Epistaxis.   Bleed edema. Peripheral pulses are 2+. Skin: Warm and dry.      Labs:  Lab Results   Component Value Date    WBC 8.8 06/10/2020    HGB 8.0 06/10/2020    HCT 26.3 06/10/2020    .0 06/10/2020     Lab Results   Component Value Date    PT 31.8 (H) 09/26/2016

## 2020-06-10 NOTE — CM/SW NOTE
Received MDO for [facility] orders. Pt is currently on 7L O2 - RN will try to wean pt to home O2 levels of 3-4L. SW will continue to monitor if there is an increased need for O2 at home.     HH instructions added to pt's AVS.    PLAN: Home w/ [facility], pe

## 2020-06-11 NOTE — OCCUPATIONAL THERAPY NOTE
OCCUPATIONAL THERAPY EVALUATION - INPATIENT     Room Number: 329/329-A  Evaluation Date: 6/11/2020  Type of Evaluation: Initial  Presenting Problem: (epistaxis)    Physician Order: IP Consult to Occupational Therapy  Reason for Therapy: ADL/IADL Dysfunctio Degree of Impairment: 85.69% has been calculated based on documentation in the [facility] '6 clicks' Inpatient Daily Activity Short Form. Research supports that patients with this level of impairment may benefit from DUSTIN.      DISCHARGE RECOMMENDATIONS  OT Disc 2/14/2018   • Thoracic compression fracture ([facility] 2/14/2018   • Unspecified essential hypertension      Past Surgical History  Past Surgical History:   Procedure Laterality Date   • CABG     • CATARACT     • CORONARY STENT PLACEMENT     • ESOPHAGOGASTRODUO forearm)  Management Techniques: Activity promotion;Repositioning    ACTIVITY TOLERANCE  Poor    O2 SATURATIONS  Pt received in bed w/ non breather mask off. Pt satting at 64%.  RN notified and re breather reapplied w/ macario improving to 90%+     COGNITION stressed  Patient End of Session: In bed;Needs met;Call light within reach;RN aware of session/findings; All patient questions and concerns addressed; Alarm set    OT Goals  Patients self stated goal is: unstated     Patient will complete functional transfer

## 2020-06-11 NOTE — PHYSICAL THERAPY NOTE
PHYSICAL THERAPY EVALUATION - INPATIENT     Room Number: 329/329-A  Evaluation Date: 6/11/2020  Type of Evaluation: Initial   Physician Order: PT Eval and Treat    Presenting Problem: epitaxis  Reason for Therapy: Mobility Dysfunction and Discharge Planni would benefit from rehab. Patient to seen Monday if in house. This is the second day patient has been reluctant to participate and only wants to sleep.  Patient will need rehab after D/C from the hospital.     Patient will benefit from continued IP PT servi hyperparathyroidism ([facility] 9/25/2019   • Shortness of breath    • Thoracic aorta atherosclerosis ([facility] 2/14/2018   • Thoracic arthritis 2/14/2018   • Thoracic compression fracture ([facility] 2/14/2018   • Unspecified essential hypertension        Past Surgical H strength is within functional limits BLE unable to assess    BALANCE  Static Sitting: Fair +  Dynamic Sitting: Fair  Static Standing: Fair -  Dynamic Standing: Poor    AM-PAC '6-Clicks' INPATIENT SHORT FORM - BASIC MOBILITY  How much difficulty does the pa Patient to demonstrate independence with home activity/exercise instructions provided to patient in preparation for discharge.    Goal #5   Current Status    Goal #6    Goal #6  Current Status

## 2020-06-11 NOTE — PROGRESS NOTES
[facility] AND CLINICS  Progress Note    Daniela Morales Patient Status:  Inpatient    3/8/1932 MRN [id number]   Location [facility] 3W/SW Attending Chema Mathias MD   Hosp Day # 2 PCP Phoebe Galarza MD     Assessment:    1. Epistaxis.   Bleeding is Component Value Date    WBC 10.1 06/11/2020    HGB 8.0 06/11/2020    HCT 26.2 06/11/2020    .0 06/11/2020     Lab Results   Component Value Date    PT 31.8 (H) 09/26/2016    INR 1.30 (H) 06/10/2020     Lab Results   Component Value Date

## 2020-06-11 NOTE — CONSULTS
Pulmonary Consult     Assessment / Plan:  1. Chronic respiratory failure - due to below  - wean supplemental O2 as able. On NRB given epistaxis. Switch to ventimask and titrate FiO2 down  2. COPD  - anoro  - bd protocol  3. HFpEF  - per cards  4.  Epistaxis atherosclerosis ([facility] 2/14/2018   • Thoracic arthritis 2/14/2018   • Thoracic compression fracture ([facility] 2/14/2018   • Unspecified essential hypertension        Past Surgical History:   Procedure Laterality Date   • CABG     • CATARACT     • CORONARY STENT MG Oral Tab EC, Take 1 tablet (40 mg total) by mouth every morning before breakfast., Disp: 180 tablet, Rfl: 3, Taking  acetaminophen 325 MG Oral Tab, Take 650 mg by mouth every 6 (six) hours as needed for Pain., Disp: , Rfl: , Taking  digoxin 0.125 MG Ora 3  acetaminophen 325 MG Oral Tab, Take 650 mg by mouth every 6 (six) hours as needed for Pain., Disp: , Rfl:   digoxin 0.125 MG Oral Tab, Take 125 mcg by mouth.  THREE TIMES A WEEK (M, W & FRI), Disp: , Rfl:   Montelukast Sodium 10 MG Oral Tab, Take 1 table rashes  Mental status: sleepy, but easily arousable    Labs:  Reviewed in EMR    Inpatient Medications:  Reviewed in EMR    Imaging:   Chest imaging reviewed

## 2020-06-11 NOTE — PLAN OF CARE
VS stable. AFIB on tele. Patient was on nonrebreather this morning, switched to Venturi mask at 14 liters. Pt c/o feeling short of breath this morning. PRN nebulizer treatment started, and pt stating he felt better. C/o R arm/R side pain.  Pt irritable and knowledge, values, beliefs, and cultural backgrounds into the planning and delivery of care  - Encourage patient/family to participate in care and decision-making at the level they choose  - Honor patient and family perspectives and choices  6/11/2020 6979 measures as appropriate and evaluate response  - Consider cultural and social influences on pain and pain management  - Manage/alleviate anxiety  - Utilize distraction and/or relaxation techniques  - Monitor for opioid side effects  - Notify MD/LIP if inte (meds, wound care, etc)  - Arrange for interpreters to assist at discharge as needed  - Consider post-discharge preferences of patient/family/discharge partner  - Complete POLST form as appropriate  - Assess patient's ability to be responsible for managing

## 2020-06-11 NOTE — PLAN OF CARE
This evening patient is comfortable, denies SOB/CP, however had a low blood pressure at the beginning of the shift. Patient was given a bolus of 250 mL and encouraged to drink more fluids which provided with an improvement of BP.  Patient presents at times and family perspectives and choices  Outcome: Progressing     Problem: CARDIOVASCULAR - ADULT  Goal: Maintains optimal cardiac output and hemodynamic stability  Description  INTERVENTIONS:  - Monitor vital signs, rhythm, and trends  - Monitor for bleeding, neutropenic period  Description  INTERVENTIONS  - Monitor WBC  - Administer growth factors as ordered  - Implement neutropenic guidelines  Outcome: Progressing     Problem: SAFETY ADULT - FALL  Goal: Free from fall injury  Description  INTERVENTIONS:  - As

## 2020-06-11 NOTE — PROGRESS NOTES
[facility] Hospitalist Progress Note     CC: Hospital Follow up    PCP: Mendel HousekeMD rosendo       Assessment/Plan:     Principal Problem:    Epistaxis    Diego Baron kimberlyn(n) 80year old male w/ PMHx of Afib s/p Watchman, CAD, dCHF, eHTN, HLD, MR s/p mitracl 1425 ml       Last 3 Weights  06/11/20 0549 : 119 lb 8 oz (54.2 kg)  06/10/20 0536 : 116 lb 11.2 oz (52.9 kg)  06/09/20 0138 : 117 lb 4.8 oz (53.2 kg)  06/07/20 0133 : 118 lb (53.5 kg)  06/05/20 0953 : 123 lb (55.8 kg)      Exam    Gen: No acute distress meals   • Levothyroxine Sodium  150 mcg Oral Before breakfast   • Pantoprazole Sodium  40 mg Oral QAM AC   • atorvastatin  20 mg Oral Nightly   • torsemide  20 mg Oral BID (Diuretic)   • Vitamin B-12  250 mcg Oral BID   • ampicillin-sulbactam  3 g Intraven

## 2020-06-12 NOTE — PROGRESS NOTES
Pulmonary Progress Note     Assessment / Plan:  1. Chronic respiratory failure - due to below  - wean supplemental O2 as able. On venti mask given epistaxis. Discussed with RN that we need to titrate FiO2 down significantly.  She will contact RCP  - incenti

## 2020-06-12 NOTE — PROGRESS NOTES
[facility] Hospitalist Progress Note     CC: Hospital Follow up    PCP: Estrellita Kayser, MD       Assessment/Plan:     Principal Problem:    Epistaxis    Elva sofia(n) 80year old male w/ PMHx of Afib s/p Watchman, CAD, dCHF, eHTN, HLD, MR s/p mitracl %      Intake/Output:    Intake/Output Summary (Last 24 hours) at 6/12/2020 1312  Last data filed at 6/12/2020 1218  Gross per 24 hour   Intake 2040 ml   Output 625 ml   Net 1415 ml       Last 3 Weights  06/12/20 0502 : 119 lb 14.4 oz (54.4 kg)  06/11/20 0 at 8:42 AM          Xr Chest Ap Portable  (cpt=71045)    Result Date: 6/10/2020  CONCLUSION:  1. Interval increase in a small left loculated appearing left pleural effusion from previous study.   Prominent pulmonary markings throughout the interstitium may

## 2020-06-12 NOTE — CM/SW NOTE
Per chart review - pt is now on 3L O2 (after being on 14L yesterday afternoon/overnight). PT/OT recommend 24hr care/supervision or DUSTIN. Per Sw initial conversation w/ pt's dtr, Elsa Ma, on 6/9 - they are against SNF at this time due to COVID 19.     PLAN:

## 2020-06-12 NOTE — PROGRESS NOTES
[facility] AND CLINICS  Progress Note    Nuala Shown Patient Status:  Inpatient    3/8/1932 MRN [id number]   Location [facility] 3W/SW Attending Bo Thomas MD   Hosp Day # 3 PCP Maren De Paz MD     Assessment:    1.  Epistaxis.  Bleeding is rales, rhonchi. Normal excursions and effort. Abdomen: Soft, non-tender. Extremities: Without clubbing, cyanosis or edema. Peripheral pulses are 2+. Skin: Warm and dry.      Labs:  Lab Results   Component Value Date    WBC 9.3 06/12/2020    HGB 7.5 06

## 2020-06-13 NOTE — PROGRESS NOTES
Patient having worsening epistaxis from both nare - greater on R. Dr Bennie Hartley  ENT paged. Bilateral rhinorocket emergently placed at bedside. Patient doing better now. Patient also had 3 maroon colored bowel movements. Dr Yogesh Rosado notified.  No new orders-tiki

## 2020-06-13 NOTE — PLAN OF CARE
VS stable. Pt on venti mask at 14L - sats mid 90s. US chest done. Xray chest pending. Pt comfortable at this time resting. Receiving one unit of PRBC for hg of 6.8 this morning. Small amount of bleeding from L nare this morning - has stopped.  Dr Castro Parth no stability  Description  INTERVENTIONS:  - Monitor vital signs, rhythm, and trends  - Monitor for bleeding, hypotension and signs of decreased cardiac output  - Evaluate effectiveness of vasoactive medications to optimize hemodynamic stability  - Monitor ar Progressing     Problem: SAFETY ADULT - FALL  Goal: Free from fall injury  Description  INTERVENTIONS:  - Assess pt frequently for physical needs  - Identify cognitive and physical deficits and behaviors that affect risk of falls.   - [place] fall precaut optimal ventilation and oxygenation  Description  INTERVENTIONS:  - Assess for changes in respiratory status  - Assess for changes in mentation and behavior  - Position to facilitate oxygenation and minimize respiratory effort  - Oxygen supplementation bas

## 2020-06-13 NOTE — PROGRESS NOTES
Pulmonary Progress Note     Assessment / Plan:  1. Chronic respiratory failure - due to below  - wean supplemental O2 as able. On venti mask given epistaxis.   - incentive spirometer  - Slight increase in O2 requirements could be in the setting of possible

## 2020-06-13 NOTE — PROGRESS NOTES
Marika Hoang is a 80year old male.   Patient presents with:  Nose Bleed      HISTORY OF PRESENT ILLNESS  Patient was admitted to the hospital last evening with epistaxis. Cralos Recio does have a long history of recurrent episodes of nosebleeds.  He is on Plavix complaints.     6/10/2020 did well overnight with no bleeding. This morning she took out his own [employer] on the right side. He has had no bleeding since.   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 Lips/teeth/gums - Normal. Tonsils - Normal. Oropharynx - Normal.   Nose/Mouth/Throat Normal Nares - Right: Normal Left: Normal. Septum -Normal  Turbinates - Right: Normal, Left: Normal.Left Rhinorocket rem

## 2020-06-13 NOTE — PROGRESS NOTES
ENT  Asked to emergently evaluate the patient for worsening epistaxis right greatly than epistaxis with large clots into the oropharynx. PE: right epistaxis, much less on the left. Bilateral rhinorockets placed and inflated with saline.   The right side r

## 2020-06-13 NOTE — PLAN OF CARE
Seroquel administered at bedtime d/t agitation. Pt continuously removing venturi mask overnight - continued reinforcement, frequent hourly rounding, and placement on continuous pulse ox for closer monitoring done.  Pt desaturates to 75% on RA when mask gloria trends  - Monitor for bleeding, hypotension and signs of decreased cardiac output  - Evaluate effectiveness of vasoactive medications to optimize hemodynamic stability  - Monitor arterial and/or venous puncture sites for bleeding and/or hematoma  - Assess injury  Description  INTERVENTIONS:  - Assess pt frequently for physical needs  - Identify cognitive and physical deficits and behaviors that affect risk of falls.   - [place] fall precautions as indicated by assessment.  - Educate pt/family on patient sa oxygenation  Description  INTERVENTIONS:  - Assess for changes in respiratory status  - Assess for changes in mentation and behavior  - Position to facilitate oxygenation and minimize respiratory effort  - Oxygen supplementation based on oxygen saturation

## 2020-06-13 NOTE — PROGRESS NOTES
[facility]    Cardiology Progress Note    Mack Redway Patient Status:  Inpatient    3/8/1932 MRN [id number]   Location [facility] 3W/SW Attending Brendon Su, [address] [facility] Day # 4 PCP Arnel Stout MD     Primary Cardio and two-vessel PCI in 2018.  Stable and asymptomatic with no active ischemia. Aspirin and Plavix on hold for nosebleeds.     5.  History of severe mitral regurgitation, status post mitral clip. Normal LV systolic function.     6.  Suspected GI bleed

## 2020-06-13 NOTE — PROGRESS NOTES
[facility] Hospitalist Progress Note     CC: Hospital Follow up    PCP: Sonny Nageotte, MD       Assessment/Plan:     Carol sofia(n) 80year old male w/ PMHx of Afib s/p Watchman, CAD, dCHF, eHTN, HLD, MR s/p mitraclip, pulmonary hypertension, GERD, c 76  Resp:  [16-22] 22  BP: (119-132)/(46-53) 122/50  FiO2 (%):  [28 %-55 %] 55 %      Intake/Output:    Intake/Output Summary (Last 24 hours) at 6/13/2020 0847  Last data filed at 6/13/2020 0634  Gross per 24 hour   Intake 580 ml   Output 450 ml   Net 130 Dictated by (CST): Helen Bowling MD on 6/12/2020 at 8:39 AM     Finalized by (CST): Helen Bowling MD on 6/12/2020 at 8:42 AM          Xr Chest Ap Portable  (cpt=71045)    Result Date: 6/10/2020  CONCLUSION:  1.  Interval increase in a small left loc

## 2020-06-13 NOTE — PLAN OF CARE
Pt up in chair today; teaching and encouraged pt to use IS; Venturi mask in place - pt removed mask through out the day; explained importance to keep mask in place; updated Dr Kaiser Servant regarding pt Spo2 and oxygen needs; ABGs drawn; Left nare polock electrolytes and administer replacement therapy as ordered  Outcome: Progressing     Problem: PAIN - ADULT  Goal: Verbalizes/displays adequate comfort level or patient's stated pain goal  Description  INTERVENTIONS:  - Encourage pt to monitor pain and requ discharge planning  - Arrange for needed discharge resources and transportation as appropriate  - Identify discharge learning needs (meds, wound care, etc)  - Arrange for interpreters to assist at discharge as needed  - Consider post-discharge preferences

## 2020-06-13 NOTE — PLAN OF CARE
Critical Hgb 6.8 this AM. Dr Montez Waters notified and received orders to transfuse 1 unit PRBCs. Unable to reach [place], this morning for blood consent. Message left on voicemail. Awaiting call back.

## 2020-06-14 NOTE — PROGRESS NOTES
[facility]    Cardiology Progress Note    Tony Stout Patient Status:  Inpatient    3/8/1932 MRN [id number]   Location [facility] 3W/SW Attending Dimitris Sandhu, [address] [facility] Day # 5 PCP Jorge Shell MD     Subjective: and two-vessel PCI in 2018.  Stable and asymptomatic with no active ischemia.  Aspirin and Plavix on hold for nosebleeds.     5.  History of severe mitral regurgitation, status post mitral clip.  Normal LV systolic function.     6.   Suspected GI bleed

## 2020-06-14 NOTE — PROGRESS NOTES
[facility] Hospitalist Progress Note     CC: Hospital Follow up    PCP: Sophy Jacobsen MD       Assessment/Plan:     Sandra sofia(n) 80year old male w/ PMHx of Afib s/p Watchman, CAD, dCHF, eHTN, HLD, MR s/p mitraclip, pulmonary hypertension, GERD, c vik Montoya, [facility] Hospitalist  Pager: [phone]  Answering Service: [phone]      Subjective:     - had more epistaxis was re eval by ENT and Rhinorocket replaced     - has black stool on my exam     - he repeatedly asks for ginger ale Recent Labs   Lab 06/12/20  0508 06/13/20  0528 06/14/20  0741   * 117* 132*   BUN 43* 48* 64*   CREATSERUM 1.84* 1.91* 1.84*   GFRAA 37* 35* 37*   GFRNAA 32* 31* 32*   CA 8.4* 8.3* 8.5   * 139 142   K 4.3 4.0 3.1*   CL 99 104 106   CO • Levothyroxine Sodium  150 mcg Oral Before breakfast   • atorvastatin  20 mg Oral Nightly   • Vitamin B-12  250 mcg Oral BID   • umeclidinium-vilanterol  1 puff Inhalation Daily   • dilTIAZem HCl ER Coated Beads  120 mg Oral Daily   • Montelukast Sodium

## 2020-06-14 NOTE — PLAN OF CARE
Pt more compliant with wearing venturi mask overnight - maintained 14L 55% FiO2 with saturation mid 90s. + melena stools. Con't prompt incontinence care. Encouraging frequent oral intake overnight. Con't IV abx. Bilateral rhino rockets maintained.  Floyd Guo sites for bleeding and/or hematoma  - Assess quality of pulses, skin color and temperature  - Assess for signs of decreased coronary artery perfusion - ex.  Angina  - Evaluate fluid balance, assess for edema, trend weights  Outcome: Progressing  Goal: Absen Educate pt/family on patient safety including physical limitations  - Instruct pt to call for assistance with activity based on assessment  - Modify environment to reduce risk of injury  - Provide assistive devices as appropriate  - Consider OT/PT consult of CO2 retention  Outcome: Progressing     Problem: SKIN/TISSUE INTEGRITY - ADULT  Goal: Skin integrity remains intact  Description  INTERVENTIONS  - Assess and document risk factors for pressure ulcer development  - Assess and document skin integrity  - M

## 2020-06-14 NOTE — PROGRESS NOTES
Pulmonary Progress Note     Assessment / Plan:  1. Chronic respiratory failure - due to below  - wean supplemental O2 as able. On venti mask given epistaxis. - incentive spirometer  - Repeat CXR with worsening RLL infiltrate.  Switched to zosyn after discu

## 2020-06-14 NOTE — PLAN OF CARE
VS stable. AFIB on tele - rate controlled. Hg 7.0 this morning, 1 unit of PRBC transfused. Repeat hg is 9.1. This RN discussed with Harpal Mcclure - daughter who is HPOA - agreeable. Daughter updated on patient condition and plan of care.  Patient still requiring Ve planning and delivery of care  - Encourage patient/family to participate in care and decision-making at the level they choose  - Honor patient and family perspectives and choices  Outcome: Progressing     Problem: CARDIOVASCULAR - ADULT  Goal: Maintains op injury  - Provide assistive devices as appropriate  - Consider OT/PT consult to assist with strengthening/mobility  - Encourage toileting schedule  Outcome: Progressing     Problem: DISCHARGE PLANNING  Goal: Discharge to home or other facility with appropr and/or relaxation techniques  - Monitor for opioid side effects  - Notify MD/LIP if interventions unsuccessful or patient reports new pain  - Anticipate increased pain with activity and pre-medicate as appropriate  Outcome: Not Progressing     Problem: RES

## 2020-06-14 NOTE — PROGRESS NOTES
ENT  Getting transfused  All nasal bleeding now controlled  PE: rhinorockets in place bilaterally.   No nasal or oropharyngeal bleeding  Imp: epistaxis controlled  Plan: transfusion today           Palliative consultation pending           I will sign patie

## 2020-06-15 NOTE — PROGRESS NOTES
Pulmonary Progress Note     Assessment / Plan:  1. Chronic respiratory failure - due to COPD, atelectasis, possible aspiration and HFpEF  - wean supplemental O2 as able.  On venti mask given epistaxis  - incentive spirometer  - abx as below  - diuresis per

## 2020-06-15 NOTE — DIETARY NOTE
ADULT NUTRITION REASSESSMENT     Pt is at moderate nutrition risk. Pt meets moderate malnutrition criteria.       CRITERIA FOR MALNUTRITION DIAGNOSIS:  Criteria for non-severe malnutrition diagnosis: chronic illness related to energy intake less than75% fo vitamin B12  - Feeding assistance: meal set up and requested menu selection assistance  - Nutrition education: assess education needs  - Coordination of nutrition care: collaboration with other providers  - Discharge and transfer of nutrition care to [facility]  Usual Body Wt: 118 lbs over past month per Jeanette(DEBORAH)       Now 98% UBW  WEIGHT HISTORY:  Patient Weight(s) for the past 336 hrs:   Weight   06/14/20 0604 52.3 kg (115 lb 3.2 oz)   06/13/20 0456 54.2 kg (119 lb 8 oz)   06/12/20 0502 54.4 kg (119 lb 14. 4 Accumulation: none per RN documentation per visual exam  - Skin Integrity: intact and RN documentation reviewed.     NUTRITION PRESCRIPTION:  Diet: Low Fiber/Soft  Oral Supplements: as above  ESTIMATED NUTRITION NEEDS: 52.9 kg dosing weight  Calories: 1590-

## 2020-06-15 NOTE — PLAN OF CARE
Pt more conversational tonight and states feeling \"a little better\". Pt saturating > 90% on Venturi mask 14L / 55% FiO2. Pt removing venturi mask overnight - frequent rounding and reinforcement done.  L nare rhinorocket removed by patient and was found at trends  - Monitor for bleeding, hypotension and signs of decreased cardiac output  - Evaluate effectiveness of vasoactive medications to optimize hemodynamic stability  - Monitor arterial and/or venous puncture sites for bleeding and/or hematoma  - Assess injury  Description  INTERVENTIONS:  - Assess pt frequently for physical needs  - Identify cognitive and physical deficits and behaviors that affect risk of falls.   - [place] fall precautions as indicated by assessment.  - Educate pt/family on patient sa Spirometry  - Assess the need for suctioning and perform as needed  - Assess and instruct to report SOB or any respiratory difficulty  - Respiratory Therapy support as indicated  - Manage/alleviate anxiety  - Monitor for signs/symptoms of CO2 retention  Shelby Osorio

## 2020-06-15 NOTE — PLAN OF CARE
VSS. Pt refuses to eat, only wants Ginger Ale and juice. Continues to have loose black BM's. Up in the chair for about 2 hours. Pt c/o generalized pain, scheduled tylenol given as per orders.  No significant nose bleed noted today, continue to remind patien Evaluate effectiveness of vasoactive medications to optimize hemodynamic stability  - Monitor arterial and/or venous puncture sites for bleeding and/or hematoma  - Assess quality of pulses, skin color and temperature  - Assess for signs of decreased corona Identify cognitive and physical deficits and behaviors that affect risk of falls.   - [place] fall precautions as indicated by assessment.  - Educate pt/family on patient safety including physical limitations  - Instruct pt to call for assistance with act products/factors as ordered and appropriate  Outcome: Not Progressing

## 2020-06-15 NOTE — PROGRESS NOTES
[facility] AND CLINICS  Progress Note    Christopher Palomares Patient Status:  Inpatient    3/8/1932 MRN [id number]   Location [facility] 3W/SW Attending Lashaun Deluca, [address] [facility] Day # 6 PCP Mendel Housekeeper, MD     Assessment:    1.  Epistaxis.   Hemog 06/15/2020    .0 06/15/2020     Lab Results   Component Value Date    PT 31.8 (H) 09/26/2016    INR 1.30 (H) 06/10/2020     Lab Results   Component Value Date     06/15/2020    K 3.6 06/15/2020     06/15/2020    CO2 27.0 06/15/2020    BU

## 2020-06-15 NOTE — PROGRESS NOTES
[facility] Hospitalist Progress Note     CC: Hospital Follow up    PCP: Renzo Castillo MD       Assessment/Plan:     Lorena Matson a(n) 80year old male w/ PMHx of Afib s/p Watchman, CAD, dCHF, eHTN, HLD, MR s/p mitraclip, pulmonary hypertension, GERD, c palliative care, inpatient due to o2 requirements     Discussed code status with daughter, will leave DNI for now, but is leaning towards full DNR/DNI, will let me know later     Kati Gallegos DO  [facility] Hospitalist  Pager: [phone]  Answering Service: 23.7*   .9*  --  97.8  --  97.5   MCH 31.5  --  30.4  --  30.0   MCHC 30.9*  --  31.1  --  30.8*   RDW 25.2*  --  26.5*  --  24.3*   NEPRELIM 6.84  --  7.66  --  8.10*   WBC 9.3  --  9.7  --  10.5   .0*  --  126.0*  --  114.0*    < > = values breakfast   • atorvastatin  20 mg Oral Nightly   • Vitamin B-12  250 mcg Oral BID   • umeclidinium-vilanterol  1 puff Inhalation Daily   • dilTIAZem HCl ER Coated Beads  120 mg Oral Daily   • Montelukast Sodium  10 mg Oral Nightly       ipratropium-albuter

## 2020-06-15 NOTE — PHYSICAL THERAPY NOTE
PHYSICAL THERAPY TREATMENT NOTE - INPATIENT     Room Number: 329/329-A       Presenting Problem: epitaxis    Problem List  Principal Problem:    Epistaxis  Active Problems:    Goals of care, counseling/discussion    Advance care planning      PHYSICAL THER shoulder  Management Techniques: Body mechanics; Activity promotion;Repositioning    BALANCE                                                                                                                     Static Sitting: Fair +  Dynamic Sitting: Fair assistance      Goal #1   Current Status  Min/Mod   Goal #2 Patient is able to demonstrate transfers Sit to/from Stand at assistance level: minimum assistance with walker - rolling      Goal #2  Current Status  Mod A x 1   Goal #3 Patient is able to CYNDY SCHULTZ JR. [facility]

## 2020-06-15 NOTE — CONSULTS
[address] [facility] Patient Status:  Inpatient    3/8/1932 MRN [id number]   Location [facility] 3W/SW Attending Juventino Watson, [address] [facility] Day # 6 PCP Maren De Paz MD     Date of C hospitalized here from 5/6/2020-5/13/2020.     Substance History:  Smoking Status: former  Hx of Substance Use/Abuse: former ETOH use; no illicit drugs     Allergies:    Coumadin [Warfarin]     BLEEDING    Comment:Needed 12 units of blood at [facility] History:   Diagnosis Date   • Abdominal aortic atherosclerosis ([facility] 2/14/2018   • Acute diastolic CHF (congestive heart failure) ([facility] 12/3/2018   • Anemia due to stage 3 chronic kidney disease ([facility] 9/25/2019   • Arrhythmia     Afib   • Arthritis of lumb ENDOSCOPY   • FLEXIBLE SIGMOIDOSCOPY  11/20/2018    Performed by Dread Bowden MD at [facility] ENDOSCOPY   • [facility] N/A 8/31/2018    Performed by Wali Correa MD at [place] [address] N/A 7/29/2016    Performed by Wali Correa MD at [address] [place] [address] spray, 1 spray, Nasal, Q12H PRN  No current outpatient medications on file.       Hematology:  Lab Results   Component Value Date    WBC 10.5 06/15/2020    HGB 7.5 (L) 06/15/2020    HCT 23.7 (L) 06/15/2020    .0 (L) 06/15/2020       Coags:  Lab Resul Spiritual Care    Disposition: ongoing goals of care discussions    Goals of Care discussion/Advance Care Planning counseling and discussion:  With patient, I discussed reason for palliative care consultation.     I discussed patient's current clinical cond for Yudi Duong. She agrees that he is too confused to make this decision. We also talked about meeting with [facility] for a hospice informational session. Cynthia agrees to meeting with hospice. Cynthia confirms that she is Yudi Duong' #1 HCPOA.  She say [id number]  6/15/2020  2:41 PM

## 2020-06-15 NOTE — CM/SW NOTE
Received MDO for Hospice. ARJUN contacted hospice liaison/Shayy and initiated referral. Per her request, ARJUN sent pt's clinical info to [facility] [place].     PLAN: Pending Hospice meeting w/ [facility]    ARJUN/LITA to remain available for britton

## 2020-06-16 NOTE — PROGRESS NOTES
Responded to Spiritual Care consult and anointing request. Patient declined visit and wanted to rest. Submitted anointing request.       06/15/20 0001   Clinical Encounter Type   Visited With Patient   Routine Visit Introduction   Patient's Supportive Stra

## 2020-06-16 NOTE — PROGRESS NOTES
[facility] Pharmacy Note:  Renal Adjustment for piperacillin/tazobactam (Bobby Young)    Patrick Webber is a 80year old male who has been prescribed piperacillin/tazobactam (ZOSYN) 3.375g every 8 hrs.   CrCl is estimated creatinine clearance is 18.6 mL/min (A) (based

## 2020-06-16 NOTE — PLAN OF CARE
This evening patient is comfortable, denies SOB/CP, and remains on the venturi mask with 14L O2 and 55% FiO2. Patient continues to receive IV Zosyn with the right Rhinorocket intact. Plan is for a hospice meeting tomorrow at 2pm to confirm goals of care.  B effectiveness of vasoactive medications to optimize hemodynamic stability  - Monitor arterial and/or venous puncture sites for bleeding and/or hematoma  - Assess quality of pulses, skin color and temperature  - Assess for signs of decreased coronary artery physical deficits and behaviors that affect risk of falls.   - Leslie fall precautions as indicated by assessment.  - Educate pt/family on patient safety including physical limitations  - Instruct pt to call for assistance with activity based on assessme respiratory difficulty  - Respiratory Therapy support as indicated  - Manage/alleviate anxiety  - Monitor for signs/symptoms of CO2 retention  Outcome: Progressing     Problem: SKIN/TISSUE INTEGRITY - ADULT  Goal: Skin integrity remains intact  Description

## 2020-06-16 NOTE — CM/SW NOTE
Hospice Team met with pts family, Larry April, partner, son, Mat and dtr, Cynthia, Palliative APN, Aleks Haas and Dr. Frankey Luria also present. Hospice Explanation given, all questions answered. Family initially requested time to discuss hospice at home vs GIP.   Hospice te

## 2020-06-16 NOTE — PALLIATIVE CARE NOTE
[facility]  Palliative Care Follow Up    Tracy Nelson Patient Status:  Inpatient    3/8/1932 MRN [id number]   Location [facility] 3W/SW Attending Juni Agrawal, [address] [facility] Day # 7 PCP Estrellita Kayser, MD     Date of Consult BLEEDING    Medications:     Current Facility-Administered Medications:   •  furosemide (LASIX) injection 40 mg, 40 mg, Intravenous, BID (Diuretic)  •  Morphine Sulfate (Concentrate) concentrated solution 10 mg, 10 mg, Oral, Once  •  Morphine Sulfate (Conc 06/16/2020       Coags:  Lab Results   Component Value Date    PT 31.8 (H) 09/26/2016    INR 1.30 (H) 06/10/2020    PTT 41.0 (H) 06/10/2020       Chemistry:  Lab Results   Component Value Date    CREATSERUM 2.09 (H) 06/16/2020    BUN 47 (H) 06/16/2020    N dyspnea and Ativan Intensol 1 mg PO Q 8 hours PRN anxiety. I also again confirmed patient's DNR/DNI status with [employer].  Today, Cynthia signed a POLST form confirming wishes for DNR, Comfort Focused Treatment and No medically administered means of nutritio comfort care/hospice.      Kevon COVARRUBIAS, SAGAR-BC, [facility], [id number]  6/16/2020  3:16 PM

## 2020-06-16 NOTE — PROGRESS NOTES
[facility]  Progress Note    Tasha Bowling Patient Status:  Inpatient    3/8/1932 MRN [id number]   Location [facility] 3W/SW Attending Carolann Vann, 1604 [facility] Day # 7 PCP Sonny Nageotte, MD     Assessment:    1.  Epistaxis.   Hemog Lab Results   Component Value Date    PT 31.8 (H) 09/26/2016    INR 1.30 (H) 06/10/2020     Lab Results   Component Value Date     06/16/2020    K 4.4 06/16/2020     06/16/2020    CO2 18.0 06/16/2020    BUN 47 06/16/2020    CREATSERUM 2.09

## 2020-06-16 NOTE — PROGRESS NOTES
[facility] Hospitalist Progress Note     CC: Hospital Follow up    PCP: Tangela Castano MD       Assessment/Plan:     Charito sofia(n) 80year old male w/ PMHx of Afib s/p Watchman, CAD, dCHF, eHTN, HLD, MR s/p mitraclip, pulmonary hypertension, GERD, c Thrombocytopenia  - likely consumptive  - trend      Dispo: palliative care consulted, code status updated to DNR/DNI, family meeting w/ hospice this afternoon    Karine Alexis DO  [facility] Hospitalist  Pager: [phone]  Answering Service: [phone] NEPRELIM 7.66  --  8.10*  --  5.21   WBC 9.7  --  10.5  --  7.0   .0*  --  114.0*  --  104.0*         Recent Labs   Lab 06/14/20  0741  06/15/20  0453 06/15/20  2104 06/16/20  0522   *  --  132*  --  88   BUN 64*  --  50*  --  47*   KEIRY mg Oral Nightly   • Vitamin B-12  250 mcg Oral BID   • umeclidinium-vilanterol  1 puff Inhalation Daily   • dilTIAZem HCl ER Coated Beads  120 mg Oral Daily   • Montelukast Sodium  10 mg Oral Nightly       ipratropium-albuterol, glycerin-Hypromellose-PEG 4

## 2020-06-17 PROBLEM — J44.9 COPD (CHRONIC OBSTRUCTIVE PULMONARY DISEASE) (HCC): Status: ACTIVE | Noted: 2020-01-01

## 2020-06-17 NOTE — DISCHARGE SUMMARY
[facility] Internal Medicine Discharge Summary   Patient ID:  Nat Reid  [id number]  80year old  3/8/1932    Admit date: 6/8/2020    Discharge date and time: 6/17/2020 11:17 AM     Attending Physician: No att. providers found     Primary Care Physician:  Nina Browne respiratory failure on 2-3L per [place]   Copd  pulm htn   gerd  Hypothyroidism  ckd3  Chronic anemia   Gout   Hospital acquired pneumonia     Plan  Acute on chronic resp faliure  HAP  - was on unasyn, developing rll infiltrate vs atelectasis w/ uptrend in pct (QBP=17405)    Result Date: 6/13/2020  PROCEDURE: US CHEST (CPT= 94873)  COMPARISON: [facility], XR CHEST AP PORTABLE (CPT=71045), 6/12/2020, 8:08 AM.  INDICATIONS: Left chest for effusion or atelectasis  FINDINGS: There is a small pleural (CPT=71045), 6/10/2020, 11:21 AM.  INDICATIONS: Shortness of breath today. History of atrial fibrillation, asthma, COPD, and hypertension. TECHNIQUE:   Single view. FINDINGS:  CARDIAC/VASC:   The heart is mildly enlarged.  Postop changes are seen about t increase in a small left loculated appearing left pleural effusion from previous study. Prominent pulmonary markings throughout the interstitium may be chronic. Mild cardiomegaly and normal pulmonary vascularity.     Dictated by (CST): Evelia Pfeiffer MD o

## 2020-06-17 NOTE — HOSPICE RN NOTE
GIP ADMIT   DX COPD  He is GIP to manage his dyspnea and agitation  POC discussed with Dr Mariano Fregoso and with Piyush ALCARAZ. Plan is to move the patient to the 4th floor when bed is available.

## 2020-06-17 NOTE — HOSPICE RN NOTE
POC was discussed with Dr Katarina Gomez. We will start Morphine drip to help with his dyspnea. POC discussed with Aung Martinez RN.

## 2020-06-17 NOTE — PROGRESS NOTES
[facility] AND [facility]  Progress Note    Cindy Solis Patient Status:  Inpatient    3/8/1932 MRN [id number]   Location [facility] 3W/SW Attending Timbo Gr MD   Hosp Day # 0 PCP Ellen Sosa MD     Assessment:    1.  No further nose (H) 09/26/2016    INR 1.30 (H) 06/10/2020           Lab Results   Component Value Date    TROP <0.045 05/14/2020    TROP 0.046 (HH) 05/14/2020    TROP <0.045 05/07/2020        Medications:    • scopolamine  1 patch Transdermal Q72H     • morphINE sulfate

## 2020-06-17 NOTE — PLAN OF CARE
Patient resting comfortably in bed this evening, denies pain or SOB. Venturi mask continued 14L, 55% FiO2. Right rhino-rocket in place. Plan for transition to hospice care today 6/17. Bed locked lowest position, call light within reach, bed alarm on.     Pr Assess quality of pulses, skin color and temperature  - Assess for signs of decreased coronary artery perfusion - ex.  Angina  - Evaluate fluid balance, assess for edema, trend weights  Outcome: Progressing  Goal: Absence of cardiac arrhythmias or at baseli including physical limitations  - Instruct pt to call for assistance with activity based on assessment  - Modify environment to reduce risk of injury  - Provide assistive devices as appropriate  - Consider OT/PT consult to assist with strengthening/mobilit Progressing     Problem: SKIN/TISSUE INTEGRITY - ADULT  Goal: Skin integrity remains intact  Description  INTERVENTIONS  - Assess and document risk factors for pressure ulcer development  - Assess and document skin integrity  - Monitor for areas of redness

## 2020-06-17 NOTE — H&P
[facility] Hospitalist H&P     CC: hospice      PCP: Sophy Jacobsen MD      Assessment and Plan     Waunita Lights a(n) 80year old male w/ PMHx of Afib s/p Watchman, CAD, dCHF, eHTN, HLD, MR s/p mitraclip, pulmonary hypertension, GERD, copd, hypothyroi (uptrend in BUN likely from ingestion of blood)   - received 1u pRBCs on 6/13, 1u on 6/14  - hemoglobin stable      - cont ppi bid for now      Cards  - hold AP/AC  - cards on consult  - resume home rate control and defer diuretics to cards       gerd  - p • Atrial fibrillation (HCC)    • Morris esophagus    • Bilateral carotid artery disease ([facility] 2/14/2018   • Calcification of abdominal aorta (HCC) 2/14/2018   • Chronic respiratory failure ([facility] 1/10/2018   • CKD (chronic kidney disease) stage 3, GFR 3 watchman device   • REPAIR  ANAL FISTULA,RECT ADV FLAP     • TRANSCATH MITRAL VALVE REPAIR VIA CORONARY SINUS     • WATCHMAN N/A 9/6/2016    Performed by Susan Mao MD at [facility] CVOR        ALL:    Coumadin [Warfarin]     BLEEDING    Comment:Needed 12 unit 06/16/20  0522 06/17/20  0524    142  --  139  --  136 137   K 4.0 3.1* 3.5 3.6 4.3 4.4 3.7    106  --  105  --  105 106   CO2 27.0 26.0  --  27.0  --  18.0* 22.0   BUN 48* 64*  --  50*  --  47* 46*   CREATSERUM 1.91* 1.84*  --  1.87*  --  2.09 cardiomegaly, prior cardiac surgery, and pulmonary vascular congestion as well as bilateral pleural effusions.  2.  There are bibasilar pulmonary opacities which could represent atelectasis however correlate for symptoms of pneumonia the right basal pulmona 5/14/2020, 6:49 PM.  INDICATIONS: worsening hypoxemia  TECHNIQUE:   Single view. FINDINGS:  CARDIAC/VASC: Mild cardiomegaly and normal pulmonary vascularity. MEDIAST/RACHEL:   Postsurgical changes seen in the mediastinum.   Mild mediastinal widening unchang

## 2020-06-18 NOTE — PLAN OF CARE
Problem: COPING  Goal: Pt/Family able to verbalize concerns and demonstrate effective coping strategies  Description  INTERVENTIONS:  - Assist patient/family to identify coping skills, available support systems and cultural and spiritual values  - Provid Palliative Care or initiate Family Care Conference as is appropriate  Outcome: Progressing     Plan of care discussed, verbalized understanding. All questions answered. Medications given via MAR. PRN meds given as needed for comfort  Dee placed.    Turn

## 2020-06-18 NOTE — HOSPICE RN NOTE
GIP DAY 2. PT IS A/O X1 AND VERY RESTLESS AND AGITATED. HOSPITAL RN IS GOING TO ADMINISTER IV PUSH LORAZEPAM TO HELP WITH PT AGITATION. PT IS ON MORPHINE DRIP AT 4MG/HOUR FOR DYSPNEA. PT IS ON 3L OF OXYGEN VIA NASAL CANNULA.  [address] [facility] KIERAN

## 2020-06-18 NOTE — PROGRESS NOTES
[facility] Hospitalist Progress note      CC: hospice      PCP: Cynthia Macias MD      Assessment and Plan     Matilde Cardozo kimberlyn(n) 80year old male w/ PMHx of Afib s/p Watchman, CAD, dCHF, eHTN, HLD, MR s/p mitraclip, pulmonary hypertension, GERD, copd, HD  - diuretics per cards, no further labs      Acute on Chronic anemia   - 2/2 epistaxis   - melena is also most likely from epistaxis, also has hx of small bowel AVM (uptrend in BUN likely from ingestion of blood)   - received 1u pRBCs on 6/13, 1u on 6/1 .9*  --  97.8  --  97.5  --  99.6 99.2   .0*  --  126.0*  --  114.0*  --  104.0* 121.0*    < > = values in this interval not displayed.        Recent Labs   Lab 06/13/20  0528 06/14/20  0741 06/14/20  1801 06/15/20  0453 06/15/20  2104 06/16 lower lung zones, and there are small bilateral pleural effusions. BONES: Mild degenerative disc disease and spondylosis without visible acute abnormalities. OTHER: Negative. CONCLUSION:  1.   There are findings suggesting CHF with cardiomegaly, p Date: 6/10/2020  PROCEDURE: XR CHEST AP PORTABLE  (CPT=71045) TIME: 1121 hours.    COMPARISON: [facility], XR CHEST PA + LAT CHEST (CPT=71046), 12/28/2019, 4:53 PM.  [facility], XR CHEST AP PORTABLE (CPT=71045), 5/14/2020, 6:

## 2020-06-18 NOTE — PROGRESS NOTES
Patient recieved from 3rd flr, resting comfortably in bed. Family at bedside. Morphine drip to be initiated. No distress noted.

## 2020-06-18 NOTE — PLAN OF CARE
Problem: COPING  Goal: Pt/Family able to verbalize concerns and demonstrate effective coping strategies  Description  INTERVENTIONS:  - Assist patient/family to identify coping skills, available support systems and cultural and spiritual values  - Provid Palliative Care or initiate Family Care Conference as is appropriate  Outcome: Progressing   Patient is alert to self, more drowsy this AM. Bed alarm on, patient attempted to get out of bed once overnight, unsteady gait.  Morphine drip increased to 2mg/hr d

## 2020-06-19 NOTE — PLAN OF CARE
Pt  at [facility]. Resusitation not attempted as pt was DNR.     Time of Death     Family Notified Daughter Miah Fontenot of [facility] Notified Jorge Luis Tamayos [phone]     contacted if applicable NA

## 2020-06-28 NOTE — DISCHARGE SUMMARY
[facility] Internal Medicine Discharge Summary   Patient ID:  Tony Stout  [id number]  80year old  3/8/1932    Admit date: 6/17/2020    Discharge date and time: 6/18/2020 10:38 PM     Attending Physician: Prachi att. providers found     Primary Care Physician: [place]

## 2021-05-25 VITALS
OXYGEN SATURATION: 97 % | WEIGHT: 129 LBS | HEART RATE: 52 BPM | HEIGHT: 60 IN | BODY MASS INDEX: 25.32 KG/M2 | DIASTOLIC BLOOD PRESSURE: 60 MMHG | SYSTOLIC BLOOD PRESSURE: 110 MMHG

## 2022-04-26 NOTE — PROGRESS NOTES
[facility] AND [facility]  Progress Note    Nat Reid Patient Status:  Inpatient    3/8/1932 MRN [id number]   Location [facility] 3W/SW Attending Latha Valerio MD   Hosp Day # 5 PCP Mary Tellez MD     Assessment:    1.  Acute exacerbation of  Exam:    General: Alert and oriented x 3,  No apparent distress. HEENT: No focal deficits. Neck: No JVD, carotids 2+ no bruits. Cardiac: Regular rate and rhythm, S1, S2 normal, 2/6 HSM  Lungs: Clear without wheezes, rales, rhonchi.   Normal excursions an 12-Mar-2021

## 2022-06-20 NOTE — CM/SW NOTE
Received MDO for Advanced Directives, true POA. Leslye ALCARAZ spoke with patient who states daughter Drea Rojas and partner Graeme Quintero are [place]. Called daughter Drea Rojas, she is going to visit patient today & will bring copy of POA forms.  Drea Rojas is primary [facility], son Violeta Dickson is [place]: Margarita calls this morning reporting unbearable discomfort with fetal movement. She shares that she's been dealing with end of pregnancy discomforts, contractions without relief, and lack of sleep. Since last night she feels the baby is in a posterior position and pushing feet toward umbilicus and back/butt toward her spine. She is tearful. We walked through some stretches and exercises that might allow baby to rotate, provided by Adelita, and discussed importance of not only for this acute situation but also ongoing through remainder of pregnancy. She is reassured and presents as calm by the end of our conversation. Offers no further questions at this time. Confirmed upcoming clinic appointment on Wednesday. Aware she may call with concerns.

## 2022-11-01 NOTE — PATIENT INSTRUCTIONS
Continue current medications    Please call the heart failure clinic if you notice a weight gain of 3 pounds overnight or 5 pounds or more in 5 days.      Monitor yourself for signs and symptoms of fluid overload, increased shortness of breath, difficulty No

## 2023-08-21 NOTE — H&P
[facility] Hospitalist Team  History and Physical     ASSESSMENT / PLAN:   79 y/o Mf w/ PMH CAD s/p CABG, severe MR s/p mitral clip w/ recurrent MR, Afib, HTN, HL, Morris esophagus, asthma/COPD who presented w/ dysphagia and SOB    Acute on chronic diastolic HF having more pain w/ swallowing and talking. He also feels progressively SOB. Pt presented to ED. Found to be in resp distress/CHF. Pt given IV lasix. When I saw the pt a few hours later, per family, resp status has much improved from first arrival to ED. Thoracic aorta atherosclerosis ([facility] 2/14/2018   • Thoracic arthritis ([facility] 2/14/2018   • Thoracic compression fracture ([facility] 2/14/2018   • Unspecified essential hypertension         PSH  Past Surgical History:  No date: CABG  No date: CATARACT  10/4/2016: taking differently: Take 150 mcg by mouth before breakfast. TAKE 1 TABLET(150 MCG) BY MOUTH BEFORE BREAKFAST ) Disp: 90 tablet Rfl: 3   allopurinol 100 MG Oral Tab Take 1 tablet (100 mg total) by mouth 2 (two) times daily.  Disp: 180 tablet Rfl: 3   umeclid 5 (moderate pain) of pneumonia appears new since 6/14/18. Short-term followup suggested to ensure resolution.    Dictated by (CST): Nickie Rodrigues MD on 7/15/2018 at 10:57     Approved by (CST): Nickie Rodrigues MD on 7/15/2018 at 10:59          CT chest reviewed personally and [facility]

## 2024-02-13 NOTE — PROGRESS NOTES
[facility] AND [facility]  Progress Note    Priyank Rose Patient Status:  Inpatient    3/8/1932 MRN [id number]   Location [facility] 3W/SW Attending Andre Sosa MD   Hosp Day # 2 PCP Manjinder Maldonado MD     Assessment:    1.   Fairly rapid respiratory d Readings from Last 2 Encounters:  07/17/18 : 128 lb 9.6 oz (58.3 kg)  07/03/18 : 135 lb (61.2 kg)      Physical Exam:    General: Alert and oriented x 3,  No apparent distress. HEENT: No focal deficits.   Neck: Normal jugular venous pressure, carotids 2+ n Nancy Gutierrez MD  7/17/2018  9:12 AM Initiate Treatment: Bactrim  mg-160 mg tablet: Take one tablet twice a day for 30 days. If bacterial culture results are negative, dosage will decrease to one tablet a day\\n\\nclindamycin 1 % topical gel: Apply a thin layer to affected areas of groin twice a day until clear. Patient may use this gel on face but is to use a Q-tip or popsicles stick to apply gel to face Render In Strict Bullet Format?: No Detail Level: Simple Plan: Discussed folliculitis vs hidradenitis supperativa (will conduct a bacterial culture during today’s visit)\\n\\nPatient will return for f/u visit in 6 weeks and possibly be initiated for Humira for HS, especially if bacterial culture does not show significant infection

## 2025-05-02 NOTE — IP AVS SNAPSHOT
Problem: Adult Inpatient Plan of Care  Goal: Absence of Hospital-Acquired Illness or Injury  Outcome: Progressing  Intervention: Identify and Manage Fall Risk  Recent Flowsheet Documentation  Taken 5/1/2025 2333 by Joshua Gleason RN  Safety Promotion/Fall Prevention:   room door open   room near nurse's station   safety round/check completed   activity supervised   clutter free environment maintained   nonskid shoes/slippers when out of bed  Intervention: Prevent Infection  Recent Flowsheet Documentation  Taken 5/1/2025 2333 by Joshua Gleason RN  Infection Prevention: hand hygiene promoted     Problem: Pain Acute  Goal: Optimal Pain Control and Function  Outcome: Progressing  Intervention: Optimize Psychosocial Wellbeing  Recent Flowsheet Documentation  Taken 5/1/2025 2333 by Joshua Gleason RN  Diversional Activities: television  Intervention: Develop Pain Management Plan  Recent Flowsheet Documentation  Taken 5/1/2025 2333 by Joshua Gleason RN  Pain Management Interventions: medication (see MAR)  Intervention: Prevent or Manage Pain  Recent Flowsheet Documentation  Taken 5/1/2025 2333 by Joshua Gleason RN  Sleep/Rest Enhancement:   awakenings minimized   room darkened   regular sleep/rest pattern promoted  Bowel Elimination Promotion: adequate fluid intake promoted  Complementary Therapy: (TV) other (see comments)  Medication Review/Management: medications reviewed     Problem: Wound  Goal: Absence of Infection Signs and Symptoms  Outcome: Progressing  Intervention: Prevent or Manage Infection  Recent Flowsheet Documentation  Taken 5/1/2025 2333 by Joshua Gleason RN  Infection Management: aseptic technique maintained  Isolation Precautions: protective environment maintained  Goal: Optimal Pain Control and Function  Outcome: Progressing  Intervention: Prevent or Manage Pain  Recent Flowsheet Documentation  Taken 5/1/2025 2333 by Joshua Gleason  [facility] [place] [address] [place], [address] ~ [phone]                After Visit Summary   1/25/2017    Patrick Webber    MRN: [id number]           Visit Information        Provider Department    1/25/2017 10:15 AM Jared Walsh RN  Pain Management Interventions: medication (see MAR)  Sleep/Rest Enhancement:   awakenings minimized   room darkened   regular sleep/rest pattern promoted  Complementary Therapy: (TV) other (see comments)     Problem: Infection  Goal: Absence of Infection Signs and Symptoms  Outcome: Progressing  Intervention: Prevent or Manage Infection  Recent Flowsheet Documentation  Taken 5/1/2025 2333 by Joshua Gleason RN  Infection Management: aseptic technique maintained  Isolation Precautions: protective environment maintained   Goal Outcome Evaluation:  Pt alert and oriented. VS baseline. Afebrile. Calm and pleasant. Denies having pain or discomfort. No respiratory distress noted. On nasal cannula with 3 LPM. Slept most of the shift.wound dressing intact. All needs were attend. Call bell with in reach. Will cont to monitor       Joshua Nguyen RN                  Vitamin B-12 250 MCG Oral Tab Take 250 mcg by mouth 2 (two) times daily. docusate sodium 100 MG Oral Cap Take 200 mg by mouth 2 (two) times daily. Ferrous Sulfate 325 (65 FE) MG Oral Tab Take 325 mg by mouth 2 (two) times daily.           Patient [address])    [address] [address]  [facility] [address]   [phone]            Feb 10, 2017  1:30 PM   [place] Phase 2 Cardiac Rehab with [facility] PHASE 2 [address] Cardiopulmonary Rehab ([address]  [facility] [address]   [phone]            Mar 01, 2017  1:30 PM   [place] Phase 2 Cardiac Rehab with [facility] THE [facility] PHASE 2 [address] Cardiopulmonary Rehab ([address])    [address]

## (undated) DEVICE — ENDOSCOPY PACK UPPER: Brand: MEDLINE INDUSTRIES, INC.

## (undated) DEVICE — ENDOSCOPY PACK - LOWER: Brand: MEDLINE INDUSTRIES, INC.

## (undated) DEVICE — LINE MNTR ADLT SET O2 INTMD

## (undated) DEVICE — Device: Brand: DEFENDO AIR/WATER/SUCTION AND BIOPSY VALVE

## (undated) DEVICE — REM POLYHESIVE ADULT PATIENT RETURN ELECTRODE: Brand: VALLEYLAB

## (undated) DEVICE — CONMED SCOPE SAVER BITE BLOCK, 20X27 MM: Brand: SCOPE SAVER

## (undated) DEVICE — FIAPC® PROBE W/ FILTER 2200 A OD 2.3MM/6.9FR; L 2.2M/7.2FT: Brand: ERBE

## (undated) DEVICE — STEERABLE GUIDE CATHETER: Brand: STEERABLE GUIDE CATHETER

## (undated) DEVICE — FORCEP RADIAL JAW 4

## (undated) NOTE — IP AVS SNAPSHOT
[facility]            (For Outpatient Use Only) Initial Admit Date: 11/18/2018   Inpt/Obs Admit Date: Inpt: 11/18/18 / Obs: N/A   Discharge Date:    Constance Rodriguez:  [de-identified]   MRN: [de-identified]   CSN: [id number]        ENCOUNTER  Patient Cla Subscriber ID:  Pt Rel to Subscriber:    Hospital Account Financial Class: Medicare    November 27, 2018

## (undated) NOTE — Clinical Note
January 8, 2017          Eufemia Delong  [facility]      Dear Eduardo Lugo: The results from your recent tests ordered by Fuad Curry MD are normal.  Please review the list of test results.   Your result is the number on the left;

## (undated) NOTE — ED AVS SNAPSHOT
Daniela Morales   MRN: [id number]    Department:  [facility] Emergency Department   Date of Visit:  10/7/2018           Disclosure     Insurance plans vary and the physician(s) referred by the ER may not be covered by your plan.  Please contact within the next three months to obtain basic health screening including reassessment of your blood pressure.     IF THERE IS ANY CHANGE OR WORSENING OF YOUR CONDITION, CALL YOUR PRIMARY CARE PHYSICIAN AT ONCE OR RETURN IMMEDIATELY TO THE EMERGENCY DEPARTMEN

## (undated) NOTE — Clinical Note
I saw Mr Khushbu Aguirre today in the CHF clinic. Doing well since d'c on torsemide 20 mg daily. K+ 2.9 - I gave him 40 meq kdur in clinic and started him on 40 meq daily. Repeat labs on Wednesday. He sees his cardiologist on Thursday.  Will follow up in CHF clinic

## (undated) NOTE — LETTER
[address] [facility] FOR BLOOD OR BLOOD COMPONENTS      In the course of your treatment, it may become necessary to administer a transfusion of blood or blood components.  This form provides basic information concerning this proc explain the alternatives to you if it has not already been done. I,Bennie Senior, have read/had read to me the above. I understand the matters bearing on the decision whether or not to authorize a transfusion of blood or blood components.  I have no ques

## (undated) NOTE — Clinical Note
I saw Pepe Goldberg today in the HF clinic. He is looking much better, less dyspnea and agitation, diuresed 5 pounds, BP normal and orthostatic dizziness resolved off hydralazine. Kidney function only slightly decreased, BUN 59 (47), creatinine 2.0 (1.97).   K2.9

## (undated) NOTE — IP AVS SNAPSHOT
[facility]            (For Outpatient Use Only) Initial Admit Date: 6/1/2018   Inpt/Obs Admit Date: Inpt: 6/1/18 / Obs: N/A   Discharge Date:    Agnes Bolanos:  [de-identified]   MRN: [de-identified]   CSN: [id number]        ENCOUNTER  Patient Class: Subscriber ID:  Pt Rel to Subscriber:    Hospital Account Financial Class: Medicare    June 6, 2018

## (undated) NOTE — IP AVS SNAPSHOT
[facility]            (For Outpatient Use Only) Initial Admit Date: 5/6/2020   Inpt/Obs Admit Date: Inpt: 5/6/20 / Obs: N/A   Discharge Date:    Link Pattee:  [de-identified]   MRN: [de-identified]   CSN: [id number]   CEID: [id number]        [facility] Number:  Insurance Type:    Subscriber Name:  Subscriber :    Subscriber ID:  Pt Rel to Subscriber:    Hospital Account Financial Class: Medicare    May 13, 2020

## (undated) NOTE — LETTER
[facility] [address], [address] [place] [address]    Consent for Anesthesia   1.   IPriscilla agree to be cared for by an anesthesiologist, who is specially trained to monitor me and give me medicine to put me to sleep or keep me comforta vision, nerves, or muscles and in extremely rare instances death. 5. My doctor has explained to me other choices available to me for my care (alternatives).   6. Pregnant Patients (“epidural”):  I understand that the risks of having an epidural (medicine g

## (undated) NOTE — LETTER
[address], [place]  Authorization for Invasive Procedures  1.  I hereby authorize Dr. Edna Bullock , my physician and whomever may be designated as the doctor's assistant, to perform the following operation and/or procedure:  Shaji Chris performed for the purposes of advancing medicine, science, and/or education, provided my identity is not revealed. If the procedure has been videotaped, the physician/surgeon will obtain the original videotape.  The hospital will not be responsible for stor My signature below affirms that prior to the time of the procedure, I have explained to the patient and/or his legal representative, the risks and benefits involved in the proposed treatment and any reasonable alternative to the proposed treatment.  I have

## (undated) NOTE — ED AVS SNAPSHOT
[facility]   MRN: [id number]    Department:  [facility] Emergency Department   Date of Visit:  12/1/2019           Disclosure     Insurance plans vary and the physician(s) referred by the ER may not be covered by your plan.  Please contact within the next three months to obtain basic health screening including reassessment of your blood pressure.     IF THERE IS ANY CHANGE OR WORSENING OF YOUR CONDITION, CALL YOUR PRIMARY CARE PHYSICIAN AT ONCE OR RETURN IMMEDIATELY TO THE EMERGENCY DEPARTMEN

## (undated) NOTE — Clinical Note
I saw Lexie Montelongo today in the HF clinic. He is up 5 lbs since dc. His bun 51, cr 1.81, K 3.2, sodium 132, I gave him bumex 1 mg and kdur 40 meq. His bp was 112/60's, I increased his hydralazine to 10 mg tid and limited his fluids to 48oz/day. HGB is 8. 5. he is

## (undated) NOTE — Clinical Note
I saw Martín Eli today . He is doing well, fluid status is stable with addition of metolazone 5 mg 2x weekly. Bun up to 79, cr stable 2.03, K 2.9.  I gave him Kdur 40 meq x2 today, increase kdur to 20 meq bid, holding metolazone tomorrow 6/6 and cutting down to me

## (undated) NOTE — LETTER
[facility]  Paulette Feliciano [address] [employer], [address] [place] [address]    Consent for Operation    Date: __________________    Time: _______________    1. I authorize the performance upon Tara Corona the following operation:    Mitraclip    2.  I authorize Dr. Adriana Luis videotape. The [facility] will not be responsible for storage or maintenance of this tape. 6. For the purpose of advancing medical education, I consent to the admittance of observers to the Operating Room.     7. I authorize the use of any specimen, organs Signature of Patient:   ___________________________    When the patient is a minor or mentally incompetent to give consent:  Signature of person authorized to consent for patient: ___________________________   Relationship to patient: _____________________ drugs/illegal medications). Failure to inform my anesthesiologist about these medicines may increase my risk of anesthetic complications. · If I am allergic to anything or have had a reaction to anesthesia before.     3. I understand how the anesthesia med I have discussed the procedure and information above with the patient (or patient’s representative) and answered their questions. The patient or their representative has agreed to have anesthesia services.     _______________________________________________

## (undated) NOTE — Clinical Note
I saw Mr Jojo Solis today in clinic. He was markedly dyspenic and hypoxic on room air after walking 20 feet.  I had him walk in the clinic and on 3L his oxygen sat ~84%, it took nearly 3 min rest with o2 on before he was >90% (he likes to take it off immediate

## (undated) NOTE — LETTER
[facility], [place]  Authorization for Invasive Procedures  1.  I hereby authorize  Dr. Stiven Magallanes , my physician and whomever may be designated as the doctor's assistant, to perform the following operation and/or procedure:  Right He performed for the purposes of advancing medicine, science, and/or education, provided my identity is not revealed. If the procedure has been videotaped, the physician/surgeon will obtain the original videotape.  The hospital will not be responsible for stor My signature below affirms that prior to the time of the procedure, I have explained to the patient and/or his legal representative, the risks and benefits involved in the proposed treatment and any reasonable alternative to the proposed treatment.  I have

## (undated) NOTE — IP AVS SNAPSHOT
Patient Demographics     Address  [place] [address] Phone  [phone] [facility])  [phone] (Mobile) *Preferred* E-mail Address  [email or web address]      Emergency Contact(s)     Name Relation Home Work Mobile    Jeanette Sotelo · Nausea or profuse sweating. · Increasing shortness of breath with activity or at rest.  · If your pain medication is not relieving your pain. WOUND HEALING:  · If you notice minor bleeding from the puncture wound, please do the following things.  If i Heart Failure Guidelines - place this worksheet on your refrigerator or somewhere you can refer to it daily to help you decide if your symptoms are under control, and what to do if they are not.      Home Care Instructions Following Heart Failure - the most Go to the Emergency Room If:   · You have pain or tightness in your chest  · You are extremely short of breath  · You are coughing up pink-frothy mucus  · You are traveling and develop symptoms of worsening heart failure    Additional Resources:   · If you Generic drug:  DilTIAZem HCl ER Coated Beads  Next dose due:  9/6/2018 9 AM  Notes to patient:  Gillian Benavidezs your heart rhythm       TK ONE C PO  QD   Deon Shepherd MD         Clopidogrel Bisulfate 75 MG Tabs  Commonly known as:  PLAVIX  Next dose due:  9/6/20 Take 1 tablet (20 mEq total) by mouth daily. ROVERTO Basurto         simvastatin 40 MG Tabs  Commonly known as:  ZOCOR  Next dose due:  9/6/2018 9 AM  Notes to patient:  Cholesterol pill       Take 1 tablet (40 mg total) by mouth once daily. [id number] famoTIDine (PEPCID) tab 20 mg (Or Linked Group #2) 09/05/18 0913 Given      [id number] ferrous sulfate EC tab 325 mg 09/04/18 2023 Given      [id number] ferrous sulfate EC tab 325 mg 09/05/18 0913 Given      [id number] torsemide (DEMADEX) tab 20 mg Ordering provider:  ROVERTO Benjamin  09/04/18 1536 Resulting lab:  [facility]    Specimen Information    Type Source Collected On   Blood — 09/04/18 1430          Components    Component Value Reference Range Flag Lab   Magnesium 2.1 1.8 - 2.5 mg/d Author:  Tanvi Lr MD Service:  Cardiology Author Type:  Physician    Filed:  8/31/2018  9:23 AM Date of Service:  8/27/2018  1:01 PM Status:  Addendum    :  Tanvi Lr MD (Physician)    Related Notes:  Original Note by Conchis Sanchez, • [facility] 2/14/2018   • Calcification of abdominal aorta (HCC) 2/14/2018   • Chronic respiratory failure ([facility] 1/10/2018   • Congestive heart disease (HCC)    • COPD (chronic obstructive pulmonary disease) ([facility]     on home o HEART [[SW.1]x[HM.1] ] [ ][SW.1] 3/6 HSM[HM. 1]   LUNGS [[SW.1]x[HM.1] ] [[SW.1]x[HM.1] ]    ABDOMEN [[SW.1]x[HM.1] ] [ ][SW.1] + hernia[HM. 1]   UROGENITAL [[SW.1]x[HM.1] ] [[SW.1]x[HM.1] ]    EXTREMITIES [[SW.1]x[HM.1] ] [[SW.1]x[HM.1] ]    OTHER        [ experiencing significant shortness of breath over the last few months, and his MR on echo is worse, despite 2 functioning clips. He was admitted for HF symptoms at [facility] in July. He underwent PCI in August, without improvement of his symptoms. No date: CABG  No date: CATARACT  10/4/2016: EGD      Comment: Procedure: ESOPHAGOGASTRODUODENOSCOPY (EGD);                  Surgeon: Tony Grey MD;  Location: [facility]                ENDOSCOPY  10/12/2016: EGD N/A      Comment: Procedure: [id number] Consults signed by Delfina Jaimes MD at 9/2/2018 11:41 AM     Author:  Delfina Jaimes MD Service:  Rehab Author Type:  Physician    Filed:  9/2/2018 11:41 AM Date of Service:  9/2/2018 11:34 AM Status:  Signed    :  Delfina Jaimes MD (Physician) mobility/ambulation, transfers, ADL's, IADL's. Support System and Family Circumstances (i.e., potential caregivers): spouse / significant other  Home Environment / Accessibility: 2-story house, he can stay on the first floor.   There is one step landing an Surgeon: Danny Kelly MD;  Location: [facility]                ENDOSCOPY  No date: OTHER SURGICAL HISTORY      Comment: watchman device  No date: REPAIR  ANAL FISTULA,RECT ADV FLAP  No date: TRANSCATH MITRAL VALVE REPAIR VIA CORONARY SIN*      [C atorvastatin (LIPITOR) tab 20 mg 20 mg Oral Nightly   torsemide (DEMADEX) tab 20 mg 20 mg Oral Daily   umeclidinium-vilanterol (ANORO ELLIPTA) 62.5-25 MCG/INH inhaler 1 puff 1 puff Inhalation Daily   Vitamin B-12 (VITAMIN B12) tab 250 mcg 250 mcg Oral Nigh and gums normal. Moist mucous membranes. Neck: No neck masses or thyroid enlargement/tenderness/nodules. Lungs:   Resonant, clear but diminished breath sounds, quiet accessory muscles. Chest wall:  No tenderness or deformity.    Cardiovascular: reassess to see if we can get him into an acute rehab stay program. Discuseds with significant other                                       Advance directives reviewed and noted.           Would be happy to reassess should medical and/or functional status ch - Procedure information:  A transthoracic complete 2D study was performed. Additional evaluation included M-mode, complete spectral Doppler, and color Doppler. Inpatient. Bedside. Comparison was made to the study of 07/26/2017.     This was a STAT echocardi volume was moderately to markedly increased. Systolic function was normal. Right atrium:  The atrium was moderately to markedly dilated. Mitral valve: An [id number] MitraClip was present and functioning normally. Doppler: There was mild regurgitation. ---------   Left atrium                                     Value        Reference  LA volume, S                            (H)     151   ml     18 - 58  LA volume/bsa, S                        (H)     93    ml/m^2 16 - 28  LA volume, ES, 1-p A4C Legend: (L)  and  (H)  terrell values outside specified reference range. ---------------------------------------------------------------------------- Prepared and electronically signed by Zenon Spatz.  Lorie Rosario M.D. 09/01/2018 10:36         Cath Angiogram Results (HF

## (undated) NOTE — Clinical Note
I follow Mr Mary Jo Olvera in the CHF clinic. I will be seeing him a few days prior to your scheduled appointments. His renal function has remained stable and Hgb stable 11.4.  His KRAMER has progressively been getting worse so I started him on short course of steroi

## (undated) NOTE — LETTER
[facility] [address], [address]  Consent for Procedure/Sedation    Date:     Time:       1. I authorize the performance upon Patrick Lawanda the following: TRANSCATHETER MITRAL VALVE REPAIR    2.  I authorize Lisset Burns (and whom ________________________________    ___________________    Witness: _________________________      Date: ___________________    Printed: 2018   9:14 AM  Patient Name: Daniela Morales        : 3/8/1932       Medical Record #: [id number]

## (undated) NOTE — LETTER
[facility] [address], [address]  Consent for Procedure/Sedation    Date:     Time:       1.  I authorize the performance upon Renaldo Shown the following:cardiac catheterization, left ventricular cineangiography, bilateral arslan period, the physician will determine when the applicable recovery period ends for purposes of reinstating the Do Not Resuscitate (DNR) order.     Signature of Patient: ____________________________________________________    Signature of person authorized

## (undated) NOTE — LETTER
[facility] [address], [address]  Consent for Procedure/Sedation    Date:        Time:       1. I authorize the performance upon Penelope Lamb the following:  Transesophageal Echocardiogram     2.  I authorize Dr. Frandy Okeefe (and vernone ________________________________    ___________________    Witness: _________________________      Date: ___________________    Printed: 2018   4:38 PM  Patient Name: Angie Kiser        : 3/8/1932       Medical Record #: [id number]

## (undated) NOTE — Clinical Note
CXR without fluid. I think he is slowly declining and everyone is in denial. I started him on the prednisone and will see him right after Thanksgiving, before your visit in December. Thank you for the help today.  - V

## (undated) NOTE — IP AVS SNAPSHOT
[address]  [address] [facility] [phone]                Discharge Summary   4/9/2017    Kassy Waldron           Admission Information        Provider Department    4/9/2017 Marcia Knox MD [facility] 5sw/Se Commonly known as:  [facility]   Next dose due: Take tonight at 9:00pm        Take 200 mg by mouth 2 (two) times daily. famoTIDine 20 MG Tabs   Commonly known as:  PEPCID   Next dose due:   Take tonight before bed at 9:00pm. torsemide 10 MG Tabs   Commonly known as:  DEMADEX   Next dose due: Take tomorrow morning. Take 20 mg by mouth daily.                             Vitamin B-12 250 MCG Tabs   Commonly known as:  VITAMIN B12   Next dose due: WBC RBC Hemoglobin Hematocrit MCV MCH MCHC RDW Platelet MPV    (28/86/66)  8.3 (04/13/17)  2.62 (L) (04/13/17)  8.4 (L) (04/13/17)  24.8 (L) (04/13/17)  94.7 -- -- -- (04/13/17)  125 (L) (04/13/17)  7.4    (04/12/17)  9.7 (04/12/17)  2.74 (L) (04/13/17) - If you are a smoker or have smoked in the last 12 months, we encourage you to explore options for quitting.     - If you have concerns related to behavioral health issues or thoughts of harming yourself, contact [facility] a your medications while at home.          Blood Pressure and Cardiac Medications     CARTIA  MG Oral Capsule SR 24 Hr         Use: Treat abnormal blood pressure (high or low), cardiac conditions; and/or abnormal heart rates/rhythms   Most common side e generally include: diarrhea, constipation, headache, allergic reaction (itching, rash, hives)   What to report to your healthcare team: Pain, nausea/vomiting, no bowel movement in 2+ days, diarrhea           Respiratory Medications     Tiotropium Bromide M

## (undated) NOTE — IP AVS SNAPSHOT
Patient Demographics     Address  [place] [id number] Phone  [phone] [facility])  [phone] (Mobile) *Preferred* E-mail Address  [email or web address]      Emergency Contact(s)     Name Relation Home Work Mobile    Jeanette Sotelo TAKE these medications       Instructions Authorizing Provider Morning Afternoon Evening As Needed   Albuterol Sulfate  (90 Base) MCG/ACT Aers  Next dose due:  As needed every 6 hours      Inhale 2 puffs into the lungs every 6 (six) hours as needed Take 1 tablet (40 mg total) by mouth once daily. Kortney Ring MD         torsemide 20 MG Tabs  Commonly known as:  DEMADEX  Start taking on:  6/7/2018  Next dose due: Tomorrow morning      Take 1 tablet (20 mg total) by mouth daily.    Sada Carlson [id number] atorvastatin (LIPITOR) tab 20 mg 06/05/18 2010 Given      [id number] docusate sodium (COLACE) cap 200 mg 06/05/18 2011 Given      [id number] docusate sodium (COLACE) cap 200 mg 06/06/18 0927 Given      [id number] ferrous sulfate EC tab 325 mg 06/05 CBC W/ DIFFERENTIAL[[id number]]          Abnormal            Final result                 Please view results for these tests on the individual orders.     Specimen Information    Type Source Collected On   Blood — 06/06/18 0638            BASIC METABOLIC PA H&P signed by Geneva Gonzalez MD at 6/1/2018  6:54 PM  Version 1 of 1    Author:  Geneva Gonzalez MD Service:  (none) Author Type:  Physician    Filed:  6/1/2018  6:54 PM Date of Service:  6/1/2018  5:55 PM Status:  Signed    :  Geneva Gonzalez medications. Further recommendations pending patient's clinical course.   [facility] hospitalist to continue to follow patient while in house    Patient and/or patient's family given opportunity to ask questions and note understanding and agreeing with therape • History of gastrointestinal bleeding 1/10/2018   • Hx of CABG 2/14/2018   • Hypothyroid    • Hypothyroidism, unspecified 1/10/2018   • Kyphosis of thoracic region 2/14/2018   • MI, old    • Pulmonary hypertension ([facility] [address] 2/14/2018   • Right to left cardiac Pulm: poor airmovement b/l but no wheezing heard  CV: irreg, irreg,  No murmurs, rubs, gallops  Abd: Abdomen soft, nontender, nondistended, no organomegaly, bowel sounds present  MSK:no clubbing, no cyanosis.   No Lower extremity edema  Skin: no rashes or l chest and hyperinflation of the lungs suggestive of COPD. Small bilateral pleural effusions. Extensive increased reticular markings are seen within the bilateral hemithoraces which has progressed since 7/24/16 and may represent fibrosis versus edema.   PETRA with pulmonary edema. Mild bilateral peribronchial cuffing. NO lobar consolidations or pulmonary masses seen. PLEURA: Stable small bibasilar pleural effusions. NO pneumothoraces. HEART/MEDIASTINUM: Mild cardiomegaly.  Mild mediastinal widening likely relate 3. COPD  - Anoro  - bd protocol  4. Right wrist pain - ?gout  - wrist x-ray pending  5. PPx  - scds[BC. 2]    Thanks.  Will follow    Italo Chavarria MD  Pulmonary and Critical Care Medicine      History of Present Illness:   Mr. Khushbu Aguirre is a 80year old wi Comment: Procedure: ESOPHAGOGASTRODUODENOSCOPY (EGD); Surgeon: Simran Roy MD;  Location: [address] [place] [address]                ENDOSCOPY  10/12/2016: EGD N/A      Comment: Procedure: ESOPHAGOGASTRODUODENOSCOPY (EGD);                  Surgeon: [place], Multiple Vitamin (MULTI VITAMIN MENS) Oral Tab Take 1 tablet by mouth daily. Disp:  Rfl:  6/1/2018 at Unknown time   famoTIDine 20 MG Oral Tab Take 20 mg by mouth nightly.  Disp:  Rfl:  6/1/2018 at Unknown time   Vitamin B-12 250 MCG Oral Tab Take 250 mcg b famoTIDine 20 MG Oral Tab Take 20 mg by mouth nightly. Disp:  Rfl:    Vitamin B-12 250 MCG Oral Tab Take 250 mcg by mouth 2 (two) times daily. Disp:  Rfl:    docusate sodium 100 MG Oral Cap Take 200 mg by mouth 2 (two) times daily.    Disp:  Rfl:    Cathie Daigle BC.2 Khari Duval MD on 6/4/2018 11:14 AM                        Discharge Summaries - D/C Summary      Discharge Summaries signed by Rivera Blandon DO at 6/6/2018  2:50 PM  Version 2 of 2    Author:  Rivera Blandon DO Service:  (none) Author Type:  Phy CABG, severe MR s/p mitral clip,  HTN, HL, afib on aspirin (sig leeding on coumadin/eliquis), b/l carotid artery disease, GERD with catalan esophagus, asthma/COPD on chronic 3 L, hypothyroidism, gout who presented with worsening SOB for several days and re leeding on coumadin/eliquis), b/l carotid artery disease  -Cards following  -cont home meds as tolerated, torsemide resumed 6/4/18  -d/w family, was taken off aspirin too due to continued issues with bleeding  -stress test as above     COPD, chronic 3L  -n by (CST): Bruna Bautista MD on 6/04/2018 at 12:27     Approved by (CST): Bruna Bautista MD on 6/04/2018 at 12:39            Discharge Instructions     Medication List      START taking these medications    ipratropium-albuterol 0.5-2.5 (3) MG/3ML Gelacio Field MD In 1 week.     Specialty:  PULMONARY DISEASES  Why:  discuss if able to qualify for pulmonary rehab, plan repeat pulmonary function test  Contact information:  [address] [place] [address] Related Notes:  Addendum by Josesito Dash DO (Physician) filed at 6/6/2018  2:50 PM       [facility] Hospitalist Discharge Summary   Patient ID:  Angie Kiser  [id number]  80year old  3/8/1932    Admit date: 6/1/2018  Discharge date: 6/6/2018  Primary Care Ph -likely due to CHF which improved back to baseline, has severe TR however would not affect resp symptoms, has hx severe MR s/p mitral clip which is functioning well  -DDIMER neg, Trop normal  -symptoms better with IV diuretic, transitioned to torsemide po -range from 9-11 in the last several months  -was 11's prior to admission but did have recent large nose bleed  -hgb 9.8->10.3->10.0->9.5->9.4,  monitor closely, no current active bleeding     Hypothyroidism  -cont home med     GERD, history of barretts  - Generic drug:  DilTIAZem HCl ER Coated Beads  TK ONE C PO  QD     docusate sodium 100 MG Caps  Commonly known as:  COLACE     famoTIDine 20 MG Tabs  Commonly known as:  PEPCID     Ferrous Sulfate 325 (65 Fe) MG Tabs     Levothyroxine Sodium 150 MCG Tabs  C [address] [place] [address] [address]. Schedule an appointment as soon as possible for a visit in 1 week. Why:  call after discharge from rehab  Contact information:  [address] bilateral hemidiaphragms with increased anterior posterior diameter of the chest and hyperinflation of the lungs suggestive of COPD. Small bilateral pleural effusions.    Extensive increased reticular markings are seen within the bilateral hemithoraces whic diffuse groundglass, reticulonodular and interstitial opacities compatible with pulmonary edema. Mild bilateral peribronchial cuffing. NO lobar consolidations or pulmonary masses seen. PLEURA: Stable small bibasilar pleural effusions. NO pneumothoraces.  HE ------------------------------------------------------------------- Study Conclusions 1. Left ventricle: The cavity size was normal. Wall thickness was    increased in a pattern of mild LVH. Systolic function was mildly    reduced.  The estimated ejection f Findings Left ventricle: The cavity size was normal. Wall thickness was increased in a pattern of mild LVH. Systolic function was mildly reduced. The estimated ejection fraction was 45-50%.  The study was not technically sufficient to allow evaluation of L ratio, ED            1.03         1.04       -----------  LV end-diastolic       (H)     870   ml     ---------- 67 - 155  volume, Teichholz MM  LV end-diastolic       (H)     242   ml/m^2 ---------- 35 - 75  volume/bsa, Teichholz  MM  LV e&apos;, lateral -----------  gradient   Right atrium                   Value        10/11/2016 Reference  RA volume, ES, A/L             128   ml     ---------- -----------  RA volume/bsa, ES, A/L (H)     77.2  ml/m^2 ---------- 11.0 - 39.0   Systemic veins PT Discharge Recommendations: Home with home health PT     PLAN  PT Treatment Plan: Bed mobility; Endurance; Energy conservation;Patient education;Gait training;Balance training;Stair training;Transfer training    SUBJECTIVE  I feel good today.  My wrist does Patient End of Session: Up in chair;Needs met;Call light within reach;RN aware of session/findings; All patient questions and concerns addressed; Family present    CURRENT GOALS     Goals to be met by:   Patient Goal Patient's self-stated goal is: go home Participated in ROM activities to B LEs. C/o pain in R wrist and L ankle. Sit to stand with minimal assist,ambulated 50 ft with RW with CGA. O2 sats after and during ambulation 91-92%. At the end of session,applied a hot pack to R wrist with RN permission. Gait Assistance: Minimum assistance  Distance (ft): 50  Assistive Device: Rolling walker  Pattern: Within Functional Limits  Stoop/Curb Assistance: Not tested       Additional information: Family anxious to increase ambulation while in [facility] COPD exacerbation ([facility] [address]    Dyspnea, unspecified type      ASSESSMENT   Pt seen in cooperation with PT after nurse. Aidan Villalta approves pt participation. Pt received supine in bed having just completed a breathing treatment with his partner and dtr present. -   Putting on and taking off regular upper body clothing?: None  -   Taking care of personal grooming such as brushing teeth?: None  -   Eating meals?: None    AM-PAC Score:  Score: 21  Approx Degree of Impairment: 32.79%  Standardized Score (AM-PAC Scale 6/29/2018 1:30 PM Jerome Zamora MD [address]